# Patient Record
Sex: MALE | Race: WHITE | NOT HISPANIC OR LATINO | Employment: OTHER | URBAN - METROPOLITAN AREA
[De-identification: names, ages, dates, MRNs, and addresses within clinical notes are randomized per-mention and may not be internally consistent; named-entity substitution may affect disease eponyms.]

---

## 2017-01-03 DIAGNOSIS — R18.8 CIRRHOSIS OF LIVER WITH ASCITES, UNSPECIFIED HEPATIC CIRRHOSIS TYPE: ICD-10-CM

## 2017-01-03 DIAGNOSIS — C22.0 HCC (HEPATOCELLULAR CARCINOMA): Primary | ICD-10-CM

## 2017-01-03 DIAGNOSIS — Z76.82 LIVER TRANSPLANT CANDIDATE: ICD-10-CM

## 2017-01-03 DIAGNOSIS — K74.60 CIRRHOSIS OF LIVER WITH ASCITES, UNSPECIFIED HEPATIC CIRRHOSIS TYPE: ICD-10-CM

## 2017-01-03 PROBLEM — B18.2 HEP C W/O COMA, CHRONIC: Status: ACTIVE | Noted: 2017-01-03

## 2017-01-05 ENCOUNTER — TELEPHONE (OUTPATIENT)
Dept: TRANSPLANT | Facility: CLINIC | Age: 57
End: 2017-01-05

## 2017-01-05 NOTE — TELEPHONE ENCOUNTER
CAN faxed completed referral to [facility] for check in on 1/17/17 and check out on 1/19/17.  Sunil at  not available this evening to confirm availability.   CAN spoke with pts wife to have her call tomorrow to check for availability since referral has been faxed already.    CAN remains available for all transplant resources, education and psychosocial support.

## 2017-01-05 NOTE — TELEPHONE ENCOUNTER
----- Message from Sarah Perry sent at 1/5/2017  3:20 PM CST -----  Patients coming for procedures on 1/18 and 1/19.  He's requesting Argyle Milnor 1/17-1/19    [phone] (H)  [phone] (M)

## 2017-01-12 DIAGNOSIS — C22.0 HCC (HEPATOCELLULAR CARCINOMA): Primary | ICD-10-CM

## 2017-01-16 ENCOUNTER — TELEPHONE (OUTPATIENT)
Dept: TRANSPLANT | Facility: CLINIC | Age: 57
End: 2017-01-16

## 2017-01-16 NOTE — TELEPHONE ENCOUNTER
TACE scheduled 1/18/17 @ 1130.  Patient and wife notified.  Medication and allergy cards reviewed.  Pre-procedure instructions reviewed, including: fasting after midinight 1/17/17.  Understanding and agreement expressed.

## 2017-01-16 NOTE — TELEPHONE ENCOUNTER
"States patient had a little nausea this morning but is feeling better now. States patient thinks his symptoms are related to the antibiotic and thinks a stronger "stomach pill."     Advised I will review with MD and notify of recommendations.  Understanding expressed.      ----- Message from Mariama Hernandez sent at 1/16/2017  1:23 PM CST -----  Contact: Barbar/wife  Wants to speak with Coord pt is having a hard time tolerating some meds and wanted to talk with her about this, pt thinks it is the antibiotic, please call her @ # [phone].    "

## 2017-01-17 DIAGNOSIS — C22.0 HCC (HEPATOCELLULAR CARCINOMA): Primary | ICD-10-CM

## 2017-01-18 ENCOUNTER — HOSPITAL ENCOUNTER (INPATIENT)
Facility: HOSPITAL | Age: 57
LOS: 9 days | Discharge: LEFT BEFORE DISCHARGE | DRG: 640 | End: 2017-01-27
Attending: INTERNAL MEDICINE | Admitting: RADIOLOGY
Payer: MEDICARE

## 2017-01-18 ENCOUNTER — RESEARCH ENCOUNTER (OUTPATIENT)
Dept: TRANSPLANT | Facility: CLINIC | Age: 57
End: 2017-01-18

## 2017-01-18 ENCOUNTER — ANESTHESIA EVENT (OUTPATIENT)
Dept: ENDOSCOPY | Facility: HOSPITAL | Age: 57
End: 2017-01-18
Payer: MEDICARE

## 2017-01-18 DIAGNOSIS — K70.31 ALCOHOLIC CIRRHOSIS OF LIVER WITH ASCITES: ICD-10-CM

## 2017-01-18 DIAGNOSIS — K74.60 CIRRHOSIS OF LIVER WITH ASCITES: ICD-10-CM

## 2017-01-18 DIAGNOSIS — E88.09 HYPOALBUMINEMIA DUE TO PROTEIN-CALORIE MALNUTRITION: ICD-10-CM

## 2017-01-18 DIAGNOSIS — D69.6 THROMBOCYTOPENIA: ICD-10-CM

## 2017-01-18 DIAGNOSIS — K74.60 DECOMPENSATED HEPATIC CIRRHOSIS: ICD-10-CM

## 2017-01-18 DIAGNOSIS — R18.8 CIRRHOSIS OF LIVER WITH ASCITES, UNSPECIFIED HEPATIC CIRRHOSIS TYPE: Primary | ICD-10-CM

## 2017-01-18 DIAGNOSIS — C22.0 HCC (HEPATOCELLULAR CARCINOMA): ICD-10-CM

## 2017-01-18 DIAGNOSIS — E46 HYPOALBUMINEMIA DUE TO PROTEIN-CALORIE MALNUTRITION: ICD-10-CM

## 2017-01-18 DIAGNOSIS — J84.9 ILD (INTERSTITIAL LUNG DISEASE): ICD-10-CM

## 2017-01-18 DIAGNOSIS — Z01.811 PRE-OPERATIVE RESPIRATORY EXAMINATION: ICD-10-CM

## 2017-01-18 DIAGNOSIS — D63.8 ANEMIA OF CHRONIC DISEASE: ICD-10-CM

## 2017-01-18 DIAGNOSIS — B18.2 HEP C W/O COMA, CHRONIC: ICD-10-CM

## 2017-01-18 DIAGNOSIS — I95.9 ARTERIAL HYPOTENSION: ICD-10-CM

## 2017-01-18 DIAGNOSIS — K74.60 CIRRHOSIS OF LIVER WITH ASCITES, UNSPECIFIED HEPATIC CIRRHOSIS TYPE: Primary | ICD-10-CM

## 2017-01-18 DIAGNOSIS — E87.5 HYPERKALEMIA: ICD-10-CM

## 2017-01-18 DIAGNOSIS — I95.9 HYPOTENSION, UNSPECIFIED HYPOTENSION TYPE: ICD-10-CM

## 2017-01-18 DIAGNOSIS — K72.90 DECOMPENSATED HEPATIC CIRRHOSIS: ICD-10-CM

## 2017-01-18 DIAGNOSIS — E87.1 HYPONATREMIA: ICD-10-CM

## 2017-01-18 DIAGNOSIS — K72.91 HEPATIC COMA/ENCEPHALOPATHY: ICD-10-CM

## 2017-01-18 DIAGNOSIS — I34.9 NONRHEUMATIC MITRAL VALVE DISORDER: ICD-10-CM

## 2017-01-18 DIAGNOSIS — R18.8 CIRRHOSIS OF LIVER WITH ASCITES: ICD-10-CM

## 2017-01-18 PROCEDURE — 80048 BASIC METABOLIC PNL TOTAL CA: CPT

## 2017-01-18 PROCEDURE — 11000001 HC ACUTE MED/SURG PRIVATE ROOM

## 2017-01-18 PROCEDURE — 25000003 PHARM REV CODE 250: Performed by: HOSPITALIST

## 2017-01-18 PROCEDURE — 99223 1ST HOSP IP/OBS HIGH 75: CPT | Mod: ,,, | Performed by: HOSPITALIST

## 2017-01-18 PROCEDURE — 36415 COLL VENOUS BLD VENIPUNCTURE: CPT

## 2017-01-18 RX ORDER — LIDOCAINE HYDROCHLORIDE 10 MG/ML
1 INJECTION, SOLUTION EPIDURAL; INFILTRATION; INTRACAUDAL; PERINEURAL ONCE
Status: DISCONTINUED | OUTPATIENT
Start: 2017-01-18 | End: 2017-01-28 | Stop reason: HOSPADM

## 2017-01-18 RX ORDER — FUROSEMIDE 40 MG/1
40 TABLET ORAL DAILY
Status: DISCONTINUED | OUTPATIENT
Start: 2017-01-18 | End: 2017-01-18

## 2017-01-18 RX ORDER — LACTULOSE 10 G/15ML
30 SOLUTION ORAL 3 TIMES DAILY
Status: DISCONTINUED | OUTPATIENT
Start: 2017-01-18 | End: 2017-01-18

## 2017-01-18 RX ORDER — LACTULOSE 10 G/15ML
30 SOLUTION ORAL EVERY 6 HOURS
Status: DISCONTINUED | OUTPATIENT
Start: 2017-01-18 | End: 2017-01-20

## 2017-01-18 RX ORDER — ACETAMINOPHEN 325 MG/1
650 TABLET ORAL EVERY 4 HOURS PRN
Status: DISCONTINUED | OUTPATIENT
Start: 2017-01-18 | End: 2017-01-28 | Stop reason: HOSPADM

## 2017-01-18 RX ORDER — SODIUM CHLORIDE 9 MG/ML
INJECTION, SOLUTION INTRAVENOUS CONTINUOUS
Status: DISCONTINUED | OUTPATIENT
Start: 2017-01-18 | End: 2017-01-18

## 2017-01-18 RX ORDER — DIPHENHYDRAMINE HYDROCHLORIDE 50 MG/ML
50 INJECTION INTRAMUSCULAR; INTRAVENOUS ONCE
Status: DISCONTINUED | OUTPATIENT
Start: 2017-01-18 | End: 2017-01-28 | Stop reason: HOSPADM

## 2017-01-18 RX ORDER — SPIRONOLACTONE 50 MG/1
50 TABLET, FILM COATED ORAL DAILY
Status: DISCONTINUED | OUTPATIENT
Start: 2017-01-18 | End: 2017-01-18

## 2017-01-18 RX ORDER — GLUCAGON 1 MG
1 KIT INJECTION
Status: DISCONTINUED | OUTPATIENT
Start: 2017-01-18 | End: 2017-01-28 | Stop reason: HOSPADM

## 2017-01-18 RX ORDER — ONDANSETRON 8 MG/1
8 TABLET, ORALLY DISINTEGRATING ORAL EVERY 8 HOURS PRN
Status: DISCONTINUED | OUTPATIENT
Start: 2017-01-18 | End: 2017-01-28 | Stop reason: HOSPADM

## 2017-01-18 RX ORDER — DIAZEPAM 5 MG/1
10 TABLET ORAL 2 TIMES DAILY
Status: DISCONTINUED | OUTPATIENT
Start: 2017-01-18 | End: 2017-01-18

## 2017-01-18 RX ORDER — HYDROMORPHONE HYDROCHLORIDE 1 MG/ML
1 INJECTION, SOLUTION INTRAMUSCULAR; INTRAVENOUS; SUBCUTANEOUS EVERY 6 HOURS PRN
Status: DISCONTINUED | OUTPATIENT
Start: 2017-01-18 | End: 2017-01-24

## 2017-01-18 RX ORDER — SODIUM CHLORIDE 9 MG/ML
INJECTION, SOLUTION INTRAVENOUS CONTINUOUS
Status: DISCONTINUED | OUTPATIENT
Start: 2017-01-18 | End: 2017-01-19

## 2017-01-18 RX ORDER — OXYCODONE HYDROCHLORIDE 5 MG/1
30 TABLET ORAL EVERY 8 HOURS
Status: DISCONTINUED | OUTPATIENT
Start: 2017-01-18 | End: 2017-01-25

## 2017-01-18 RX ORDER — IBUPROFEN 200 MG
24 TABLET ORAL
Status: DISCONTINUED | OUTPATIENT
Start: 2017-01-18 | End: 2017-01-28 | Stop reason: HOSPADM

## 2017-01-18 RX ORDER — MORPHINE SULFATE 30 MG/1
30 TABLET, FILM COATED, EXTENDED RELEASE ORAL EVERY 12 HOURS
Status: DISCONTINUED | OUTPATIENT
Start: 2017-01-18 | End: 2017-01-25

## 2017-01-18 RX ORDER — PANTOPRAZOLE SODIUM 40 MG/1
40 TABLET, DELAYED RELEASE ORAL DAILY
Status: DISCONTINUED | OUTPATIENT
Start: 2017-01-18 | End: 2017-01-28 | Stop reason: HOSPADM

## 2017-01-18 RX ORDER — IBUPROFEN 200 MG
16 TABLET ORAL
Status: DISCONTINUED | OUTPATIENT
Start: 2017-01-18 | End: 2017-01-28 | Stop reason: HOSPADM

## 2017-01-18 RX ADMIN — ONDANSETRON 8 MG: 8 TABLET, ORALLY DISINTEGRATING ORAL at 07:01

## 2017-01-18 RX ADMIN — SODIUM CHLORIDE: 0.9 INJECTION, SOLUTION INTRAVENOUS at 06:01

## 2017-01-18 RX ADMIN — OXYCODONE HYDROCHLORIDE 30 MG: 5 TABLET ORAL at 06:01

## 2017-01-18 RX ADMIN — MORPHINE SULFATE 30 MG: 30 TABLET, EXTENDED RELEASE ORAL at 09:01

## 2017-01-18 RX ADMIN — FUROSEMIDE 40 MG: 40 TABLET ORAL at 06:01

## 2017-01-18 RX ADMIN — PANTOPRAZOLE SODIUM 40 MG: 40 TABLET, DELAYED RELEASE ORAL at 06:01

## 2017-01-18 RX ADMIN — LACTULOSE 30 G: 10 SOLUTION ORAL at 06:01

## 2017-01-18 NOTE — NURSING TRANSFER
Nursing Transfer Note      1/18/2017     Transfer To: [address]    Transfer via stretcher    Transported by PCT    Notified: Olga COBIAN    Patient reassessed at: 1/18/17 1440    Arrival to floor: bed in lowest position

## 2017-01-18 NOTE — PROGRESS NOTES
18 Jan 2017      IRB# [id number]  An observational study to evaluate patients receiving transarterial chemoembolization (TACE) therapy to define the link between tumor-elicited peripheral cell populations, and the risk of hepatocellular carcinoma (HCC) recurrence, before OLT.  : Александр Aguilar MD   Initials: RCALEB  Subject#: [id number]  The informed consent form was read as the patient followed along and discussed extensively, and ample time was provided for questions and answers. Pt denied participating in ant other research studies. Consent was signed at 11:00 am
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
## 2017-01-18 NOTE — PROGRESS NOTES
Pt resting in bed.  Wife at bedside.  Mami PO fluids, jello and crackers.  No distress noted.  Awaiting bed assignment.

## 2017-01-18 NOTE — NURSING
Pt admitted to floor, stable, VSS, no complaints at this time noted or stated, I.S at bedside, SCDs intact, will hand over to nurse. Olga Marks RN

## 2017-01-19 ENCOUNTER — TELEPHONE (OUTPATIENT)
Dept: TRANSPLANT | Facility: CLINIC | Age: 57
End: 2017-01-19

## 2017-01-19 ENCOUNTER — ANESTHESIA (OUTPATIENT)
Dept: ENDOSCOPY | Facility: HOSPITAL | Age: 57
End: 2017-01-19
Payer: MEDICARE

## 2017-01-19 LAB
ALBUMIN SERPL BCP-MCNC: 2.3 G/DL
ALP SERPL-CCNC: 158 U/L
ALT SERPL W/O P-5'-P-CCNC: 32 U/L
AMMONIA PLAS-SCNC: 87 UMOL/L
ANION GAP SERPL CALC-SCNC: 4 MMOL/L
ANION GAP SERPL CALC-SCNC: 5 MMOL/L
ANION GAP SERPL CALC-SCNC: 6 MMOL/L
APPEARANCE FLD: NORMAL
AST SERPL-CCNC: 75 U/L
BASOPHILS # BLD AUTO: 0.03 K/UL
BASOPHILS NFR BLD: 0.7 %
BILIRUB SERPL-MCNC: 2 MG/DL
BODY FLD TYPE: NORMAL
BUN SERPL-MCNC: 11 MG/DL
BUN SERPL-MCNC: 9 MG/DL
BUN SERPL-MCNC: 9 MG/DL
CALCIUM SERPL-MCNC: 7.6 MG/DL
CALCIUM SERPL-MCNC: 7.7 MG/DL
CALCIUM SERPL-MCNC: 7.9 MG/DL
CHLORIDE SERPL-SCNC: 97 MMOL/L
CHLORIDE SERPL-SCNC: 98 MMOL/L
CHLORIDE SERPL-SCNC: 99 MMOL/L
CO2 SERPL-SCNC: 19 MMOL/L
CO2 SERPL-SCNC: 21 MMOL/L
CO2 SERPL-SCNC: 21 MMOL/L
COLOR FLD: YELLOW
CREAT SERPL-MCNC: 0.9 MG/DL
DIFFERENTIAL METHOD: ABNORMAL
EOSINOPHIL # BLD AUTO: 0.1 K/UL
EOSINOPHIL NFR BLD: 3.1 %
ERYTHROCYTE [DISTWIDTH] IN BLOOD BY AUTOMATED COUNT: 14.8 %
EST. GFR  (AFRICAN AMERICAN): >60 ML/MIN/1.73 M^2
EST. GFR  (NON AFRICAN AMERICAN): >60 ML/MIN/1.73 M^2
GLUCOSE SERPL-MCNC: 100 MG/DL
GLUCOSE SERPL-MCNC: 111 MG/DL
GLUCOSE SERPL-MCNC: 96 MG/DL
HCT VFR BLD AUTO: 29.3 %
HGB BLD-MCNC: 10.5 G/DL
LYMPHOCYTES # BLD AUTO: 1.2 K/UL
LYMPHOCYTES NFR BLD: 28 %
LYMPHOCYTES NFR FLD MANUAL: 54 %
MAGNESIUM SERPL-MCNC: 1.4 MG/DL
MCH RBC QN AUTO: 34.7 PG
MCHC RBC AUTO-ENTMCNC: 35.8 %
MCV RBC AUTO: 97 FL
MESOTHL CELL NFR FLD MANUAL: 2 %
MONOCYTES # BLD AUTO: 0.4 K/UL
MONOCYTES NFR BLD: 9.4 %
MONOS+MACROS NFR FLD MANUAL: 31 %
NEUTROPHILS # BLD AUTO: 2.4 K/UL
NEUTROPHILS NFR BLD: 58.6 %
NEUTROPHILS NFR FLD MANUAL: 13 %
PHOSPHATE SERPL-MCNC: 3.1 MG/DL
PLATELET # BLD AUTO: 74 K/UL
PMV BLD AUTO: 9.8 FL
POTASSIUM SERPL-SCNC: 4.3 MMOL/L
POTASSIUM SERPL-SCNC: 4.5 MMOL/L
POTASSIUM SERPL-SCNC: 4.6 MMOL/L
PREALB SERPL-MCNC: 4 MG/DL
PROT SERPL-MCNC: 5.4 G/DL
RBC # BLD AUTO: 3.03 M/UL
SODIUM SERPL-SCNC: 122 MMOL/L
SODIUM SERPL-SCNC: 124 MMOL/L
SODIUM SERPL-SCNC: 124 MMOL/L
WBC # BLD AUTO: 4.14 K/UL
WBC # FLD: 0 /CU MM

## 2017-01-19 PROCEDURE — 97110 THERAPEUTIC EXERCISES: CPT

## 2017-01-19 PROCEDURE — 36415 COLL VENOUS BLD VENIPUNCTURE: CPT

## 2017-01-19 PROCEDURE — 97165 OT EVAL LOW COMPLEX 30 MIN: CPT

## 2017-01-19 PROCEDURE — 63600175 PHARM REV CODE 636 W HCPCS: Performed by: HOSPITALIST

## 2017-01-19 PROCEDURE — 97535 SELF CARE MNGMENT TRAINING: CPT

## 2017-01-19 PROCEDURE — 25000003 PHARM REV CODE 250: Performed by: HOSPITALIST

## 2017-01-19 PROCEDURE — P9047 ALBUMIN (HUMAN), 25%, 50ML: HCPCS | Performed by: HOSPITALIST

## 2017-01-19 PROCEDURE — 80048 BASIC METABOLIC PNL TOTAL CA: CPT

## 2017-01-19 PROCEDURE — 0W9G3ZZ DRAINAGE OF PERITONEAL CAVITY, PERCUTANEOUS APPROACH: ICD-10-PCS | Performed by: FAMILY MEDICINE

## 2017-01-19 PROCEDURE — 99233 SBSQ HOSP IP/OBS HIGH 50: CPT | Mod: GC,,, | Performed by: INTERNAL MEDICINE

## 2017-01-19 PROCEDURE — 84100 ASSAY OF PHOSPHORUS: CPT

## 2017-01-19 PROCEDURE — 99233 SBSQ HOSP IP/OBS HIGH 50: CPT | Mod: ,,, | Performed by: HOSPITALIST

## 2017-01-19 PROCEDURE — 80053 COMPREHEN METABOLIC PANEL: CPT

## 2017-01-19 PROCEDURE — 11000001 HC ACUTE MED/SURG PRIVATE ROOM

## 2017-01-19 PROCEDURE — 89051 BODY FLUID CELL COUNT: CPT

## 2017-01-19 PROCEDURE — 85025 COMPLETE CBC W/AUTO DIFF WBC: CPT

## 2017-01-19 PROCEDURE — 84134 ASSAY OF PREALBUMIN: CPT

## 2017-01-19 PROCEDURE — 83735 ASSAY OF MAGNESIUM: CPT

## 2017-01-19 PROCEDURE — 82140 ASSAY OF AMMONIA: CPT

## 2017-01-19 PROCEDURE — 90792 PSYCH DIAG EVAL W/MED SRVCS: CPT | Mod: ,,, | Performed by: PSYCHIATRY & NEUROLOGY

## 2017-01-19 RX ORDER — ALBUMIN HUMAN 250 G/1000ML
25 SOLUTION INTRAVENOUS ONCE
Status: COMPLETED | OUTPATIENT
Start: 2017-01-19 | End: 2017-01-19

## 2017-01-19 RX ADMIN — MORPHINE SULFATE 30 MG: 30 TABLET, EXTENDED RELEASE ORAL at 08:01

## 2017-01-19 RX ADMIN — MAGNESIUM SULFATE HEPTAHYDRATE 3 G: 500 INJECTION, SOLUTION INTRAMUSCULAR; INTRAVENOUS at 09:01

## 2017-01-19 RX ADMIN — ALBUMIN (HUMAN) 25 G: 25 SOLUTION INTRAVENOUS at 02:01

## 2017-01-19 RX ADMIN — LACTULOSE 30 G: 10 SOLUTION ORAL at 08:01

## 2017-01-19 RX ADMIN — OXYCODONE HYDROCHLORIDE 30 MG: 5 TABLET ORAL at 05:01

## 2017-01-19 RX ADMIN — PANTOPRAZOLE SODIUM 40 MG: 40 TABLET, DELAYED RELEASE ORAL at 09:01

## 2017-01-19 RX ADMIN — RIFAXIMIN 550 MG: 550 TABLET ORAL at 09:01

## 2017-01-19 RX ADMIN — OXYCODONE HYDROCHLORIDE 30 MG: 5 TABLET ORAL at 01:01

## 2017-01-19 RX ADMIN — RIFAXIMIN 550 MG: 550 TABLET ORAL at 08:01

## 2017-01-19 RX ADMIN — HYDROMORPHONE HYDROCHLORIDE 1 MG: 1 INJECTION, SOLUTION INTRAMUSCULAR; INTRAVENOUS; SUBCUTANEOUS at 04:01

## 2017-01-19 RX ADMIN — LACTULOSE 30 G: 10 SOLUTION ORAL at 05:01

## 2017-01-19 RX ADMIN — LACTULOSE 30 G: 10 SOLUTION ORAL at 01:01

## 2017-01-19 RX ADMIN — RIFAXIMIN 550 MG: 550 TABLET ORAL at 01:01

## 2017-01-19 RX ADMIN — OXYCODONE HYDROCHLORIDE 30 MG: 5 TABLET ORAL at 10:01

## 2017-01-19 RX ADMIN — MORPHINE SULFATE 30 MG: 30 TABLET, EXTENDED RELEASE ORAL at 09:01

## 2017-01-19 RX ADMIN — LACTULOSE 30 G: 10 SOLUTION ORAL at 12:01
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
## 2017-01-19 NOTE — PROCEDURES
Radiology Post-Procedure Note    Pre Op Diagnosis: Ascites  Post Op Diagnosis: Same    Procedure: Ultrasound Guided Paracentesis    Procedure performed by: Danielle HODGES, Mariama     Written Informed Consent Obtained: Yes  Specimen Removed: YES cloudy yellow  Estimated Blood Loss: Minimal    Findings:   Successful paracentesis.  Albumin administered PRN per protocol.    Patient tolerated procedure well.    Mariama Santos, APRN, FNP  Interventional Radiology  [phone] [employer]

## 2017-01-19 NOTE — TELEPHONE ENCOUNTER
Appropriate liver transplant team members notified of patient's admit via e-mail    Tommy Hopper [id number]  Alcoholic Cirrhosis, HCV/HCC  In evaluation (pending psych, egd/colon)    Patient was scheduled for TACE on 1/18 and Psych 1/19.  He was admitted yesterday w/hyponatremia to bed 523 B, Dr Castro.    TACE was not performed
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
## 2017-01-19 NOTE — PROGRESS NOTES
Paracentesis completed, pt tolerated well. No apparent distress noted. 3.4 Liters removed from left abdomen, mepore applied CDI. Labs collected and sent. Albumin 100 ml given. Pt to be transferred to [facility] via stretcher, accompanied by transport team. Report called to Eunice.

## 2017-01-19 NOTE — PT/OT/SLP PROGRESS
Physical Therapy      Tommy Hopper  MRN: [id number]    PT orders received and acknowledged.  Patient not seen today secondary to patient unavailable. Initial eval attempted 2x.  He was off the floor for tests in the morning and the afternoon.  . Will follow-up tomorrow as able.    Berta Gallegos, PT   1/19/2017
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
## 2017-01-19 NOTE — PLAN OF CARE
Problem: Patient Care Overview  Goal: Plan of Care Review  Outcome: Ongoing (interventions implemented as appropriate)  AAOx4, VSS, No falls noted, fall precautions remain. Skin intact, Pain assessed, no pain noted. Call light within reach, bed wheels locked, bed in lowest position, side rails ^x2, safety maintained. NADN, Will continue monitor.

## 2017-01-19 NOTE — PLAN OF CARE
Problem: Occupational Therapy Goal  Goal: Occupational Therapy Goal  No goals set this date 2* high (I) demonstrated during eval as well as pt report no need for therapy when POC discussed. Pt does not require further OT at this time due to performing functional tasks at baseline.  Outcome: Outcome(s) achieved Date Met:  01/19/17  OT eval completed and pt DC'd from acute OT per request and presentation of near baseline performance with daily tasks.   DESTINEE Goode  1/19/2017

## 2017-01-19 NOTE — PROGRESS NOTES
Pt arrived to IR room 125 for paracentesis, no acute distress noted. Orders and labs reviewed on chart. Awaiting consent.

## 2017-01-20 ENCOUNTER — DOCUMENTATION ONLY (OUTPATIENT)
Dept: PHARMACY | Facility: HOSPITAL | Age: 57
End: 2017-01-20

## 2017-01-20 LAB
ALBUMIN SERPL BCP-MCNC: 2.4 G/DL
ALP SERPL-CCNC: 155 U/L
ALT SERPL W/O P-5'-P-CCNC: 30 U/L
AMMONIA PLAS-SCNC: 49 UMOL/L
ANION GAP SERPL CALC-SCNC: 8 MMOL/L
AST SERPL-CCNC: 78 U/L
BASOPHILS # BLD AUTO: 0.02 K/UL
BASOPHILS NFR BLD: 0.3 %
BILIRUB SERPL-MCNC: 1.9 MG/DL
BUN SERPL-MCNC: 9 MG/DL
CALCIUM SERPL-MCNC: 7.6 MG/DL
CHLORIDE SERPL-SCNC: 99 MMOL/L
CO2 SERPL-SCNC: 18 MMOL/L
CREAT SERPL-MCNC: 0.9 MG/DL
DIFFERENTIAL METHOD: ABNORMAL
EOSINOPHIL # BLD AUTO: 0.1 K/UL
EOSINOPHIL NFR BLD: 0.8 %
ERYTHROCYTE [DISTWIDTH] IN BLOOD BY AUTOMATED COUNT: 15 %
EST. GFR  (AFRICAN AMERICAN): >60 ML/MIN/1.73 M^2
EST. GFR  (NON AFRICAN AMERICAN): >60 ML/MIN/1.73 M^2
GLUCOSE SERPL-MCNC: 133 MG/DL
HCT VFR BLD AUTO: 28.6 %
HGB BLD-MCNC: 10.4 G/DL
LYMPHOCYTES # BLD AUTO: 0.9 K/UL
LYMPHOCYTES NFR BLD: 14 %
MAGNESIUM SERPL-MCNC: 1.7 MG/DL
MCH RBC QN AUTO: 34.6 PG
MCHC RBC AUTO-ENTMCNC: 36.4 %
MCV RBC AUTO: 95 FL
MONOCYTES # BLD AUTO: 0.6 K/UL
MONOCYTES NFR BLD: 9 %
NEUTROPHILS # BLD AUTO: 4.8 K/UL
NEUTROPHILS NFR BLD: 75.6 %
PHOSPHATE SERPL-MCNC: 3.4 MG/DL
PLATELET # BLD AUTO: 69 K/UL
PMV BLD AUTO: 9.8 FL
POTASSIUM SERPL-SCNC: 4.5 MMOL/L
PROT SERPL-MCNC: 5.4 G/DL
RBC # BLD AUTO: 3.01 M/UL
SODIUM SERPL-SCNC: 125 MMOL/L
WBC # BLD AUTO: 6.35 K/UL

## 2017-01-20 PROCEDURE — 99233 SBSQ HOSP IP/OBS HIGH 50: CPT | Mod: ,,, | Performed by: HOSPITALIST

## 2017-01-20 PROCEDURE — 83735 ASSAY OF MAGNESIUM: CPT

## 2017-01-20 PROCEDURE — 82140 ASSAY OF AMMONIA: CPT

## 2017-01-20 PROCEDURE — 63600175 PHARM REV CODE 636 W HCPCS: Performed by: HOSPITALIST

## 2017-01-20 PROCEDURE — 99232 SBSQ HOSP IP/OBS MODERATE 35: CPT | Mod: GC,,, | Performed by: INTERNAL MEDICINE

## 2017-01-20 PROCEDURE — 11000001 HC ACUTE MED/SURG PRIVATE ROOM

## 2017-01-20 PROCEDURE — 97161 PT EVAL LOW COMPLEX 20 MIN: CPT

## 2017-01-20 PROCEDURE — 80053 COMPREHEN METABOLIC PANEL: CPT

## 2017-01-20 PROCEDURE — 25000003 PHARM REV CODE 250: Performed by: HOSPITALIST

## 2017-01-20 PROCEDURE — 84100 ASSAY OF PHOSPHORUS: CPT

## 2017-01-20 PROCEDURE — 85025 COMPLETE CBC W/AUTO DIFF WBC: CPT

## 2017-01-20 PROCEDURE — 36415 COLL VENOUS BLD VENIPUNCTURE: CPT

## 2017-01-20 RX ORDER — LACTULOSE 10 G/15ML
30 SOLUTION ORAL DAILY
Status: DISCONTINUED | OUTPATIENT
Start: 2017-01-21 | End: 2017-01-23

## 2017-01-20 RX ADMIN — HYPROMELLOSE 2910 1 DROP: 5 SOLUTION OPHTHALMIC at 01:01

## 2017-01-20 RX ADMIN — HYPROMELLOSE 2910 1 DROP: 5 SOLUTION OPHTHALMIC at 09:01

## 2017-01-20 RX ADMIN — MORPHINE SULFATE 30 MG: 30 TABLET, EXTENDED RELEASE ORAL at 09:01

## 2017-01-20 RX ADMIN — OXYCODONE HYDROCHLORIDE 30 MG: 5 TABLET ORAL at 01:01

## 2017-01-20 RX ADMIN — HYDROMORPHONE HYDROCHLORIDE 1 MG: 1 INJECTION, SOLUTION INTRAMUSCULAR; INTRAVENOUS; SUBCUTANEOUS at 03:01

## 2017-01-20 RX ADMIN — MORPHINE SULFATE 30 MG: 30 TABLET, EXTENDED RELEASE ORAL at 07:01

## 2017-01-20 RX ADMIN — OXYCODONE HYDROCHLORIDE 30 MG: 5 TABLET ORAL at 09:01

## 2017-01-20 RX ADMIN — RIFAXIMIN 550 MG: 550 TABLET ORAL at 07:01

## 2017-01-20 RX ADMIN — HYDROMORPHONE HYDROCHLORIDE 1 MG: 1 INJECTION, SOLUTION INTRAMUSCULAR; INTRAVENOUS; SUBCUTANEOUS at 08:01

## 2017-01-20 RX ADMIN — PANTOPRAZOLE SODIUM 40 MG: 40 TABLET, DELAYED RELEASE ORAL at 07:01

## 2017-01-20 RX ADMIN — HYPROMELLOSE 2910 1 DROP: 5 SOLUTION OPHTHALMIC at 10:01

## 2017-01-20 RX ADMIN — LACTULOSE 30 G: 10 SOLUTION ORAL at 06:01

## 2017-01-20 RX ADMIN — RIFAXIMIN 550 MG: 550 TABLET ORAL at 09:01

## 2017-01-20 RX ADMIN — OXYCODONE HYDROCHLORIDE 30 MG: 5 TABLET ORAL at 06:01

## 2017-01-20 RX ADMIN — HYDROMORPHONE HYDROCHLORIDE 1 MG: 1 INJECTION, SOLUTION INTRAMUSCULAR; INTRAVENOUS; SUBCUTANEOUS at 02:01
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
## 2017-01-20 NOTE — NURSING
IV access inadvertently pulled by pt.    Vanc due; x3 attempts made by staff, including charge RN.  Pt asking not to be stuck again.  Per notes, pt to be switched to PO atb tomorrow and d/c w/ hospice.  Resident on call made aware of same, will review get back to this RN.

## 2017-01-20 NOTE — PLAN OF CARE
Problem: Patient Care Overview  Goal: Plan of Care Review  Outcome: Ongoing (interventions implemented as appropriate)  Plan of care reviewed and updated. Pt AA+O. Pt's pain is managed with the medication ordered at this time. Pt's VS are as charted.  No falls this shift. Pt is oriented to room and call system. Will continue to [facility].

## 2017-01-20 NOTE — PROGRESS NOTES
Progress Note  Hospital Medicine    Admit Date: 1/18/2017    SUBJECTIVE:     Follow-up For: Hyponatremia      HPI/Interval history (See H&P for complete P,F,SHx) : patient still has not had a BM over night; AAO times 3; went for paracentesis today with 3.4 L removal negative for SBP  - appreciate addiction psych recs;   - will need lactulose enema if he has not had a BM by tomorrow;   - patient has been approved for inpatient liver transplant evaluation    Review of Systems:  Constitutional- Negative for Fever, positive for weakness  Neuro- Negative for Confusion, Negative for hallucinations  CV- Negative for Chest Pain, Negative for palpitations  Resp- Negative for Cough, Negative for SOB  GI- Negative for nausea, nomiting, Negative for diarrhea  Extrem- Negative for Pain, Negative for Swelling    OBJECTIVE:       Intake/Output Summary (Last 24 hours) at 01/19/17 2216  Last data filed at 01/19/17 1457   Gross per 24 hour   Intake                0 ml   Output             3400 ml   Net            -3400 ml       Vital Signs Range (Last 24H):  Temp:  [97.9 °F (36.6 °C)-98.8 °F (37.1 °C)]   Pulse:  [74-90]   Resp:  [18-20]   BP: ()/(55-64)   SpO2:  [94 %-97 %]     Physical Exam:  General: Well developed, well nourished male in NAD  HEENT: Conjunctiva clear; Oropharynx clear  Neck: No JVD noted, Supple  CV: Normal S1, S2 with no murmurs,gallops,rubs  Resp: Lungs CTA Bilaterally, Unlabored  Abdomen: NTND, BS normoactive x4 quads, soft  Extrem: No cyanosis, clubbing, edema.  Skin: No rashes, lesions, ulcers  Neuro: motor strength in tact, CN 2-12 intact  PSYCH: Oriented x3    Medications:  Medication list was reviewed and changes noted under Assessment/Plan.    Laboratory:  Recent Labs      01/18/17   1022  01/19/17   0419   WBC  6.37  4.14   RBC  3.31*  3.03*   HGB  11.3*  10.5*   HCT  31.5*  29.3*   PLT  91*  74*   MCV  95  97   MCH  34.1*  34.7*   MCHC  35.9  35.8       Recent Labs      01/18/17   1857  01/19/17    0419   GLU  100  96   NA  122*  124*   K  4.6  4.5   CL  97  98   CO2  19*  21*   BUN  11  9   CREATININE  0.9  0.9   MG   --   1.4*   PHOS   --   3.1       Diagnostic Results:    Labs reviewed     ASSESSMENT/PLAN:     Active Hospital Problems    Hepatic coma/encephalopathy      Anemia of chronic disease      Hypoalbuminemia due to protein-calorie malnutrition      Decompensated hepatic cirrhosis      Alcoholic cirrhosis of liver with ascites      Hyperkalemia      Hep C w/o coma, chronic      HCC (hepatocellular carcinoma)      Cirrhosis of liver with ascites      *Hyponatremia      Thrombocytopenia          Plan:      # Hyponatremia  - patient seems to be extremely dehydrated; will hold lasix  -  gentle hydration with NS at 100 cc/hr; sodium improved to 124, continue hydration for now, checking another BMP tonight; if begins to drop, will need to begin fluid restriction     # Decompensated alcohol and Hep C cirrhosis with ascites  # Hepatic Encephalopathy   # Hepatocellular Carcinoma  MELD-Na score: 11 at 1/19/2017  4:19 AM  MELD score: 11 at 1/19/2017  4:19 AM  Calculated from:  Serum Creatinine: 0.9 mg/dL (Rounded to 1) at 1/19/2017  4:19 AM  Serum Sodium: 124 mmol/L (Rounded to 125) at 1/19/2017  4:19 AM  Total Bilirubin: 2.0 mg/dL at 1/19/2017  4:19 AM  INR(ratio): 1.2 at 1/18/2017 10:22 AM  Age: 56 years       - hepatology consult, under going outpatient transplant evaluation, has been approved for inpatient  - actulose to 30 g PO Q6, goal to have 3 to 4 BMs daily; add rifaximin  - s/p paracentesis 1/19/17 with 3.4 L fluid removal negative for SBP  - addiction psych consulted and believe he is at moderate risk     # Hyperkalemia  - likely from aldactone; stoppped; resolved     # Anemia of chronic disease  # Thrombocytopenia  - monitor     # Chronic Pain syndrome  - as per patient, he is on morphine 30 mg PO BID and oxycodone 30mg PO TID; states he was on much more before and has been tapered down to this; has  multiple back and hip surgeries and HCC causing this pain     # Hypoalbuminemia due to moderate protein vero malnutrition  - PAB 4; boost    # Hypomagnesemia - replace     DVT prophylaxis- early ambulation     Discharge Disposition- possibly saturday    Time spent in care of patient (Greater than 1/2 spent in coordinating and counseling patient care):  30 minutes

## 2017-01-20 NOTE — PROGRESS NOTES
"Hepatology  Consult note      SUBJECTIVE:     Reason for Consult: ESLD    History of Present Illness:  Patient is a 56 y.o. male w/ a history of ETOH+HCV+HCC cirrhosis who was admitted to [facility] after he was found to be hyponatremic and confused when he presented to IR for scheduled TACE.    The patient is currently in outpatient liver transplant evaluation.  He has undergone TACE previously on 10/3/16 for a 3.3cm lesion.  A second lesion identified on CT on 11/4/16 was scheduled for TACE yesterday.      Today the patient reports a productive cough of 3 months' duration.  No fevers, chills, rigors.  Mental status back to baseline.      Interval Hx: No acute events overnight.  Patient has had "multiple" bowel movements.       OBJECTIVE:     Vital Signs (Most Recent)  Temp: 99.9 °F (37.7 °C) (01/20/17 1137)  Pulse: 100 (01/20/17 1137)  Resp: 18 (01/20/17 1137)  BP: (!) 105/56 (01/20/17 1137)  SpO2: 95 % (01/20/17 1137)    Physical Exam:  General appearance: chronically ill appearing, no acute distress  Eyes: anicteric sclerae  Lungs: bronchial breath sounds, productive cough  Heart: regular rate and rhythm, S1, S2 clearly audible,  Abdomen: soft, non-tender, non-distended; no rebound tenderness, rigidity, or guarding  Pulses: 2+ and symmetric  Skin: Skin color, texture, turgor normal.   Neurologic: No focal deficits noted      Laboratory    CBC:     Recent Labs  Lab 01/18/17  1022 01/19/17  0419 01/20/17  0540   WBC 6.37 4.14 6.35   RBC 3.31* 3.03* 3.01*   HGB 11.3* 10.5* 10.4*   HCT 31.5* 29.3* 28.6*   PLT 91* 74* 69*   MCV 95 97 95   MCH 34.1* 34.7* 34.6*   MCHC 35.9 35.8 36.4*     BMP:     Recent Labs  Lab 01/19/17  0419 01/19/17  2240 01/20/17  0540   GLU 96 111* 133*   * 124* 125*   K 4.5 4.3 4.5   CL 98 99 99   CO2 21* 21* 18*   BUN 9 9 9   CREATININE 0.9 0.9 0.9   CALCIUM 7.7* 7.6* 7.6*   MG 1.4*  --  1.7     Coagulation:     Recent Labs  Lab 01/18/17  1022   INR 1.2           ASSESSMENT/PLAN:     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

## 2017-01-20 NOTE — NURSING
PRE-TRANSPLANT NOTE:    This patient's medication therapy was evaluated as part of his pre-transplant evaluation.    The following pharmacologic concerns were noted: none    I am available for consultation and can be contacted, as needed by the other members of the Liver Transplant team.

## 2017-01-20 NOTE — PLAN OF CARE
Problem: Physical Therapy Goal  Goal: Physical Therapy Goal  Goals to be met by: 2017     Patient will increase functional independence with mobility by performin. Sit to stand transfer with Modified [place]  2. Bed to chair transfer with Modified [place] using Rolling Walker as needed  3. Gait training with Rollator walker to determine safety x 150 feet with SBA for mobility and safety  Outcome: Ongoing (interventions implemented as appropriate)  Initial eval completed.  Results, POC and goals discussed with patient.

## 2017-01-20 NOTE — NURSING
Pt refusing lactulose, educated on importance of complying with medication, pt states he had 6-7 BIG BMs  today, 2 on current shift, no others noted throughout day.  WCTM.

## 2017-01-20 NOTE — PLAN OF CARE
Future Appointments  Date Time Provider Department Center   2/24/2017 9:00 AM PULMONARY FUNCTION [facility]   2/24/2017 9:15 AM PULMONARY FUNCTION [facility]   2/24/2017 9:30 AM PULMONARY FUNCTION [facility]   2/24/2017 9:45 AM PULMONARY FUNCTION [facility]   2/24/2017 10:00 AM PULMONARY FUNCTION [facility]   2/24/2017 11:30 AM NOM CT1 64- LIMIT 450 LBS [facility]AN [facility]         [facility] [place] [address]  [address]  [place] [address]  Phone: [phone] Fax: [phone]      Payor: [employer] MANAGED MEDICARE / Plan: [employer] MEDICARE HMO / Product Type: Capitation /        01/20/17 1120   Discharge Assessment   Assessment Type Discharge Planning Assessment   Confirmed/corrected address and phone number on facesheet? Yes   Assessment information obtained from? Caregiver;Patient   Expected Length of Stay (days) 3   Prior to hospitilization cognitive status: Unable to Assess   Prior to hospitalization functional status: Assistive Equipment   Current cognitive status: Not Oriented to Time;Not Oriented to Place   Current Functional Status: Assistive Equipment   Arrived From home or self-care   Lives With spouse   Able to Return to Prior Arrangements unable to determine at this time (comments)   Is patient able to care for self after discharge? Unable to determine at this time (comments)   Who are your caregiver(s) and their phone number(s)? (Evelia Hopper  spouse [phone])   Patient currently receives home health services? No   Patient currently receives any other outside agency services? No   Equipment Currently Used at Home cane, straight  (rollator)   Does the patient have transportation to healthcare appointments? Yes   Transportation Available family or friend will provide   On Dialysis? No   Discharge Plan A Home Health   Discharge Plan B Home with family   Patient/Family In Agreement With Plan yes

## 2017-01-21 PROBLEM — I95.9 ARTERIAL HYPOTENSION: Status: ACTIVE | Noted: 2017-01-21

## 2017-01-21 LAB
ALBUMIN SERPL BCP-MCNC: 2.2 G/DL
ALP SERPL-CCNC: 133 U/L
ALT SERPL W/O P-5'-P-CCNC: 28 U/L
ANION GAP SERPL CALC-SCNC: 6 MMOL/L
AST SERPL-CCNC: 67 U/L
BASOPHILS # BLD AUTO: 0.04 K/UL
BASOPHILS NFR BLD: 0.6 %
BILIRUB SERPL-MCNC: 2 MG/DL
BUN SERPL-MCNC: 12 MG/DL
CALCIUM SERPL-MCNC: 7.5 MG/DL
CHLORIDE SERPL-SCNC: 98 MMOL/L
CO2 SERPL-SCNC: 17 MMOL/L
CREAT SERPL-MCNC: 0.9 MG/DL
DIFFERENTIAL METHOD: ABNORMAL
EOSINOPHIL # BLD AUTO: 0.3 K/UL
EOSINOPHIL NFR BLD: 4.4 %
ERYTHROCYTE [DISTWIDTH] IN BLOOD BY AUTOMATED COUNT: 14.7 %
EST. GFR  (AFRICAN AMERICAN): >60 ML/MIN/1.73 M^2
EST. GFR  (NON AFRICAN AMERICAN): >60 ML/MIN/1.73 M^2
GLUCOSE SERPL-MCNC: 105 MG/DL
HCT VFR BLD AUTO: 26.4 %
HGB BLD-MCNC: 9.6 G/DL
LYMPHOCYTES # BLD AUTO: 1.7 K/UL
LYMPHOCYTES NFR BLD: 24.6 %
MAGNESIUM SERPL-MCNC: 1.5 MG/DL
MCH RBC QN AUTO: 34.4 PG
MCHC RBC AUTO-ENTMCNC: 36.4 %
MCV RBC AUTO: 95 FL
MONOCYTES # BLD AUTO: 0.8 K/UL
MONOCYTES NFR BLD: 11.3 %
NEUTROPHILS # BLD AUTO: 4 K/UL
NEUTROPHILS NFR BLD: 58.8 %
PHOSPHATE SERPL-MCNC: 2.6 MG/DL
PLATELET # BLD AUTO: 64 K/UL
PMV BLD AUTO: 9.8 FL
POTASSIUM SERPL-SCNC: 4.6 MMOL/L
PROT SERPL-MCNC: 5 G/DL
RBC # BLD AUTO: 2.79 M/UL
SODIUM SERPL-SCNC: 121 MMOL/L
WBC # BLD AUTO: 6.84 K/UL

## 2017-01-21 PROCEDURE — 99233 SBSQ HOSP IP/OBS HIGH 50: CPT | Mod: ,,, | Performed by: HOSPITALIST

## 2017-01-21 PROCEDURE — 84100 ASSAY OF PHOSPHORUS: CPT

## 2017-01-21 PROCEDURE — 83735 ASSAY OF MAGNESIUM: CPT

## 2017-01-21 PROCEDURE — 11000001 HC ACUTE MED/SURG PRIVATE ROOM

## 2017-01-21 PROCEDURE — 63600175 PHARM REV CODE 636 W HCPCS: Performed by: HOSPITALIST

## 2017-01-21 PROCEDURE — 85025 COMPLETE CBC W/AUTO DIFF WBC: CPT

## 2017-01-21 PROCEDURE — 36415 COLL VENOUS BLD VENIPUNCTURE: CPT

## 2017-01-21 PROCEDURE — 99232 SBSQ HOSP IP/OBS MODERATE 35: CPT | Mod: GC,,, | Performed by: INTERNAL MEDICINE

## 2017-01-21 PROCEDURE — 80053 COMPREHEN METABOLIC PANEL: CPT

## 2017-01-21 PROCEDURE — 25000003 PHARM REV CODE 250: Performed by: HOSPITALIST

## 2017-01-21 RX ORDER — MIDODRINE HYDROCHLORIDE 2.5 MG/1
10 TABLET ORAL 3 TIMES DAILY
Status: DISCONTINUED | OUTPATIENT
Start: 2017-01-21 | End: 2017-01-22

## 2017-01-21 RX ADMIN — OXYCODONE HYDROCHLORIDE 30 MG: 5 TABLET ORAL at 08:01

## 2017-01-21 RX ADMIN — RIFAXIMIN 550 MG: 550 TABLET ORAL at 08:01

## 2017-01-21 RX ADMIN — PANTOPRAZOLE SODIUM 40 MG: 40 TABLET, DELAYED RELEASE ORAL at 09:01

## 2017-01-21 RX ADMIN — MORPHINE SULFATE 30 MG: 30 TABLET, EXTENDED RELEASE ORAL at 10:01

## 2017-01-21 RX ADMIN — HYPROMELLOSE 2910 1 DROP: 5 SOLUTION OPHTHALMIC at 10:01

## 2017-01-21 RX ADMIN — LACTULOSE 30 G: 10 SOLUTION ORAL at 10:01

## 2017-01-21 RX ADMIN — RIFAXIMIN 550 MG: 550 TABLET ORAL at 09:01

## 2017-01-21 RX ADMIN — MIDODRINE HYDROCHLORIDE 10 MG: 2.5 TABLET ORAL at 08:01

## 2017-01-21 RX ADMIN — MORPHINE SULFATE 30 MG: 30 TABLET, EXTENDED RELEASE ORAL at 08:01

## 2017-01-21 RX ADMIN — MAGNESIUM SULFATE HEPTAHYDRATE 3 G: 500 INJECTION, SOLUTION INTRAMUSCULAR; INTRAVENOUS at 06:01

## 2017-01-21 RX ADMIN — HYPROMELLOSE 2910 1 DROP: 5 SOLUTION OPHTHALMIC at 05:01

## 2017-01-21 RX ADMIN — OXYCODONE HYDROCHLORIDE 30 MG: 5 TABLET ORAL at 10:01

## 2017-01-21 RX ADMIN — MIDODRINE HYDROCHLORIDE 10 MG: 2.5 TABLET ORAL at 01:01

## 2017-01-21 RX ADMIN — OXYCODONE HYDROCHLORIDE 30 MG: 5 TABLET ORAL at 01:01

## 2017-01-21 RX ADMIN — HYPROMELLOSE 2910 1 DROP: 5 SOLUTION OPHTHALMIC at 01:01

## 2017-01-21 RX ADMIN — HYDROMORPHONE HYDROCHLORIDE 1 MG: 1 INJECTION, SOLUTION INTRAMUSCULAR; INTRAVENOUS; SUBCUTANEOUS at 10:01

## 2017-01-21 RX ADMIN — MIDODRINE HYDROCHLORIDE 10 MG: 2.5 TABLET ORAL at 10:01

## 2017-01-21 NOTE — PLAN OF CARE
Problem: Patient Care Overview  Goal: Plan of Care Review  Outcome: Ongoing (interventions implemented as appropriate)   Pt AAOx3. No falls noted, fall precautions in place. No skin breakdown noted. Pt encouraged to eat and drink. VSS. Will continue to monitor.

## 2017-01-21 NOTE — PROGRESS NOTES
Spoke with Dr. Atkins regarding pt BP of 90/52. Pt is alert and oriented. No concerns noted, no new orders noted. Will continue to monitor.

## 2017-01-21 NOTE — PROGRESS NOTES
Progress Note  Hospital Medicine    Admit Date: 1/18/2017    SUBJECTIVE:     Follow-up For: Hyponatremia      HPI/Interval history (See H&P for complete P,F,SHx) : patient with multiple BMs over night, states atleast 7; wife at the bedside;     Review of Systems:  Constitutional- Negative for Fever, positive for weakness  Neuro- Negative for Confusion, Negative for hallucinations  CV- Negative for Chest Pain, Negative for palpitations  Resp- Negative for Cough, Negative for SOB  GI- Negative for nausea, nomiting, Negative for diarrhea  Extrem- Negative for Pain, Negative for Swelling    OBJECTIVE:       Intake/Output Summary (Last 24 hours) at 01/21/17 1614  Last data filed at 01/21/17 1252   Gross per 24 hour   Intake              318 ml   Output                0 ml   Net              318 ml       Vital Signs Range (Last 24H):  Temp:  [97.5 °F (36.4 °C)-98.7 °F (37.1 °C)]   Pulse:  [73-86]   Resp:  [16-18]   BP: ()/(44-58)   SpO2:  [94 %-96 %]     Physical Exam:  General: Well developed, well nourished male in NAD  HEENT: Conjunctiva clear; Oropharynx clear  Neck: No JVD noted, Supple  CV: Normal S1, S2 with no murmurs,gallops,rubs  Resp: Lungs CTA Bilaterally, Unlabored  Abdomen: NTND, BS normoactive x4 quads, soft  Extrem: No cyanosis, clubbing, edema.  Skin: No rashes, lesions, ulcers  Neuro: motor strength in tact, CN 2-12 intact  PSYCH: Oriented x3    Medications:  Medication list was reviewed and changes noted under Assessment/Plan.    Laboratory:  Recent Labs      01/20/17   0540  01/21/17   0434   WBC  6.35  6.84   RBC  3.01*  2.79*   HGB  10.4*  9.6*   HCT  28.6*  26.4*   PLT  69*  64*   MCV  95  95   MCH  34.6*  34.4*   MCHC  36.4*  36.4*       Recent Labs      01/20/17   0540  01/21/17   0434   GLU  133*  105   NA  125*  121*   K  4.5  4.6   CL  99  98   CO2  18*  17*   BUN  9  12   CREATININE  0.9  0.9   MG  1.7  1.5*   PHOS  3.4  2.6*       Diagnostic Results:    Labs reviewed      ASSESSMENT/PLAN:     Active Hospital Problems    Hepatic coma/encephalopathy      Anemia of chronic disease      Hypoalbuminemia due to protein-calorie malnutrition      Decompensated hepatic cirrhosis      Alcoholic cirrhosis of liver with ascites      Hyperkalemia      Hep C w/o coma, chronic      HCC (hepatocellular carcinoma)      Cirrhosis of liver with ascites      *Hyponatremia      Thrombocytopenia          Plan:      # Hyponatremia  - patient seems to be extremely dehydrated; will hold lasix  -  gentle hydration with NS at 100 cc/hr; sodium improved to 125, holding hydration for now     # Decompensated alcohol and Hep C cirrhosis with ascites  # Hepatic Encephalopathy   # Hepatocellular Carcinoma  MELD-Na score: 11 at 1/20/2017  5:40 AM  MELD score: 11 at 1/20/2017  5:40 AM  Calculated from:  Serum Creatinine: 0.9 mg/dL (Rounded to 1) at 1/20/2017  5:40 AM  Serum Sodium: 125 mmol/L at 1/20/2017  5:40 AM  Total Bilirubin: 1.9 mg/dL at 1/20/2017  5:40 AM  INR(ratio): 1.2 at 1/18/2017 10:22 AM  Age: 56 years       - hepatology consult, under going outpatient transplant evaluation, has been approved for inpatient  - actulose to 30 g PO Q6, goal to have 3 to 4 BMs daily; add rifaximin  - s/p paracentesis 1/19/17 with 3.4 L fluid removal negative for SBP  - addiction psych consulted and believe he is at moderate risk     # Hyperkalemia  - likely from aldactone; stoppped; resolved     # Anemia of chronic disease  # Thrombocytopenia  - monitor     # Chronic Pain syndrome  - as per patient, he is on morphine 30 mg PO BID and oxycodone 30mg PO TID; states he was on much more before and has been tapered down to this; has multiple back and hip surgeries and HCC causing this pain     # Hypoalbuminemia due to moderate protein vero malnutrition  - PAB 4; boost    # Hypomagnesemia - replace     DVT prophylaxis- early ambulation     Discharge Disposition- possibly saturday    Time spent in care of patient (Greater than  1/2 spent in coordinating and counseling patient care):  30 minutes

## 2017-01-21 NOTE — PROGRESS NOTES
Progress Note  Hospital Medicine    Admit Date: 1/18/2017    SUBJECTIVE:     Follow-up For: Hyponatremia      HPI/Interval history (See H&P for complete P,F,SHx) : patient doing well today, complaining of pain, however is having low BPs and unable to get pain meds  - will start patient on midodrine 10 mg TID; wife at the bedside  - Na is 121 today, will start fluid restriction today    Review of Systems:  Constitutional- Negative for Fever, positive for weakness  Neuro- Negative for Confusion, Negative for hallucinations  CV- Negative for Chest Pain, Negative for palpitations  Resp- Negative for Cough, Negative for SOB  GI- Negative for nausea, nomiting, Negative for diarrhea  Extrem- Negative for Pain, Negative for Swelling    OBJECTIVE:       Intake/Output Summary (Last 24 hours) at 01/21/17 1615  Last data filed at 01/21/17 1252   Gross per 24 hour   Intake              318 ml   Output                0 ml   Net              318 ml       Vital Signs Range (Last 24H):  Temp:  [97.5 °F (36.4 °C)-98.7 °F (37.1 °C)]   Pulse:  [73-86]   Resp:  [16-18]   BP: ()/(44-58)   SpO2:  [94 %-96 %]     Physical Exam:  General: Well developed, well nourished male in NAD  HEENT: Conjunctiva clear; Oropharynx clear  Neck: No JVD noted, Supple  CV: Normal S1, S2 with no murmurs,gallops,rubs  Resp: Lungs CTA Bilaterally, Unlabored  Abdomen: NTND, BS normoactive x4 quads, soft  Extrem: No cyanosis, clubbing, edema.  Skin: No rashes, lesions, ulcers  Neuro: motor strength in tact, CN 2-12 intact  PSYCH: Oriented x3    Medications:  Medication list was reviewed and changes noted under Assessment/Plan.    Laboratory:  Recent Labs      01/20/17   0540  01/21/17   0434   WBC  6.35  6.84   RBC  3.01*  2.79*   HGB  10.4*  9.6*   HCT  28.6*  26.4*   PLT  69*  64*   MCV  95  95   MCH  34.6*  34.4*   MCHC  36.4*  36.4*       Recent Labs      01/20/17   0540  01/21/17   0434   GLU  133*  105   NA  125*  121*   K  4.5  4.6   CL  99  98    CO2  18*  17*   BUN  9  12   CREATININE  0.9  0.9   MG  1.7  1.5*   PHOS  3.4  2.6*       Diagnostic Results:    Labs reviewed     ASSESSMENT/PLAN:     Active Hospital Problems    Hepatic coma/encephalopathy      Anemia of chronic disease      Hypoalbuminemia due to protein-calorie malnutrition      Decompensated hepatic cirrhosis      Alcoholic cirrhosis of liver with ascites      Hyperkalemia      Hep C w/o coma, chronic      HCC (hepatocellular carcinoma)      Cirrhosis of liver with ascites      *Hyponatremia      Thrombocytopenia          Plan:      # Hyponatremia  - sodium dropped from 125 to 121, will start fluid restriction of 1L today;      # Decompensated alcohol and Hep C cirrhosis with ascites  # Hepatic Encephalopathy   # Hepatocellular Carcinoma  MELD-Na score: 11 at 1/20/2017  5:40 AM  MELD score: 11 at 1/20/2017  5:40 AM  Calculated from:  Serum Creatinine: 0.9 mg/dL (Rounded to 1) at 1/20/2017  5:40 AM  Serum Sodium: 125 mmol/L at 1/20/2017  5:40 AM  Total Bilirubin: 1.9 mg/dL at 1/20/2017  5:40 AM  INR(ratio): 1.2 at 1/18/2017 10:22 AM  Age: 56 years       - hepatology consult, under going outpatient transplant evaluation, has been approved for inpatient  - actulose to 30 g PO Q6, goal to have 3 to 4 BMs daily; add rifaximin  - s/p paracentesis 1/19/17 with 3.4 L fluid removal negative for SBP  - addiction psych consulted and believe he is at moderate risk    # Arterial Hypotension  - likely from liver disease, will start midodrine 10 mg PO TID     # Hyperkalemia  - likely from aldactone; stoppped; resolved     # Anemia of chronic disease  # Thrombocytopenia  - monitor     # Chronic Pain syndrome  - as per patient, he is on morphine 30 mg PO BID and oxycodone 30mg PO TID; states he was on much more before and has been tapered down to this; has multiple back and hip surgeries and HCC causing this pain     # Hypoalbuminemia due to moderate protein vero malnutrition  - PAB 4; boost    #  Hypomagnesemia - replace     DVT prophylaxis- early ambulation     Discharge Disposition- possibly monday    Time spent in care of patient (Greater than 1/2 spent in coordinating and counseling patient care):  30 minutes

## 2017-01-21 NOTE — PROGRESS NOTES
"Hepatology  Consult note      SUBJECTIVE:     Reason for Consult: ESLD    History of Present Illness:  Patient is a 56 y.o. male w/ a history of ETOH+HCV+HCC cirrhosis who [place] [facility] [place] he was found to be hyponatremic and confused when he presented to IR for scheduled TACE.    The patient is currently in outpatient liver transplant evaluation.  He has undergone TACE previously on 10/3/16 for a 3.3cm lesion.  A second lesion identified on CT on 11/4/16 was scheduled for TACE yesterday.      Today the patient reports a productive cough of 3 months' duration.  No fevers, chills, rigors.  Mental status back to baseline.      Interval Hx: Na+ dropped to 121 overnight.  Patient states he wants to go home "but can't because they won't give me a liver."      OBJECTIVE:     Vital Signs (Most Recent)  Temp: 97.8 °F (36.6 °C) (01/21/17 0759)  Pulse: 81 (01/21/17 0759)  Resp: 18 (01/21/17 0759)  BP: (!) 104/44 (01/21/17 0759)  SpO2: 96 % (01/21/17 0759)    Physical Exam:  General appearance: chronically ill appearing, no acute distress  Eyes: anicteric sclerae  Lungs: bronchial breath sounds, productive cough  Heart: regular rate and rhythm, S1, S2 clearly audible,  Abdomen: soft, non-tender, non-distended; no rebound tenderness, rigidity, or guarding  Pulses: 2+ and symmetric  Skin: Skin color, texture, turgor normal.   Neurologic: No focal deficits noted      Laboratory    CBC:     Recent Labs  Lab 01/19/17  0419 01/20/17  0540 01/21/17  0434   WBC 4.14 6.35 6.84   RBC 3.03* 3.01* 2.79*   HGB 10.5* 10.4* 9.6*   HCT 29.3* 28.6* 26.4*   PLT 74* 69* 64*   MCV 97 95 95   MCH 34.7* 34.6* 34.4*   MCHC 35.8 36.4* 36.4*     BMP:     Recent Labs  Lab 01/19/17  2240 01/20/17  0540 01/21/17  0434   * 133* 105   * 125* 121*   K 4.3 4.5 4.6   CL 99 99 98   CO2 21* 18* 17*   BUN 9 9 12   CREATININE 0.9 0.9 0.9   CALCIUM 7.6* 7.6* 7.5*   MG  --  1.7 1.5*     Coagulation:     Recent Labs  Lab 01/18/17  1022   INR 1.2 "           ASSESSMENT/PLAN:     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

## 2017-01-22 LAB
ALBUMIN SERPL BCP-MCNC: 2.1 G/DL
ALP SERPL-CCNC: 126 U/L
ALT SERPL W/O P-5'-P-CCNC: 31 U/L
ANION GAP SERPL CALC-SCNC: 5 MMOL/L
AST SERPL-CCNC: 78 U/L
BASOPHILS # BLD AUTO: 0.04 K/UL
BASOPHILS NFR BLD: 0.6 %
BILIRUB SERPL-MCNC: 1.3 MG/DL
BUN SERPL-MCNC: 11 MG/DL
CALCIUM SERPL-MCNC: 7.4 MG/DL
CHLORIDE SERPL-SCNC: 99 MMOL/L
CO2 SERPL-SCNC: 18 MMOL/L
CREAT SERPL-MCNC: 0.8 MG/DL
DIFFERENTIAL METHOD: ABNORMAL
EOSINOPHIL # BLD AUTO: 0.3 K/UL
EOSINOPHIL NFR BLD: 4.9 %
ERYTHROCYTE [DISTWIDTH] IN BLOOD BY AUTOMATED COUNT: 14.8 %
EST. GFR  (AFRICAN AMERICAN): >60 ML/MIN/1.73 M^2
EST. GFR  (NON AFRICAN AMERICAN): >60 ML/MIN/1.73 M^2
GLUCOSE SERPL-MCNC: 110 MG/DL
HCT VFR BLD AUTO: 26.4 %
HGB BLD-MCNC: 9.7 G/DL
LYMPHOCYTES # BLD AUTO: 1.4 K/UL
LYMPHOCYTES NFR BLD: 21 %
MAGNESIUM SERPL-MCNC: 2 MG/DL
MCH RBC QN AUTO: 34.5 PG
MCHC RBC AUTO-ENTMCNC: 36.7 %
MCV RBC AUTO: 94 FL
MONOCYTES # BLD AUTO: 0.7 K/UL
MONOCYTES NFR BLD: 10.6 %
NEUTROPHILS # BLD AUTO: 4.1 K/UL
NEUTROPHILS NFR BLD: 62.6 %
PHOSPHATE SERPL-MCNC: 2.8 MG/DL
PLATELET # BLD AUTO: 69 K/UL
PMV BLD AUTO: 9.9 FL
POTASSIUM SERPL-SCNC: 4.3 MMOL/L
PROT SERPL-MCNC: 4.8 G/DL
RBC # BLD AUTO: 2.81 M/UL
SODIUM SERPL-SCNC: 122 MMOL/L
WBC # BLD AUTO: 6.52 K/UL

## 2017-01-22 PROCEDURE — 36415 COLL VENOUS BLD VENIPUNCTURE: CPT

## 2017-01-22 PROCEDURE — 25000003 PHARM REV CODE 250: Performed by: HOSPITALIST

## 2017-01-22 PROCEDURE — 11000001 HC ACUTE MED/SURG PRIVATE ROOM

## 2017-01-22 PROCEDURE — 99232 SBSQ HOSP IP/OBS MODERATE 35: CPT | Mod: GC,,, | Performed by: INTERNAL MEDICINE

## 2017-01-22 PROCEDURE — 83735 ASSAY OF MAGNESIUM: CPT

## 2017-01-22 PROCEDURE — 84100 ASSAY OF PHOSPHORUS: CPT

## 2017-01-22 PROCEDURE — 99233 SBSQ HOSP IP/OBS HIGH 50: CPT | Mod: ,,, | Performed by: HOSPITALIST

## 2017-01-22 PROCEDURE — 80053 COMPREHEN METABOLIC PANEL: CPT

## 2017-01-22 PROCEDURE — 85025 COMPLETE CBC W/AUTO DIFF WBC: CPT

## 2017-01-22 RX ORDER — MIDODRINE HYDROCHLORIDE 5 MG/1
15 TABLET ORAL 3 TIMES DAILY
Status: DISCONTINUED | OUTPATIENT
Start: 2017-01-22 | End: 2017-01-28 | Stop reason: HOSPADM

## 2017-01-22 RX ADMIN — PANTOPRAZOLE SODIUM 40 MG: 40 TABLET, DELAYED RELEASE ORAL at 10:01

## 2017-01-22 RX ADMIN — MORPHINE SULFATE 30 MG: 30 TABLET, EXTENDED RELEASE ORAL at 10:01

## 2017-01-22 RX ADMIN — LACTULOSE 30 G: 10 SOLUTION ORAL at 10:01

## 2017-01-22 RX ADMIN — MIDODRINE HYDROCHLORIDE 10 MG: 2.5 TABLET ORAL at 06:01

## 2017-01-22 RX ADMIN — MIDODRINE HYDROCHLORIDE 15 MG: 5 TABLET ORAL at 09:01

## 2017-01-22 RX ADMIN — RIFAXIMIN 550 MG: 550 TABLET ORAL at 09:01

## 2017-01-22 RX ADMIN — RIFAXIMIN 550 MG: 550 TABLET ORAL at 10:01

## 2017-01-22 RX ADMIN — MIDODRINE HYDROCHLORIDE 10 MG: 2.5 TABLET ORAL at 01:01

## 2017-01-22 RX ADMIN — OXYCODONE HYDROCHLORIDE 30 MG: 5 TABLET ORAL at 09:01

## 2017-01-22 RX ADMIN — HYPROMELLOSE 2910 1 DROP: 5 SOLUTION OPHTHALMIC at 06:01

## 2017-01-22 RX ADMIN — OXYCODONE HYDROCHLORIDE 30 MG: 5 TABLET ORAL at 01:01

## 2017-01-22 RX ADMIN — OXYCODONE HYDROCHLORIDE 30 MG: 5 TABLET ORAL at 06:01

## 2017-01-22 RX ADMIN — HYPROMELLOSE 2910 1 DROP: 5 SOLUTION OPHTHALMIC at 01:01

## 2017-01-22 RX ADMIN — MORPHINE SULFATE 30 MG: 30 TABLET, EXTENDED RELEASE ORAL at 09:01

## 2017-01-22 RX ADMIN — HYPROMELLOSE 2910 1 DROP: 5 SOLUTION OPHTHALMIC at 09:01

## 2017-01-22 NOTE — PROGRESS NOTES
Progress Note  Hospital Medicine    Admit Date: 1/18/2017    SUBJECTIVE:     Follow-up For: Hyponatremia      HPI/Interval history (See H&P for complete P,F,SHx) : patients BP has improved on midodrine, will increase to 15 mg TID  - sodium slightly improving, will check cortisol in the AM;     Review of Systems:  Constitutional- Negative for Fever, positive for weakness  Neuro- Negative for Confusion, Negative for hallucinations  CV- Negative for Chest Pain, Negative for palpitations  Resp- Negative for Cough, Negative for SOB  GI- Negative for nausea, nomiting, Negative for diarrhea  Extrem- Negative for Pain, Negative for Swelling    OBJECTIVE:       Intake/Output Summary (Last 24 hours) at 01/22/17 1741  Last data filed at 01/22/17 1600   Gross per 24 hour   Intake             1308 ml   Output             1100 ml   Net              208 ml       Vital Signs Range (Last 24H):  Temp:  [97 °F (36.1 °C)-98.5 °F (36.9 °C)]   Pulse:  [68-84]   Resp:  [18-20]   BP: ()/(50-64)   SpO2:  [95 %-99 %]     Physical Exam:  General: Well developed, well nourished male in NAD  HEENT: Conjunctiva clear; Oropharynx clear  Neck: No JVD noted, Supple  CV: Normal S1, S2 with no murmurs,gallops,rubs  Resp: Lungs CTA Bilaterally, Unlabored  Abdomen: NTND, BS normoactive x4 quads, soft  Extrem: No cyanosis, clubbing, edema.  Skin: No rashes, lesions, ulcers  Neuro: motor strength in tact, CN 2-12 intact  PSYCH: Oriented x3    Medications:  Medication list was reviewed and changes noted under Assessment/Plan.    Laboratory:  Recent Labs      01/21/17   0434  01/22/17 0447   WBC  6.84  6.52   RBC  2.79*  2.81*   HGB  9.6*  9.7*   HCT  26.4*  26.4*   PLT  64*  69*   MCV  95  94   MCH  34.4*  34.5*   MCHC  36.4*  36.7*       Recent Labs      01/21/17   0434  01/22/17   0447   GLU  105  110   NA  121*  122*   K  4.6  4.3   CL  98  99   CO2  17*  18*   BUN  12  11   CREATININE  0.9  0.8   MG  1.5*  2.0   PHOS  2.6*  2.8        Diagnostic Results:    Labs reviewed     ASSESSMENT/PLAN:     Active Hospital Problems    Arterial hypotension      Hepatic coma/encephalopathy      Anemia of chronic disease      Hypoalbuminemia due to protein-calorie malnutrition      Decompensated hepatic cirrhosis      Alcoholic cirrhosis of liver with ascites      Hyperkalemia      Hep C w/o coma, chronic      HCC (hepatocellular carcinoma)      Cirrhosis of liver with ascites      *Hyponatremia      Thrombocytopenia          Plan:      # Hyponatremia  - sodium improved from 121 to 122, continue 1 L fluid restriction, will check a cortisol at 8 am      # Decompensated alcohol and Hep C cirrhosis with ascites  # Hepatic Encephalopathy   # Hepatocellular Carcinoma  MELD-Na score: 11 at 1/20/2017  5:40 AM  MELD score: 11 at 1/20/2017  5:40 AM  Calculated from:  Serum Creatinine: 0.9 mg/dL (Rounded to 1) at 1/20/2017  5:40 AM  Serum Sodium: 125 mmol/L at 1/20/2017  5:40 AM  Total Bilirubin: 1.9 mg/dL at 1/20/2017  5:40 AM  INR(ratio): 1.2 at 1/18/2017 10:22 AM  Age: 56 years       - hepatology consult, under going outpatient transplant evaluation, has been approved for inpatient  - actulose to 30 g PO Q6, goal to have 3 to 4 BMs daily; add rifaximin  - s/p paracentesis 1/19/17 with 3.4 L fluid removal negative for SBP  - addiction psych consulted and believe he is at moderate risk    # Arterial Hypotension  - likely from liver disease, increase midodrine 15 mg PO TID     # Hyperkalemia  - likely from aldactone; stoppped; resolved     # Anemia of chronic disease  # Thrombocytopenia  - monitor     # Chronic Pain syndrome  - as per patient, he is on morphine 30 mg PO BID and oxycodone 30mg PO TID; states he was on much more before and has been tapered down to this; has multiple back and hip surgeries and HCC causing this pain     # Hypoalbuminemia due to moderate protein vero malnutrition  - PAB 4; boost    # Hypomagnesemia - replace     DVT prophylaxis- early  ambulation     Discharge Disposition- possibly monday    Time spent in care of patient (Greater than 1/2 spent in coordinating and counseling patient care):  30 minutes

## 2017-01-22 NOTE — PLAN OF CARE
Problem: Patient Care Overview  Goal: Plan of Care Review  Outcome: Ongoing (interventions implemented as appropriate)  No falls or injuries this shift. Pain meds given as ordered.

## 2017-01-22 NOTE — PROGRESS NOTES
Hepatology  Consult note      SUBJECTIVE:     Reason for Consult: ESLD    History of Present Illness:  Patient is a 56 y.o. male w/ a history of ETOH+HCV+HCC cirrhosis [place] [facility] [place] was found to be hyponatremic and confused when he presented to IR for scheduled TACE.    The patient is currently in outpatient liver transplant evaluation.  He has undergone TACE previously on 10/3/16 for a 3.3cm lesion.  A second lesion identified on CT on 11/4/16 was scheduled for TACE yesterday.      Today the patient reports a productive cough of 3 months' duration.  No fevers, chills, rigors.  Mental status back to baseline.      Interval Hx: No acute events overnight.       OBJECTIVE:     Vital Signs (Most Recent)  Temp: 98.5 °F (36.9 °C) (01/22/17 0720)  Pulse: 68 (01/22/17 0720)  Resp: 18 (01/22/17 0720)  BP: (!) 99/56 (01/22/17 0720)  SpO2: 95 % (01/22/17 0720)    Physical Exam:  General appearance: chronically ill appearing, no acute distress  Eyes: anicteric sclerae  Lungs: bronchial breath sounds, productive cough  Heart: regular rate and rhythm, S1, S2 clearly audible,  Abdomen: soft, non-tender, non-distended; no rebound tenderness, rigidity, or guarding  Pulses: 2+ and symmetric  Skin: Skin color, texture, turgor normal.   Neurologic: No focal deficits noted      Laboratory    CBC:     Recent Labs  Lab 01/20/17  0540 01/21/17 0434 01/22/17 0447   WBC 6.35 6.84 6.52   RBC 3.01* 2.79* 2.81*   HGB 10.4* 9.6* 9.7*   HCT 28.6* 26.4* 26.4*   PLT 69* 64* 69*   MCV 95 95 94   MCH 34.6* 34.4* 34.5*   MCHC 36.4* 36.4* 36.7*     BMP:     Recent Labs  Lab 01/20/17  0540 01/21/17 0434 01/22/17 0447   * 105 110   * 121* 122*   K 4.5 4.6 4.3   CL 99 98 99   CO2 18* 17* 18*   BUN 9 12 11   CREATININE 0.9 0.9 0.8   CALCIUM 7.6* 7.5* 7.4*   MG 1.7 1.5* 2.0     Coagulation:     Recent Labs  Lab 01/18/17  1022   INR 1.2           ASSESSMENT/PLAN:     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

## 2017-01-23 LAB
AFP SERPL-MCNC: 2.5 NG/ML
ALBUMIN SERPL BCP-MCNC: 2.2 G/DL
ALP SERPL-CCNC: 132 U/L
ALT SERPL W/O P-5'-P-CCNC: 37 U/L
ANION GAP SERPL CALC-SCNC: 3 MMOL/L
AST SERPL-CCNC: 89 U/L
BASOPHILS # BLD AUTO: 0.09 K/UL
BASOPHILS NFR BLD: 1.6 %
BILIRUB SERPL-MCNC: 1.7 MG/DL
BUN SERPL-MCNC: 14 MG/DL
CALCIUM SERPL-MCNC: 7.6 MG/DL
CHLORIDE SERPL-SCNC: 100 MMOL/L
CO2 SERPL-SCNC: 20 MMOL/L
CORTIS SERPL-MCNC: 5.1 UG/DL
CREAT SERPL-MCNC: 0.9 MG/DL
DIFFERENTIAL METHOD: ABNORMAL
EOSINOPHIL # BLD AUTO: 0.3 K/UL
EOSINOPHIL NFR BLD: 4.7 %
ERYTHROCYTE [DISTWIDTH] IN BLOOD BY AUTOMATED COUNT: 15 %
EST. GFR  (AFRICAN AMERICAN): >60 ML/MIN/1.73 M^2
EST. GFR  (NON AFRICAN AMERICAN): >60 ML/MIN/1.73 M^2
GLUCOSE SERPL-MCNC: 106 MG/DL
HCT VFR BLD AUTO: 28.1 %
HGB BLD-MCNC: 10.1 G/DL
LYMPHOCYTES # BLD AUTO: 1.4 K/UL
LYMPHOCYTES NFR BLD: 24 %
MAGNESIUM SERPL-MCNC: 2 MG/DL
MCH RBC QN AUTO: 34.5 PG
MCHC RBC AUTO-ENTMCNC: 35.9 %
MCV RBC AUTO: 96 FL
MONOCYTES # BLD AUTO: 0.6 K/UL
MONOCYTES NFR BLD: 9.7 %
NEUTROPHILS # BLD AUTO: 3.4 K/UL
NEUTROPHILS NFR BLD: 59.7 %
PHOSPHATE SERPL-MCNC: 3.8 MG/DL
PLATELET # BLD AUTO: 89 K/UL
PMV BLD AUTO: 9.9 FL
POTASSIUM SERPL-SCNC: 4.6 MMOL/L
PROT SERPL-MCNC: 4.9 G/DL
RBC # BLD AUTO: 2.93 M/UL
SODIUM SERPL-SCNC: 123 MMOL/L
WBC # BLD AUTO: 5.75 K/UL

## 2017-01-23 PROCEDURE — 36415 COLL VENOUS BLD VENIPUNCTURE: CPT

## 2017-01-23 PROCEDURE — 11000001 HC ACUTE MED/SURG PRIVATE ROOM

## 2017-01-23 PROCEDURE — 84100 ASSAY OF PHOSPHORUS: CPT

## 2017-01-23 PROCEDURE — 85025 COMPLETE CBC W/AUTO DIFF WBC: CPT

## 2017-01-23 PROCEDURE — 25500020 PHARM REV CODE 255: Performed by: STUDENT IN AN ORGANIZED HEALTH CARE EDUCATION/TRAINING PROGRAM

## 2017-01-23 PROCEDURE — 63600175 PHARM REV CODE 636 W HCPCS: Performed by: NURSE PRACTITIONER

## 2017-01-23 PROCEDURE — 82105 ALPHA-FETOPROTEIN SERUM: CPT

## 2017-01-23 PROCEDURE — P9047 ALBUMIN (HUMAN), 25%, 50ML: HCPCS | Performed by: NURSE PRACTITIONER

## 2017-01-23 PROCEDURE — 82533 TOTAL CORTISOL: CPT

## 2017-01-23 PROCEDURE — 83735 ASSAY OF MAGNESIUM: CPT

## 2017-01-23 PROCEDURE — 25000003 PHARM REV CODE 250: Performed by: HOSPITALIST

## 2017-01-23 PROCEDURE — 80053 COMPREHEN METABOLIC PANEL: CPT

## 2017-01-23 PROCEDURE — 99233 SBSQ HOSP IP/OBS HIGH 50: CPT | Mod: ,,, | Performed by: HOSPITALIST

## 2017-01-23 PROCEDURE — 99223 1ST HOSP IP/OBS HIGH 75: CPT | Mod: GC,,, | Performed by: INTERNAL MEDICINE

## 2017-01-23 RX ORDER — LACTULOSE 10 G/15ML
30 SOLUTION ORAL 3 TIMES DAILY
Status: DISCONTINUED | OUTPATIENT
Start: 2017-01-23 | End: 2017-01-28 | Stop reason: HOSPADM

## 2017-01-23 RX ORDER — ALBUMIN HUMAN 250 G/1000ML
25 SOLUTION INTRAVENOUS ONCE
Status: COMPLETED | OUTPATIENT
Start: 2017-01-23 | End: 2017-01-23

## 2017-01-23 RX ORDER — LIDOCAINE 50 MG/G
1 PATCH TOPICAL
Status: DISCONTINUED | OUTPATIENT
Start: 2017-01-23 | End: 2017-01-28 | Stop reason: HOSPADM

## 2017-01-23 RX ADMIN — IOHEXOL 15 ML: 350 INJECTION, SOLUTION INTRAVENOUS at 03:01

## 2017-01-23 RX ADMIN — MORPHINE SULFATE 30 MG: 30 TABLET, EXTENDED RELEASE ORAL at 09:01

## 2017-01-23 RX ADMIN — OXYCODONE HYDROCHLORIDE 30 MG: 5 TABLET ORAL at 04:01

## 2017-01-23 RX ADMIN — ONDANSETRON 8 MG: 8 TABLET, ORALLY DISINTEGRATING ORAL at 04:01

## 2017-01-23 RX ADMIN — OXYCODONE HYDROCHLORIDE 30 MG: 5 TABLET ORAL at 01:01

## 2017-01-23 RX ADMIN — LACTULOSE 30 G: 10 SOLUTION ORAL at 09:01

## 2017-01-23 RX ADMIN — OXYCODONE HYDROCHLORIDE 30 MG: 5 TABLET ORAL at 09:01

## 2017-01-23 RX ADMIN — HYPROMELLOSE 2910 1 DROP: 5 SOLUTION OPHTHALMIC at 01:01

## 2017-01-23 RX ADMIN — RIFAXIMIN 550 MG: 550 TABLET ORAL at 09:01

## 2017-01-23 RX ADMIN — HYPROMELLOSE 2910 1 DROP: 5 SOLUTION OPHTHALMIC at 04:01

## 2017-01-23 RX ADMIN — MIDODRINE HYDROCHLORIDE 15 MG: 5 TABLET ORAL at 09:01

## 2017-01-23 RX ADMIN — ALBUMIN (HUMAN) 25 G: 12.5 SOLUTION INTRAVENOUS at 09:01

## 2017-01-23 RX ADMIN — MIDODRINE HYDROCHLORIDE 15 MG: 5 TABLET ORAL at 04:01

## 2017-01-23 RX ADMIN — PANTOPRAZOLE SODIUM 40 MG: 40 TABLET, DELAYED RELEASE ORAL at 09:01

## 2017-01-23 RX ADMIN — LIDOCAINE 1 PATCH: 50 PATCH TOPICAL at 02:01

## 2017-01-23 RX ADMIN — HYPROMELLOSE 2910 1 DROP: 5 SOLUTION OPHTHALMIC at 09:01

## 2017-01-23 RX ADMIN — LACTULOSE 30 G: 10 SOLUTION ORAL at 01:01

## 2017-01-23 NOTE — PLAN OF CARE
Problem: Patient Care Overview  Goal: Plan of Care Review  Outcome: Ongoing (interventions implemented as appropriate)  Patient very somnolent, but otherwise AAOx4. Encouraged to avoid Dilaudid with low systolic b/p. Scheduled pain meds given as ordered with good results. No falls or injuries this shift. Will continue to monitor.

## 2017-01-23 NOTE — PROGRESS NOTES
Hepatology  Consult note      SUBJECTIVE:     Reason for Consult: ESLD    History of Present Illness:  Patient is a 56 y.o. male w/ a history of ETOH+HCV+HCC cirrhosis who was [place] [facility] [place] he was found to be hyponatremic and confused when he presented to IR for scheduled TACE.    The patient is currently in outpatient liver transplant evaluation.  He has undergone TACE previously on 10/3/16 for a 3.3cm lesion.  A second lesion identified on CT on 11/4/16 was scheduled for TACE yesterday.      Today the patient reports a productive cough of 3 months' duration.  No fevers, chills, rigors.  Mental status back to baseline.      Interval Hx: No acute events overnight.       OBJECTIVE:     Vital Signs (Most Recent)  Temp: 98.2 °F (36.8 °C) (01/23/17 0733)  Pulse: 78 (01/23/17 0733)  Resp: 18 (01/23/17 0733)  BP: (!) 93/51 (01/23/17 0733)  SpO2: 95 % (01/23/17 0733)    Physical Exam:  General appearance: chronically ill appearing, no acute distress  Eyes: anicteric sclerae  Lungs: bronchial breath sounds, productive cough  Heart: regular rate and rhythm, S1, S2 clearly audible,  Abdomen: soft, non-tender, non-distended; no rebound tenderness, rigidity, or guarding  Pulses: 2+ and symmetric  Skin: Skin color, texture, turgor normal.   Neurologic: No focal deficits noted      Laboratory    CBC:     Recent Labs  Lab 01/21/17 0434 01/22/17 0447 01/23/17  0523   WBC 6.84 6.52 5.75   RBC 2.79* 2.81* 2.93*   HGB 9.6* 9.7* 10.1*   HCT 26.4* 26.4* 28.1*   PLT 64* 69* 89*   MCV 95 94 96   MCH 34.4* 34.5* 34.5*   MCHC 36.4* 36.7* 35.9     BMP:     Recent Labs  Lab 01/21/17 0434 01/22/17 0447 01/23/17  0523    110 106   * 122* 123*   K 4.6 4.3 4.6   CL 98 99 100   CO2 17* 18* 20*   BUN 12 11 14   CREATININE 0.9 0.8 0.9   CALCIUM 7.5* 7.4* 7.6*   MG 1.5* 2.0 2.0     Coagulation:     Recent Labs  Lab 01/18/17  1022   INR 1.2       MELD-Na score: 11 at 1/20/2017  5:40 AM  MELD score: 11 at 1/20/2017  5:40  AM  Calculated from:  Serum Creatinine: 0.9 mg/dL (Rounded to 1) at 1/20/2017  5:40 AM  Serum Sodium: 125 mmol/L at 1/20/2017  5:40 AM  Total Bilirubin: 1.9 mg/dL at 1/20/2017  5:40 AM  INR(ratio): 1.2 at 1/18/2017 10:22 AM  Age: 56 years    ASSESSMENT/PLAN:     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

## 2017-01-23 NOTE — PLAN OF CARE
Problem: Patient Care Overview  Goal: Plan of Care Review  Outcome: Ongoing (interventions implemented as appropriate)  Pt verbalized understanding plan of care. Frequent assessments done and fall precautions maintained. Pain and discomfort controlled. Will continue to monitor.

## 2017-01-24 PROBLEM — J84.9 ILD (INTERSTITIAL LUNG DISEASE): Status: ACTIVE | Noted: 2017-01-24

## 2017-01-24 PROBLEM — Z01.811 PRE-OPERATIVE RESPIRATORY EXAMINATION: Status: ACTIVE | Noted: 2017-01-24

## 2017-01-24 LAB
ALBUMIN SERPL BCP-MCNC: 2.8 G/DL
ALP SERPL-CCNC: 143 U/L
ALT SERPL W/O P-5'-P-CCNC: 40 U/L
AMMONIA PLAS-SCNC: 55 UMOL/L
ANION GAP SERPL CALC-SCNC: 6 MMOL/L
AST SERPL-CCNC: 88 U/L
BASOPHILS # BLD AUTO: 0.03 K/UL
BASOPHILS NFR BLD: 0.6 %
BILIRUB SERPL-MCNC: 1.9 MG/DL
BUN SERPL-MCNC: 13 MG/DL
CALCIUM SERPL-MCNC: 8.2 MG/DL
CHLORIDE SERPL-SCNC: 98 MMOL/L
CO2 SERPL-SCNC: 20 MMOL/L
CREAT SERPL-MCNC: 0.8 MG/DL
DIASTOLIC DYSFUNCTION: NO
DIFFERENTIAL METHOD: ABNORMAL
EOSINOPHIL # BLD AUTO: 0.2 K/UL
EOSINOPHIL NFR BLD: 4.7 %
ERYTHROCYTE [DISTWIDTH] IN BLOOD BY AUTOMATED COUNT: 15.2 %
EST. GFR  (AFRICAN AMERICAN): >60 ML/MIN/1.73 M^2
EST. GFR  (NON AFRICAN AMERICAN): >60 ML/MIN/1.73 M^2
ESTIMATED PA SYSTOLIC PRESSURE: 16
GLUCOSE SERPL-MCNC: 93 MG/DL
HCT VFR BLD AUTO: 28.7 %
HGB BLD-MCNC: 10.2 G/DL
INR PPP: 1.3
LACTATE SERPL-SCNC: 0.9 MMOL/L
LYMPHOCYTES # BLD AUTO: 1.5 K/UL
LYMPHOCYTES NFR BLD: 30.3 %
MAGNESIUM SERPL-MCNC: 1.7 MG/DL
MCH RBC QN AUTO: 34.7 PG
MCHC RBC AUTO-ENTMCNC: 35.5 %
MCV RBC AUTO: 98 FL
MITRAL VALVE MOBILITY: NORMAL
MITRAL VALVE REGURGITATION: NORMAL
MONOCYTES # BLD AUTO: 0.6 K/UL
MONOCYTES NFR BLD: 12.7 %
NEUTROPHILS # BLD AUTO: 2.5 K/UL
NEUTROPHILS NFR BLD: 51.7 %
PHOSPHATE SERPL-MCNC: 3.2 MG/DL
PLATELET # BLD AUTO: 87 K/UL
PMV BLD AUTO: 9.5 FL
POTASSIUM SERPL-SCNC: 4.2 MMOL/L
PRE FEV1 FVC: 72
PRE FEV1: 2.16
PRE FVC: 3.02
PREDICTED FEV1 FVC: 80
PREDICTED FEV1: 3.49
PREDICTED FVC: 4.3
PROT SERPL-MCNC: 5.6 G/DL
PROTHROMBIN TIME: 13.7 SEC
RBC # BLD AUTO: 2.94 M/UL
RETIRED EF AND QEF - SEE NOTES: 60 (ref 55–65)
SODIUM SERPL-SCNC: 124 MMOL/L
TRICUSPID VALVE REGURGITATION: NORMAL
WBC # BLD AUTO: 4.88 K/UL

## 2017-01-24 PROCEDURE — 99223 1ST HOSP IP/OBS HIGH 75: CPT | Mod: GC,,, | Performed by: INTERNAL MEDICINE

## 2017-01-24 PROCEDURE — 94727 GAS DIL/WSHOT DETER LNG VOL: CPT | Performed by: INTERNAL MEDICINE

## 2017-01-24 PROCEDURE — 99291 CRITICAL CARE FIRST HOUR: CPT | Mod: ,,, | Performed by: PHYSICIAN ASSISTANT

## 2017-01-24 PROCEDURE — 82803 BLOOD GASES ANY COMBINATION: CPT | Performed by: INTERNAL MEDICINE

## 2017-01-24 PROCEDURE — 87040 BLOOD CULTURE FOR BACTERIA: CPT

## 2017-01-24 PROCEDURE — 36600 WITHDRAWAL OF ARTERIAL BLOOD: CPT | Performed by: INTERNAL MEDICINE

## 2017-01-24 PROCEDURE — 94729 DIFFUSING CAPACITY: CPT | Performed by: INTERNAL MEDICINE

## 2017-01-24 PROCEDURE — P9047 ALBUMIN (HUMAN), 25%, 50ML: HCPCS | Performed by: HOSPITALIST

## 2017-01-24 PROCEDURE — 85025 COMPLETE CBC W/AUTO DIFF WBC: CPT

## 2017-01-24 PROCEDURE — 25000003 PHARM REV CODE 250: Performed by: PHYSICIAN ASSISTANT

## 2017-01-24 PROCEDURE — 93005 ELECTROCARDIOGRAM TRACING: CPT

## 2017-01-24 PROCEDURE — 85610 PROTHROMBIN TIME: CPT

## 2017-01-24 PROCEDURE — 63600175 PHARM REV CODE 636 W HCPCS: Performed by: HOSPITALIST

## 2017-01-24 PROCEDURE — 83605 ASSAY OF LACTIC ACID: CPT

## 2017-01-24 PROCEDURE — 25000003 PHARM REV CODE 250: Performed by: HOSPITALIST

## 2017-01-24 PROCEDURE — 99233 SBSQ HOSP IP/OBS HIGH 50: CPT | Mod: ,,, | Performed by: HOSPITALIST

## 2017-01-24 PROCEDURE — 25500020 PHARM REV CODE 255: Performed by: STUDENT IN AN ORGANIZED HEALTH CARE EDUCATION/TRAINING PROGRAM

## 2017-01-24 PROCEDURE — 80053 COMPREHEN METABOLIC PANEL: CPT

## 2017-01-24 PROCEDURE — 93306 TTE W/DOPPLER COMPLETE: CPT | Mod: 26,,, | Performed by: INTERNAL MEDICINE

## 2017-01-24 PROCEDURE — 84100 ASSAY OF PHOSPHORUS: CPT

## 2017-01-24 PROCEDURE — 83735 ASSAY OF MAGNESIUM: CPT

## 2017-01-24 PROCEDURE — 11000001 HC ACUTE MED/SURG PRIVATE ROOM

## 2017-01-24 PROCEDURE — 93306 TTE W/DOPPLER COMPLETE: CPT

## 2017-01-24 PROCEDURE — 94010 BREATHING CAPACITY TEST: CPT | Performed by: INTERNAL MEDICINE

## 2017-01-24 PROCEDURE — 82140 ASSAY OF AMMONIA: CPT

## 2017-01-24 RX ORDER — ALBUMIN HUMAN 250 G/1000ML
25 SOLUTION INTRAVENOUS EVERY 6 HOURS
Status: DISCONTINUED | OUTPATIENT
Start: 2017-01-24 | End: 2017-01-28 | Stop reason: HOSPADM

## 2017-01-24 RX ORDER — OXYCODONE HYDROCHLORIDE 5 MG/1
5 TABLET ORAL ONCE AS NEEDED
Status: COMPLETED | OUTPATIENT
Start: 2017-01-24 | End: 2017-01-24

## 2017-01-24 RX ORDER — MIDODRINE HYDROCHLORIDE 2.5 MG/1
2.5 TABLET ORAL ONCE
Status: COMPLETED | OUTPATIENT
Start: 2017-01-24 | End: 2017-01-24

## 2017-01-24 RX ADMIN — MIDODRINE HYDROCHLORIDE 2.5 MG: 2.5 TABLET ORAL at 02:01

## 2017-01-24 RX ADMIN — HYPROMELLOSE 2910 1 DROP: 5 SOLUTION OPHTHALMIC at 10:01

## 2017-01-24 RX ADMIN — LACTULOSE 30 G: 10 SOLUTION ORAL at 06:01

## 2017-01-24 RX ADMIN — MIDODRINE HYDROCHLORIDE 15 MG: 5 TABLET ORAL at 06:01

## 2017-01-24 RX ADMIN — HYPROMELLOSE 2910 1 DROP: 5 SOLUTION OPHTHALMIC at 06:01

## 2017-01-24 RX ADMIN — MIDODRINE HYDROCHLORIDE 15 MG: 5 TABLET ORAL at 10:01

## 2017-01-24 RX ADMIN — OXYCODONE HYDROCHLORIDE 5 MG: 5 TABLET ORAL at 02:01

## 2017-01-24 RX ADMIN — ALBUMIN (HUMAN) 25 G: 12.5 SOLUTION INTRAVENOUS at 07:01

## 2017-01-24 RX ADMIN — PANTOPRAZOLE SODIUM 40 MG: 40 TABLET, DELAYED RELEASE ORAL at 09:01

## 2017-01-24 RX ADMIN — LIDOCAINE 1 PATCH: 50 PATCH TOPICAL at 03:01

## 2017-01-24 RX ADMIN — MORPHINE SULFATE 30 MG: 30 TABLET, EXTENDED RELEASE ORAL at 11:01

## 2017-01-24 RX ADMIN — IOHEXOL 15 ML: 350 INJECTION, SOLUTION INTRAVENOUS at 05:01

## 2017-01-24 RX ADMIN — MIDODRINE HYDROCHLORIDE 15 MG: 5 TABLET ORAL at 01:01

## 2017-01-24 RX ADMIN — OXYCODONE HYDROCHLORIDE 30 MG: 5 TABLET ORAL at 01:01

## 2017-01-24 RX ADMIN — LACTULOSE 30 G: 10 SOLUTION ORAL at 02:01

## 2017-01-24 RX ADMIN — HYPROMELLOSE 2910 1 DROP: 5 SOLUTION OPHTHALMIC at 02:01

## 2017-01-24 RX ADMIN — OXYCODONE HYDROCHLORIDE 30 MG: 5 TABLET ORAL at 10:01

## 2017-01-24 RX ADMIN — RIFAXIMIN 550 MG: 550 TABLET ORAL at 09:01

## 2017-01-24 RX ADMIN — MORPHINE SULFATE 30 MG: 30 TABLET, EXTENDED RELEASE ORAL at 09:01

## 2017-01-24 NOTE — PROGRESS NOTES
During 4am frequent checks patient's automatic BP reading was 68/40, confirmed reading with manual at 60/38. Charge nurse notified and at bedside immediately. NP - Bushra Atkins notified and at bedside within minutes. Rapid called and at pt's bedside within minutes. Team stabilized pt and was able to keep him on floor. Reassessed pt's VS at 5:05am BP returned to pt's baseline at 91/60. NP - Milly said to notify IM-A team if pt's blood pressure systolic drops blow 85 to give pt a bolus of 250. Will continue to monitor

## 2017-01-24 NOTE — PT/OT/SLP PROGRESS
"Physical Therapy      Tommy Hopper  MRN: [id number]    Patient not seen today secondary to pt attempted x 2 attempts. Pt on first attempt pt with increased c/o of pain reporting "If I get something for pain, I will walk" nsg notified, upon returning pt out of room for testing  . Will follow-up next scheduled treatment per PT POC.    Quirino Singh, PTA   1/24/2017      "
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
## 2017-01-24 NOTE — PROGRESS NOTES
Progress Note  Hospital Medicine    Admit Date: 1/18/2017    SUBJECTIVE:     Follow-up For: Hyponatremia      HPI/Interval history (See H&P for complete P,F,SHx) : patient had a core call this morning due to hypotension, resolved after Albumin administration.     Review of Systems:  Constitutional- Negative for Fever, positive for weakness  Neuro- Negative for Confusion, Negative for hallucinations  CV- Negative for Chest Pain, Negative for palpitations  Resp- Negative for Cough, Negative for SOB  GI- Negative for nausea, nomiting, Negative for diarrhea  Extrem- Negative for Pain, Negative for Swelling    OBJECTIVE:       Intake/Output Summary (Last 24 hours) at 01/24/17 1736  Last data filed at 01/24/17 0900   Gross per 24 hour   Intake              250 ml   Output              200 ml   Net               50 ml       Vital Signs Range (Last 24H):  Temp:  [97.4 °F (36.3 °C)-98.5 °F (36.9 °C)]   Pulse:  [64-75]   Resp:  [16-18]   BP: ()/(34-66)   SpO2:  [91 %-96 %]     Physical Exam:  General: Well developed, well nourished male in NAD  HEENT: Conjunctiva clear; Oropharynx clear  Neck: No JVD noted, Supple  CV: Normal S1, S2 with no murmurs,gallops,rubs  Resp: Lungs CTA Bilaterally, Unlabored  Abdomen: NTND, BS normoactive x4 quads, soft  Extrem: No cyanosis, clubbing, edema.  Skin: No rashes, lesions, ulcers  Neuro: motor strength in tact, CN 2-12 intact  PSYCH: Oriented x3    Medications:  Medication list was reviewed and changes noted under Assessment/Plan.    Laboratory:  Recent Labs      01/23/17 0523 01/24/17 0522   WBC  5.75  4.88   RBC  2.93*  2.94*   HGB  10.1*  10.2*   HCT  28.1*  28.7*   PLT  89*  87*   MCV  96  98   MCH  34.5*  34.7*   MCHC  35.9  35.5       Recent Labs      01/23/17 0523 01/24/17 0522   GLU  106  93   NA  123*  124*   K  4.6  4.2   CL  100  98   CO2  20*  20*   BUN  14  13   CREATININE  0.9  0.8   MG  2.0  1.7   PHOS  3.8  3.2       Diagnostic Results:    Labs reviewed      ASSESSMENT/PLAN:     Active Hospital Problems    Arterial hypotension      Hepatic coma/encephalopathy      Anemia of chronic disease      Hypoalbuminemia due to protein-calorie malnutrition      Decompensated hepatic cirrhosis      Alcoholic cirrhosis of liver with ascites      Hyperkalemia      Hep C w/o coma, chronic      HCC (hepatocellular carcinoma)      Cirrhosis of liver with ascites      *Hyponatremia      Thrombocytopenia          Plan:      # Hyponatremia  - sodium improved from 121 to 122, continue 1 L fluid restriction, will check a cortisol at 8 am      # Decompensated alcohol and Hep C cirrhosis with ascites  # Hepatic Encephalopathy   # Hepatocellular Carcinoma  MELD-Na score: 23 at 1/24/2017  5:22 AM  MELD score: 12 at 1/24/2017  5:22 AM  Calculated from:  Serum Creatinine: 0.8 mg/dL (Rounded to 1) at 1/24/2017  5:22 AM  Serum Sodium: 124 mmol/L (Rounded to 125) at 1/24/2017  5:22 AM  Total Bilirubin: 1.9 mg/dL at 1/24/2017  5:22 AM  INR(ratio): 1.3 at 1/24/2017  5:22 AM  Age: 56 years  - hepatology consult, under going outpatient transplant evaluation, has been approved for inpatient  - actulose to 30 g PO Q6, goal to have 3 to 4 BMs daily; add rifaximin  - s/p paracentesis 1/19/17 with 3.4 L fluid removal negative for SBP  - addiction psych consulted and believe he is at moderate risk      # Emphysema   - 45 pack year hx  - Chest CT w/ atypical pattern of emphysema and worsening GGO within the right and left lower lobe, which may represent focal worsening of interstitial lung disease and/or pulmonary edema. This may be 2/2 worsening ascites causing some compressive atelectasis at the lung bases.   - PFTs in June w/o obstruction or restriction but DLCO decreased. PFTs repeated this am.   - Very likely that respiratory studies will demonstrate worsening respiratory status, especially with significant distention and ascites 2/2 liver disease.    - Repeat Paracentesis tomorrow     # Arterial  Hypotension  - likely from liver disease,  midodrine 15 mg PO TID, will need to continue of discharge  - Albumin IVPB q 6hrs     # Hyperkalemia  - likely from aldactone; stoppped; resolved     # Anemia of chronic disease  # Thrombocytopenia  - monitor     # Chronic Pain syndrome  - as per patient, he is on morphine 30 mg PO BID and oxycodone 30mg PO TID; states he was on much more before and has been tapered down to this; has multiple back and hip surgeries and HCC causing this pain     # Hypoalbuminemia due to moderate protein vero malnutrition  - PAB 4; boost    # Hypomagnesemia - replaced     DVT prophylaxis- early ambulation     Discharge Disposition- possibly wednesday    Time spent in care of patient (Greater than 1/2 spent in coordinating and counseling patient care):  30 minutes

## 2017-01-24 NOTE — PROGRESS NOTES
Progress Note  Hospital Medicine    Admit Date: 1/18/2017    SUBJECTIVE:     Follow-up For: Hyponatremia      HPI/Interval history (See H&P for complete P,F,SHx) : patient still complaining of pain, however patient looks weak too lethargic to get an extra doses of pain medications;   - patients CT of chest shows emphysema and some thoracic fractures, which are known, will place lidoderm patch and will get pulmonology evaluation and 2d echo     Review of Systems:  Constitutional- Negative for Fever, positive for weakness  Neuro- Negative for Confusion, Negative for hallucinations  CV- Negative for Chest Pain, Negative for palpitations  Resp- Negative for Cough, Negative for SOB  GI- Negative for nausea, nomiting, Negative for diarrhea  Extrem- Negative for Pain, Negative for Swelling    OBJECTIVE:       Intake/Output Summary (Last 24 hours) at 01/23/17 2217  Last data filed at 01/23/17 1100   Gross per 24 hour   Intake              440 ml   Output              200 ml   Net              240 ml       Vital Signs Range (Last 24H):  Temp:  [97 °F (36.1 °C)-98.2 °F (36.8 °C)]   Pulse:  [71-78]   Resp:  [16-20]   BP: (93-98)/(51-68)   SpO2:  [95 %-100 %]     Physical Exam:  General: Well developed, well nourished male in NAD  HEENT: Conjunctiva clear; Oropharynx clear  Neck: No JVD noted, Supple  CV: Normal S1, S2 with no murmurs,gallops,rubs  Resp: Lungs CTA Bilaterally, Unlabored  Abdomen: NTND, BS normoactive x4 quads, soft  Extrem: No cyanosis, clubbing, edema.  Skin: No rashes, lesions, ulcers  Neuro: motor strength in tact, CN 2-12 intact  PSYCH: Oriented x3    Medications:  Medication list was reviewed and changes noted under Assessment/Plan.    Laboratory:  Recent Labs      01/22/17 0447 01/23/17   0523   WBC  6.52  5.75   RBC  2.81*  2.93*   HGB  9.7*  10.1*   HCT  26.4*  28.1*   PLT  69*  89*   MCV  94  96   MCH  34.5*  34.5*   MCHC  36.7*  35.9       Recent Labs      01/22/17 0447 01/23/17   0523   GLU   110  106   NA  122*  123*   K  4.3  4.6   CL  99  100   CO2  18*  20*   BUN  11  14   CREATININE  0.8  0.9   MG  2.0  2.0   PHOS  2.8  3.8       Diagnostic Results:    Labs reviewed     ASSESSMENT/PLAN:     Active Hospital Problems    Arterial hypotension      Hepatic coma/encephalopathy      Anemia of chronic disease      Hypoalbuminemia due to protein-calorie malnutrition      Decompensated hepatic cirrhosis      Alcoholic cirrhosis of liver with ascites      Hyperkalemia      Hep C w/o coma, chronic      HCC (hepatocellular carcinoma)      Cirrhosis of liver with ascites      *Hyponatremia      Thrombocytopenia          Plan:      # Hyponatremia  - sodium improved from 121 to 122, continue 1 L fluid restriction, will check a cortisol at 8 am      # Decompensated alcohol and Hep C cirrhosis with ascites  # Hepatic Encephalopathy   # Hepatocellular Carcinoma  MELD-Na score: 11 at 1/20/2017  5:40 AM  MELD score: 11 at 1/20/2017  5:40 AM  Calculated from:  Serum Creatinine: 0.9 mg/dL (Rounded to 1) at 1/20/2017  5:40 AM  Serum Sodium: 125 mmol/L at 1/20/2017  5:40 AM  Total Bilirubin: 1.9 mg/dL at 1/20/2017  5:40 AM  INR(ratio): 1.2 at 1/18/2017 10:22 AM  Age: 56 years       - hepatology consult, under going outpatient transplant evaluation, has been approved for inpatient  - actulose to 30 g PO Q6, goal to have 3 to 4 BMs daily; add rifaximin  - s/p paracentesis 1/19/17 with 3.4 L fluid removal negative for SBP  - addiction psych consulted and believe he is at moderate risk      # Emphysema   - PFTs pending, will get pulmonology consultation for clearance for liver transplant as well as a 2d echo with evaluate pulmonary pressures;     # Arterial Hypotension  - likely from liver disease,  midodrine 15 mg PO TID, will need to continue of discharge     # Hyperkalemia  - likely from aldactone; stoppped; resolved     # Anemia of chronic disease  # Thrombocytopenia  - monitor     # Chronic Pain syndrome  - as per patient,  he is on morphine 30 mg PO BID and oxycodone 30mg PO TID; states he was on much more before and has been tapered down to this; has multiple back and hip surgeries and HCC causing this pain     # Hypoalbuminemia due to moderate protein vero malnutrition  - PAB 4; boost    # Hypomagnesemia - replace     DVT prophylaxis- early ambulation     Discharge Disposition- possibly wednesday    Time spent in care of patient (Greater than 1/2 spent in coordinating and counseling patient care):  30 minutes

## 2017-01-24 NOTE — PROGRESS NOTES
Patient transferred from  Unit for CT-Scan, CT-Tech attempted to flush patient IV line before connecting to contrast line, line not flushing, blood stain saline fluid coming out from IV dressing when flushed, reassessed, clean area, attempted to flush line with male adaptor connected, no success, patient's primary Nurse Shanda notified, aware patient is returning back to unit for IV access to return back to  CT once ready.

## 2017-01-24 NOTE — SIGNIFICANT EVENT
Significant Event  Hospital Medicine    CC:  Hyponatremia  Date:  01/24/2017  Admit Date:  1/18/2017  Hospital Length of Stay:  6    Code Status:  Full Code     SUBJECTIVE:     Significant Events:  Called urgently to the bedside by nurse to evaluate patient for SBP in the 60s.  Patient seen and examined.  Albumin started.  SBP improved to high 80s.  VSS otherwise.  NAD.  Patient is alert and oriented.  States he had two BMs today.  ECG showed NSR.  CCM at bedside recommends septic work-up.  Ammonia, lactic acid, and blood cultures ordered.  BP cuff noted to be big on patient.  This was switched to a more appropriate size with SBP improved to 89.  RR 16 and O2 sats 99% on RA.  GCS 15.    Of note, patient had been complaining of pain earlier in the evening.  Patient did not have IV access, and had not been able to receive his albumin.  He also missed a dose of midodrine 15mg this afternoon.  His morphine 30mg AR had been held due to low BP.  As patient was in pain and as his previous BP had been maintained in the 90s on BID morphine 30mg and oxycodone 30mg TID, patient was given oxycodone 5mg and Midodrine 2.5mg.  This was given at 2:27am.    OBJECTIVE:     Physical Exam:  Last Vitals:   Vitals:    01/24/17 0000   BP: (!) 98/57   Pulse: 65   Resp: 16   Temp: 97.4 °F (36.3 °C)     GA:  Alert  HEENT:  PERRL.  No scleral icterus or JVD.   Pulmonary:  Clear to auscultation A/P/L.  No wheezing, crackles, or rhonchi.  Cardiac:  RRR, S1 & S2 w/o rubs/murmurs/gallops.   Abdominal:  Bowel sounds present x 4. No appreciable hepatosplenomegaly.  Neuro:  --GCS:  E4 V5 M6  --Mental Status:  Alert  --CN II-XII grossly intact.  Corneal reflex, gag, cough intact.  --Extremity strength and sensation intact x 4.     ASSESSMENT AND PLAN/INTERVENTIONS:     1) Hypotension  Plan/Interventions:  - Patient returned to SBP of 89 after receiving albumin.  Baseline is low 90s.  - Will hold IV bolus for now due to fluid restriction for  hyponatremia.  Should SBP drop again, will give 250cc bolus.  - Septic work-up recommended by [facility].  Blood cultures and lactic acid ordered.  - Suspect secondary to pain medication and missed albumin and midodrine treatments.  GCS 15, RR 16, SpO2 99% on RA.  No indication for Narcan at this time.  Caution with opioids.    Uninterrupted Critical Care/Counseling Time (not including procedures):  30 minutes    ADELAIDE Alejandra, PA-C  Hospital Medicine Department  Staff:  Dr. Aggarwal

## 2017-01-24 NOTE — SIGNIFICANT EVENT
Rapid response called for systolic BP in the 60s. Pt was evaluated and albumin was started with subsequent improvement in BP to high 80s. Other vitals signs remained stable. Pt was alert and oriented and at his baseline mental status per nursing. Septic workup was started wit lactic acid, blood cultures, and ammonia. EKG at beside showed NSR. Pt remained stable following albumin administration.     Javi Arias MD  [facility] PGY1  [phone]

## 2017-01-24 NOTE — PROGRESS NOTES
Hepatology  Consult note      SUBJECTIVE:     Reason for Consult: ESLD    History of Present Illness:  Patient is a 56 y.o. male w/ a history of ETOH+HCV+HCC cirrhosis who was admitted to [facility] after he was found to be hyponatremic and confused when he presented to IR for scheduled TACE.    The patient is currently in outpatient liver transplant evaluation.  He has undergone TACE previously on 10/3/16 for a 3.3cm lesion.  A second lesion identified on CT on 11/4/16 was scheduled for TACE yesterday.      Today the patient reports a productive cough of 3 months' duration.  No fevers, chills, rigors.  Mental status back to baseline.      Interval Hx: No acute events overnight.       OBJECTIVE:     Vital Signs (Most Recent)  Temp: 98.2 °F (36.8 °C) (01/24/17 0714)  Pulse: 70 (01/24/17 0714)  Resp: 16 (01/24/17 0714)  BP: (!) 90/53 (01/24/17 0714)  SpO2: 95 % (01/24/17 0714)    Physical Exam:  General appearance: chronically ill appearing, no acute distress  Eyes: anicteric sclerae  Lungs: bronchial breath sounds, productive cough  Heart: regular rate and rhythm, S1, S2 clearly audible,  Abdomen: soft, non-tender, non-distended; no rebound tenderness, rigidity, or guarding  Pulses: 2+ and symmetric  Skin: Skin color, texture, turgor normal.   Neurologic: No focal deficits noted      Laboratory    CBC:     Recent Labs  Lab 01/22/17 0447 01/23/17 0523 01/24/17  0522   WBC 6.52 5.75 4.88   RBC 2.81* 2.93* 2.94*   HGB 9.7* 10.1* 10.2*   HCT 26.4* 28.1* 28.7*   PLT 69* 89* 87*   MCV 94 96 98   MCH 34.5* 34.5* 34.7*   MCHC 36.7* 35.9 35.5     BMP:     Recent Labs  Lab 01/22/17 0447 01/23/17  0523 01/24/17  0522    106 93   * 123* 124*   K 4.3 4.6 4.2   CL 99 100 98   CO2 18* 20* 20*   BUN 11 14 13   CREATININE 0.8 0.9 0.8   CALCIUM 7.4* 7.6* 8.2*   MG 2.0 2.0 1.7     Coagulation:     Recent Labs  Lab 01/24/17  0522   INR 1.3*       MELD-Na score: 23 at 1/24/2017  5:22 AM  MELD score: 12 at 1/24/2017  5:22  AM  Calculated from:  Serum Creatinine: 0.8 mg/dL (Rounded to 1) at 1/24/2017  5:22 AM  Serum Sodium: 124 mmol/L (Rounded to 125) at 1/24/2017  5:22 AM  Total Bilirubin: 1.9 mg/dL at 1/24/2017  5:22 AM  INR(ratio): 1.3 at 1/24/2017  5:22 AM  Age: 56 years    ASSESSMENT/PLAN:     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  HCC s/p TACE  MELD 11    Admitted with Hyponatremia/Confusion  - likely 2/2 not enough lactulose (no bowel movement in >24hr) and dehydration    Para 1/19: No SBP    Addiction Psych: Moderate Risk    CT Chest (1/20): Emphysema/UIP    Am cortisol 5.1     Recommendations:     Continue fluid restriction (1L)    Liberalize diet to regular    Obtain daily CMP and INR while inpatient     Repeat CT abdomen and pelvis, AFP level     Repeat 2d ECHO    Please order Pulmonary function tests     Bro Ruiz   Gastroenterology Fellow PGY V

## 2017-01-24 NOTE — PLAN OF CARE
Problem: Patient Care Overview  Goal: Plan of Care Review  Outcome: Ongoing (interventions implemented as appropriate)  AAOX3 VSS. No falls noted, fall precautions remain. Skin intact, patient encouraged to eat and limited to 1000ml fluids daily. Pain assessed, complains of pain with mild relief from medications.. Call light within reach. Will continue to monitor.

## 2017-01-24 NOTE — PLAN OF CARE
01/24/17 1231   Right Care Assessment   Can the patient answer the patient profile reliably? Yes, cognitively intact   How often would a person be available to care for the patient? Whenever needed   Describe the patient's ability to walk at the present time. Walks with the help of equipment   How does the patient rate their overall health at the present time? Fair   Number of comorbid conditions (as recorded on the chart) Liver disease   During the past month, has the patient often been bothered by feeling down, depressed or hopeless? Yes   Have you felt down, depressed, or hopeless? 1   During the past month, has the patient often been bothered by little interest or pleasure in doing things? Yes   Have you felt little interest or pleasure in doing things? 1     Patient will need home health services at discharge.

## 2017-01-25 LAB
ALBUMIN SERPL BCP-MCNC: 2.9 G/DL
ALP SERPL-CCNC: 118 U/L
ALT SERPL W/O P-5'-P-CCNC: 30 U/L
ANION GAP SERPL CALC-SCNC: 7 MMOL/L
AST SERPL-CCNC: 66 U/L
BASOPHILS # BLD AUTO: 0.03 K/UL
BASOPHILS NFR BLD: 0.7 %
BILIRUB SERPL-MCNC: 2.6 MG/DL
BUN SERPL-MCNC: 11 MG/DL
CALCIUM SERPL-MCNC: 8.1 MG/DL
CHLORIDE SERPL-SCNC: 101 MMOL/L
CO2 SERPL-SCNC: 17 MMOL/L
CREAT SERPL-MCNC: 0.8 MG/DL
DIFFERENTIAL METHOD: ABNORMAL
EOSINOPHIL # BLD AUTO: 0.2 K/UL
EOSINOPHIL NFR BLD: 3.5 %
ERYTHROCYTE [DISTWIDTH] IN BLOOD BY AUTOMATED COUNT: 15.4 %
EST. GFR  (AFRICAN AMERICAN): >60 ML/MIN/1.73 M^2
EST. GFR  (NON AFRICAN AMERICAN): >60 ML/MIN/1.73 M^2
GLUCOSE SERPL-MCNC: 92 MG/DL
HCT VFR BLD AUTO: 28 %
HGB BLD-MCNC: 10 G/DL
INR PPP: 1.4
LYMPHOCYTES # BLD AUTO: 1.6 K/UL
LYMPHOCYTES NFR BLD: 35.9 %
MAGNESIUM SERPL-MCNC: 1.5 MG/DL
MCH RBC QN AUTO: 34.6 PG
MCHC RBC AUTO-ENTMCNC: 35.7 %
MCV RBC AUTO: 97 FL
MONOCYTES # BLD AUTO: 0.4 K/UL
MONOCYTES NFR BLD: 9.3 %
NEUTROPHILS # BLD AUTO: 2.3 K/UL
NEUTROPHILS NFR BLD: 50.4 %
PHOSPHATE SERPL-MCNC: 2.8 MG/DL
PLATELET # BLD AUTO: 79 K/UL
PMV BLD AUTO: 9.6 FL
POTASSIUM SERPL-SCNC: 4.4 MMOL/L
PROT SERPL-MCNC: 5.4 G/DL
PROTHROMBIN TIME: 14.4 SEC
RBC # BLD AUTO: 2.89 M/UL
SODIUM SERPL-SCNC: 125 MMOL/L
WBC # BLD AUTO: 4.54 K/UL

## 2017-01-25 PROCEDURE — 80053 COMPREHEN METABOLIC PANEL: CPT

## 2017-01-25 PROCEDURE — P9047 ALBUMIN (HUMAN), 25%, 50ML: HCPCS | Performed by: HOSPITALIST

## 2017-01-25 PROCEDURE — 97116 GAIT TRAINING THERAPY: CPT

## 2017-01-25 PROCEDURE — 36415 COLL VENOUS BLD VENIPUNCTURE: CPT

## 2017-01-25 PROCEDURE — 97110 THERAPEUTIC EXERCISES: CPT

## 2017-01-25 PROCEDURE — 83735 ASSAY OF MAGNESIUM: CPT

## 2017-01-25 PROCEDURE — 99233 SBSQ HOSP IP/OBS HIGH 50: CPT | Mod: GC,,, | Performed by: INTERNAL MEDICINE

## 2017-01-25 PROCEDURE — 11000001 HC ACUTE MED/SURG PRIVATE ROOM

## 2017-01-25 PROCEDURE — 99233 SBSQ HOSP IP/OBS HIGH 50: CPT | Mod: ,,, | Performed by: HOSPITALIST

## 2017-01-25 PROCEDURE — 85025 COMPLETE CBC W/AUTO DIFF WBC: CPT

## 2017-01-25 PROCEDURE — 97530 THERAPEUTIC ACTIVITIES: CPT

## 2017-01-25 PROCEDURE — 25000003 PHARM REV CODE 250: Performed by: HOSPITALIST

## 2017-01-25 PROCEDURE — 63600175 PHARM REV CODE 636 W HCPCS: Performed by: HOSPITALIST

## 2017-01-25 PROCEDURE — 85610 PROTHROMBIN TIME: CPT

## 2017-01-25 PROCEDURE — 84100 ASSAY OF PHOSPHORUS: CPT

## 2017-01-25 RX ORDER — OXYCODONE HYDROCHLORIDE 5 MG/1
15 TABLET ORAL EVERY 8 HOURS PRN
Status: DISCONTINUED | OUTPATIENT
Start: 2017-01-25 | End: 2017-01-28 | Stop reason: HOSPADM

## 2017-01-25 RX ORDER — MAGNESIUM SULFATE HEPTAHYDRATE 40 MG/ML
2 INJECTION, SOLUTION INTRAVENOUS ONCE
Status: COMPLETED | OUTPATIENT
Start: 2017-01-25 | End: 2017-01-25

## 2017-01-25 RX ORDER — MORPHINE SULFATE 30 MG/1
30 TABLET, FILM COATED, EXTENDED RELEASE ORAL EVERY 12 HOURS PRN
Status: DISCONTINUED | OUTPATIENT
Start: 2017-01-25 | End: 2017-01-28 | Stop reason: HOSPADM

## 2017-01-25 RX ADMIN — OXYCODONE HYDROCHLORIDE 15 MG: 5 TABLET ORAL at 05:01

## 2017-01-25 RX ADMIN — LACTULOSE 30 G: 10 SOLUTION ORAL at 02:01

## 2017-01-25 RX ADMIN — ALBUMIN (HUMAN) 25 G: 12.5 SOLUTION INTRAVENOUS at 05:01

## 2017-01-25 RX ADMIN — HYPROMELLOSE 2910 1 DROP: 5 SOLUTION OPHTHALMIC at 09:01

## 2017-01-25 RX ADMIN — LACTULOSE 30 G: 10 SOLUTION ORAL at 06:01

## 2017-01-25 RX ADMIN — MIDODRINE HYDROCHLORIDE 15 MG: 5 TABLET ORAL at 09:01

## 2017-01-25 RX ADMIN — HYPROMELLOSE 2910 1 DROP: 5 SOLUTION OPHTHALMIC at 02:01

## 2017-01-25 RX ADMIN — MIDODRINE HYDROCHLORIDE 15 MG: 5 TABLET ORAL at 06:01

## 2017-01-25 RX ADMIN — HYPROMELLOSE 2910 1 DROP: 5 SOLUTION OPHTHALMIC at 06:01

## 2017-01-25 RX ADMIN — ALBUMIN (HUMAN) 25 G: 12.5 SOLUTION INTRAVENOUS at 01:01

## 2017-01-25 RX ADMIN — RIFAXIMIN 550 MG: 550 TABLET ORAL at 09:01

## 2017-01-25 RX ADMIN — MORPHINE SULFATE 30 MG: 30 TABLET, EXTENDED RELEASE ORAL at 08:01

## 2017-01-25 RX ADMIN — PANTOPRAZOLE SODIUM 40 MG: 40 TABLET, DELAYED RELEASE ORAL at 08:01

## 2017-01-25 RX ADMIN — MORPHINE SULFATE 30 MG: 30 TABLET, EXTENDED RELEASE ORAL at 09:01

## 2017-01-25 RX ADMIN — OXYCODONE HYDROCHLORIDE 30 MG: 5 TABLET ORAL at 06:01

## 2017-01-25 RX ADMIN — RIFAXIMIN 550 MG: 550 TABLET ORAL at 08:01

## 2017-01-25 RX ADMIN — MIDODRINE HYDROCHLORIDE 15 MG: 5 TABLET ORAL at 02:01

## 2017-01-25 RX ADMIN — MAGNESIUM SULFATE IN WATER 2 G: 40 INJECTION, SOLUTION INTRAVENOUS at 06:01

## 2017-01-25 NOTE — NURSING
Pt transported to CT scan, CT tech notified me that patient does not have a number 18 or 20 gauge iv in, which is needed for the test.  Pt is very hard stick and we were unable to obtain the iv.  Dr. Carty notified, no new orders given, states will pass on to primary service, will continue to monitor.

## 2017-01-25 NOTE — PROGRESS NOTES
Hepatology  Consult note      SUBJECTIVE:     Reason for Consult: ESLD    History of Present Illness:  Patient is a 56 y.o. male w/ a history of ETOH+HCV+HCC cirrhosis who was [place] [facility] [place] after he was found to be hyponatremic and confused when he presented to IR for scheduled TACE.    The patient is currently in outpatient liver transplant evaluation.  He has undergone TACE previously on 10/3/16 for a 3.3cm lesion.  A second lesion identified on CT on 11/4/16 was scheduled for TACE yesterday.      Today the patient reports a productive cough of 3 months' duration.  No fevers, chills, rigors.  Mental status back to baseline.      Interval Hx:  Patient intermittently somnolent      OBJECTIVE:     Vital Signs (Most Recent)  Temp: 98.8 °F (37.1 °C) (01/25/17 0720)  Pulse: 68 (01/25/17 0720)  Resp: 18 (01/25/17 0720)  BP: (!) 98/54 (01/25/17 0720)  SpO2: 95 % (01/25/17 0720)    Physical Exam:  General appearance: chronically ill appearing, no acute distress  Eyes: anicteric sclerae  Lungs: bronchial breath sounds, productive cough  Heart: regular rate and rhythm, S1, S2 clearly audible,  Abdomen: soft, non-tender, non-distended; no rebound tenderness, rigidity, or guarding  Pulses: 2+ and symmetric  Skin: Skin color, texture, turgor normal.   Neurologic: No focal deficits noted      Laboratory    CBC:     Recent Labs  Lab 01/23/17  0523 01/24/17  0522 01/25/17  0633   WBC 5.75 4.88 4.54   RBC 2.93* 2.94* 2.89*   HGB 10.1* 10.2* 10.0*   HCT 28.1* 28.7* 28.0*   PLT 89* 87* 79*   MCV 96 98 97   MCH 34.5* 34.7* 34.6*   MCHC 35.9 35.5 35.7     BMP:     Recent Labs  Lab 01/23/17  0523 01/24/17  0522 01/25/17  0633    93 92   * 124* 125*   K 4.6 4.2 4.4    98 101   CO2 20* 20* 17*   BUN 14 13 11   CREATININE 0.9 0.8 0.8   CALCIUM 7.6* 8.2* 8.1*   MG 2.0 1.7 1.5*     Coagulation:     Recent Labs  Lab 01/25/17  0633   INR 1.4*       MELD-Na score: 24 at 1/25/2017  6:33 AM  MELD score: 14 at 1/25/2017  6:33  AM  Calculated from:  Serum Creatinine: 0.8 mg/dL (Rounded to 1) at 1/25/2017  6:33 AM  Serum Sodium: 125 mmol/L at 1/25/2017  6:33 AM  Total Bilirubin: 2.6 mg/dL at 1/25/2017  6:33 AM  INR(ratio): 1.4 at 1/25/2017  6:33 AM  Age: 56 years    ASSESSMENT/PLAN:     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

## 2017-01-25 NOTE — PLAN OF CARE
Problem: Patient Care Overview  Goal: Plan of Care Review  Outcome: Ongoing (interventions implemented as appropriate)  Patient resting in bed comfortably. No IV access. Charge nurse and nurse manager attempted without success. PICC consult placed earlier today. ICU charge to attempt IV. Fall precautions maintained with no falls noted. Call light in reach bed locked and in lowest position. Non-skid socks on while out of bed. Patient instructed to call for assistance. Skin integrity maintained as patient is assisted with frequent positioning. C/o pain managed with prn meds. No other complaints or concerns. Progressing towards goals. Will continue to monitor and follow plan of care.

## 2017-01-25 NOTE — PT/OT/SLP PROGRESS
Physical Therapy  Treatment    Tommy Hopper   MRN: [id number]   Admitting Diagnosis: Hyponatremia    PT Received On: 17  PT Start Time: 907     PT Stop Time: 932    PT Total Time (min): 25 min       Billable Minutes:  Gait Training9, Therapeutic Activity 8 and Therapeutic Exercise 8    Treatment Type: Treatment  PT/PTA: PTA     PTA Visit Number: 1       General Precautions: Standard, fall  Orthopedic Precautions:     Braces:           Subjective:  Communicated with RN prior to session.  I want a walker with the brakes to go home with    Pain Ratin/10  Location - Side: Bilateral  Location - Orientation: generalized  Location: abdomen  Pain Addressed: Reposition, Distraction, Cessation of Activity  Pain Rating Post-Intervention: 6/10    Objective:   Patient found with: peripheral IV    Functional Mobility:  Bed Mobility:   Rolling/Turning Right: Independent  Supine to Sit: Modified Independent  Sit to Supine: Modified Independent    Transfers:  Sit <> Stand Assistance: Supervision  Sit <> Stand Assistive Device: No Assistive Device    Gait:   Gait Distance: 185 ft with vcs for posture,safety,narrow HUBERT and placement of AD. with no LOB  Assistance 1: Stand by Assistance  Gait Assistive Device: Rollator  Gait Pattern: swing-through gait  Gait Deviation(s): decreased tomi, decreased step length    Therapeutic Activities and Exercises:   Discussed pt progress and d/c  Pt performed sit <> stand from rollator locking brakes and sitting safely   Patient performed therex X 15 reps seated B LE AROM AP, LAQ, Hip Flexion, Hip Abd/Add   White board updated    AM-PAC 6 CLICK MOBILITY  How much help from another person does this patient currently need?   1 = Unable, Total/Dependent Assistance  2 = A lot, Maximum/Moderate Assistance  3 = A little, Minimum/Contact Guard/Supervision  4 = None, Modified [place]/Independent    Turning over in bed (including adjusting bedclothes, sheets and blankets)?: 4  Sitting down  on and standing up from a chair with arms (e.g., wheelchair, bedside commode, etc.): 3  Moving from lying on back to sitting on the side of the bed?: 4  Moving to and from a bed to a chair (including a wheelchair)?: 3  Need to walk in hospital room?: 3  Climbing 3-5 steps with a railing?: 3  Total Score: 20    AM-PAC Raw Score CMS G-Code Modifier Level of Impairment Assistance   6 % Total / Unable   7 - 9 CM 80 - 100% Maximal Assist   10 - 14 CL 60 - 80% Moderate Assist   15 - 19 CK 40 - 60% Moderate Assist   20 - 22 CJ 20 - 40% Minimal Assist   23 CI 1-20% SBA / CGA   24 CH 0% Independent/ Mod I     Patient left supine with all lines intact, call button in reach and nsg notified.    Assessment:  Tommy Hopper is a 56 y.o. male with a medical diagnosis of Hyponatremia and presents with continued deficits as listed below. Pt continues to remain motivated and cooperative with treatment session. Pt Progressing with PT Intervention. Pt required min vcs for safety with  gait and mobility. Pt would continue to benefit from skilled PT to address overall functional mobility and goals. Goals remain appropriate.    Rehab identified problem list/impairments: Rehab identified problem list/impairments: weakness, impaired functional mobilty, gait instability, pain    Rehab potential is good.    Activity tolerance: Good    Discharge recommendations: Discharge Facility/Level Of Care Needs: home with home health     Barriers to discharge: Barriers to Discharge: None    Equipment recommendations: Equipment Needed After Discharge: rollator     GOALS:   Physical Therapy Goals        Problem: Physical Therapy Goal    Goal Priority Disciplines Outcome Goal Variances Interventions   Physical Therapy Goal     PT/OT, PT Ongoing (interventions implemented as appropriate)     Description:  Goals to be met by: 2017     Patient will increase functional independence with mobility by performin. Sit to stand transfer with  Modified [place]-not met  2. Bed to chair transfer with Modified [place] using Rolling Walker as needed-not met  3. Gait training with Rollator walker to determine safety x 150 feet with SBA for mobility and safety- met                 PLAN:    Patient to be seen 4 x/week  to address the above listed problems via gait training, therapeutic activities, therapeutic exercises, neuromuscular re-education  Plan of Care expires: 02/20/17  Plan of Care reviewed with: patient         Quirino Singh, PTA  01/25/2017

## 2017-01-25 NOTE — PLAN OF CARE
Problem: Physical Therapy Goal  Goal: Physical Therapy Goal  Goals to be met by: 2017     Patient will increase functional independence with mobility by performin. Sit to stand transfer with Modified [place]-not met  2. Bed to chair transfer with Modified [place] using Rolling Walker as needed-not met  3. Gait training with Rollator walker to determine safety x 150 feet with SBA for mobility and safety- met   Pt progressing towards goals. continue with PT POC.Goals remain appropriate.     Quirino Singh PTA  2017

## 2017-01-26 LAB
ALBUMIN FLD-MCNC: 1.1 G/DL
ALBUMIN SERPL BCP-MCNC: 3.1 G/DL
ALP SERPL-CCNC: 99 U/L
ALT SERPL W/O P-5'-P-CCNC: 26 U/L
ANION GAP SERPL CALC-SCNC: 6 MMOL/L
APPEARANCE FLD: NORMAL
AST SERPL-CCNC: 59 U/L
BASOPHILS # BLD AUTO: 0.04 K/UL
BASOPHILS NFR BLD: 0.8 %
BILIRUB SERPL-MCNC: 2 MG/DL
BODY FLD TYPE: NORMAL
BODY FLUID SOURCE, LDH: NORMAL
BUN SERPL-MCNC: 10 MG/DL
CALCIUM SERPL-MCNC: 7.9 MG/DL
CHLORIDE SERPL-SCNC: 101 MMOL/L
CO2 SERPL-SCNC: 19 MMOL/L
COLOR FLD: YELLOW
CREAT SERPL-MCNC: 0.8 MG/DL
DIFFERENTIAL METHOD: ABNORMAL
EOSINOPHIL # BLD AUTO: 0.2 K/UL
EOSINOPHIL NFR BLD: 3 %
ERYTHROCYTE [DISTWIDTH] IN BLOOD BY AUTOMATED COUNT: 15.6 %
EST. GFR  (AFRICAN AMERICAN): >60 ML/MIN/1.73 M^2
EST. GFR  (NON AFRICAN AMERICAN): >60 ML/MIN/1.73 M^2
GLUCOSE SERPL-MCNC: 101 MG/DL
HCT VFR BLD AUTO: 26.2 %
HGB BLD-MCNC: 9.4 G/DL
INR PPP: 1.4
LDH FLD L TO P-CCNC: 47 U/L
LYMPHOCYTES # BLD AUTO: 1.5 K/UL
LYMPHOCYTES NFR BLD: 30.3 %
LYMPHOCYTES NFR FLD MANUAL: 61 %
MAGNESIUM SERPL-MCNC: 1.8 MG/DL
MCH RBC QN AUTO: 34.7 PG
MCHC RBC AUTO-ENTMCNC: 35.9 %
MCV RBC AUTO: 97 FL
MESOTHL CELL NFR FLD MANUAL: 2 %
MONOCYTES # BLD AUTO: 0.5 K/UL
MONOCYTES NFR BLD: 10.3 %
MONOS+MACROS NFR FLD MANUAL: 19 %
NEUTROPHILS # BLD AUTO: 2.8 K/UL
NEUTROPHILS NFR BLD: 55.4 %
NEUTROPHILS NFR FLD MANUAL: 18 %
PHOSPHATE SERPL-MCNC: 3.3 MG/DL
PLATELET # BLD AUTO: 85 K/UL
PMV BLD AUTO: 9.5 FL
POTASSIUM SERPL-SCNC: 4.4 MMOL/L
PROT FLD-MCNC: 1.6 G/DL
PROT SERPL-MCNC: 5.2 G/DL
PROTHROMBIN TIME: 14.7 SEC
RBC # BLD AUTO: 2.71 M/UL
SODIUM SERPL-SCNC: 126 MMOL/L
SPECIMEN SOURCE: NORMAL
SPECIMEN SOURCE: NORMAL
WBC # BLD AUTO: 5.05 K/UL
WBC # FLD: 120 /CU MM

## 2017-01-26 PROCEDURE — 0W9G3ZZ DRAINAGE OF PERITONEAL CAVITY, PERCUTANEOUS APPROACH: ICD-10-PCS | Performed by: NURSE PRACTITIONER

## 2017-01-26 PROCEDURE — 11000001 HC ACUTE MED/SURG PRIVATE ROOM

## 2017-01-26 PROCEDURE — 87070 CULTURE OTHR SPECIMN AEROBIC: CPT

## 2017-01-26 PROCEDURE — A9585 GADOBUTROL INJECTION: HCPCS | Performed by: HOSPITALIST

## 2017-01-26 PROCEDURE — 84157 ASSAY OF PROTEIN OTHER: CPT

## 2017-01-26 PROCEDURE — 97110 THERAPEUTIC EXERCISES: CPT

## 2017-01-26 PROCEDURE — 83615 LACTATE (LD) (LDH) ENZYME: CPT

## 2017-01-26 PROCEDURE — P9047 ALBUMIN (HUMAN), 25%, 50ML: HCPCS | Performed by: HOSPITALIST

## 2017-01-26 PROCEDURE — 87205 SMEAR GRAM STAIN: CPT

## 2017-01-26 PROCEDURE — 84100 ASSAY OF PHOSPHORUS: CPT

## 2017-01-26 PROCEDURE — 99233 SBSQ HOSP IP/OBS HIGH 50: CPT | Mod: ,,, | Performed by: HOSPITALIST

## 2017-01-26 PROCEDURE — 99233 SBSQ HOSP IP/OBS HIGH 50: CPT | Mod: GC,,, | Performed by: INTERNAL MEDICINE

## 2017-01-26 PROCEDURE — 89051 BODY FLUID CELL COUNT: CPT

## 2017-01-26 PROCEDURE — 97116 GAIT TRAINING THERAPY: CPT

## 2017-01-26 PROCEDURE — 80053 COMPREHEN METABOLIC PANEL: CPT

## 2017-01-26 PROCEDURE — 25500020 PHARM REV CODE 255: Performed by: HOSPITALIST

## 2017-01-26 PROCEDURE — 82042 OTHER SOURCE ALBUMIN QUAN EA: CPT

## 2017-01-26 PROCEDURE — 36415 COLL VENOUS BLD VENIPUNCTURE: CPT

## 2017-01-26 PROCEDURE — 85025 COMPLETE CBC W/AUTO DIFF WBC: CPT

## 2017-01-26 PROCEDURE — 87075 CULTR BACTERIA EXCEPT BLOOD: CPT

## 2017-01-26 PROCEDURE — 25000003 PHARM REV CODE 250: Performed by: HOSPITALIST

## 2017-01-26 PROCEDURE — 85610 PROTHROMBIN TIME: CPT

## 2017-01-26 PROCEDURE — 83735 ASSAY OF MAGNESIUM: CPT

## 2017-01-26 PROCEDURE — 63600175 PHARM REV CODE 636 W HCPCS: Performed by: HOSPITALIST

## 2017-01-26 RX ORDER — ALBUMIN HUMAN 250 G/1000ML
25 SOLUTION INTRAVENOUS ONCE
Status: COMPLETED | OUTPATIENT
Start: 2017-01-26 | End: 2017-01-26

## 2017-01-26 RX ORDER — GADOBUTROL 604.72 MG/ML
8 INJECTION INTRAVENOUS
Status: COMPLETED | OUTPATIENT
Start: 2017-01-26 | End: 2017-01-26

## 2017-01-26 RX ADMIN — OXYCODONE HYDROCHLORIDE 15 MG: 5 TABLET ORAL at 03:01

## 2017-01-26 RX ADMIN — OXYCODONE HYDROCHLORIDE 15 MG: 5 TABLET ORAL at 11:01

## 2017-01-26 RX ADMIN — ALBUMIN (HUMAN) 25 G: 25 SOLUTION INTRAVENOUS at 03:01

## 2017-01-26 RX ADMIN — ALBUMIN (HUMAN) 25 G: 12.5 SOLUTION INTRAVENOUS at 05:01

## 2017-01-26 RX ADMIN — ALBUMIN (HUMAN) 25 G: 12.5 SOLUTION INTRAVENOUS at 01:01

## 2017-01-26 RX ADMIN — MIDODRINE HYDROCHLORIDE 15 MG: 5 TABLET ORAL at 06:01

## 2017-01-26 RX ADMIN — MIDODRINE HYDROCHLORIDE 15 MG: 5 TABLET ORAL at 09:01

## 2017-01-26 RX ADMIN — RIFAXIMIN 550 MG: 550 TABLET ORAL at 08:01

## 2017-01-26 RX ADMIN — OXYCODONE HYDROCHLORIDE 15 MG: 5 TABLET ORAL at 01:01

## 2017-01-26 RX ADMIN — ALBUMIN (HUMAN) 25 G: 12.5 SOLUTION INTRAVENOUS at 06:01

## 2017-01-26 RX ADMIN — MORPHINE SULFATE 30 MG: 30 TABLET, EXTENDED RELEASE ORAL at 10:01

## 2017-01-26 RX ADMIN — HYPROMELLOSE 2910 1 DROP: 5 SOLUTION OPHTHALMIC at 01:01

## 2017-01-26 RX ADMIN — MIDODRINE HYDROCHLORIDE 15 MG: 5 TABLET ORAL at 01:01

## 2017-01-26 RX ADMIN — ALBUMIN (HUMAN) 25 G: 12.5 SOLUTION INTRAVENOUS at 11:01

## 2017-01-26 RX ADMIN — PANTOPRAZOLE SODIUM 40 MG: 40 TABLET, DELAYED RELEASE ORAL at 08:01

## 2017-01-26 RX ADMIN — HYPROMELLOSE 2910 1 DROP: 5 SOLUTION OPHTHALMIC at 06:01

## 2017-01-26 RX ADMIN — GADOBUTROL 8 ML: 604.72 INJECTION INTRAVENOUS at 07:01

## 2017-01-26 RX ADMIN — LACTULOSE 30 G: 10 SOLUTION ORAL at 09:01

## 2017-01-26 RX ADMIN — HYPROMELLOSE 2910 1 DROP: 5 SOLUTION OPHTHALMIC at 11:01

## 2017-01-26 NOTE — PROGRESS NOTES
Paracentesis complete. Pt tolerated well. Total 4,900 ml peritoneal fluid removed. 100 ml Albumin 25% IV administered.

## 2017-01-26 NOTE — NURSING
"Patient back to floor. Declined vital signs at this time due to wanting to "Eat and gain energy back we took." Will continue to monitor.   "

## 2017-01-26 NOTE — NURSING
Received call from CT that pt IV infiltrated. Patient arrived back to floor. MESSI MARTINEZ MD paged. Will continue to monitor.

## 2017-01-26 NOTE — PROCEDURES
Radiology Post-Procedure Note    Pre Op Diagnosis: Ascites  Post Op Diagnosis: Same    Procedure: Ultrasound Guided Paracentesis    Procedure performed by: Jose Jones NP/Bhupinder Dinh MD    Written Informed Consent Obtained: Yes  Specimen Removed: YES clear yellow fluid   Estimated Blood Loss: Minimal    Findings:   Successful paracentesis.  Albumin administered PRN per protocol.    Patient tolerated procedure well.    CHERRIE Diaz, FNP  Interventional Radiology  [phone] [employer]

## 2017-01-26 NOTE — PLAN OF CARE
Problem: Physical Therapy Goal  Goal: Physical Therapy Goal  Goals to be met by: 2017     Patient will increase functional independence with mobility by performin. Sit to stand transfer with Modified [place]-not met  2. Bed to chair transfer with Modified [place] using Rolling Walker as needed-not met  3. Gait training with Rollator walker to determine safety x 150 feet with SBA for mobility and safety- met   Pt progressing towards goals. continue with PT POC.Goals remain appropriate.    Quirino Singh PTA  2017

## 2017-01-26 NOTE — PROGRESS NOTES
Progress Note  Hospital Medicine    Admit Date: 1/18/2017    SUBJECTIVE:     Follow-up For: Hyponatremia      HPI/Interval history (See H&P for complete P,F,SHx) : sodium improving to 125, mental status back to baseline.     Review of Systems:  Constitutional- Negative for Fever, positive for weakness  Neuro- Negative for Confusion, Negative for hallucinations  CV- Negative for Chest Pain, Negative for palpitations  Resp- Negative for Cough, Negative for SOB  GI- Negative for nausea, nomiting, Negative for diarrhea  Extrem- Negative for Pain, Negative for Swelling    OBJECTIVE:       Intake/Output Summary (Last 24 hours) at 01/25/17 2006  Last data filed at 01/25/17 1800   Gross per 24 hour   Intake              580 ml   Output             1300 ml   Net             -720 ml       Vital Signs Range (Last 24H):  Temp:  [98.1 °F (36.7 °C)-99.4 °F (37.4 °C)]   Pulse:  [68-73]   Resp:  [16-18]   BP: ()/(54-62)   SpO2:  [95 %-97 %]     Physical Exam:  General: Well developed, well nourished male in NAD  HEENT: Conjunctiva clear; Oropharynx clear  Neck: No JVD noted, Supple  CV: Normal S1, S2 with no murmurs,gallops,rubs  Resp: Lungs CTA Bilaterally, Unlabored  Abdomen: NTND, BS normoactive x4 quads, soft  Extrem: No cyanosis, clubbing, edema.  Skin: No rashes, lesions, ulcers  Neuro: motor strength in tact, CN 2-12 intact  PSYCH: Oriented x3    Medications:  Medication list was reviewed and changes noted under Assessment/Plan.    Laboratory:  Recent Labs      01/24/17 0522 01/25/17   0633   WBC  4.88  4.54   RBC  2.94*  2.89*   HGB  10.2*  10.0*   HCT  28.7*  28.0*   PLT  87*  79*   MCV  98  97   MCH  34.7*  34.6*   MCHC  35.5  35.7       Recent Labs      01/24/17   0522  01/25/17   0633   GLU  93  92   NA  124*  125*   K  4.2  4.4   CL  98  101   CO2  20*  17*   BUN  13  11   CREATININE  0.8  0.8   MG  1.7  1.5*   PHOS  3.2  2.8       Diagnostic Results:    Labs reviewed     ASSESSMENT/PLAN:     Active Hospital  Problems    ILD (interstitial lung disease)      Pre-operative respiratory examination      Arterial hypotension      Hepatic coma/encephalopathy      Anemia of chronic disease      Hypoalbuminemia due to protein-calorie malnutrition      Decompensated hepatic cirrhosis      Alcoholic cirrhosis of liver with ascites      Hyperkalemia      Hep C w/o coma, chronic      HCC (hepatocellular carcinoma)      Cirrhosis of liver with ascites      *Hyponatremia      Thrombocytopenia          Plan:      # Hyponatremia  - sodium improved from 121 to 125, continue 1 L fluid restriction, normal cortisol level.     # Decompensated alcohol and Hep C cirrhosis with ascites  # Hepatic Encephalopathy   # Hepatocellular Carcinoma  MELD-Na score: 24 at 1/25/2017  6:33 AM  MELD score: 14 at 1/25/2017  6:33 AM  Calculated from:  Serum Creatinine: 0.8 mg/dL (Rounded to 1) at 1/25/2017  6:33 AM  Serum Sodium: 125 mmol/L at 1/25/2017  6:33 AM  Total Bilirubin: 2.6 mg/dL at 1/25/2017  6:33 AM  INR(ratio): 1.4 at 1/25/2017  6:33 AM  Age: 56 years  - hepatology consult, under going outpatient transplant evaluation, has been approved for inpatient  - actulose to 30 g PO Q6, goal to have 3 to 4 BMs daily; add rifaximin  - s/p paracentesis 1/19/17 with 3.4 L fluid removal negative for SBP  - addiction psych consulted and believe he is at moderate risk      # Emphysema   - 45 pack year hx  - Chest CT w/ atypical pattern of emphysema and worsening GGO within the right and left lower lobe, which may represent focal worsening of interstitial lung disease and/or pulmonary edema. This may be 2/2 worsening ascites causing some compressive atelectasis at the lung bases.   - PFTs in June w/o obstruction or restriction but DLCO decreased. PFTs repeated this am.   - Very likely that respiratory studies will demonstrate worsening respiratory status, especially with significant distention and ascites 2/2 liver disease.      # Arterial Hypotension  - likely  from liver disease,  midodrine 15 mg PO TID, will need to continue of discharge  - Albumin IVPB q 6hrs     # Hyperkalemia  - likely from aldactone; stoppped; resolved     # Anemia of chronic disease  # Thrombocytopenia  - monitor     # Chronic Pain syndrome  - as per patient, he is on morphine 30 mg PO BID and oxycodone 30mg PO TID;will try to wean off to avoid somnl     # Hypoalbuminemia due to moderate protein vero malnutrition  - PAB 4; boost    # Hypomagnesemia - replaced     DVT prophylaxis- early ambulation     Discharge Disposition- possibly wednesday    Time spent in care of patient (Greater than 1/2 spent in coordinating and counseling patient care):  30 minutes

## 2017-01-26 NOTE — PLAN OF CARE
Problem: Patient Care Overview  Goal: Plan of Care Review  Outcome: Ongoing (interventions implemented as appropriate)  Patient resting in bed comfortably. IV intact with no signs of irritation. Fall precautions maintained with no falls noted. Call light in reach bed locked and in lowest position. Non-skid socks on while out of bed. patient instructed to call for assistance. Skin integrity maintained as patient is assisted with frequent positioning. C/o pain managed with prn meds. No other complaints or concerns. Progressing towards goals. Will continue to monitor and follow plan of care.

## 2017-01-26 NOTE — PROGRESS NOTES
Pt to rm 124. T Robert NP to bedside. Yueh Catheter placed to RLQ with use of ultrasound guidance. No complaints or signs of distress. Will continue to monitor.

## 2017-01-26 NOTE — PROGRESS NOTES
Hepatology  Consult note      SUBJECTIVE:     Reason for Consult: ESLD    History of Present Illness:  Patient is a 56 y.o. male w/ a history of ETOH+HCV+HCC cirrhosis who was admitted to [facility] [place] after he was found to be hyponatremic and confused when he presented to IR for scheduled TACE.    The patient is currently in outpatient liver transplant evaluation.  He has undergone TACE previously on 10/3/16 for a 3.3cm lesion.  A second lesion identified on CT on 11/4/16 was scheduled for TACE yesterday.      Today the patient reports a productive cough of 3 months' duration.  No fevers, chills, rigors.  Mental status back to baseline.      Interval Hx:  Patient intermittently somnolent    OBJECTIVE:     Vital Signs (Most Recent)  Temp: 97.1 °F (36.2 °C) (01/26/17 0730)  Pulse: 66 (01/26/17 0730)  Resp: 18 (01/26/17 0730)  BP: (!) 101/58 (01/26/17 0730)  SpO2: 98 % (01/26/17 0730)    Physical Exam:  General appearance: chronically ill appearing, no acute distress  Eyes: anicteric sclerae  Lungs: bronchial breath sounds, productive cough  Heart: regular rate and rhythm, S1, S2 clearly audible,  Abdomen: soft, non-tender, non-distended; no rebound tenderness, rigidity, or guarding  Pulses: 2+ and symmetric  Skin: Skin color, texture, turgor normal.   Neurologic: No focal deficits noted      Laboratory    CBC:     Recent Labs  Lab 01/24/17 0522 01/25/17 0633 01/26/17 0519   WBC 4.88 4.54 5.05   RBC 2.94* 2.89* 2.71*   HGB 10.2* 10.0* 9.4*   HCT 28.7* 28.0* 26.2*   PLT 87* 79* 85*   MCV 98 97 97   MCH 34.7* 34.6* 34.7*   MCHC 35.5 35.7 35.9     BMP:     Recent Labs  Lab 01/24/17 0522 01/25/17 0633 01/26/17  0519   GLU 93 92 101   * 125* 126*   K 4.2 4.4 4.4   CL 98 101 101   CO2 20* 17* 19*   BUN 13 11 10   CREATININE 0.8 0.8 0.8   CALCIUM 8.2* 8.1* 7.9*   MG 1.7 1.5* 1.8     Coagulation:     Recent Labs  Lab 01/26/17  0519   INR 1.4*     MELD-Na score: 23 at 1/26/2017  5:19 AM  MELD score: 13 at 1/26/2017  5:19  AM  Calculated from:  Serum Creatinine: 0.8 mg/dL (Rounded to 1) at 1/26/2017  5:19 AM  Serum Sodium: 126 mmol/L at 1/26/2017  5:19 AM  Total Bilirubin: 2.0 mg/dL at 1/26/2017  5:19 AM  INR(ratio): 1.4 at 1/26/2017  5:19 AM  Age: 56 years    ASSESSMENT/PLAN:     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

## 2017-01-27 VITALS
DIASTOLIC BLOOD PRESSURE: 60 MMHG | OXYGEN SATURATION: 95 % | HEART RATE: 65 BPM | WEIGHT: 166 LBS | RESPIRATION RATE: 18 BRPM | TEMPERATURE: 98 F | BODY MASS INDEX: 24.59 KG/M2 | SYSTOLIC BLOOD PRESSURE: 99 MMHG | HEIGHT: 69 IN

## 2017-01-27 LAB
ALBUMIN SERPL BCP-MCNC: 3.7 G/DL
ALP SERPL-CCNC: 81 U/L
ALT SERPL W/O P-5'-P-CCNC: 25 U/L
ANION GAP SERPL CALC-SCNC: 11 MMOL/L
AST SERPL-CCNC: 57 U/L
BASOPHILS # BLD AUTO: 0.03 K/UL
BASOPHILS NFR BLD: 0.6 %
BILIRUB SERPL-MCNC: 2.4 MG/DL
BUN SERPL-MCNC: 7 MG/DL
CALCIUM SERPL-MCNC: 8.2 MG/DL
CHLORIDE SERPL-SCNC: 106 MMOL/L
CO2 SERPL-SCNC: 12 MMOL/L
CREAT SERPL-MCNC: 0.9 MG/DL
DIFFERENTIAL METHOD: ABNORMAL
EOSINOPHIL # BLD AUTO: 0.1 K/UL
EOSINOPHIL NFR BLD: 2.1 %
ERYTHROCYTE [DISTWIDTH] IN BLOOD BY AUTOMATED COUNT: 16.1 %
EST. GFR  (AFRICAN AMERICAN): >60 ML/MIN/1.73 M^2
EST. GFR  (NON AFRICAN AMERICAN): >60 ML/MIN/1.73 M^2
GLUCOSE SERPL-MCNC: 106 MG/DL
GRAM STN SPEC: NORMAL
GRAM STN SPEC: NORMAL
HCT VFR BLD AUTO: 29 %
HGB BLD-MCNC: 10.2 G/DL
INR PPP: 1.6
LYMPHOCYTES # BLD AUTO: 1.1 K/UL
LYMPHOCYTES NFR BLD: 21.3 %
MAGNESIUM SERPL-MCNC: 1.9 MG/DL
MCH RBC QN AUTO: 34.9 PG
MCHC RBC AUTO-ENTMCNC: 35.2 %
MCV RBC AUTO: 99 FL
MONOCYTES # BLD AUTO: 0.7 K/UL
MONOCYTES NFR BLD: 12.5 %
NEUTROPHILS # BLD AUTO: 3.4 K/UL
NEUTROPHILS NFR BLD: 63.3 %
PHOSPHATE SERPL-MCNC: 3.5 MG/DL
PLATELET # BLD AUTO: 100 K/UL
PMV BLD AUTO: 9.9 FL
POTASSIUM SERPL-SCNC: 4.6 MMOL/L
PROT SERPL-MCNC: 5.6 G/DL
PROTHROMBIN TIME: 16.1 SEC
RBC # BLD AUTO: 2.92 M/UL
SODIUM SERPL-SCNC: 129 MMOL/L
WBC # BLD AUTO: 5.3 K/UL

## 2017-01-27 PROCEDURE — 25500020 PHARM REV CODE 255: Performed by: STUDENT IN AN ORGANIZED HEALTH CARE EDUCATION/TRAINING PROGRAM

## 2017-01-27 PROCEDURE — 99238 HOSP IP/OBS DSCHRG MGMT 30/<: CPT | Mod: ,,, | Performed by: HOSPITALIST

## 2017-01-27 PROCEDURE — 84100 ASSAY OF PHOSPHORUS: CPT

## 2017-01-27 PROCEDURE — 85610 PROTHROMBIN TIME: CPT

## 2017-01-27 PROCEDURE — 36415 COLL VENOUS BLD VENIPUNCTURE: CPT

## 2017-01-27 PROCEDURE — 94761 N-INVAS EAR/PLS OXIMETRY MLT: CPT

## 2017-01-27 PROCEDURE — 63600175 PHARM REV CODE 636 W HCPCS: Performed by: HOSPITALIST

## 2017-01-27 PROCEDURE — P9047 ALBUMIN (HUMAN), 25%, 50ML: HCPCS | Performed by: HOSPITALIST

## 2017-01-27 PROCEDURE — 80053 COMPREHEN METABOLIC PANEL: CPT

## 2017-01-27 PROCEDURE — 85025 COMPLETE CBC W/AUTO DIFF WBC: CPT

## 2017-01-27 PROCEDURE — 25000003 PHARM REV CODE 250: Performed by: HOSPITALIST

## 2017-01-27 PROCEDURE — 83735 ASSAY OF MAGNESIUM: CPT

## 2017-01-27 RX ADMIN — LACTULOSE 30 G: 10 SOLUTION ORAL at 05:01

## 2017-01-27 RX ADMIN — MIDODRINE HYDROCHLORIDE 15 MG: 5 TABLET ORAL at 05:01

## 2017-01-27 RX ADMIN — RIFAXIMIN 550 MG: 550 TABLET ORAL at 08:01

## 2017-01-27 RX ADMIN — MORPHINE SULFATE 30 MG: 30 TABLET, EXTENDED RELEASE ORAL at 03:01

## 2017-01-27 RX ADMIN — HYPROMELLOSE 2910 1 DROP: 5 SOLUTION OPHTHALMIC at 02:01

## 2017-01-27 RX ADMIN — ALBUMIN (HUMAN) 25 G: 12.5 SOLUTION INTRAVENOUS at 11:01

## 2017-01-27 RX ADMIN — ALBUMIN (HUMAN) 25 G: 12.5 SOLUTION INTRAVENOUS at 05:01

## 2017-01-27 RX ADMIN — HYPROMELLOSE 2910 1 DROP: 5 SOLUTION OPHTHALMIC at 05:01

## 2017-01-27 RX ADMIN — MIDODRINE HYDROCHLORIDE 15 MG: 5 TABLET ORAL at 02:01

## 2017-01-27 RX ADMIN — IOHEXOL 15 ML: 350 INJECTION, SOLUTION INTRAVENOUS at 02:01

## 2017-01-27 RX ADMIN — LIDOCAINE 1 PATCH: 50 PATCH TOPICAL at 04:01

## 2017-01-27 RX ADMIN — LACTULOSE 30 G: 10 SOLUTION ORAL at 02:01

## 2017-01-27 RX ADMIN — PANTOPRAZOLE SODIUM 40 MG: 40 TABLET, DELAYED RELEASE ORAL at 08:01

## 2017-01-27 NOTE — NURSING
MD Santo aware of BP = 85/55 HR = 63. No new orders at this time. Per MD, to call back if pt becomes symptomatic or if pressure goes lower than 85/55. Will monitor pt

## 2017-01-27 NOTE — PLAN OF CARE
SW met with pt and his wife at bedside to discuss possible discharge for today and recommendations of home health. Pt admitted to eating breakfast this morning and SW made both pt and his wife aware that, that may cause a delay in discharge. Pt and his wife eager to discharge today. SW made them aware of recommendation of home health at discharge and provided them with [employer] list, they selected [facility], SW will send referral via [facility].     Face sheet, H&P, MAR and last physician to [place] via [facility], pending HH orders

## 2017-01-27 NOTE — NURSING
Medicaine Team A paged three times ti inquire about oral contrast. Radiologist st to adm PO contrast per protocol.

## 2017-01-27 NOTE — PLAN OF CARE
Problem: Patient Care Overview  Goal: Plan of Care Review  Outcome: Ongoing (interventions implemented as appropriate)  Plan of care reviewed with patient with no questions or concerns at this time. Pain was assessed and managed throughout shift. Patient voids without difficulty. Patient turned every 2 hours throughout shift. IV intact, albumin administered as scheduled. SCD's in place. Fall precautions in place with bed alarm set and call light within reach. Family at bedside. Will continue to monitor.

## 2017-01-28 NOTE — NURSING
CT transport present w/ Pt and wife not oresent w/ wife leaving floor with  in w/c. Medicine team A paged to inform. Nursing staff walked to front of hospital and to cafeteria w/ pt or spouse not found.

## 2017-01-28 NOTE — PROGRESS NOTES
Progress Note  Hospital Medicine    Admit Date: 1/18/2017    SUBJECTIVE:     Follow-up For: Hyponatremia      HPI/Interval history (See H&P for complete P,F,SHx) : sodium improving to 129, repeat CT abdomen to investigate HCC progression.     Review of Systems:  Constitutional- Negative for Fever, positive for weakness  Neuro- Negative for Confusion, Negative for hallucinations  CV- Negative for Chest Pain, Negative for palpitations  Resp- Negative for Cough, Negative for SOB  GI- Negative for nausea, nomiting, Negative for diarrhea  Extrem- Negative for Pain, Negative for Swelling    OBJECTIVE:       Intake/Output Summary (Last 24 hours) at 01/27/17 8412  Last data filed at 01/27/17 0351   Gross per 24 hour   Intake              550 ml   Output                0 ml   Net              550 ml       Vital Signs Range (Last 24H):  Temp:  [96.7 °F (35.9 °C)-99 °F (37.2 °C)]   Pulse:  [63-76]   Resp:  [14-20]   BP: ()/(54-60)   SpO2:  [93 %-97 %]     Physical Exam:  General: Well developed, well nourished male in NAD  HEENT: Conjunctiva clear; Oropharynx clear  Neck: No JVD noted, Supple  CV: Normal S1, S2 with no murmurs,gallops,rubs  Resp: Lungs CTA Bilaterally, Unlabored  Abdomen: NTND, BS normoactive x4 quads, soft  Extrem: No cyanosis, clubbing, edema.  Skin: No rashes, lesions, ulcers  Neuro: motor strength in tact, CN 2-12 intact  PSYCH: Oriented x3    Medications:  Medication list was reviewed and changes noted under Assessment/Plan.    Laboratory:  Recent Labs      01/26/17 0519 01/27/17   0624   WBC  5.05  5.30   RBC  2.71*  2.92*   HGB  9.4*  10.2*   HCT  26.2*  29.0*   PLT  85*  100*   MCV  97  99*   MCH  34.7*  34.9*   MCHC  35.9  35.2       Recent Labs      01/26/17 0519 01/27/17   0624   GLU  101  106   NA  126*  129*   K  4.4  4.6   CL  101  106   CO2  19*  12*   BUN  10  7   CREATININE  0.8  0.9   MG  1.8  1.9   PHOS  3.3  3.5       Diagnostic Results:    Labs reviewed     ASSESSMENT/PLAN:      Active Hospital Problems    ILD (interstitial lung disease)      Pre-operative respiratory examination      Arterial hypotension      Hepatic coma/encephalopathy      Anemia of chronic disease      Hypoalbuminemia due to protein-calorie malnutrition      Decompensated hepatic cirrhosis      Alcoholic cirrhosis of liver with ascites      Hyperkalemia      Hep C w/o coma, chronic      HCC (hepatocellular carcinoma)      Cirrhosis of liver with ascites      *Hyponatremia      Thrombocytopenia          Plan:      # Hyponatremia  - sodium improved from 121 to 129, s/p paracentesis on 01/26/17, removed 5L     # Decompensated alcohol and Hep C cirrhosis with ascites  # Hepatic Encephalopathy   # Hepatocellular Carcinoma  MELD-Na score: 22 at 1/27/2017  6:24 AM  MELD score: 15 at 1/27/2017  6:24 AM  Calculated from:  Serum Creatinine: 0.9 mg/dL (Rounded to 1) at 1/27/2017  6:24 AM  Serum Sodium: 129 mmol/L at 1/27/2017  6:24 AM  Total Bilirubin: 2.4 mg/dL at 1/27/2017  6:24 AM  INR(ratio): 1.6 at 1/27/2017  6:24 AM  Age: 56 years  - hepatology consult, under going outpatient transplant evaluation, has been approved for inpatient  - Lactulose to 30 g PO Q6, goal to have 3 to 4 BMs daily; add rifaximin  - s/p paracentesis 1/19/17 with 3.4 L fluid removal negative for SBP, repeat paracentesis on 01/26/17 removed 4.9 L, also negative for SBP  - repeat CT Abdomen with contrast  - addiction psych consulted and believe he is at moderate risk    # Emphysema   - 45 pack year hx  - Chest CT w/ atypical pattern of emphysema and worsening GGO within the right and left lower lobe, which may represent focal worsening of interstitial lung disease and/or pulmonary edema. This may be 2/2 worsening ascites causing some compressive atelectasis at the lung bases.   - PFTs in June w/o obstruction or restriction but DLCO decreased. PFTs repeated  And revealed worsening pulmonary function  - Very likely that respiratory studies will  demonstrate worsening respiratory status, especially with significant distention and ascites 2/2 liver disease.      # Arterial Hypotension  - likely from liver disease, midodrine 15 mg PO TID, will need to continue of discharge  - Albumin IVPB q 6hrs     # Hyperkalemia  - likely from aldactone; stoppped; resolved     # Anemia of chronic disease  # Thrombocytopenia  - monitor     # Chronic Pain syndrome  - as per patient, he is on morphine 30 mg PO BID and oxycodone 30mg PO TID;will try to wean off to avoid somnolence   - MRI T/L to rule out fracture     # Hypoalbuminemia due to moderate protein vero malnutrition  - PAB 4; boost    # Hypomagnesemia - replaced     DVT prophylaxis- early ambulation     Discharge Disposition- possibly tomorrow    Time spent in care of patient (Greater than 1/2 spent in coordinating and counseling patient care):  30 minutes    Addendum: Patient was not in room during my daily rounds, spoke to nursing staff who called patient several times after noticing his personal items were out of his room. Patient did not received ordered CT of the abdomen with pelvis. Patients' contact numbers were called with no answer. Liver team notified of patient's Elopement,

## 2017-01-28 NOTE — NURSING
Pt overhead paged via hospital PA system. All patients personal items out    Room. Wife was seen wheeling patient off of Unit. Pt st'd throughtout day that he was going to leave, and was getting flustrated. Dr Crawford returned call and was informed of patients elopement. Patients contact numbers were called w/ no answer. Severo nurse and House Supervisor informed of elopement. Patients 1600 VS were stable w/ no pain st w/ last contact. PIV's were p[resent when last seen as documented in AM assessment.

## 2017-01-29 LAB — BACTERIA BLD CULT: NORMAL

## 2017-01-30 LAB — BACTERIA SPEC AEROBE CULT: NO GROWTH

## 2017-01-30 NOTE — PT/OT/SLP DISCHARGE
Physical Therapy Discharge Summary    Tommy Hopper  MRN: [id number]   Hyponatremia     Patient Discharged from acute Physical Therapy on 2017.  Please refer to prior PT noted date on 2017 for functional status.     Assessment:   Patient appropriate for care in another setting.  GOALS:   Physical Therapy Goals        Problem: Physical Therapy Goal    Goal Priority Disciplines Outcome Goal Variances Interventions   Physical Therapy Goal     PT/OT, PT Ongoing (interventions implemented as appropriate)     Description:  Goals to be met by: 2017     Patient will increase functional independence with mobility by performin. Sit to stand transfer with Modified Baker-not met  2. Bed to chair transfer with Modified Baker using Rolling Walker as needed-not met  3. Gait training with Rollator walker to determine safety x 150 feet with SBA for mobility and safety- met               Reasons for Discontinuation of Therapy Services  Pt elopement      Plan:  Patient Discharged to: Home with Home Health Service recommended.    Berta Gallegos, PT  2017

## 2017-02-03 LAB — BACTERIA SPEC ANAEROBE CULT: NORMAL

## 2017-02-10 ENCOUNTER — TELEPHONE (OUTPATIENT)
Dept: TRANSPLANT | Facility: CLINIC | Age: 57
End: 2017-02-10

## 2017-02-10 ENCOUNTER — TELEPHONE (OUTPATIENT)
Dept: TRANSPLANT | Facility: HOSPITAL | Age: 57
End: 2017-02-10

## 2017-02-10 NOTE — TELEPHONE ENCOUNTER
----- Message from Balwinder Jones sent at 2/10/2017 12:26 PM CST -----  Regarding: [facility]  Pt has appts on 2/22, 2/23 and 2/24. They where wondering if they where any openings at [facility] fir those days.

## 2017-02-10 NOTE — TELEPHONE ENCOUNTER
CAN contacted pt's wife Evelia ([phone]) to confirm that pt was looking to stay at [facility] from 2/22-2/24. Pt's wife reports that pt has appointments on those days at [facility]. CAN stated that there is no guarantee that [facility] will have availability but that [facility] will notify pt once they receive fax. CAN had attempted to contact [facility], but no one was picking up. CAN will try again later. CAN reviewed referral form with pt's wife. Pt's wife had no further questions at this time. SW remains available.    CAN faxed [facility] referral form to [phone]. SW remains available.     CAN was able to get a hold of Romario from [facility] who stated that he was unsure of availability for those dates at this time, and would be in touch with pt or wife to confirm. CAN remains available.

## 2017-02-10 NOTE — TELEPHONE ENCOUNTER
----- Message from Yessica Vargas sent at 2017 12:10 PM CST -----  Contact: pt   ..Patient wants to schedule a follow up appointment.     Patient's Name: Tommy Hopper  Patient's Phone Number:   Patient's : 1960

## 2017-02-13 DIAGNOSIS — Z76.82 LIVER TRANSPLANT CANDIDATE: Primary | ICD-10-CM

## 2017-02-13 DIAGNOSIS — C22.0 HCC (HEPATOCELLULAR CARCINOMA): ICD-10-CM

## 2017-02-15 ENCOUNTER — TELEPHONE (OUTPATIENT)
Dept: TRANSPLANT | Facility: CLINIC | Age: 57
End: 2017-02-15

## 2017-02-15 NOTE — TELEPHONE ENCOUNTER
TACE scheduled 2/22/17 @ 1130.  Patient notified.  Medication and allergy cards reviewed.  Pre-procedure instructions reviewed, including: fasting after midinight 2/21/17.  Understanding and agreement expressed.

## 2017-02-16 ENCOUNTER — TELEPHONE (OUTPATIENT)
Dept: ENDOSCOPY | Facility: HOSPITAL | Age: 57
End: 2017-02-16

## 2017-02-16 NOTE — TELEPHONE ENCOUNTER
Patient is rescheduled for EGD & Colonoscopy 3/9/2017 with Dr. PONCHO Green.  Instructions sent via mail.  Prep used: Suprep.

## 2017-02-21 DIAGNOSIS — C22.0 HCC (HEPATOCELLULAR CARCINOMA): Primary | ICD-10-CM

## 2017-02-22 ENCOUNTER — HOSPITAL ENCOUNTER (OUTPATIENT)
Dept: RADIOLOGY | Facility: HOSPITAL | Age: 57
Discharge: HOME | End: 2017-02-22
Attending: INTERNAL MEDICINE
Payer: MEDICARE

## 2017-02-22 DIAGNOSIS — Z76.82 LIVER TRANSPLANT CANDIDATE: ICD-10-CM

## 2017-02-22 DIAGNOSIS — C22.0 HCC (HEPATOCELLULAR CARCINOMA): ICD-10-CM

## 2017-02-22 DIAGNOSIS — C22.0 HCC (HEPATOCELLULAR CARCINOMA): Primary | ICD-10-CM

## 2017-02-22 PROCEDURE — 25500020 PHARM REV CODE 255: Performed by: INTERNAL MEDICINE

## 2017-02-22 PROCEDURE — 74178 CT ABD&PLV WO CNTR FLWD CNTR: CPT | Mod: 26,,, | Performed by: RADIOLOGY

## 2017-02-22 PROCEDURE — 74178 CT ABD&PLV WO CNTR FLWD CNTR: CPT | Mod: TC

## 2017-02-22 RX ADMIN — IOHEXOL 100 ML: 350 INJECTION, SOLUTION INTRAVENOUS at 04:02

## 2017-02-22 RX ADMIN — IOHEXOL 15 ML: 350 INJECTION, SOLUTION INTRAVENOUS at 12:02

## 2017-02-22 RX ADMIN — IOHEXOL 15 ML: 350 INJECTION, SOLUTION INTRAVENOUS at 01:02

## 2017-02-22 NOTE — PROGRESS NOTES
Pt to [facility] to meet with Dr Dorado about having CT performed in order to evaluate for need for TACE procedure.  Scheduled as Add-On for 2pm today.

## 2017-02-22 NOTE — PROGRESS NOTES
Dr. Dorado to bedside. TACE canceled at this time. IV DC'd. Pt to travel home with wife and return tomorrow for paracentesis.

## 2017-02-23 ENCOUNTER — HOSPITAL ENCOUNTER (OUTPATIENT)
Dept: INTERVENTIONAL RADIOLOGY/VASCULAR | Facility: HOSPITAL | Age: 57
Discharge: HOME | End: 2017-02-23
Attending: INTERNAL MEDICINE
Payer: MEDICARE

## 2017-02-23 VITALS
HEART RATE: 80 BPM | DIASTOLIC BLOOD PRESSURE: 60 MMHG | RESPIRATION RATE: 18 BRPM | OXYGEN SATURATION: 100 % | SYSTOLIC BLOOD PRESSURE: 136 MMHG

## 2017-02-23 DIAGNOSIS — R18.8 CIRRHOSIS OF LIVER WITH ASCITES, UNSPECIFIED HEPATIC CIRRHOSIS TYPE: ICD-10-CM

## 2017-02-23 DIAGNOSIS — Z76.82 LIVER TRANSPLANT CANDIDATE: ICD-10-CM

## 2017-02-23 DIAGNOSIS — K74.60 CIRRHOSIS OF LIVER WITH ASCITES, UNSPECIFIED HEPATIC CIRRHOSIS TYPE: ICD-10-CM

## 2017-02-23 DIAGNOSIS — C22.0 HCC (HEPATOCELLULAR CARCINOMA): ICD-10-CM

## 2017-02-23 LAB
APPEARANCE FLD: CLEAR
BODY FLD TYPE: NORMAL
COLOR FLD: YELLOW
LYMPHOCYTES NFR FLD MANUAL: 66 %
MESOTHL CELL NFR FLD MANUAL: 3 %
MONOS+MACROS NFR FLD MANUAL: 23 %
NEUTROPHILS NFR FLD MANUAL: 8 %
WBC # FLD: 104 /CU MM

## 2017-02-23 PROCEDURE — C1729 CATH, DRAINAGE: HCPCS

## 2017-02-23 PROCEDURE — A7048 VACUUM DRAIN BOTTLE/TUBE KIT: HCPCS

## 2017-02-23 PROCEDURE — 49083 ABD PARACENTESIS W/IMAGING: CPT | Mod: ,,, | Performed by: FAMILY MEDICINE

## 2017-02-23 PROCEDURE — 63600175 PHARM REV CODE 636 W HCPCS: Performed by: INTERNAL MEDICINE

## 2017-02-23 PROCEDURE — P9047 ALBUMIN (HUMAN), 25%, 50ML: HCPCS | Performed by: INTERNAL MEDICINE

## 2017-02-23 PROCEDURE — 89051 BODY FLUID CELL COUNT: CPT

## 2017-02-23 RX ORDER — ALBUMIN HUMAN 250 G/1000ML
12.5 SOLUTION INTRAVENOUS
Status: DISCONTINUED | OUTPATIENT
Start: 2017-02-23 | End: 2017-02-24 | Stop reason: HOSPADM

## 2017-02-23 RX ADMIN — ALBUMIN (HUMAN) 12.5 G: 25 SOLUTION INTRAVENOUS at 12:02

## 2017-02-23 RX ADMIN — ALBUMIN (HUMAN) 12.5 G: 25 SOLUTION INTRAVENOUS at 01:02

## 2017-02-23 NOTE — PROGRESS NOTES
Pt stable tolerated paracentesis well 7.8 L removed, labs sent,  200 mL albumin given. Discharge instructions and follow up care reviewed. Pt verbalized understanding, and denies further questions. Provided  With ROCU and after hours. Pt ambulated to lobby.

## 2017-02-23 NOTE — PROCEDURES
Radiology Post-Procedure Note    Pre Op Diagnosis: Ascites  Post Op Diagnosis: Same    Procedure: Ultrasound Guided Paracentesis    Procedure performed by: Danielle HODGES, Mariama     Written Informed Consent Obtained: Yes  Specimen Removed: YES clear yellow  Estimated Blood Loss: Minimal    Findings:   Successful paracentesis.  Albumin administered PRN per protocol.    Patient tolerated procedure well.    Mariama Santos, APRN, FNP  Interventional Radiology  [phone] spectralink

## 2017-02-23 NOTE — IP AVS SNAPSHOT
[facility]  [address]  [facility] [address]  Phone: [phone]           Patient Discharge Instructions     Our goal is to set you up for success. This packet includes information on your condition, medications, and your home care. It will help you to care for yourself so you don't get sicker and need to go back to the hospital.     Please ask your nurse if you have any questions.        There are many details to remember when preparing to leave the hospital. Here is what you will need to do:    1. Take your medicine. If you are prescribed medications, review your Medication List in the following pages. You may have new medications to  at the pharmacy and others that you'll need to stop taking. Review the instructions for how and when to take your medications. Talk with your doctor or nurses if you are unsure of what to do.     2. Go to your follow-up appointments. Specific follow-up information is listed in the following pages. Your may be contacted by a transition nurse or clinical provider about future appointments. Be sure we have all of the phone numbers to reach you, if needed. Please contact your provider's office if you are unable to make an appointment.     3. Watch for warning signs. Your doctor or nurse will give you detailed warning signs to watch for and when to call for assistance. These instructions may also include educational information about your condition. If you experience any of warning signs to your health, call your doctor.               [facility]  Unless otherwise directed by your provider, please contact [facility], our nurse care line that is available for 24/7 assistance.     [phone] (toll-free)    Registered nurses in the [facility] provide clinical advisement, health education, appointment booking, and other advisory services.                    ** Verify the list of medication(s) below is accurate and up  to date. Carry this with you in case of emergency. If your medications have changed, please notify your healthcare provider.             Medication List      TAKE these medications        Additional Info                      calcium-vitamin D 250-100 mg-unit per tablet   Refills:  0   Dose:  2 tablet    Instructions:  Take 2 tablets by mouth 2 (two) times daily.     Begin Date    AM    Noon    PM    Bedtime       ciprofloxacin HCl 500 MG tablet   Commonly known as:  CIPRO   Quantity:  30 tablet   Refills:  6   Dose:  500 mg    Instructions:  Take 1 tablet (500 mg total) by mouth once daily.     Begin Date    AM    Noon    PM    Bedtime       diazePAM 10 MG Tab   Commonly known as:  VALIUM   Refills:  0   Dose:  10 mg    Instructions:  Take 10 mg by mouth 2 (two) times daily.     Begin Date    AM    Noon    PM    Bedtime       furosemide 40 MG tablet   Commonly known as:  LASIX   Quantity:  30 tablet   Refills:  11   Dose:  40 mg    Instructions:  Take 1 tablet (40 mg total) by mouth once daily.     Begin Date    AM    Noon    PM    Bedtime       hydrocodone-ibuprofen 7.5-200 mg 7.5-200 mg per tablet   Commonly known as:  VICOPROFEN   Refills:  0    Instructions:  Take by mouth. 1 Tablet Oral Every 6 hours     Begin Date    AM    Noon    PM    Bedtime       * lactulose 20 gram/30 mL Soln   Commonly known as:  CHRONULAC   Quantity:  3000 mL   Refills:  11   Dose:  20 g    Instructions:  Take 30 mLs (20 g total) by mouth every 6 (six) hours as needed (titrate to have 2-3 bowle movements per day).     Begin Date    AM    Noon    PM    Bedtime       * lactulose 10 gram/15 mL solution   Commonly known as:  CHRONULAC   Refills:  0      Begin Date    AM    Noon    PM    Bedtime       morphine 30 MG 12 hr tablet   Commonly known as:  MS CONTIN   Refills:  0   Dose:  30 mg    Instructions:  Take 30 mg by mouth every 12 (twelve) hours.     Begin Date    AM    Noon    PM    Bedtime       nicotine 10 mg Crtg   Commonly known as:   NICOTROL   Quantity:  168 puff   Refills:  0   Dose:  1 puff    Instructions:  Inhale 1 puff into the lungs as needed. (6-16 cartridges daily as needed) inhaled     Begin Date    AM    Noon    PM    Bedtime       oxycodone 30 MG Tab   Commonly known as:  ROXICODONE   Refills:  0   Dose:  30 mg    Instructions:  Take 30 mg by mouth every 8 (eight) hours.     Begin Date    AM    Noon    PM    Bedtime       pantoprazole 40 MG tablet   Commonly known as:  PROTONIX   Quantity:  30 tablet   Refills:  11   Dose:  40 mg    Instructions:  Take 1 tablet (40 mg total) by mouth once daily.     Begin Date    AM    Noon    PM    Bedtime       sodium,potassium,mag sulfates 17.5-3.13-1.6 gram Solr   Commonly known as:  SUPREP BOWEL PREP KIT   Quantity:  354 mL   Refills:  0   Dose:  1 kit    Instructions:  Take 1 kit by mouth as directed.     Begin Date    AM    Noon    PM    Bedtime       spironolactone 50 MG tablet   Commonly known as:  ALDACTONE   Quantity:  30 tablet   Refills:  11   Dose:  50 mg    Instructions:  Take 1 tablet (50 mg total) by mouth once daily.     Begin Date    AM    Noon    PM    Bedtime       VOLTAREN 1 % Gel   Refills:  3   Generic drug:  diclofenac sodium    Instructions:  apply (2G) by topical route 3 times every day to the affected area(s)     Begin Date    AM    Noon    PM    Bedtime       * Notice:  This list has 2 medication(s) that are the same as other medications prescribed for you. Read the directions carefully, and ask your doctor or other care provider to review them with you.               Please bring to all follow up appointments:    1. A copy of your discharge instructions.  2. All medicines you are currently taking in their original bottles.  3. Identification and insurance card.    Please arrive 15 minutes ahead of scheduled appointment time.    Please call 24 hours in advance if you must reschedule your appointment and/or time.        Your Scheduled Appointments     Mar 09, 2017  1:00  PM CST   Non-Fasting Lab with LAB, APPOINT[place] [facility])    [address] [facility] [address] [phone]            Mar 09, 2017  2:20 PM CST   Established Patient with Sharyn Eaton NP   [facility] - Liver Transplant ([facility] )    [address]  [place] [address] [phone]            Apr 25, 2017  1:00 PM CDT   Non-Fasting Lab with LAB, APPOINTMENT [place] [facility] ([facility])    [address] [place] [address] [phone]            Apr 25, 2017  1:05 PM CDT   Urine with SPECIMEN, MAIN CAMPUS [facility] ([facility])    [facility] [facility] [address] [phone]            May 05, 2017 11:30 AM CDT   Established Patient Visit with Keely Luna MD   [facility] - Nephrology ([facility] )    [facility]  [place] [address] [phone]              Your Future Surgeries/Procedures     Mar 09, 2017   Surgery with Italia Green MD   [facility])    [facility] [facility] [address] [phone]                  Discharge Instructions       IF QUESTIONS CALL:    ROCU: [phone]  RADIOLOGY RESIDENT ON CALL: [phone]    Discharge References/Attachments     PARACENTESIS, DISCHARGE INSTRUCTIONS FOR (ENGLISH)        Admission Information     Date & Time Provider Department CSN    2/23/2017 11:00 AM Indira Oconnor MD [facility] [id number]      Care Providers     Provider Role Specialty Primary office phone    Indira Oconnor MD Attending Provider Gastroenterology [phone]      Your Vitals Were     BP Pulse Resp SpO2          97/56 80 18 100%        Recent Lab Values     No lab values to display.      Pending Labs     Order Current Status    WBC & Diff,Body Fluid Abdominal Fluid In process      Allergies  as of 2/23/2017        Reactions    Adhesive Blisters      Advance Directives     An advance directive is a document which, in the event you are no longer able to make decisions for yourself, tells your healthcare team what kind of treatment you do or do not want to receive, or who you would like to make those decisions for you.  If you do not currently have an advance directive, [facility] encourages you to create one.  For more information call:  [phone]), [phone] ([phone]),  or log on to [email or web address].        Smoking Cessation     If you would like to quit smoking:   You may be eligible for free services if you are a [place] resident and started smoking cigarettes before September 1, 1988.  Call the Smoking Cessation Trust (SCT) toll free at (173) 528-5297 or (668) 022-0908.   Call 1-534-QUIT-NOW if you do not meet the above criteria.            Language Assistance Services     ATTENTION: Language assistance services are available, free of charge. Please call [phone].      ATENCIÓN: Si habla español, tiene a sears disposición servicios gratuitos de asistencia lingüística. Llame al [phone].     MILLY Ý: N?u b?n nói Ti?ng Vi?t, có các d?ch v? h? tr? ngôn ng? mi?n phí dành cho b?n. G?i s? [phone].         [facility] complies with applicable Federal civil rights laws and does not discriminate on the basis of race, color, national origin, age, disability, or sex.
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
## 2017-02-27 DIAGNOSIS — K21.00 GASTROESOPHAGEAL REFLUX DISEASE WITH ESOPHAGITIS: ICD-10-CM

## 2017-02-27 RX ORDER — PANTOPRAZOLE SODIUM 40 MG/1
40 TABLET, DELAYED RELEASE ORAL DAILY
Qty: 30 TABLET | Refills: 11 | Status: SHIPPED | OUTPATIENT
Start: 2017-02-27

## 2017-03-01 ENCOUNTER — TELEPHONE (OUTPATIENT)
Dept: TRANSPLANT | Facility: CLINIC | Age: 57
End: 2017-03-01

## 2017-03-01 ENCOUNTER — CONFERENCE (OUTPATIENT)
Dept: TRANSPLANT | Facility: CLINIC | Age: 57
End: 2017-03-01
Payer: MEDICARE

## 2017-03-01 DIAGNOSIS — Z76.82 AWAITING ORGAN TRANSPLANT STATUS: ICD-10-CM

## 2017-03-01 DIAGNOSIS — C22.0 HCC (HEPATOCELLULAR CARCINOMA): Primary | ICD-10-CM

## 2017-03-01 NOTE — TELEPHONE ENCOUNTER
LIVER CONFERENCE 2/27/2017     Radiology review by Dr. Jevon Benites     Tommy Hopper [id number] (Elvin--Kamari) LTX--in eval  re:  CT for tumor restaging  56 y.o. male with ESLD secondary to alcoholic liver disease. First told of cirrhosis around Jan 2016 when abd swelling started, has had 2 paracenteses 2 weeks apart , drained 8-9 liters each time.   A ct done 1/23/16 revealed a 3.0 cm and 1.8 cm lesion.  An MRI done 1/25/16 showed a 4.0 cm and 1.0 cm which were nondiagnostic.  This was further evaluated by MRI done at [facility] which revealed a 3.3 indeterminate liver lesion and a pulmonary lesion so a CT was done to further evaluate which demonstrated a 3.3 cm lesion consistent with HCC.  He was treated with TACE on 10/3/16.  MELD (Na) 20.  Normal AFPs; currently 2.5.  Bld type A.   Labs 2/22/17: tbili 2.9; alb 3.0; crt 1.1; gfr >60; inr 1.3; plt ct 84.   CT 2/22/17:  Interval decrease in size of a 2.0 cm hypodense lesion in hepatic segment VII, compatible with a treated lesion (previously 2.8 cm).  There is slight interval increase in conspicuity of peripheral arterial enhancement posterior to the lesion which becomes isodense on portal venous and delayed images without evidence of washout.  Stable 2.0 cm arterial enhancing lesion in hepatic segment VII near hepatic dome.  This lesion demonstrates indeterminant characteristics without evidence of definite washout or pseudocapsule.  Stable 0.7 cm focus of arterial enhancement in hepatic segment II which demonstrates indeterminant characteristics without evidence  Previous IR review:  3 cm lesion within segment VI/VII shows good treatment response. Lesion high up in segment VII demonstrates increased size with blush on angio. Subcentimeter enhancing left lobe lesion.  Plan: TACE, segment VII lesion.  Discussion/Plan:   - Segment VI 2.0 cm lesion with enhancement in periphery and washout consistent with recurrence --> Rec TACE  - Segment VIII 2.6 cm lesion with  enhancement and subtle washout (best seen on delayed images) consistent with HCC --> Rec TACE   Patient notified of IR review and recommendations.  Understanding and agreement expressed.  Appointment card completed for TACE, CT Scan and Lab.

## 2017-03-02 NOTE — TELEPHONE ENCOUNTER
Imaging reviewed by IR 2/27/17:    Please inform patient:     Discussion/Plan:   - Segment VI 2.0 cm lesion with enhancement in periphery and washout consistent with recurrence --> Rec TACE   - Segment VIII 2.6 cm lesion with enhancement and subtle washout (best seen on delayed images) consistent with HCC --> Rec TACE     Pl schedule these procedures

## 2017-03-06 ENCOUNTER — TELEPHONE (OUTPATIENT)
Dept: ENDOSCOPY | Facility: HOSPITAL | Age: 57
End: 2017-03-06

## 2017-03-07 ENCOUNTER — DOCUMENTATION ONLY (OUTPATIENT)
Dept: TRANSPLANT | Facility: CLINIC | Age: 57
End: 2017-03-07

## 2017-03-07 DIAGNOSIS — Z76.82 LIVER TRANSPLANT CANDIDATE: ICD-10-CM

## 2017-03-07 DIAGNOSIS — C22.0 HCC (HEPATOCELLULAR CARCINOMA): Primary | ICD-10-CM

## 2017-03-07 NOTE — NURSING
Spoke with Sharon in endoscopy scheduling.  Confirmed patient's scheduled for egd/colon with Dr. Rees on 3/9.  Arrival time 0845.  Mariama Tsai,  notified and advised patient's clinic follow-up will need to be rescheduled.

## 2017-03-08 ENCOUNTER — TELEPHONE (OUTPATIENT)
Dept: TRANSPLANT | Facility: CLINIC | Age: 57
End: 2017-03-08

## 2017-03-08 DIAGNOSIS — C22.0 HCC (HEPATOCELLULAR CARCINOMA): ICD-10-CM

## 2017-03-08 DIAGNOSIS — Z01.818 PRE-TRANSPLANT EVALUATION FOR LIVER TRANSPLANT: ICD-10-CM

## 2017-03-08 DIAGNOSIS — K70.31 ALCOHOLIC CIRRHOSIS OF LIVER WITH ASCITES: Primary | ICD-10-CM

## 2017-03-08 NOTE — TELEPHONE ENCOUNTER
Patient and wife notified.  Patient requesting to schedule a paracentesis tomorrow afternoon (after egd/colon).  Says he's been requiring paracenteses every 7-14 days. Denies fever, abdominal pain, or shortness of breath at this time.      Message sent to IR requesting procedure.  Standing order entered for paracenteses every two weeks.      ----- Message from Indira Oconnor MD sent at 2/24/2017  6:01 AM CST -----  Please inform patient results are OK.

## 2017-03-09 ENCOUNTER — HOSPITAL ENCOUNTER (OUTPATIENT)
Dept: INTERVENTIONAL RADIOLOGY/VASCULAR | Facility: HOSPITAL | Age: 57
Discharge: HOME | End: 2017-03-09
Attending: INTERNAL MEDICINE
Payer: MEDICARE

## 2017-03-09 VITALS
SYSTOLIC BLOOD PRESSURE: 105 MMHG | OXYGEN SATURATION: 99 % | HEART RATE: 80 BPM | DIASTOLIC BLOOD PRESSURE: 65 MMHG | RESPIRATION RATE: 16 BRPM

## 2017-03-09 VITALS — RESPIRATION RATE: 22 BRPM

## 2017-03-09 DIAGNOSIS — K70.30 ALCOHOLIC CIRRHOSIS OF LIVER WITHOUT ASCITES: ICD-10-CM

## 2017-03-09 DIAGNOSIS — K70.31 ALCOHOLIC CIRRHOSIS OF LIVER WITH ASCITES: ICD-10-CM

## 2017-03-09 DIAGNOSIS — R18.8 OTHER ASCITES: ICD-10-CM

## 2017-03-09 DIAGNOSIS — C22.0 HCC (HEPATOCELLULAR CARCINOMA): ICD-10-CM

## 2017-03-09 DIAGNOSIS — Z01.818 PRE-TRANSPLANT EVALUATION FOR LIVER TRANSPLANT: ICD-10-CM

## 2017-03-09 PROBLEM — Z12.11 COLON CANCER SCREENING: Status: ACTIVE | Noted: 2017-03-09

## 2017-03-09 LAB
APPEARANCE FLD: CLEAR
BODY FLD TYPE: NORMAL
COLOR FLD: YELLOW
LYMPHOCYTES NFR FLD MANUAL: 63 %
MONOS+MACROS NFR FLD MANUAL: 27 %
NEUTROPHILS NFR FLD MANUAL: 10 %
WBC # FLD: 78 /CU MM

## 2017-03-09 PROCEDURE — 63600175 PHARM REV CODE 636 W HCPCS: Performed by: INTERNAL MEDICINE

## 2017-03-09 PROCEDURE — P9047 ALBUMIN (HUMAN), 25%, 50ML: HCPCS | Performed by: INTERNAL MEDICINE

## 2017-03-09 PROCEDURE — D9220A PRA ANESTHESIA: Mod: PT,CRNA,, | Performed by: NURSE ANESTHETIST, CERTIFIED REGISTERED

## 2017-03-09 PROCEDURE — C1729 CATH, DRAINAGE: HCPCS

## 2017-03-09 PROCEDURE — 89051 BODY FLUID CELL COUNT: CPT

## 2017-03-09 PROCEDURE — 49083 ABD PARACENTESIS W/IMAGING: CPT | Mod: ,,, | Performed by: NURSE PRACTITIONER

## 2017-03-09 PROCEDURE — D9220A PRA ANESTHESIA: Mod: PT,ANES,, | Performed by: ANESTHESIOLOGY

## 2017-03-09 RX ORDER — ALBUMIN HUMAN 250 G/1000ML
25 SOLUTION INTRAVENOUS
Status: DISCONTINUED | OUTPATIENT
Start: 2017-03-09 | End: 2017-03-10 | Stop reason: HOSPADM

## 2017-03-09 RX ORDER — PROPOFOL 10 MG/ML
VIAL (ML) INTRAVENOUS CONTINUOUS PRN
Status: DISCONTINUED | OUTPATIENT
Start: 2017-03-09 | End: 2017-03-09

## 2017-03-09 RX ORDER — LIDOCAINE HCL/PF 100 MG/5ML
SYRINGE (ML) INTRAVENOUS
Status: DISCONTINUED | OUTPATIENT
Start: 2017-03-09 | End: 2017-03-09

## 2017-03-09 RX ORDER — PHENYLEPHRINE HYDROCHLORIDE 10 MG/ML
INJECTION INTRAVENOUS
Status: DISCONTINUED | OUTPATIENT
Start: 2017-03-09 | End: 2017-03-09

## 2017-03-09 RX ORDER — PROPOFOL 10 MG/ML
INJECTION, EMULSION INTRAVENOUS
Status: DISCONTINUED | OUTPATIENT
Start: 2017-03-09 | End: 2017-03-09

## 2017-03-09 RX ORDER — SODIUM CHLORIDE 9 MG/ML
INJECTION, SOLUTION INTRAVENOUS CONTINUOUS PRN
Status: DISCONTINUED | OUTPATIENT
Start: 2017-03-09 | End: 2017-03-09

## 2017-03-09 RX ADMIN — PHENYLEPHRINE HYDROCHLORIDE 200 MCG: 10 INJECTION INTRAVENOUS at 10:03

## 2017-03-09 RX ADMIN — PROPOFOL 50 MG: 10 INJECTION, EMULSION INTRAVENOUS at 10:03

## 2017-03-09 RX ADMIN — ALBUMIN (HUMAN) 25 G: 25 SOLUTION INTRAVENOUS at 03:03

## 2017-03-09 RX ADMIN — PROPOFOL 150 MCG/KG/MIN: 10 INJECTION, EMULSION INTRAVENOUS at 10:03

## 2017-03-09 RX ADMIN — LIDOCAINE HYDROCHLORIDE 50 MG: 20 INJECTION, SOLUTION INTRAVENOUS at 10:03

## 2017-03-09 RX ADMIN — SODIUM CHLORIDE: 0.9 INJECTION, SOLUTION INTRAVENOUS at 10:03

## 2017-03-09 NOTE — IP AVS SNAPSHOT
[facility]  [address]  [facility] [address]  Phone: [phone]           Patient Discharge Instructions     Our goal is to set you up for success. This packet includes information on your condition, medications, and your home care. It will help you to care for yourself so you don't get sicker and need to go back to the hospital.     Please ask your nurse if you have any questions.        There are many details to remember when preparing to leave the hospital. Here is what you will need to do:    1. Take your medicine. If you are prescribed medications, review your Medication List in the following pages. You may have new medications to  at the pharmacy and others that you'll need to stop taking. Review the instructions for how and when to take your medications. Talk with your doctor or nurses if you are unsure of what to do.     2. Go to your follow-up appointments. Specific follow-up information is listed in the following pages. Your may be contacted by a transition nurse or clinical provider about future appointments. Be sure we have all of the phone numbers to reach you, if needed. Please contact your provider's office if you are unable to make an appointment.     3. Watch for warning signs. Your doctor or nurse will give you detailed warning signs to watch for and when to call for assistance. These instructions may also include educational information about your condition. If you experience any of warning signs to your health, call your doctor.               [facility]  Unless otherwise directed by your provider, please contact [facility], our nurse care line that is available for 24/7 assistance.     [phone] (toll-free)    Registered nurses in the [facility] provide clinical advisement, health education, appointment booking, and other advisory services.                    ** Verify the list of medication(s) below is accurate and up  to date. Carry this with you in case of emergency. If your medications have changed, please notify your healthcare provider.             Medication List      TAKE these medications        Additional Info                      calcium-vitamin D 250-100 mg-unit per tablet   Refills:  0   Dose:  2 tablet    Instructions:  Take 2 tablets by mouth 2 (two) times daily.     Begin Date    AM    Noon    PM    Bedtime       ciprofloxacin HCl 500 MG tablet   Commonly known as:  CIPRO   Quantity:  30 tablet   Refills:  6   Dose:  500 mg    Instructions:  Take 1 tablet (500 mg total) by mouth once daily.     Begin Date    AM    Noon    PM    Bedtime       diazePAM 10 MG Tab   Commonly known as:  VALIUM   Refills:  0   Dose:  10 mg    Instructions:  Take 10 mg by mouth 2 (two) times daily.     Begin Date    AM    Noon    PM    Bedtime       furosemide 40 MG tablet   Commonly known as:  LASIX   Quantity:  30 tablet   Refills:  11   Dose:  40 mg    Instructions:  Take 1 tablet (40 mg total) by mouth once daily.     Begin Date    AM    Noon    PM    Bedtime       hydrocodone-ibuprofen 7.5-200 mg 7.5-200 mg per tablet   Commonly known as:  VICOPROFEN   Refills:  0    Instructions:  Take by mouth. 1 Tablet Oral Every 6 hours     Begin Date    AM    Noon    PM    Bedtime       * lactulose 20 gram/30 mL Soln   Commonly known as:  CHRONULAC   Quantity:  3000 mL   Refills:  11   Dose:  20 g    Instructions:  Take 30 mLs (20 g total) by mouth every 6 (six) hours as needed (titrate to have 2-3 bowle movements per day).     Begin Date    AM    Noon    PM    Bedtime       * lactulose 10 gram/15 mL solution   Commonly known as:  CHRONULAC   Refills:  0      Begin Date    AM    Noon    PM    Bedtime       morphine 30 MG 12 hr tablet   Commonly known as:  MS CONTIN   Refills:  0   Dose:  30 mg    Instructions:  Take 30 mg by mouth every 12 (twelve) hours.     Begin Date    AM    Noon    PM    Bedtime       neomycin-polymyxin-dexamethasone 3.5  mg/g-10,000 unit/g-0.1 % Oint   Commonly known as:  DEXACINE   Refills:  0    Instructions:  every 6 (six) hours.     Begin Date    AM    Noon    PM    Bedtime       nicotine 10 mg Crtg   Commonly known as:  NICOTROL   Quantity:  168 puff   Refills:  0   Dose:  1 puff    Instructions:  Inhale 1 puff into the lungs as needed. (6-16 cartridges daily as needed) inhaled     Begin Date    AM    Noon    PM    Bedtime       oxycodone 30 MG Tab   Commonly known as:  ROXICODONE   Refills:  0   Dose:  30 mg    Instructions:  Take 30 mg by mouth every 8 (eight) hours.     Begin Date    AM    Noon    PM    Bedtime       pantoprazole 40 MG tablet   Commonly known as:  PROTONIX   Quantity:  30 tablet   Refills:  11   Dose:  40 mg    Instructions:  Take 1 tablet (40 mg total) by mouth once daily.     Begin Date    AM    Noon    PM    Bedtime       sodium,potassium,mag sulfates 17.5-3.13-1.6 gram Solr   Commonly known as:  SUPREP BOWEL PREP KIT   Quantity:  354 mL   Refills:  0   Dose:  1 kit    Instructions:  Take 1 kit by mouth as directed.     Begin Date    AM    Noon    PM    Bedtime       spironolactone 50 MG tablet   Commonly known as:  ALDACTONE   Quantity:  30 tablet   Refills:  11   Dose:  50 mg    Instructions:  Take 1 tablet (50 mg total) by mouth once daily.     Begin Date    AM    Noon    PM    Bedtime       VOLTAREN 1 % Gel   Refills:  3   Generic drug:  diclofenac sodium    Instructions:  apply (2G) by topical route 3 times every day to the affected area(s)     Begin Date    AM    Noon    PM    Bedtime       * Notice:  This list has 2 medication(s) that are the same as other medications prescribed for you. Read the directions carefully, and ask your doctor or other care provider to review them with you.               Please bring to all follow up appointments:    1. A copy of your discharge instructions.  2. All medicines you are currently taking in their original bottles.  3. Identification and insurance  card.    Please arrive 15 minutes ahead of scheduled appointment time.    Please call 24 hours in advance if you must reschedule your appointment and/or time.        Your Scheduled Appointments     Mar 15, 2017  9:45 AM CDT   Non-Fasting Lab with LAB, TRANSPLANT   [facility] ([facility] )    [address]  [place] [id number] [phone]            Mar 15, 2017 11:30 AM CDT   Established Patient with Indira Oconnor MD   [facility] - Liver Transplant ([facility] )    [address] [place] [address]   [phone]            Apr 25, 2017  1:00 PM CDT   Non-Fasting Lab with LAB, APPOINTMENT [place] [facility] ([facility])    [address] [place] [id number] [phone]            Apr 25, 2017  1:05 PM CDT   Urine with SPECIMEN, MAIN CAMPUS [facility] ([facility])    [address] [place] [address] [phone]            May 05, 2017 11:30 AM CDT   Established Patient Visit with Keely Luna MD   [facility] - Nephrology ([facility] )    [address] [place] [address] [phone]                  Discharge Instructions       For scheduling: Call Carlee at [phone]    For questions or concerns call: ROCU MON-FRI 8 AM- 5PM [phone]. Radiology resident on call [phone].    For immediate concerns that are not emergent, you may call our radiology clinic at: [phone]      Discharge References/Attachments     PARACENTESIS, DISCHARGE INSTRUCTIONS FOR (ENGLISH)        Admission Information     Date & Time Provider Department CSN    3/9/2017  1:30 PM Indira Oconnor MD [facility] [id number]      Care Providers     Provider Role Specialty Primary office phone    Indira Oconnor MD Attending Provider Gastroenterology [phone]      Your Vitals Were     BP Pulse Resp SpO2          103/57 87 16 95%         Recent Lab Values     No lab values to display.      Allergies as of 3/9/2017        Reactions    Adhesive Blisters      Advance Directives     An advance directive is a document which, in the event you are no longer able to make decisions for yourself, tells your healthcare team what kind of treatment you do or do not want to receive, or who you would like to make those decisions for you.  If you do not currently have an advance directive, [facility] encourages you to create one.  For more information call:  [phone]), [phone] ([phone]),  or log on to [email or web address].        Language Assistance Services     ATTENTION: Language assistance services are available, free of charge. Please call [phone].      ATENCIÓN: Si habla español, tiene a sears disposición servicios gratuitos de asistencia lingüística. Llame al [phone].     CHÚ Ý: N?u b?n nói Ti?ng Vi?t, có các d?ch v? h? tr? ngôn ng? mi?n phí dành cho b?n. G?i s? [phone].         [facility] complies with applicable Federal civil rights laws and does not discriminate on the basis of race, color, national origin, age, disability, or sex.
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
## 2017-03-09 NOTE — PROGRESS NOTES
Paracentesis complete. 8200 mLs peritoneal fluid drained. Pt tolerated well. Dressing to clean, dry, and intact. Albumin 25% given 200 mLs. Specimens sent per lab order. Discharge instructions and handouts provided. Pt verbalized understanding.

## 2017-03-09 NOTE — TRANSFER OF CARE
"Anesthesia Transfer of Care Note    Patient: Tommy Hopper    Procedure(s) Performed: Procedure(s) (LRB):  ESOPHAGOGASTRODUODENOSCOPY (EGD) (N/A)  COLONOSCOPY (N/A)    Patient location: PACU    Anesthesia Type: general    Transport from OR: Transported from OR on 2-3 L/min O2 by NC with adequate spontaneous ventilation    Post pain: adequate analgesia    Post assessment: no apparent anesthetic complications and tolerated procedure well    Post vital signs: stable    Level of consciousness: awake, alert and oriented    Nausea/Vomiting: no nausea/vomiting    Complications: none          Last vitals:   Visit Vitals    BP (!) 101/59    Pulse 75    Temp 36.8 °C (98.3 °F)    Resp 20    Ht 5' 9" (1.753 m)    Wt 72.6 kg (160 lb)    SpO2 96%    BMI 23.63 kg/m2     "

## 2017-03-09 NOTE — DISCHARGE INSTRUCTIONS
For scheduling: Call Carlee at [phone]    For questions or concerns call: JORY MON-FRI 8 AM- 5PM [phone]. Radiology resident on call [phone].    For immediate concerns that are not emergent, you may call our radiology clinic at: [phone]

## 2017-03-09 NOTE — ANESTHESIA RELEASE NOTE
Anesthesia Release from PACU note    Patient: Tommy Hopper    Procedure(s) Performed: Procedure(s) (LRB):  ESOPHAGOGASTRODUODENOSCOPY (EGD) (N/A)  COLONOSCOPY (N/A)    Anesthesia type: general    Post pain: Adequate analgesia    Post assessment: no apparent anesthetic complications, tolerated procedure well and no evidence of recall    Last Vitals:   Vitals:    03/09/17 1120   BP: 93/60   Pulse: 72   Resp: 16   Temp:    SpO2: 99%       Post vital signs: stable    Level of consciousness: awake, alert  and oriented    Nausea/Vomiting: no nausea/no vomiting    Complications: none    Airway Patency: patent    Respiratory: unassisted    Cardiovascular: stable and blood pressure at baseline    Hydration: euvolemic

## 2017-03-09 NOTE — PROCEDURES
Radiology Post-Procedure Note    Pre Op Diagnosis: Ascites  Post Op Diagnosis: Same    Procedure: Ultrasound Guided Paracentesis    Procedure performed by: Jose Jones NP/Bhupinder Dinh MD    Written Informed Consent Obtained: Yes  Specimen Removed: YES clear yellow fluid   Estimated Blood Loss: Minimal    Findings:   Successful paracentesis.  Albumin administered PRN per protocol.    Patient tolerated procedure well.    CHERRIE Diaz, FNP  Interventional Radiology  [phone] [employer]

## 2017-03-10 ENCOUNTER — TELEPHONE (OUTPATIENT)
Dept: TRANSPLANT | Facility: CLINIC | Age: 57
End: 2017-03-10

## 2017-03-10 RX ORDER — SPIRONOLACTONE 50 MG/1
50 TABLET, FILM COATED ORAL DAILY
Qty: 30 TABLET | Refills: 11 | Status: ON HOLD | OUTPATIENT
Start: 2017-03-10 | End: 2017-06-13 | Stop reason: HOSPADM

## 2017-03-10 NOTE — TELEPHONE ENCOUNTER
Called patients wife on 09Mar17 at 1630. Informed her that medication was forwarded to the provider, and then when approved will be forwarded to the pharmacy. Medication refill was approved by Dr Oconnor this morning. Called this morning on 10Mar17 at 0850 to inform him that his blood work came back with no signs of infection. Patient understood. Patient reports no other issues at this time.

## 2017-03-14 DIAGNOSIS — Z01.818 PRE-TRANSPLANT EVALUATION FOR LIVER TRANSPLANT: Primary | ICD-10-CM

## 2017-03-14 DIAGNOSIS — K70.31 ALCOHOLIC CIRRHOSIS OF LIVER WITH ASCITES: ICD-10-CM

## 2017-03-14 DIAGNOSIS — C22.0 HCC (HEPATOCELLULAR CARCINOMA): ICD-10-CM

## 2017-03-15 ENCOUNTER — OFFICE VISIT (OUTPATIENT)
Dept: TRANSPLANT | Facility: CLINIC | Age: 57
DRG: 433 | End: 2017-03-15
Payer: MEDICARE

## 2017-03-15 ENCOUNTER — HOSPITAL ENCOUNTER (INPATIENT)
Facility: HOSPITAL | Age: 57
LOS: 1 days | Discharge: HOME | DRG: 433 | End: 2017-03-16
Attending: EMERGENCY MEDICINE | Admitting: HOSPITALIST
Payer: MEDICARE

## 2017-03-15 ENCOUNTER — COMMITTEE REVIEW (OUTPATIENT)
Dept: TRANSPLANT | Facility: CLINIC | Age: 57
End: 2017-03-15

## 2017-03-15 ENCOUNTER — TELEPHONE (OUTPATIENT)
Dept: TRANSPLANT | Facility: CLINIC | Age: 57
End: 2017-03-15

## 2017-03-15 VITALS
BODY MASS INDEX: 27.26 KG/M2 | TEMPERATURE: 98 F | HEART RATE: 94 BPM | WEIGHT: 184.06 LBS | OXYGEN SATURATION: 94 % | RESPIRATION RATE: 18 BRPM | DIASTOLIC BLOOD PRESSURE: 70 MMHG | SYSTOLIC BLOOD PRESSURE: 111 MMHG | HEIGHT: 69 IN

## 2017-03-15 DIAGNOSIS — D63.8 ANEMIA OF CHRONIC DISEASE: ICD-10-CM

## 2017-03-15 DIAGNOSIS — R93.2 ABNORMAL LIVER DIAGNOSTIC IMAGING: ICD-10-CM

## 2017-03-15 DIAGNOSIS — E87.1 HYPONATREMIA: ICD-10-CM

## 2017-03-15 DIAGNOSIS — K74.60 DECOMPENSATED HEPATIC CIRRHOSIS: ICD-10-CM

## 2017-03-15 DIAGNOSIS — R91.8 LUNG MASS: ICD-10-CM

## 2017-03-15 DIAGNOSIS — Z01.818 PRE-TRANSPLANT EVALUATION FOR LIVER TRANSPLANT: ICD-10-CM

## 2017-03-15 DIAGNOSIS — K72.90 DECOMPENSATED HEPATIC CIRRHOSIS: ICD-10-CM

## 2017-03-15 DIAGNOSIS — R06.02 SHORTNESS OF BREATH: ICD-10-CM

## 2017-03-15 DIAGNOSIS — G93.40 ENCEPHALOPATHY: Primary | ICD-10-CM

## 2017-03-15 DIAGNOSIS — C22.0 HCC (HEPATOCELLULAR CARCINOMA): ICD-10-CM

## 2017-03-15 DIAGNOSIS — E87.5 HYPERKALEMIA: ICD-10-CM

## 2017-03-15 DIAGNOSIS — K52.9 GASTROENTERITIS, ACUTE: ICD-10-CM

## 2017-03-15 DIAGNOSIS — Z01.811 PRE-OPERATIVE RESPIRATORY EXAMINATION: ICD-10-CM

## 2017-03-15 DIAGNOSIS — K70.31 ASCITES DUE TO ALCOHOLIC CIRRHOSIS: ICD-10-CM

## 2017-03-15 DIAGNOSIS — K76.82 HEPATIC ENCEPHALOPATHY: Primary | ICD-10-CM

## 2017-03-15 DIAGNOSIS — R60.0 PERIPHERAL EDEMA: ICD-10-CM

## 2017-03-15 DIAGNOSIS — J43.2 CENTRILOBULAR EMPHYSEMA: ICD-10-CM

## 2017-03-15 DIAGNOSIS — B18.2 HEP C W/O COMA, CHRONIC: ICD-10-CM

## 2017-03-15 DIAGNOSIS — E46 HYPOALBUMINEMIA DUE TO PROTEIN-CALORIE MALNUTRITION: ICD-10-CM

## 2017-03-15 DIAGNOSIS — Z12.11 COLON CANCER SCREENING: ICD-10-CM

## 2017-03-15 DIAGNOSIS — K72.91 HEPATIC COMA/ENCEPHALOPATHY: ICD-10-CM

## 2017-03-15 DIAGNOSIS — E88.09 HYPOALBUMINEMIA DUE TO PROTEIN-CALORIE MALNUTRITION: ICD-10-CM

## 2017-03-15 DIAGNOSIS — J84.9 ILD (INTERSTITIAL LUNG DISEASE): ICD-10-CM

## 2017-03-15 DIAGNOSIS — K70.31 ALCOHOLIC CIRRHOSIS OF LIVER WITH ASCITES: ICD-10-CM

## 2017-03-15 DIAGNOSIS — K76.6 PORTAL HYPERTENSION: ICD-10-CM

## 2017-03-15 DIAGNOSIS — D69.6 THROMBOCYTOPENIA: ICD-10-CM

## 2017-03-15 LAB — AMMONIA PLAS-SCNC: 102 UMOL/L

## 2017-03-15 PROCEDURE — 25000003 PHARM REV CODE 250: Performed by: HOSPITALIST

## 2017-03-15 PROCEDURE — 99284 EMERGENCY DEPT VISIT MOD MDM: CPT | Mod: NTX,,, | Performed by: EMERGENCY MEDICINE

## 2017-03-15 PROCEDURE — 1160F RVW MEDS BY RX/DR IN RCRD: CPT | Mod: S$GLB,,, | Performed by: INTERNAL MEDICINE

## 2017-03-15 PROCEDURE — 20600001 HC STEP DOWN PRIVATE ROOM

## 2017-03-15 PROCEDURE — 82140 ASSAY OF AMMONIA: CPT

## 2017-03-15 PROCEDURE — 99214 OFFICE O/P EST MOD 30 MIN: CPT | Mod: S$GLB,,, | Performed by: INTERNAL MEDICINE

## 2017-03-15 PROCEDURE — 99285 EMERGENCY DEPT VISIT HI MDM: CPT

## 2017-03-15 PROCEDURE — 99999 PR PBB SHADOW E&M-EST. PATIENT-LVL V: CPT | Mod: PBBFAC,,, | Performed by: INTERNAL MEDICINE

## 2017-03-15 PROCEDURE — 99223 1ST HOSP IP/OBS HIGH 75: CPT | Mod: ,,, | Performed by: HOSPITALIST

## 2017-03-15 RX ORDER — MORPHINE SULFATE 15 MG/1
30 TABLET, FILM COATED, EXTENDED RELEASE ORAL EVERY 12 HOURS
Status: DISCONTINUED | OUTPATIENT
Start: 2017-03-15 | End: 2017-03-16 | Stop reason: HOSPADM

## 2017-03-15 RX ORDER — OXYCODONE HYDROCHLORIDE 5 MG/1
30 TABLET ORAL EVERY 8 HOURS
Status: DISCONTINUED | OUTPATIENT
Start: 2017-03-15 | End: 2017-03-16 | Stop reason: HOSPADM

## 2017-03-15 RX ORDER — HYDROMORPHONE HYDROCHLORIDE 1 MG/ML
0.5 INJECTION, SOLUTION INTRAMUSCULAR; INTRAVENOUS; SUBCUTANEOUS EVERY 6 HOURS PRN
Status: DISCONTINUED | OUTPATIENT
Start: 2017-03-15 | End: 2017-03-16 | Stop reason: HOSPADM

## 2017-03-15 RX ORDER — SPIRONOLACTONE 50 MG/1
50 TABLET, FILM COATED ORAL DAILY
Status: DISCONTINUED | OUTPATIENT
Start: 2017-03-16 | End: 2017-03-16 | Stop reason: HOSPADM

## 2017-03-15 RX ORDER — CIPROFLOXACIN 500 MG/1
500 TABLET ORAL DAILY
Status: DISCONTINUED | OUTPATIENT
Start: 2017-03-16 | End: 2017-03-16 | Stop reason: HOSPADM

## 2017-03-15 RX ORDER — DIAZEPAM 5 MG/1
10 TABLET ORAL 2 TIMES DAILY
Status: DISCONTINUED | OUTPATIENT
Start: 2017-03-15 | End: 2017-03-16 | Stop reason: HOSPADM

## 2017-03-15 RX ORDER — LACTULOSE 10 G/15ML
30 SOLUTION ORAL EVERY 6 HOURS
Status: DISCONTINUED | OUTPATIENT
Start: 2017-03-16 | End: 2017-03-16 | Stop reason: HOSPADM

## 2017-03-15 RX ORDER — LACTULOSE 10 G/15ML
30 SOLUTION ORAL ONCE
Status: COMPLETED | OUTPATIENT
Start: 2017-03-15 | End: 2017-03-15

## 2017-03-15 RX ORDER — FUROSEMIDE 40 MG/1
40 TABLET ORAL DAILY
Status: DISCONTINUED | OUTPATIENT
Start: 2017-03-16 | End: 2017-03-16 | Stop reason: HOSPADM

## 2017-03-15 RX ORDER — GLUCAGON 1 MG
1 KIT INJECTION
Status: DISCONTINUED | OUTPATIENT
Start: 2017-03-15 | End: 2017-03-16 | Stop reason: HOSPADM

## 2017-03-15 RX ORDER — LACTULOSE 10 G/15ML
20 SOLUTION ORAL EVERY 6 HOURS
Status: DISCONTINUED | OUTPATIENT
Start: 2017-03-15 | End: 2017-03-15

## 2017-03-15 RX ORDER — IBUPROFEN 200 MG
24 TABLET ORAL
Status: DISCONTINUED | OUTPATIENT
Start: 2017-03-15 | End: 2017-03-16 | Stop reason: HOSPADM

## 2017-03-15 RX ORDER — PANTOPRAZOLE SODIUM 40 MG/1
40 TABLET, DELAYED RELEASE ORAL DAILY
Status: DISCONTINUED | OUTPATIENT
Start: 2017-03-16 | End: 2017-03-16 | Stop reason: HOSPADM

## 2017-03-15 RX ORDER — VARENICLINE TARTRATE 0.5 (11)-1
KIT ORAL
Refills: 0 | COMMUNITY
Start: 2017-01-05

## 2017-03-15 RX ORDER — IBUPROFEN 200 MG
16 TABLET ORAL
Status: DISCONTINUED | OUTPATIENT
Start: 2017-03-15 | End: 2017-03-16 | Stop reason: HOSPADM

## 2017-03-15 RX ORDER — ONDANSETRON 8 MG/1
8 TABLET, ORALLY DISINTEGRATING ORAL EVERY 8 HOURS PRN
Status: DISCONTINUED | OUTPATIENT
Start: 2017-03-15 | End: 2017-03-16 | Stop reason: HOSPADM

## 2017-03-15 RX ORDER — ACETAMINOPHEN 325 MG/1
650 TABLET ORAL EVERY 4 HOURS PRN
Status: DISCONTINUED | OUTPATIENT
Start: 2017-03-15 | End: 2017-03-16 | Stop reason: HOSPADM

## 2017-03-15 RX ADMIN — RIFAXIMIN 550 MG: 550 TABLET ORAL at 09:03

## 2017-03-15 RX ADMIN — MORPHINE SULFATE 30 MG: 15 TABLET, EXTENDED RELEASE ORAL at 09:03

## 2017-03-15 RX ADMIN — OXYCODONE HYDROCHLORIDE 30 MG: 5 TABLET ORAL at 10:03

## 2017-03-15 RX ADMIN — LACTULOSE 20 G: 10 SOLUTION ORAL at 04:03

## 2017-03-15 RX ADMIN — LACTULOSE 30 G: 20 SOLUTION ORAL at 10:03

## 2017-03-15 RX ADMIN — DIAZEPAM 10 MG: 5 TABLET ORAL at 09:03

## 2017-03-15 NOTE — NURSING
Pt arrived to room 1027. Pt assessed. AOx3. AVSS. On RA, resp rate even & unlabored. Diet ordered. Wife at bedside. Fall risk band and socks applied. Bed alarm on, call bell w/in reach, bed in lowest position. Dr. Castro at bedside. Belongings w/ wife. Will cont to monit.

## 2017-03-15 NOTE — COMMITTEE REVIEW
Tommy Hopper's case presented to selection committee.  Patient has been deferred for liver transplant due to need for smoke cessation and prognostic assessment of UIP and risk of perioperative complications secondary to emphysema.  I was present at the committee meeting and attest to the decision of the committee.    Hiro Smallwood  03/15/2017

## 2017-03-15 NOTE — LETTER
March 15, 2017        Farshad Carney  [address] [address]  Phone: [phone]  Fax: [phone]             [facility]  [address]  [facility] [address]  Phone: [phone]   Patient: Tommy Hopper   MR Number: [id number]   YOB: 1960   Date of Visit: 3/15/2017       Dear Dr. Farshad Carney    Thank you for referring Tommy Hopper to me for evaluation. Attached you will find relevant portions of my assessment and plan of care.    If you have questions, please do not hesitate to call me. I look forward to following Tommy Hopper along with you.    Sincerely,    Indira Oconnor MD    Enclosure    If you would like to receive this communication electronically, please contact [email or web address] or [phone] to request [facility] [employer] access.    [employer] is a tool which provides read-only access to select patient information with whom you have a relationship. Its easy to use and provides real time access to review your patients record including encounter summaries, notes, results, and demographic information.    If you feel you have received this communication in error or would no longer like to receive these types of communications, please e-mail [email or web address]

## 2017-03-15 NOTE — PATIENT INSTRUCTIONS
Plan:  -  Start xifaxan 550 mg BID, in addition to lactulose (has 2-3 stools daily).   -  Hold lasix to 40 mg and spironolactone 50 mg x two days.    -   Low salt diet, no processed foods, bottled and canned foods.   -   Schedule paracentesis today if possible, he has not money to travel back and forth from [place], [place] which is 80 miles from here.   -   CBC, CMP, PT INR every month.   -   No driving.    -   Reduce sedatives (valium), and opiates (oxycodone, hydrocodone)  -   Return in 1 month.

## 2017-03-15 NOTE — TELEPHONE ENCOUNTER
CAN attempting to reach pt and pts wife, no answer on phones.  CAN unable to leave a msg.   CAN discovered pt in the hospital.  CAN was calling to review pts smoking status with pt and pts wife.  CAN will have a team member see pt tomorrow.   CAN remains available for all transplant resources, education and psychosocial support.
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
## 2017-03-15 NOTE — IP AVS SNAPSHOT
[facility]  [address]  [facility] [address]  Phone: [phone]           Patient Discharge Instructions     Our goal is to set you up for success. This packet includes information on your condition, medications, and your home care. It will help you to care for yourself so you don't get sicker and need to go back to the hospital.     Please ask your nurse if you have any questions.        There are many details to remember when preparing to leave the hospital. Here is what you will need to do:    1. Take your medicine. If you are prescribed medications, review your Medication List in the following pages. You may have new medications to  at the pharmacy and others that you'll need to stop taking. Review the instructions for how and when to take your medications. Talk with your doctor or nurses if you are unsure of what to do.     2. Go to your follow-up appointments. Specific follow-up information is listed in the following pages. Your may be contacted by a transition nurse or clinical provider about future appointments. Be sure we have all of the phone numbers to reach you, if needed. Please contact your provider's office if you are unable to make an appointment.     3. Watch for warning signs. Your doctor or nurse will give you detailed warning signs to watch for and when to call for assistance. These instructions may also include educational information about your condition. If you experience any of warning signs to your health, call your doctor.               [facility]  Unless otherwise directed by your provider, please contact [facility], our nurse care line that is available for 24/7 assistance.     [phone] (toll-free)    Registered nurses in the [facility] provide clinical advisement, health education, appointment booking, and other advisory services.                    ** Verify the list of medication(s) below is accurate and up  to date. Carry this with you in case of emergency. If your medications have changed, please notify your healthcare provider.             Medication List      CONTINUE taking these medications        Additional Info                      calcium-vitamin D 250-100 mg-unit per tablet   Refills:  0   Dose:  2 tablet    Instructions:  Take 2 tablets by mouth 2 (two) times daily.     Begin Date    AM    Noon    PM    Bedtime       CHANTIX STARTING MONTH BOX 0.5 mg (11)- 1 mg (42) tablet   Refills:  0   Generic drug:  varenicline    Instructions:  as directed Orally 30 days     Begin Date    AM    Noon    PM    Bedtime       ciprofloxacin HCl 500 MG tablet   Commonly known as:  CIPRO   Quantity:  30 tablet   Refills:  6   Dose:  500 mg    Last time this was given:  500 mg on 3/16/2017  8:54 AM   Instructions:  Take 1 tablet (500 mg total) by mouth once daily.     Begin Date    AM    Noon    PM    Bedtime       diazePAM 10 MG Tab   Commonly known as:  VALIUM   Refills:  0   Dose:  10 mg    Last time this was given:  10 mg on 3/16/2017  8:55 AM   Instructions:  Take 10 mg by mouth 2 (two) times daily.     Begin Date    AM    Noon    PM    Bedtime       furosemide 40 MG tablet   Commonly known as:  LASIX   Quantity:  30 tablet   Refills:  11   Dose:  40 mg    Last time this was given:  40 mg on 3/16/2017  8:55 AM   Instructions:  Take 1 tablet (40 mg total) by mouth once daily.     Begin Date    AM    Noon    PM    Bedtime       hydrocodone-ibuprofen 7.5-200 mg 7.5-200 mg per tablet   Commonly known as:  VICOPROFEN   Refills:  0    Instructions:  Take by mouth. 1 Tablet Oral Every 6 hours     Begin Date    AM    Noon    PM    Bedtime       * lactulose 20 gram/30 mL Soln   Commonly known as:  CHRONULAC   Quantity:  3000 mL   Refills:  11   Dose:  20 g    Last time this was given:  30 g on 3/16/2017  9:46 AM   Instructions:  Take 30 mLs (20 g total) by mouth every 6 (six) hours as needed (titrate to have 2-3 bowle movements per  day).     Begin Date    AM    Noon    PM    Bedtime       * lactulose 10 gram/15 mL solution   Commonly known as:  CHRONULAC   Refills:  0    Last time this was given:  30 g on 3/16/2017  9:46 AM     Begin Date    AM    Noon    PM    Bedtime       morphine 30 MG 12 hr tablet   Commonly known as:  MS CONTIN   Refills:  0   Dose:  30 mg    Last time this was given:  30 mg on 3/16/2017  8:55 AM   Instructions:  Take 30 mg by mouth every 12 (twelve) hours.     Begin Date    AM    Noon    PM    Bedtime       neomycin-polymyxin-dexamethasone 3.5 mg/g-10,000 unit/g-0.1 % Oint   Commonly known as:  DEXACINE   Refills:  0    Instructions:  every 6 (six) hours.     Begin Date    AM    Noon    PM    Bedtime       nicotine 10 mg Crtg   Commonly known as:  NICOTROL   Quantity:  168 puff   Refills:  0   Dose:  1 puff    Instructions:  Inhale 1 puff into the lungs as needed. (6-16 cartridges daily as needed) inhaled     Begin Date    AM    Noon    PM    Bedtime       oxycodone 30 MG Tab   Commonly known as:  ROXICODONE   Refills:  0   Dose:  30 mg    Last time this was given:  30 mg on 3/16/2017  6:36 AM   Instructions:  Take 30 mg by mouth every 8 (eight) hours.     Begin Date    AM    Noon    PM    Bedtime       pantoprazole 40 MG tablet   Commonly known as:  PROTONIX   Quantity:  30 tablet   Refills:  11   Dose:  40 mg    Last time this was given:  40 mg on 3/16/2017  8:55 AM   Instructions:  Take 1 tablet (40 mg total) by mouth once daily.     Begin Date    AM    Noon    PM    Bedtime       rifAXIMin 550 mg Tab   Commonly known as:  XIFAXAN   Quantity:  60 tablet   Refills:  11   Dose:  550 mg    Last time this was given:  550 mg on 3/16/2017  8:55 AM   Instructions:  Take 1 tablet (550 mg total) by mouth 2 (two) times daily.     Begin Date    AM    Noon    PM    Bedtime       sodium,potassium,mag sulfates 17.5-3.13-1.6 gram Solr   Commonly known as:  SUPREP BOWEL PREP KIT   Quantity:  354 mL   Refills:  0   Dose:  1 kit     Instructions:  Take 1 kit by mouth as directed.     Begin Date    AM    Noon    PM    Bedtime       spironolactone 50 MG tablet   Commonly known as:  ALDACTONE   Quantity:  30 tablet   Refills:  11   Dose:  50 mg    Last time this was given:  50 mg on 3/16/2017  8:55 AM   Instructions:  Take 1 tablet (50 mg total) by mouth once daily.     Begin Date    AM    Noon    PM    Bedtime       VOLTAREN 1 % Gel   Refills:  3   Generic drug:  diclofenac sodium    Instructions:  apply (2G) by topical route 3 times every day to the affected area(s)     Begin Date    AM    Noon    PM    Bedtime       * Notice:  This list has 2 medication(s) that are the same as other medications prescribed for you. Read the directions carefully, and ask your doctor or other care provider to review them with you.               Please bring to all follow up appointments:    1. A copy of your discharge instructions.  2. All medicines you are currently taking in their original bottles.  3. Identification and insurance card.    Please arrive 15 minutes ahead of scheduled appointment time.    Please call 24 hours in advance if you must reschedule your appointment and/or time.        Your Scheduled Appointments     Apr 25, 2017  1:00 PM CDT   Non-Fasting Lab with LAB, APPOINTMENT [place] [facility] ([facility])    [address] [address]   [phone]            Apr 25, 2017  1:05 PM CDT   Urine with SPECIMEN, MAIN CAMPUS [facility] ([facility])    [address] [address]   [phone]            May 05, 2017 11:30 AM CDT   Established Patient Visit with Keely Luna MD   [facility] ([facility] )    [address]  [place] [address]   [phone]              Follow-up Information     Follow up with Indira Oconnor MD.    Specialties:  Gastroenterology, Transplant, Hepatology    Contact information:     "[address]  [facility] [address]  [phone]          Discharge Instructions     Future Orders    Diet general     Questions:    Total calories:      Fat restriction, if any:      Protein restriction, if any:      Na restriction, if any:      Fluid restriction:      Additional restrictions:          Primary Diagnosis     Your primary diagnosis was:  Liver Encephalopathy      Admission Information     Date & Time Provider Department CSN    3/15/2017  1:57 PM Dung Castro MD [facility] [id number]      Care Providers     Provider Role Specialty Primary office phone    Dung Castro MD Attending Provider Hospitalist [phone]    Dung Castro MD Team Attending  Hospitalist [phone]    Nevaeh Guzmán MD Consulting Physician  Hepatology [phone]    Doug Moreira MD Consulting Physician  Hepatology [phone]    Indira Oconnor MD Consulting Physician  Gastroenterology [phone]    Hiro Smallwood MD Consulting Physician  Hepatology [phone]      Your Vitals Were     BP Pulse Temp Resp Height Weight    104/66 (BP Location: Right arm, Patient Position: Lying, BP Method: Automatic) 84 97.6 °F (36.4 °C) (Oral) 16 5' 10" (1.778 m) 83.9 kg (185 lb)    SpO2 BMI             97% 26.54 kg/m2         Recent Lab Values     No lab values to display.      Pending Labs     Order Current Status    WBC & Diff,Body Fluid Ascites In process      Allergies as of 3/16/2017        Reactions    Adhesive Blisters      Advance Directives     An advance directive is a document which, in the event you are no longer able to make decisions for yourself, tells your healthcare team what kind of treatment you do or do not want to receive, or who you would like to make those decisions for you.  If you do not currently have an advance directive, [facility] encourages you to create one.  For more information call:  [phone] ([phone]), [phone] ([phone]),  or log on to " [email or web address].        Language Assistance Services     ATTENTION: Language assistance services are available, free of charge. Please call [phone].      ATENCIÓN: Si habla hollie, tiene a sears disposición servicios gratuitos de asistencia lingüística. Llame al [phone].     CHÚ Ý: N?u b?n nói Ti?ng Vi?t, có các d?ch v? h? tr? ngôn ng? mi?n phí dành cho b?n. G?i s? [phone].         [facility] complies with applicable Federal civil rights laws and does not discriminate on the basis of race, color, national origin, age, disability, or sex.

## 2017-03-15 NOTE — Clinical Note
Plan: -  Start xifaxan 550 mg BID, in addition to lactulose (has 2-3 stools daily).  -  Hold lasix to 40 mg and spironolactone 50 mg x two days.   -   Low salt diet, no processed foods, bottled and canned foods.  -   Schedule paracentesis today if possible, he has not money to travel back and forth from [place], [place] which is 80 miles from here.  -   CBC, CMP, PT INR every month.  -   No driving.   -   Reduce sedatives (valium), and opiates (oxycodone, hydrocodone) -   Return in 1 month.

## 2017-03-15 NOTE — ED PROVIDER NOTES
INTAKE PHYSICIAN EVALUATION  57 y.o. presents with history of liver failure presents from clinic for paracentesis. Decompensated with large ascites      BRIEF PHYSICAL EXAM FINDINGS:  Ill appearing, significant abdominal distension     INITIAL INTAKE PLAN:  Labs obtained in clinic this morning. Will give dose of lactulose, send ammonia. Will need paracentesis.     I initially evaluated this patient and ordered workup while in intake.  The patient will receive a full evaluation in an ED pod when space is available.  All results from tests ordered in intake will not be followed by the intake team, including myself. All results will be followed by the ED Pod team.    ISAIAS Smith MD  Emergency Department Staff Physician    2:06 PM 03/15/2017           Bradley Smith MD  03/15/17 1599
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
## 2017-03-15 NOTE — ED NOTES
Placed on pulse ox and BP cuff; side rails up x 2.  Call bell handed to patient.  Wife at bedside.

## 2017-03-16 ENCOUNTER — TELEPHONE (OUTPATIENT)
Dept: ENDOSCOPY | Facility: HOSPITAL | Age: 57
End: 2017-03-16

## 2017-03-16 VITALS
TEMPERATURE: 97 F | OXYGEN SATURATION: 97 % | WEIGHT: 185 LBS | HEART RATE: 88 BPM | RESPIRATION RATE: 16 BRPM | SYSTOLIC BLOOD PRESSURE: 97 MMHG | HEIGHT: 70 IN | BODY MASS INDEX: 26.48 KG/M2 | DIASTOLIC BLOOD PRESSURE: 54 MMHG

## 2017-03-16 DIAGNOSIS — B18.2 HEP C W/O COMA, CHRONIC: Primary | ICD-10-CM

## 2017-03-16 PROBLEM — J43.2 CENTRILOBULAR EMPHYSEMA: Status: ACTIVE | Noted: 2017-03-16

## 2017-03-16 LAB
ALBUMIN SERPL BCP-MCNC: 2.4 G/DL
ALP SERPL-CCNC: 132 U/L
ALT SERPL W/O P-5'-P-CCNC: 28 U/L
AMMONIA PLAS-SCNC: 90 UMOL/L
ANION GAP SERPL CALC-SCNC: 6 MMOL/L
APPEARANCE FLD: CLEAR
AST SERPL-CCNC: 65 U/L
BASOPHILS # BLD AUTO: 0.04 K/UL
BASOPHILS NFR BLD: 0.9 %
BILIRUB SERPL-MCNC: 1.8 MG/DL
BODY FLD TYPE: NORMAL
BUN SERPL-MCNC: 13 MG/DL
CALCIUM SERPL-MCNC: 7.9 MG/DL
CHLORIDE SERPL-SCNC: 105 MMOL/L
CO2 SERPL-SCNC: 18 MMOL/L
COLOR FLD: YELLOW
CREAT SERPL-MCNC: 1 MG/DL
DIFFERENTIAL METHOD: ABNORMAL
EOSINOPHIL # BLD AUTO: 0.2 K/UL
EOSINOPHIL NFR BLD: 4 %
ERYTHROCYTE [DISTWIDTH] IN BLOOD BY AUTOMATED COUNT: 15.1 %
EST. GFR  (AFRICAN AMERICAN): >60 ML/MIN/1.73 M^2
EST. GFR  (NON AFRICAN AMERICAN): >60 ML/MIN/1.73 M^2
GLUCOSE SERPL-MCNC: 87 MG/DL
HCT VFR BLD AUTO: 25.5 %
HGB BLD-MCNC: 9 G/DL
INR PPP: 1.3
LYMPHOCYTES # BLD AUTO: 1.2 K/UL
LYMPHOCYTES NFR BLD: 27 %
LYMPHOCYTES NFR FLD MANUAL: 72 %
MAGNESIUM SERPL-MCNC: 1.5 MG/DL
MCH RBC QN AUTO: 34.7 PG
MCHC RBC AUTO-ENTMCNC: 35.3 %
MCV RBC AUTO: 99 FL
MESOTHL CELL NFR FLD MANUAL: 3 %
MONOCYTES # BLD AUTO: 0.5 K/UL
MONOCYTES NFR BLD: 12.1 %
MONOS+MACROS NFR FLD MANUAL: 16 %
NEUTROPHILS # BLD AUTO: 2.4 K/UL
NEUTROPHILS NFR BLD: 55.8 %
NEUTROPHILS NFR FLD MANUAL: 9 %
PHOSPHATE SERPL-MCNC: 3.8 MG/DL
PLATELET # BLD AUTO: 83 K/UL
PMV BLD AUTO: 9 FL
POTASSIUM SERPL-SCNC: 4.3 MMOL/L
PROT SERPL-MCNC: 4.8 G/DL
PROTHROMBIN TIME: 13.1 SEC
RBC # BLD AUTO: 2.59 M/UL
SODIUM SERPL-SCNC: 129 MMOL/L
WBC # BLD AUTO: 4.29 K/UL
WBC # FLD: 78 /CU MM

## 2017-03-16 PROCEDURE — 85025 COMPLETE CBC W/AUTO DIFF WBC: CPT

## 2017-03-16 PROCEDURE — 97166 OT EVAL MOD COMPLEX 45 MIN: CPT

## 2017-03-16 PROCEDURE — 82140 ASSAY OF AMMONIA: CPT

## 2017-03-16 PROCEDURE — 85610 PROTHROMBIN TIME: CPT

## 2017-03-16 PROCEDURE — 36415 COLL VENOUS BLD VENIPUNCTURE: CPT

## 2017-03-16 PROCEDURE — 80053 COMPREHEN METABOLIC PANEL: CPT

## 2017-03-16 PROCEDURE — 89051 BODY FLUID CELL COUNT: CPT

## 2017-03-16 PROCEDURE — 99222 1ST HOSP IP/OBS MODERATE 55: CPT | Mod: ,,, | Performed by: INTERNAL MEDICINE

## 2017-03-16 PROCEDURE — 97161 PT EVAL LOW COMPLEX 20 MIN: CPT

## 2017-03-16 PROCEDURE — 99406 BEHAV CHNG SMOKING 3-10 MIN: CPT

## 2017-03-16 PROCEDURE — 63600175 PHARM REV CODE 636 W HCPCS: Performed by: HOSPITALIST

## 2017-03-16 PROCEDURE — 84100 ASSAY OF PHOSPHORUS: CPT

## 2017-03-16 PROCEDURE — 99233 SBSQ HOSP IP/OBS HIGH 50: CPT | Mod: ,,, | Performed by: HOSPITALIST

## 2017-03-16 PROCEDURE — 83735 ASSAY OF MAGNESIUM: CPT

## 2017-03-16 PROCEDURE — 25000003 PHARM REV CODE 250: Performed by: HOSPITALIST

## 2017-03-16 PROCEDURE — 0W9G3ZZ DRAINAGE OF PERITONEAL CAVITY, PERCUTANEOUS APPROACH: ICD-10-PCS | Performed by: RADIOLOGY

## 2017-03-16 PROCEDURE — P9047 ALBUMIN (HUMAN), 25%, 50ML: HCPCS | Performed by: HOSPITALIST

## 2017-03-16 RX ORDER — ALBUMIN HUMAN 250 G/1000ML
25 SOLUTION INTRAVENOUS ONCE
Status: COMPLETED | OUTPATIENT
Start: 2017-03-16 | End: 2017-03-16

## 2017-03-16 RX ORDER — MAGNESIUM SULFATE HEPTAHYDRATE 40 MG/ML
2 INJECTION, SOLUTION INTRAVENOUS ONCE
Status: COMPLETED | OUTPATIENT
Start: 2017-03-16 | End: 2017-03-16

## 2017-03-16 RX ADMIN — LACTULOSE 30 G: 20 SOLUTION ORAL at 09:03

## 2017-03-16 RX ADMIN — ALBUMIN (HUMAN) 25 G: 25 SOLUTION INTRAVENOUS at 03:03

## 2017-03-16 RX ADMIN — CIPROFLOXACIN HYDROCHLORIDE 500 MG: 500 TABLET, FILM COATED ORAL at 08:03

## 2017-03-16 RX ADMIN — RIFAXIMIN 550 MG: 550 TABLET ORAL at 08:03

## 2017-03-16 RX ADMIN — FUROSEMIDE 40 MG: 40 TABLET ORAL at 08:03

## 2017-03-16 RX ADMIN — OXYCODONE HYDROCHLORIDE 30 MG: 5 TABLET ORAL at 06:03

## 2017-03-16 RX ADMIN — ALBUMIN (HUMAN) 25 G: 25 SOLUTION INTRAVENOUS at 02:03

## 2017-03-16 RX ADMIN — MAGNESIUM SULFATE IN WATER 2 G: 40 INJECTION, SOLUTION INTRAVENOUS at 08:03

## 2017-03-16 RX ADMIN — MORPHINE SULFATE 30 MG: 15 TABLET, EXTENDED RELEASE ORAL at 08:03

## 2017-03-16 RX ADMIN — DIAZEPAM 10 MG: 5 TABLET ORAL at 08:03

## 2017-03-16 RX ADMIN — SPIRONOLACTONE 50 MG: 50 TABLET, FILM COATED ORAL at 08:03

## 2017-03-16 RX ADMIN — PANTOPRAZOLE SODIUM 40 MG: 40 TABLET, DELAYED RELEASE ORAL at 08:03

## 2017-03-16 NOTE — PLAN OF CARE
"Problem: Fall Risk (Adult)  Goal: Absence of Falls  Patient will demonstrate the desired outcomes by discharge/transition of care.   Outcome: Ongoing (interventions implemented as appropriate)  Pt remains free from falls. Bed remains in lowest position with wheels locked. Personal items and call light remain within reach. Wife present at bedside. Pt up with assistance to bathroom with use of cane. Pt calls to get out of bed. Bed alarm remains activated.     Problem: Patient Care Overview  Goal: Plan of Care Review  Outcome: Ongoing (interventions implemented as appropriate)  AOx4, agitated and angry states "I do not want to be here. I want to go home." Yells at wife in room, loudly. RN educated pt on this. VSS (hypotensive, but pretty much basline - non-concerning to on call MD). Pain medication given as scheduled. Pt was very agitated about not getting the pain medication more frequently, requesting more. Will continue to monitor and assess.     Problem: Liver Failure, Acute/Chronic (Adult)  Goal: Signs and Symptoms of Listed Potential Problems Will be Absent, Minimized or Managed (Liver Failure, Acute/Chronic)  Signs and symptoms of listed potential problems will be absent, minimized or managed by discharge/transition of care (reference Liver Failure, Acute/Chronic (Adult) CPG).  Outcome: Ongoing (interventions implemented as appropriate)  Pt continuously refusing the administration of lactulose stating "I will go on my own. I do not need to take that." Will continue to educate pt on the importance of lactulose administration. No BM this shift. Remains AOx4.     Problem: Pressure Ulcer Risk (Aric Scale) (Adult,Obstetrics,Pediatric)  Goal: Skin Integrity  Patient will demonstrate the desired outcomes by discharge/transition of care.  Outcome: Ongoing (interventions implemented as appropriate)  Pt with little assistance with repositioning.     Problem: Infection, Risk/Actual (Adult)  Goal: Identify Related Risk " Factors and Signs and Symptoms  Related risk factors and signs and symptoms are identified upon initiation of Human Response Clinical Practice Guideline (CPG)  Outcome: Ongoing (interventions implemented as appropriate)  Pt remains afebrile this shift.

## 2017-03-16 NOTE — PROGRESS NOTES
Paracentesis complete. 7000 mLs peritoneal fluid drained. Pt tolerated well. Dressing to LLQ clean, dry, and intact. Albumin 25% given 200 mLs. Specimens sent per lab order. Report called to floor nurse. Pt awaiting transport at this time.

## 2017-03-16 NOTE — PROCEDURES
Radiology Post-Procedure Note    Pre Op Diagnosis: Ascites  Post Op Diagnosis: Same    Procedure: Ultrasound Guided Paracentesis    Procedure performed by: Danielle HODGES, Mariama     Written Informed Consent Obtained: Yes  Specimen Removed: YES clear yellow fluid  Estimated Blood Loss: Minimal    Findings:   Successful paracentesis.  Albumin administered PRN per protocol.    Patient tolerated procedure well.    Mariama Santos, APRN, FNP  Interventional Radiology  [phone] spectral[employer]

## 2017-03-16 NOTE — DISCHARGE SUMMARY
[facility]  Discharge Summary      Admit Date: 3/15/2017    Discharge Date and Time: 3/16/17    Attending Physician: Dung Castro MD     Reason for Admission: Hepatic Encephalopathy     Procedures Performed: * No surgery found *    Hospital Course   Patient is a 57 y.o. 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 Pt denies abdominal pain, n/v,chest pain, SOB. Patient was suppose to get an paracentesis today, however missed his appointment. Patient also seemed a little confused in the clinic today and found to have a ammonia of 102. Wife states he has not had a BM today and only 1 yesterday. Patient given one dose of lactulose in the ED, still no BM. Patient is currently in active liver transplant evaluation.      # Hepatic Encephalopathy   - starting on lactulose 30g PO q6 and rifaximin; needs 3 to 4 BMs daily      # Decompensated alcohol and Hep C cirrhosis with ascites  MELD-Na score: 19 at 3/16/2017 3:51 AM  MELD score: 12 at 3/16/2017 3:51 AM  Calculated from:  Serum Creatinine: 1.0 mg/dL at 3/16/2017 3:51 AM  Serum Sodium: 129 mmol/L at 3/16/2017 3:51 AM  Total Bilirubin: 1.8 mg/dL at 3/16/2017 3:51 AM  INR(ratio): 1.3 at 3/16/2017 3:51 AM  Age: 57 years         - hepatology consult;   - IR paracentesis today  - cont cipro for SBP prophylaxis  - continue lasix and aldactone      # Hyponatremia  - fluid restriction of 1200 cc      # Emphysema of lung due tobacco use  - smoking cessation; nicotine check  - pulmonology consult for stephany-operative risk:     With decline in pulmonary function and continued smoking I feel he is high risk for respiratory complications.  Recommend:  Smoking cessation for 6 months  Continue bronchodilator therapy (breo, spiriva and nebulizer) for emperic treatment of emphysema although CT most consistent with a fibrotic interstitial lung  disease.     # Anemia of chronic disease   # Thrombocytopenia   - H/H is stable monitor      # Hepatocellular Carcinoma  - s/p TACE, monitoring with abdominal U/S      # Hypoalbuminemia due to moderate protein vero malnutrition  - ensure          DVT prophylaxis- early ambulation       Discharge Disposition- d/c today       Consults: hepatology and pulmonology        Final Diagnoses:    Principal Problem: Hepatic encephalopathy   Secondary Diagnoses: Ascites     Discharged Condition: good    Disposition: Home or Self Care    Follow Up/Patient Instructions:     Medications:  Reconciled Home Medications:   Current Discharge Medication List      CONTINUE these medications which have NOT CHANGED    Details   hydrocodone-ibuprofen 7.5-200 mg (VICOPROFEN) 7.5-200 mg per tablet Take by mouth. 1 Tablet Oral Every 6 hours      pantoprazole (PROTONIX) 40 MG tablet Take 1 tablet (40 mg total) by mouth once daily.  Qty: 30 tablet, Refills: 11    Associated Diagnoses: Gastroesophageal reflux disease with esophagitis      calcium-vitamin D 250-100 mg-unit per tablet Take 2 tablets by mouth 2 (two) times daily.      CHANTIX STARTING MONTH BOX 0.5 mg (11)- 1 mg (42) tablet as directed Orally 30 days  Refills: 0      ciprofloxacin HCl (CIPRO) 500 MG tablet Take 1 tablet (500 mg total) by mouth once daily.  Qty: 30 tablet, Refills: 6    Associated Diagnoses: Spontaneous bacterial peritonitis      diazepam (VALIUM) 10 MG Tab Take 10 mg by mouth 2 (two) times daily.  Refills: 0      furosemide (LASIX) 40 MG tablet Take 1 tablet (40 mg total) by mouth once daily.  Qty: 30 tablet, Refills: 11    Associated Diagnoses: Cirrhosis of liver with ascites, unspecified hepatic cirrhosis type      !! lactulose (CHRONULAC) 10 gram/15 mL solution       !! lactulose (CHRONULAC) 20 gram/30 mL Soln Take 30 mLs (20 g total) by mouth every 6 (six) hours as needed (titrate to have 2-3 bowle movements per day).  Qty: 3000 mL, Refills: 11    Associated  Diagnoses: Other cirrhosis of liver      morphine (MS CONTIN) 30 MG 12 hr tablet Take 30 mg by mouth every 12 (twelve) hours.  Refills: 0      neomycin-polymyxin-dexamethasone (DEXACINE) 3.5 mg/g-10,000 unit/g-0.1 % Oint every 6 (six) hours.      nicotine (NICOTROL) 10 mg Crtg Inhale 1 puff into the lungs as needed. (6-16 cartridges daily as needed) inhaled  Qty: 168 puff, Refills: 0    Associated Diagnoses: Nicotine dependence      oxycodone (ROXICODONE) 30 MG Tab Take 30 mg by mouth every 8 (eight) hours.  Refills: 0      rifAXIMin (XIFAXAN) 550 mg Tab Take 1 tablet (550 mg total) by mouth 2 (two) times daily.  Qty: 60 tablet, Refills: 11    Associated Diagnoses: Encephalopathy      sodium,potassium,mag sulfates (SUPREP BOWEL PREP KIT) 17.5-3.13-1.6 gram SolR Take 1 kit by mouth as directed.  Qty: 354 mL, Refills: 0    Associated Diagnoses: Special screening for malignant neoplasms, colon      spironolactone (ALDACTONE) 50 MG tablet Take 1 tablet (50 mg total) by mouth once daily.  Qty: 30 tablet, Refills: 11    Associated Diagnoses: Alcoholic cirrhosis of liver without ascites; Other ascites      VOLTAREN 1 % Gel apply (2G) by topical route 3 times every day to the affected area(s)  Refills: 3       !! - Potential duplicate medications found. Please discuss with provider.          Discharge Procedure Orders  Diet general       Follow-up Information     Follow up with Indira Oconnor MD.    Specialties:  Gastroenterology, Transplant, Hepatology    Contact information:    [address]  [facility] [address] [phone]

## 2017-03-16 NOTE — NURSING
Pt arrived back to room. AVSS. D/C instructions reviewed w/ pt & wife. Both verbalized understanding. PIV removed, secured w/ gauze. Assisted pt w/ clothing & belongings with wife. Transport requested. Wife at bedside to drive pt home.

## 2017-03-16 NOTE — PLAN OF CARE
Problem: Physical Therapy Goal  Goal: Physical Therapy Goal  Goals to be met by: 3/26/17     Patient will increase functional independence with mobility by performin. Supine to sit with Modified [place]  2. Sit to supine with Modified [place]  3. Sit to stand transfer with Supervision  4. Bed to chair transfer with Supervision using SPC  5. Gait x 200 feet with Stand-by Assistance using Single-point Cane .   6. Lower extremity exercise program x 10 reps per handout, with independence  Outcome: Ongoing (interventions implemented as appropriate)  Upon initial PT evaluation, pt presents with weakness, decreased endurance, gait instability, impaired functional mobility, and pain. Pt completed bed mobility with SBA, transfers with CGA and SPC, and ambulated 10' with CGA and SPC. Pt would benefit from skilled PT services to address these deficits and improve return to PLOF. Anticipate d/c to PT.      Tali Lee DPT, PT  3/16/2017
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
## 2017-03-16 NOTE — PROGRESS NOTES
Progress Note  Hospital Medicine    Admit Date: 3/15/2017    SUBJECTIVE:     Follow-up For: Hepatic encephalopathy      HPI/Interval history (See H&P for complete P,F,SHx) : patient had a large BM this AM, seems to be back to patient's mentation baseline; pulmonology evaluated and appreciate recs    Review of Systems:  Constitutional- Negative for Fever, Negative for weakness  Neuro- Negative for Confusion, Negative for hallucinations  CV- Negative for Chest Pain, Negative for palpitations  Resp- Negative for Cough, Negative for SOB  GI- Negative for nausea, nomiting, Negative for diarrhea  Extrem- Negative for Pain, Negative for Swelling    OBJECTIVE:       Intake/Output Summary (Last 24 hours) at 03/16/17 1332  Last data filed at 03/16/17 0525   Gross per 24 hour   Intake              440 ml   Output                0 ml   Net              440 ml       Vital Signs Range (Last 24H):  Temp:  [97.6 °F (36.4 °C)-98.3 °F (36.8 °C)]   Pulse:  [74-86]   Resp:  [15-18]   BP: ()/(51-61)   SpO2:  [96 %-100 %]     Physical Exam:  General: Well developed, well nourished male in NAD  HEENT: Conjunctiva clear; Oropharynx clear  Neck: No JVD noted, Supple  CV: Normal S1, S2 with no murmurs,gallops,rubs  Resp: Lungs CTA Bilaterally, Unlabored  Abdomen: NT, BS normoactive x4 quads, soft, positive distension  Extrem: No cyanosis, clubbing, edema.  Skin: No rashes, lesions, ulcers  Neuro: motor strength in tact, CN 2-12 intact  PSYCH: Oriented x3    Medications:  Medication list was reviewed and changes noted under Assessment/Plan.    Laboratory:  Recent Labs      03/15/17   1000  03/16/17   0351   WBC  5.26  4.29   RBC  2.81*  2.59*   HGB  9.7*  9.0*   HCT  28.4*  25.5*   PLT  95*  83*   MCV  101*  99*   MCH  34.5*  34.7*   MCHC  34.2  35.3       Recent Labs      03/15/17   1000  03/16/17   0351   GLU  130*  87   NA  128*  129*   K  4.0  4.3   CL  104  105   CO2  15*  18*   BUN  14  13   CREATININE  1.3  1.0   MG   --   1.5*    PHOS   --   3.8       Diagnostic Results:    Labs reviewed     ASSESSMENT/PLAN:     Active Hospital Problems    Centrilobular emphysema      *Hepatic encephalopathy      Alcoholic cirrhosis of liver with ascites      Anemia of chronic disease      Decompensated hepatic cirrhosis      Hypoalbuminemia due to protein-calorie malnutrition      HCC (hepatocellular carcinoma)      Cirrhosis of liver with ascites      Hyponatremia      Thrombocytopenia        Plan:      # Hepatic Encephalopathy   - starting on lactulose 30g PO q6 and rifaximin; needs 3 to 4 BMs daily     # Decompensated alcohol and Hep C cirrhosis with ascites  MELD-Na score: 19 at 3/16/2017  3:51 AM  MELD score: 12 at 3/16/2017  3:51 AM  Calculated from:  Serum Creatinine: 1.0 mg/dL at 3/16/2017  3:51 AM  Serum Sodium: 129 mmol/L at 3/16/2017  3:51 AM  Total Bilirubin: 1.8 mg/dL at 3/16/2017  3:51 AM  INR(ratio): 1.3 at 3/16/2017  3:51 AM  Age: 57 years       - hepatology consult;   - IR paracentesis today  - cont cipro for SBP prophylaxis  - continue lasix and aldactone     # Hyponatremia  - fluid restriction of 1200 cc     # Emphysema of lung due tobacco use  - smoking cessation; nicotine check  - pulmonology consult for stephany-operative risk:    With decline in pulmonary function and continued smoking I feel he is high risk for respiratory complications.  Recommend:  Smoking cessation for 6 months  Continue bronchodilator therapy (breo, spiriva and nebulizer) for emperic treatment of emphysema although CT most consistent with a fibrotic interstitial lung disease.    # Anemia of chronic disease   # Thrombocytopenia   - H/H is stable monitor     # Hepatocellular Carcinoma  - s/p TACE, monitoring with abdominal U/S     # Hypoalbuminemia due to moderate protein vero malnutrition  - ensure        DVT prophylaxis- early ambulation      Discharge Disposition- d/c today       Time spent in care of patient (Greater than 1/2 spent in coordinating and counseling  patient care):  30 minutes

## 2017-03-16 NOTE — PROGRESS NOTES
Patient Consulted by CTTS:     The following was discussed by the Tobacco Treatment Specialist:  ? Relevance of Quitting  ? Risk to Health  ? Long Term Risk  ? Risk for Others  ? Rewards of Quitting  ? Motivation Intervention to Quit          Pt currently taking Chantix and has a nicotrol inhaler.

## 2017-03-16 NOTE — PLAN OF CARE
Problem: Occupational Therapy Goal  Goal: Occupational Therapy Goal  Goals to be met by: 3/27/2017     Patient will increase functional independence with ADLs by performing:    UE Dressing with Supervision.  Grooming while standing at sink with Supervision.  Rolling to Bilateral with Supervision.   Supine to sit with Supervision.  Stand pivot transfers with Supervision.  Toilet transfer to toilet with Supervision.  Upper extremity exercise program x15 reps per handout, with independence.  Outcome: Ongoing (interventions implemented as appropriate)  OT evaluation completed. OT goals and POC established.     DESTINEE Root  3/16/2017
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
## 2017-03-16 NOTE — NURSING
Discharge orders placed on pt, orders state when pt has IR para procedure. Pt currently in procedure, will assess once back on unit.
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
## 2017-03-16 NOTE — PROGRESS NOTES
"Transplant Social Work Consult:    SW presented to pt's room to address psychosocial concerns that were raised in Committee Review. Issues include pt is still smoking cigarettes, issues affording transportation, and potential compliance issues. Pt presented as oriented to person, situation, and time. Initially pt reported that he was in [place]. SW notified him that he was in [place]. It stated "I know." Pt stated he was joking, but it was difficult to assess if pt ws confused or joking. Pt was able to tell SW that he was at [facility] after prompting.  Pt appeared lethargic with slowed and slurred speech. Pt's tongue did not appear to have full control of his tongue as he spoke making it difficult to understand him at time. Despite speech difficulties, pt  was able to answer questions appropriately. Pt's wife was not present at this time. Pt reported she was getting a cup of coffee in the lobby.     Tobacco Use: Pt reported that he has not completely stopped smoking but has cut down to 2 cigarettes per day with the aid of Chantix prescription. Pt reported Chantix is the only assistance he has had for smoking cessation that actually works. Pt reported attempts to quit in the past without success. Pt expressed understanding of the importance of smoking cessation. Pt denied additional smoking cessation resources and stated that he feels the Chantix will help him quit altogether. Pt unable to recall last cigarette use. Pt requested SW ask his wife.     Transportation Issues: Pt reported he has very limited fiances and cannot always afford the gas to drive to [facility] for appointments. Pt reported that this has causes to barrier to following through with scheduled appointments in the past. SW reviewed the importance of transportation for scheduled appointments and urgent appointments. Pt stated "Well if I can't make it I can't make it. I'll just have to deal with the consequences. I'm not afraid to die if that's what " "God has planned for me." Pt denied past/current SI/HI and stated that his logan in God helps him to be at peace with death if that is in his future. SW and pt explored other transportation options for treatment. Pt reported there is no one else. Pt reported his daughter, Tee is local but does not have a vehicle of her own. Pt unable to verbalize a backup transportation plan.     Compliance: Pt reported that limited transportation and finances sometimes create barriers to pt making it to appointments. Pt also reported that he has "important doctor's appointments to go to" and expressed his intention to leave the hospital today despite what the doctors say. Per hepatology note, appointments are with PCP and Pain Management. Pt reported he is scheduled to discharge this afternoon and reported that even if discharge was not scheduled he would leave anyway because he has "things to do". CAN inquired if he had ever left AMA before. CAN explained AMA. Pt indicated that he had left AMA in the past but was unable to recall the situation or hospital this happened at. CAN will confirm with pt's wife. SW reviewed the importance of medical compliance and how it may contribute to his candidacy for transplant. Pt stated "I need to go when I need to go. If that's going to keep me from getting a transplant I will just go to another hospital."     CAN will confirm the above information with pt's wife before completing assessment. Due to pt's presentation noted above and somewhat questionable orientation to place, CAN will need to speak with caregiver that is fully alert and oriented. CAN requested verbal permission to address the above issues with pt's wife. Pt provided permission. CAN remains available.     CAN attempted to contact pt's wife. She was not available so CAN left  with contact information to set a time to meet. SW remains available.     Addendum:  (1:14 PM)    CAN met with wife in pt's room. Pt was not in room due to getting " "paracentesis. SW addressed above issues to gain a better understanding of potential psychosocial barriers.     Tobacco Use: Pt's wife reported pt has cut down on smoking significantly. She confirmed that pt smokes about 2 cigarettes per day at the most. Pt's wife reported in the past 6 weeks pt frequently went three days without smoking at all. Pt's wife expressed her believe that the Chantix is working and pt will not return to use once he quits. SW provided additional smoking cessation resources.     Transportation Issues: Pt's wife stated that in the past she and pt have had some issues with affording gas to get to pt's medical appointments. Pt's wife also stated that she has received help from people in her [employer] community and pt's friends to borrow money for travel expenses. Pt's wife stated they have not gone without a full tank of gas in the last few months. SW recommended a back up transportation plan in the event transportation is not available during and urgent situation. Pt's wife listed the following people that she suspected would be dedicated to provide transportation in urgent matters:    1) Valdemar Yuan (45)- friend  2) Dov "Christine" Bonnie Saha (56)- brother in law (wife's brother)  3) Kodak Nicole (30)- Niece    Pt's wife stated she has not spoken to them about including them in backup transportation plan yet and requested time to discuss it before SW contacts them. Pt's wife agreed to contact SW with contact information after she has spoken to them.     Compliance: SW addressed what pt had said to SW earlier about leaving the hospital despite what the doctors say. Pt's wife reported that she would never let pt AMA and she suspected that pt is still somewhat confused. SW inquired about the previous AMA episode that pt had disclosed to SW earlier. Pt's wife reported that it was a "misunderstanding". She stated that he was told he was being discharged, the IV was taken out, and pt reported they " "were good to go. Pt's wife reported that she asked pt where the discharge paperwork was when they got home and pt reported that he though pt's wife got it. Pt's wife insisted that pt was scheduled to discharge and he was labeled AMA based on a misunderstanding/miscommunication.     Pt's wife reported that pt did refuse lactulose a few times during this hospital stay and stated that it was be pt was afraid of "messing the bed." Pt's wife stated pt can be prideful and does not want other people to clean up his mess. Pt's wife stated that he agreed after she spoke with him about the necessity of lactulose.    Fundraising: SW reviewed fundraising resources with pt's wife. She stated that her daughter had already started researching fundraising foundations and discovered that they need a letter of listing in order to set up the fund raising account. SW suggested "Go Fund Me" or "[employer]" in the mean time. Pt's wife reported that she intends to reach out to her friend, who had previously agreed to start a Go Fund Me account. SW and pt's wife reviewed ideas for fundraising. Pt's wife expressed understanding and in agreement to start fund raising as soon as possible.     Psychosocial Suitability:    Pt continues to present as a fair candidate for transplant. Pt continues to be moderate to high risk due to continued smoking, some issues with transportation, and extensive hx of alcohol abuse with no formal treatment. Pt's compliance issues appear to be a misunderstanding per pt's wife, but transplant team should continue to monitor. Protective factors include continued commitment to sobriety, significant reduction in tobacco use, good social support, and stable caregiver plan.     Recommendations:    1) Cease all tobacco use  2) Continued sobriety  3) Addiction Psychiatry Consult. Pt's original appointment was canceled due to be admitted to the hospital in January.   4) Fundraising  5) Local Housing  6) Confirm backup " caregiver and transportation plan  7) Medical compliance
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
## 2017-03-16 NOTE — NURSING
"Pt continues to refuse to take morning lactulose. States he had one bowel movement states "I filled the toilet up."      Notified MD of BP 97/55 - stated it was okay to give meds as this is mostly baseline at home.   "

## 2017-03-17 NOTE — PT/OT/SLP DISCHARGE
Physical Therapy Discharge Summary    Tommy Hopper  MRN: [id number]   Hepatic encephalopathy   Patient Discharged from acute Physical Therapy on 3/16/17.  Please refer to prior PT noted date on 3/16/17 for functional status.     Assessment:   Patient appropriate for care in another setting.  GOALS:   Physical Therapy Goals        Problem: Physical Therapy Goal    Goal Priority Disciplines Outcome Goal Variances Interventions   Physical Therapy Goal     PT/OT, PT Ongoing (interventions implemented as appropriate)     Description:  Goals to be met by: 3/26/17     Patient will increase functional independence with mobility by performin. Supine to sit with Modified [place]  2. Sit to supine with Modified [place]  3. Sit to stand transfer with Supervision  4. Bed to chair transfer with Supervision using SPC  5. Gait  x 200 feet with Stand-by Assistance using Single-point Cane .   6. Lower extremity exercise program x 10 reps per handout, with independence              Reasons for Discontinuation of Therapy Services  Transfer to alternate level of care.      Plan:  Patient Discharged to: unclear via chart review. PT recommended HHPT.    Tali Lee DPT, PT  3/17/2017

## 2017-03-20 ENCOUNTER — PATIENT OUTREACH (OUTPATIENT)
Dept: ADMINISTRATIVE | Facility: CLINIC | Age: 57
End: 2017-03-20
Payer: MEDICARE

## 2017-03-20 NOTE — PT/OT/SLP DISCHARGE
Occupational Therapy Discharge Summary    Tommy Hopper  MRN: [id number]   Hepatic encephalopathy   Patient Discharged from acute Occupational Therapy on 3/16/2017.  Please refer to prior OT note dated on 3/16/2017 for functional status.     Assessment:   Patient appropriate for care in another setting. Patient has not met goals.  GOALS:   Occupational Therapy Goals        Problem: Occupational Therapy Goal    Goal Priority Disciplines Outcome Interventions   Occupational Therapy Goal     OT, PT/OT Ongoing (interventions implemented as appropriate)    Description:  Goals to be met by: 3/27/2017     Patient will increase functional independence with ADLs by performing:    UE Dressing with Supervision.  Grooming while standing at sink with Supervision.  Rolling to Bilateral with Supervision.   Supine to sit with Supervision.  Stand pivot transfers with Supervision.  Toilet transfer to toilet with Supervision.  Upper extremity exercise program x15 reps per handout, with independence.              Reasons for Discontinuation of Therapy Services  Transfer to alternate level of care.      Plan:  Patient Discharged to: OT reocmmending OT.    DESTINEE Root  3/20/2017

## 2017-03-20 NOTE — PATIENT INSTRUCTIONS
Cirrhosis    The liver is found on the right side of your abdomen, just below the rib cage. The liver has many essential functions. Among these, it filters toxins from the blood. It also helps blood clot to stop bleeding. Cirrhosis results from scarring and injury to the liver. This damage is permanent. It can lead to loss of liver function. At some point, the liver may stop working (liver failure).   Long-term heavy alcohol use and having hepatitis B or C are the two most common causes of cirrhosis. Other things that can damage the liver include toxins, certain medicines, and certain viruses.  Common symptoms of cirrhosis include:  · Tiredness, weakness  · Loss of appetite  · Nausea and vomiting  · Easy bleeding and bruising  · Abdominal swelling  · Weight loss  · Jaundice  · Itching  · Confusion  Treatment is aimed at managing symptoms and preventing further liver damage. Treatments may be given to fight the hepatitis virus. Quitting alcohol will help slow the progress of the disease and may prevent further complications. If cirrhosis progresses and becomes life threatening, a liver transplant may be an option in some cases.   Home care  · Avoid medicines that can worsen liver damage.  Your healthcare provider will explain if any of the medicines you now take need to be changed. Talk to you healthcare provider before taking any medicine, including mineral and vitamin supplements or herbs. Certain substances can worsen liver damage.  · Talk to your healthcare provider avoiding medicines containing acetaminophen or NSAIDs (such as ibuprofen and naproxen). These can affect your liver.   · Stop drinking alcohol. If you are dependent on alcohol or find it hard to stop drinking, seek professional help. Consider joining Alcoholics Anonymous or another type of treatment program for support.  · If you use IV drugs, you are at high risk for hepatitis B and C. Seek help to stop.   Follow-up care  Follow up with your  healthcare provider or as advised by our staff.  For more information and to learn about support groups for people with liver disease, contact:  · [employer]  www.liverfoundation.org  321.908.7134  · [employer]  www.hepfi.org  192- 631-6447  When to seek medical advice  Call your healthcare provider for any of the following:  · Rapid weight gain with increased size of your abdomen or leg swelling  · Increasing jaundice (yellow color of skin or eyes)  · Excess bleeding from cuts or injuries  Date Last Reviewed: 6/22/2015  © 2068-0288 [employer]. [address] [place] [address]. All rights reserved. This information is not intended as a substitute for professional medical care. Always follow your healthcare professional's instructions.

## 2017-03-20 NOTE — PROGRESS NOTES
The patient does not have a scheduled HOSFU appointment within 7-14 days post hospital discharge date 3/16/17.Unable to Message  Physician staff to assist with HOSFU appointment scheduling.Patient do not have a PCP at this time.

## 2017-03-22 ENCOUNTER — TELEPHONE (OUTPATIENT)
Dept: TRANSPLANT | Facility: CLINIC | Age: 57
End: 2017-03-22

## 2017-03-22 NOTE — TELEPHONE ENCOUNTER
"Call returned.  Mrs Hopper advised of previous conversation with Mr. Hopper and states Dr. Galvez is a "liver doctor we saw a long time ago.  We only saw him one time.  We don't need to see him now."   She also states they were informed by Dr. Oconnor to hold his diuretics x3 days, at his last clinic visit, but was admitted to the hospital after the visit.  She says told by the nurse, there was no order to hold any of his medications, so she's not sure if the medications were held.  She agreed to repeat labs at [facility] tomorrow (3/23).  Will review results with MD to reassess need to hold or adjust the diuretic dose.    Mrs. Hopper reminded of standing order for paracentesis.  Instructed to call 1-2 days in advance, so procedure can be scheduled in the outpatient setting. Understanding expressed.     ----- Message from Chaitanya Valenzuela sent at 3/22/2017  4:29 PM CDT -----  Contact: Mrs Hopper  Returning your call states that you spoke with pt but he's confused and she's not sure what exactly you told him to do please call  [phone]    "

## 2017-03-23 ENCOUNTER — TELEPHONE (OUTPATIENT)
Dept: TRANSPLANT | Facility: CLINIC | Age: 57
End: 2017-03-23

## 2017-03-23 LAB
EXT BASOPHIL%: 0.8 (ref ?–1)
EXT EOSINOPHIL%: 4.8 (ref ?–6)
EXT HEMATOCRIT: 29.8 (ref 40–52)
EXT HEMOGLOBIN: 10.5 (ref 13.7–17.5)
EXT LYMPH%: 20.8 (ref 20–50)
EXT MONOCYTES%: 9.3 (ref ?–14)
EXT PLATELETS: 81 (ref 140–450)
EXT SEGS%: 64.3 (ref 45–75)
EXT WBC: 7.1 (ref 4–11)

## 2017-03-23 NOTE — TELEPHONE ENCOUNTER
"Call received from telephone receptionists, stating a "nurse called to say they received the referral to Dr. Galvez, but Dr. Galvez no longer works at this office.  Says Dr. Galvez now works at [facility]."      Inquired as to the name of the caller and contact number, and Dr.. Galvez' speciality.  Telephone receptionists unable to provide this information and said the caller has hung up.    Unable to find information related to a referral to Dr. Galvez, in review of patient's chart.      Call placed to patient in attempts to get more information.  Patient states he doesn't know of a referral and thinks he was seen by Dr. Galvez a long time ago.  Patient unable to provide contact information for Dr. Galvez.    "

## 2017-03-23 NOTE — TELEPHONE ENCOUNTER
Call patients pharmacy on 23Mar17 at 1400. Spoke with pharmacist, informed him we submitted the PA to [employer]. [employer] approved medication Xifan 550mg. Pharmacist resubmitted for approval while on the phone. Informed me that he will have medication ready for patient and call him to let him know.

## 2017-03-23 NOTE — TELEPHONE ENCOUNTER
Call returned.  Mrs. Hopper states she spoke with [facility] and was told they have not received the Rifaximin rx.      Rx called to [facility], Rifaximin 550mg, 1 tablet twice daily, #60 w/11 refills.        ----- Message from Chayo Negrete sent at 3/23/2017  9:28 AM CDT -----  Calling to speak with Sarah about a prescription; please call

## 2017-03-24 ENCOUNTER — TELEPHONE (OUTPATIENT)
Dept: TRANSPLANT | Facility: CLINIC | Age: 57
End: 2017-03-24

## 2017-03-24 NOTE — TELEPHONE ENCOUNTER
Call returned.  Mrs Hopper states they picked up and started the Xifaxan. Labs were done 3/23.      Informed by Laron Izaguirre MA, [facility] only collected a CBC.  Patient advised of need for repeat labs and states they will go either Saturday or Monday.      ----- Message from La Law MA sent at 3/24/2017 12:30 PM CDT -----  Contact: spouse      ----- Message -----     From: Lilia Turcios     Sent: 3/23/2017  12:07 PM       To: [facility] Hepat Clinical Staff    Says that Elvin put pt on Xifaxan and should she get labs done before taking, please call back at [phone]

## 2017-03-27 ENCOUNTER — TELEPHONE (OUTPATIENT)
Dept: SMOKING CESSATION | Facility: CLINIC | Age: 57
End: 2017-03-27

## 2017-03-27 ENCOUNTER — TELEPHONE (OUTPATIENT)
Dept: TRANSPLANT | Facility: CLINIC | Age: 57
End: 2017-03-27

## 2017-03-27 LAB
EXT ALBUMIN: 3 (ref 3.5–4.7)
EXT ALKALINE PHOSPHATASE: 173 (ref 34–104)
EXT ALT: 32 (ref 7–52)
EXT AST: 77 (ref 13–39)
EXT BASOPHIL%: 0.6 (ref ?–1)
EXT BILIRUBIN TOTAL: 2 (ref 0.2–1)
EXT BUN: 9 (ref 7–25)
EXT CALCIUM: 8.3 (ref 8.6–10.3)
EXT CHLORIDE: 95 (ref 98–107)
EXT CO2: 22 (ref 22–32)
EXT CREATININE: 1 MG/DL (ref 0.7–1.3)
EXT EOSINOPHIL%: 3.3 (ref ?–6)
EXT GLUCOSE: 102 (ref 70–105)
EXT HEMATOCRIT: 29 (ref 40–52)
EXT HEMOGLOBIN: 10.4 (ref 13.7–17.4)
EXT INR: 1.29 (ref 2–3)
EXT LYMPH%: 21.7 (ref 20–50)
EXT MONOCYTES%: 11.4 (ref ?–14)
EXT PLATELETS: 90 (ref 140–450)
EXT POTASSIUM: 3.5 (ref 3.5–5.1)
EXT PROTEIN TOTAL: 6 (ref 6.4–8.9)
EXT PT: 16 (ref 11.6–14.3)
EXT SEGS%: 63 (ref 45–75)
EXT SODIUM: 124 MMOL/L (ref 136–145)
EXT WBC: 6.6 (ref 4–11)

## 2017-03-27 NOTE — TELEPHONE ENCOUNTER
Call returned.  Patient advised labs received and will be reviewed with Dr. Oconnor.  Understanding expressed.     ----- Message from Yessica Vargas sent at 3/27/2017  2:15 PM CDT -----  Contact: pt   Calling to let you know that pt completed labs today, vero if needed

## 2017-03-28 ENCOUNTER — CLINICAL SUPPORT (OUTPATIENT)
Dept: SMOKING CESSATION | Facility: CLINIC | Age: 57
End: 2017-03-28
Payer: COMMERCIAL

## 2017-03-28 DIAGNOSIS — F17.200 NICOTINE DEPENDENCE: Primary | ICD-10-CM

## 2017-03-28 PROCEDURE — 99407 BEHAV CHNG SMOKING > 10 MIN: CPT | Mod: S$GLB,,, | Performed by: GENERAL PRACTICE

## 2017-03-31 ENCOUNTER — TELEPHONE (OUTPATIENT)
Dept: TRANSPLANT | Facility: CLINIC | Age: 57
End: 2017-03-31

## 2017-03-31 NOTE — TELEPHONE ENCOUNTER
Call returned.  Patient's requesting to schedule a paracentesis Tuesday or Wednesday of next week.  Message sent to JAZZMINE Lawler  requesting a procedure date.     ---- Message from Chayo Negrete sent at 3/31/2017 11:03 AM CDT -----  Contact: patient    Calling to speak with kwame about kwame. Please call

## 2017-04-03 ENCOUNTER — TELEPHONE (OUTPATIENT)
Dept: TRANSPLANT | Facility: CLINIC | Age: 57
End: 2017-04-03

## 2017-04-03 NOTE — TELEPHONE ENCOUNTER
TACE tentatively scheduled 4/17/17 @ 0730  Voice message left, requesting a return call to confirm date/time.  Contact numbers provided

## 2017-04-03 NOTE — TELEPHONE ENCOUNTER
TACE scheduled 4/17/17 @ 0730.  Patient/spouse notified.  Medication and allergy cards reviewed.  Pre-procedure instructions reviewed, including: fasting after midinight 4/16/17.  Understanding and agreement expressed.     Reminded of paracentesis scheduled 4/4 @ 0830.      ----- Message from Yessica Vargas sent at 4/3/2017  4:25 PM CDT -----  Contact: pt   ..Tommy Hopper is returning call, please call

## 2017-04-04 ENCOUNTER — HOSPITAL ENCOUNTER (OUTPATIENT)
Dept: INTERVENTIONAL RADIOLOGY/VASCULAR | Facility: HOSPITAL | Age: 57
Discharge: HOME | End: 2017-04-04
Attending: INTERNAL MEDICINE
Payer: MEDICARE

## 2017-04-04 VITALS
OXYGEN SATURATION: 100 % | HEART RATE: 80 BPM | RESPIRATION RATE: 18 BRPM | SYSTOLIC BLOOD PRESSURE: 102 MMHG | DIASTOLIC BLOOD PRESSURE: 58 MMHG

## 2017-04-04 DIAGNOSIS — K70.31 ALCOHOLIC CIRRHOSIS OF LIVER WITH ASCITES: ICD-10-CM

## 2017-04-04 DIAGNOSIS — C22.0 HCC (HEPATOCELLULAR CARCINOMA): ICD-10-CM

## 2017-04-04 DIAGNOSIS — Z01.818 PRE-TRANSPLANT EVALUATION FOR LIVER TRANSPLANT: ICD-10-CM

## 2017-04-04 LAB
APPEARANCE FLD: CLEAR
BODY FLD TYPE: NORMAL
COLOR FLD: YELLOW
LYMPHOCYTES NFR FLD MANUAL: 61 %
MESOTHL CELL NFR FLD MANUAL: 3 %
MONOS+MACROS NFR FLD MANUAL: 29 %
NEUTROPHILS NFR FLD MANUAL: 7 %
WBC # FLD: 66 /CU MM

## 2017-04-04 PROCEDURE — 63600175 PHARM REV CODE 636 W HCPCS: Performed by: INTERNAL MEDICINE

## 2017-04-04 PROCEDURE — C1729 CATH, DRAINAGE: HCPCS

## 2017-04-04 PROCEDURE — P9047 ALBUMIN (HUMAN), 25%, 50ML: HCPCS | Performed by: INTERNAL MEDICINE

## 2017-04-04 PROCEDURE — 49083 ABD PARACENTESIS W/IMAGING: CPT | Mod: ,,, | Performed by: RADIOLOGY

## 2017-04-04 PROCEDURE — 89051 BODY FLUID CELL COUNT: CPT

## 2017-04-04 RX ORDER — ALBUMIN HUMAN 250 G/1000ML
25 SOLUTION INTRAVENOUS ONCE
Status: COMPLETED | OUTPATIENT
Start: 2017-04-04 | End: 2017-04-04

## 2017-04-04 RX ADMIN — ALBUMIN (HUMAN) 25 G: 25 SOLUTION INTRAVENOUS at 10:04

## 2017-04-04 RX ADMIN — ALBUMIN (HUMAN) 25 G: 25 SOLUTION INTRAVENOUS at 09:04
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
## 2017-04-04 NOTE — DISCHARGE INSTRUCTIONS
Interventional Radiology [phone]  Mon-Fri  8A-4P  24hr Radiology Resident on call [phone]

## 2017-04-04 NOTE — PROGRESS NOTES
Pt to rm 125. CONNER Patterson MD to bedside. Yueh Catheter placed to LLQ with use of ultrasound guidance. No complaints or signs of distress. Will continue to monitor.

## 2017-04-04 NOTE — PROCEDURES
Radiology Post-Procedure Note    Pre Op Diagnosis: Ascites  Post Op Diagnosis: Same    Procedure: Paracentesis    Procedure performed by: David Sheikh MD    Written Informed Consent Obtained: Yes  Specimen Removed: YES clear yellow fluid  Estimated Blood Loss: Minimal    Findings:   Successful paracentesis.  Albumin administered PRN per protocol.    Patient tolerated procedure well.    Geovanna Patterson MD  PGY-3  Department of Radiology  Pager: [phone]

## 2017-04-04 NOTE — IP AVS SNAPSHOT
[facility]  [address]  [facility] [address]  Phone: [phone]           Patient Discharge Instructions   Our goal is to set you up for success. This packet includes information on your condition, medications, and your home care.  It will help you care for yourself to prevent having to return to the hospital.     Please ask your nurse if you have any questions.      There are many details to remember when preparing to leave the hospital. Here is what you will need to do:    1. Take your medicine. If you are prescribed medications, review your Medication List on the following pages. You may have new medications to  at the pharmacy and others that you'll need to stop taking. Review the instructions for how and when to take your medications. Talk with your doctor or nurses if you are unsure of what to do.     2. Go to your follow-up appointments. Specific follow-up information is listed in the following pages. Your may be contacted by a nurse or clinical provider about future appointments. Be sure we have all of the phone numbers to reach you. Please contact your provider's office if you are unable to make an appointment.     3. Watch for warning signs. Your doctor or nurse will give you detailed warning signs to watch for and when to call for assistance. These instructions may also include educational information about your condition. If you experience any of warning signs to your health, call your doctor.           [facility]  Unless otherwise directed by your provider, please   contact [facility], our nurse care line   that is available for 24/7 assistance.     [phone] (toll-free)     Registered nurses in the [facility]   provide: appointment scheduling, clinical advisement, health education, and other advisory services.                  ** Verify the list of medication(s) below is accurate and up to date. Carry this with you in case of  emergency. If your medications have changed, please notify your healthcare provider.             Medication List      TAKE these medications        Additional Info                      calcium-vitamin D 250-100 mg-unit per tablet   Refills:  0   Dose:  2 tablet    Instructions:  Take 2 tablets by mouth 2 (two) times daily.     Begin Date    AM    Noon    PM    Bedtime       CHANTIX STARTING MONTH BOX 0.5 mg (11)- 1 mg (42) tablet   Refills:  0   Generic drug:  varenicline    Instructions:  as directed Orally 30 days     Begin Date    AM    Noon    PM    Bedtime       ciprofloxacin HCl 500 MG tablet   Commonly known as:  CIPRO   Quantity:  30 tablet   Refills:  6   Dose:  500 mg    Instructions:  Take 1 tablet (500 mg total) by mouth once daily.     Begin Date    AM    Noon    PM    Bedtime       diazePAM 10 MG Tab   Commonly known as:  VALIUM   Refills:  0   Dose:  10 mg    Instructions:  Take 10 mg by mouth 2 (two) times daily.     Begin Date    AM    Noon    PM    Bedtime       furosemide 40 MG tablet   Commonly known as:  LASIX   Quantity:  30 tablet   Refills:  11   Dose:  40 mg    Instructions:  Take 1 tablet (40 mg total) by mouth once daily.     Begin Date    AM    Noon    PM    Bedtime       * lactulose 20 gram/30 mL Soln   Commonly known as:  CHRONULAC   Quantity:  3000 mL   Refills:  11   Dose:  20 g    Instructions:  Take 30 mLs (20 g total) by mouth every 6 (six) hours as needed (titrate to have 2-3 bowle movements per day).     Begin Date    AM    Noon    PM    Bedtime       * lactulose 10 gram/15 mL solution   Commonly known as:  CHRONULAC   Refills:  0      Begin Date    AM    Noon    PM    Bedtime       morphine 30 MG 12 hr tablet   Commonly known as:  MS CONTIN   Refills:  0   Dose:  30 mg    Instructions:  Take 30 mg by mouth every 12 (twelve) hours.     Begin Date    AM    Noon    PM    Bedtime       neomycin-polymyxin-dexamethasone 3.5 mg/g-10,000 unit/g-0.1 % Oint   Commonly known as:  DEXACINE    Refills:  0    Instructions:  every 6 (six) hours.     Begin Date    AM    Noon    PM    Bedtime       nicotine 10 mg Crtg   Commonly known as:  NICOTROL   Quantity:  168 puff   Refills:  0   Dose:  1 puff    Instructions:  Inhale 1 puff into the lungs as needed. (6-16 cartridges daily as needed) inhaled     Begin Date    AM    Noon    PM    Bedtime       oxycodone 30 MG Tab   Commonly known as:  ROXICODONE   Refills:  0   Dose:  30 mg    Instructions:  Take 30 mg by mouth every 8 (eight) hours.     Begin Date    AM    Noon    PM    Bedtime       pantoprazole 40 MG tablet   Commonly known as:  PROTONIX   Quantity:  30 tablet   Refills:  11   Dose:  40 mg    Instructions:  Take 1 tablet (40 mg total) by mouth once daily.     Begin Date    AM    Noon    PM    Bedtime       rifAXIMin 550 mg Tab   Commonly known as:  XIFAXAN   Quantity:  60 tablet   Refills:  11   Dose:  550 mg    Instructions:  Take 1 tablet (550 mg total) by mouth 2 (two) times daily.     Begin Date    AM    Noon    PM    Bedtime       sodium,potassium,mag sulfates 17.5-3.13-1.6 gram Solr   Commonly known as:  SUPREP BOWEL PREP KIT   Quantity:  354 mL   Refills:  0   Dose:  1 kit    Instructions:  Take 1 kit by mouth as directed.     Begin Date    AM    Noon    PM    Bedtime       spironolactone 50 MG tablet   Commonly known as:  ALDACTONE   Quantity:  30 tablet   Refills:  11   Dose:  50 mg    Instructions:  Take 1 tablet (50 mg total) by mouth once daily.     Begin Date    AM    Noon    PM    Bedtime       VOLTAREN 1 % Gel   Refills:  3   Generic drug:  diclofenac sodium    Instructions:  apply (2G) by topical route 3 times every day to the affected area(s)     Begin Date    AM    Noon    PM    Bedtime       * Notice:  This list has 2 medication(s) that are the same as other medications prescribed for you. Read the directions carefully, and ask your doctor or other care provider to review them with you.               Please bring to all follow up  appointments:    1. A copy of your discharge instructions.  2. All medicines you are currently taking in their original bottles.  3. Identification and insurance card.    Please arrive 15 minutes ahead of scheduled appointment time.    Please call 24 hours in advance if you must reschedule your appointment and/or time.        Your Scheduled Appointments     Apr 25, 2017  1:00 PM CDT   Non-Fasting Lab with LAB, APPOINTMENT [place] [facility] ([facility])    [address] [address]   [phone]            Apr 25, 2017  1:05 PM CDT   Urine with SPECIMEN, MAIN CAMPUS [facility] ([facility])    [address] [id number] [phone]            Apr 27, 2017 11:00 AM CDT   Established Patient Visit with Binta Castaneda MD   [facility] - Pulmonary Services ([facility] )    [address]  [place] [address]   [phone]            Apr 27, 2017  2:20 PM CDT   Established Patient with Sharyn Eaton NP   [facility] - Liver Transplant ([facility] )    [address]  [place] [address] [phone]            May 05, 2017 11:30 AM CDT   Established Patient Visit with Keely Luna MD   [facility] - Nephrology ([facility] )    [address]  [place] [address] [phone]                  Discharge Instructions       Interventional Radiology [phone]  Mon-Fri  8A-4P  24hr Radiology Resident on call [phone]      Discharge References/Attachments     PARACENTESIS, DISCHARGE INSTRUCTIONS FOR (ENGLISH)        Admission Information     Date & Time Provider Department CSN    4/4/2017  8:30 AM Indira Oconnor MD [facility] [id number]      Care Providers     Provider Role Specialty Primary office phone    Indira Oconnor MD Attending Provider Gastroenterology [phone]      Your Vitals Were      BP Pulse Resp SpO2          100/55 (BP Location: Right arm, Patient Position: Lying, BP Method: Automatic) 79 18 100%        Recent Lab Values     No lab values to display.      Pending Labs     Order Current Status    WBC & Diff,Body Fluid Ascites In process      Allergies as of 4/4/2017        Reactions    Adhesive Blisters      Advance Directives     An advance directive is a document which, in the event you are no longer able to make decisions for yourself, tells your healthcare team what kind of treatment you do or do not want to receive, or who you would like to make those decisions for you.  If you do not currently have an advance directive, [facility] encourages you to create one.  For more information call:  [phone]), [phone] ([phone]),  or log on to [email or web address].        Language Assistance Services     ATTENTION: Language assistance services are available, free of charge. Please call [phone].      ATENCIÓN: Si habla español, tiene a sears disposición servicios gratuitos de asistencia lingüística. Llame al [phone].     [facility] Ý: N?u b?n nói Ti?ng Vi?t, có các d?ch v? h? tr? ngôn ng? mi?n phí dành cho b?n. G?i s? [phone].         [facility] complies with applicable Federal civil rights laws and does not discriminate on the basis of race, color, national origin, age, disability, or sex.

## 2017-04-04 NOTE — PROGRESS NOTES
Paracentesis complete. Pt tolerated well. Total 8,000 ml peritoneal fluid removed. 200 ml Albumin 25% IV administered.   Pt verbalized instructions on care for puncture site to Q.  AVS reviewed and provided.

## 2017-04-05 DIAGNOSIS — C22.0 HCC (HEPATOCELLULAR CARCINOMA): Primary | ICD-10-CM

## 2017-04-13 DIAGNOSIS — C22.0 HCC (HEPATOCELLULAR CARCINOMA): Primary | ICD-10-CM

## 2017-04-13 NOTE — NURSING
Spoke to patient's wife, Ms. Altamirano, about his TACE procedure for Monday, 4/17/2017--- They will be here, He will be NPO, and will go to lab at 0700;  Patient further states he will need to take his pain pill in a.m..;  Instructed to take his Oxycodone with a small sip of water before leaving home;

## 2017-04-17 ENCOUNTER — HOSPITAL ENCOUNTER (OUTPATIENT)
Facility: HOSPITAL | Age: 57
Discharge: HOME | End: 2017-04-18
Attending: INTERNAL MEDICINE | Admitting: INTERNAL MEDICINE
Payer: MEDICARE

## 2017-04-17 ENCOUNTER — SURGERY (OUTPATIENT)
Age: 57
End: 2017-04-17

## 2017-04-17 DIAGNOSIS — C22.0 HCC (HEPATOCELLULAR CARCINOMA): ICD-10-CM

## 2017-04-17 DIAGNOSIS — Z76.82 LIVER TRANSPLANT CANDIDATE: ICD-10-CM

## 2017-04-17 PROCEDURE — 25000003 PHARM REV CODE 250: Mod: NTX | Performed by: NURSE PRACTITIONER

## 2017-04-17 PROCEDURE — P9047 ALBUMIN (HUMAN), 25%, 50ML: HCPCS | Mod: NTX | Performed by: INTERNAL MEDICINE

## 2017-04-17 PROCEDURE — 25500020 PHARM REV CODE 255: Mod: NTX | Performed by: INTERNAL MEDICINE

## 2017-04-17 PROCEDURE — 25000003 PHARM REV CODE 250: Mod: NTX | Performed by: STUDENT IN AN ORGANIZED HEALTH CARE EDUCATION/TRAINING PROGRAM

## 2017-04-17 PROCEDURE — 63600175 PHARM REV CODE 636 W HCPCS: Performed by: RADIOLOGY

## 2017-04-17 PROCEDURE — 63600175 PHARM REV CODE 636 W HCPCS: Mod: NTX | Performed by: INTERNAL MEDICINE

## 2017-04-17 PROCEDURE — 63600175 PHARM REV CODE 636 W HCPCS

## 2017-04-17 PROCEDURE — 63600175 PHARM REV CODE 636 W HCPCS: Performed by: NURSE PRACTITIONER

## 2017-04-17 PROCEDURE — 25000003 PHARM REV CODE 250: Performed by: RADIOLOGY

## 2017-04-17 PROCEDURE — 25000003 PHARM REV CODE 250

## 2017-04-17 RX ORDER — SODIUM CHLORIDE 9 MG/ML
INJECTION, SOLUTION INTRAVENOUS CONTINUOUS
Status: DISCONTINUED | OUTPATIENT
Start: 2017-04-17 | End: 2017-04-18 | Stop reason: HOSPADM

## 2017-04-17 RX ORDER — SODIUM CHLORIDE 9 MG/ML
INJECTION, SOLUTION INTRAVENOUS CONTINUOUS
Status: DISCONTINUED | OUTPATIENT
Start: 2017-04-17 | End: 2017-04-17

## 2017-04-17 RX ORDER — MIDAZOLAM HYDROCHLORIDE 1 MG/ML
INJECTION, SOLUTION INTRAMUSCULAR; INTRAVENOUS CODE/TRAUMA/SEDATION MEDICATION
Status: COMPLETED | OUTPATIENT
Start: 2017-04-17 | End: 2017-04-17

## 2017-04-17 RX ORDER — IODIXANOL 320 MG/ML
200 INJECTION, SOLUTION INTRAVASCULAR
Status: COMPLETED | OUTPATIENT
Start: 2017-04-17 | End: 2017-04-17

## 2017-04-17 RX ORDER — OXYCODONE HYDROCHLORIDE 5 MG/1
5 TABLET ORAL EVERY 6 HOURS PRN
Status: DISCONTINUED | OUTPATIENT
Start: 2017-04-17 | End: 2017-04-18 | Stop reason: HOSPADM

## 2017-04-17 RX ORDER — LIDOCAINE HYDROCHLORIDE 10 MG/ML
1 INJECTION, SOLUTION EPIDURAL; INFILTRATION; INTRACAUDAL; PERINEURAL ONCE
Status: DISCONTINUED | OUTPATIENT
Start: 2017-04-17 | End: 2017-04-17 | Stop reason: HOSPADM

## 2017-04-17 RX ORDER — FENTANYL CITRATE 50 UG/ML
INJECTION, SOLUTION INTRAMUSCULAR; INTRAVENOUS CODE/TRAUMA/SEDATION MEDICATION
Status: COMPLETED | OUTPATIENT
Start: 2017-04-17 | End: 2017-04-17

## 2017-04-17 RX ORDER — ONDANSETRON 2 MG/ML
INJECTION INTRAMUSCULAR; INTRAVENOUS CODE/TRAUMA/SEDATION MEDICATION
Status: COMPLETED | OUTPATIENT
Start: 2017-04-17 | End: 2017-04-17

## 2017-04-17 RX ORDER — ONDANSETRON 4 MG/1
4 TABLET, FILM COATED ORAL EVERY 6 HOURS PRN
Status: DISCONTINUED | OUTPATIENT
Start: 2017-04-17 | End: 2017-04-18 | Stop reason: HOSPADM

## 2017-04-17 RX ORDER — ALBUMIN HUMAN 250 G/1000ML
25 SOLUTION INTRAVENOUS CONTINUOUS PRN
Status: DISCONTINUED | OUTPATIENT
Start: 2017-04-17 | End: 2017-04-18 | Stop reason: HOSPADM

## 2017-04-17 RX ORDER — DIPHENHYDRAMINE HYDROCHLORIDE 50 MG/ML
50 INJECTION INTRAMUSCULAR; INTRAVENOUS ONCE
Status: COMPLETED | OUTPATIENT
Start: 2017-04-17 | End: 2017-04-17

## 2017-04-17 RX ORDER — FUROSEMIDE 40 MG/1
40 TABLET ORAL DAILY
Status: DISCONTINUED | OUTPATIENT
Start: 2017-04-17 | End: 2017-04-18 | Stop reason: HOSPADM

## 2017-04-17 RX ORDER — DIAZEPAM 5 MG/1
10 TABLET ORAL 2 TIMES DAILY
Status: DISCONTINUED | OUTPATIENT
Start: 2017-04-17 | End: 2017-04-18 | Stop reason: HOSPADM

## 2017-04-17 RX ORDER — PANTOPRAZOLE SODIUM 40 MG/1
40 TABLET, DELAYED RELEASE ORAL DAILY
Status: DISCONTINUED | OUTPATIENT
Start: 2017-04-17 | End: 2017-04-18 | Stop reason: HOSPADM

## 2017-04-17 RX ORDER — DEXAMETHASONE SODIUM PHOSPHATE 4 MG/ML
8 INJECTION, SOLUTION INTRA-ARTICULAR; INTRALESIONAL; INTRAMUSCULAR; INTRAVENOUS; SOFT TISSUE ONCE
Status: COMPLETED | OUTPATIENT
Start: 2017-04-18 | End: 2017-04-18

## 2017-04-17 RX ADMIN — FENTANYL CITRATE 25 MCG: 50 INJECTION, SOLUTION INTRAMUSCULAR; INTRAVENOUS at 11:04

## 2017-04-17 RX ADMIN — AMPICILLIN SODIUM AND SULBACTAM SODIUM 3 G: 2; 1 INJECTION, POWDER, FOR SOLUTION INTRAMUSCULAR; INTRAVENOUS at 02:04

## 2017-04-17 RX ADMIN — SPIRONOLACTONE 50 MG: 25 TABLET, FILM COATED ORAL at 02:04

## 2017-04-17 RX ADMIN — DIPHENHYDRAMINE HYDROCHLORIDE 50 MG: 50 INJECTION, SOLUTION INTRAMUSCULAR; INTRAVENOUS at 08:04

## 2017-04-17 RX ADMIN — OXYCODONE HYDROCHLORIDE 5 MG: 5 TABLET ORAL at 02:04

## 2017-04-17 RX ADMIN — FENTANYL CITRATE 50 MCG: 50 INJECTION, SOLUTION INTRAMUSCULAR; INTRAVENOUS at 11:04

## 2017-04-17 RX ADMIN — DIAZEPAM 5 MG: 5 TABLET ORAL at 09:04

## 2017-04-17 RX ADMIN — PANTOPRAZOLE SODIUM 40 MG: 40 TABLET, DELAYED RELEASE ORAL at 02:04

## 2017-04-17 RX ADMIN — SODIUM CHLORIDE: 0.9 INJECTION, SOLUTION INTRAVENOUS at 08:04

## 2017-04-17 RX ADMIN — FENTANYL CITRATE 25 MCG: 50 INJECTION, SOLUTION INTRAMUSCULAR; INTRAVENOUS at 10:04

## 2017-04-17 RX ADMIN — ALBUMIN (HUMAN) 25 G: 25 SOLUTION INTRAVENOUS at 11:04

## 2017-04-17 RX ADMIN — FUROSEMIDE 40 MG: 40 TABLET ORAL at 02:04

## 2017-04-17 RX ADMIN — AMPICILLIN SODIUM AND SULBACTAM SODIUM 3 G: 2; 1 INJECTION, POWDER, FOR SOLUTION INTRAMUSCULAR; INTRAVENOUS at 08:04

## 2017-04-17 RX ADMIN — SODIUM CHLORIDE: 0.9 INJECTION, SOLUTION INTRAVENOUS at 02:04

## 2017-04-17 RX ADMIN — IODIXANOL 60 ML: 320 INJECTION, SOLUTION INTRAVASCULAR at 11:04

## 2017-04-17 RX ADMIN — RIFAXIMIN 550 MG: 550 TABLET ORAL at 09:04

## 2017-04-17 RX ADMIN — MIDAZOLAM HYDROCHLORIDE 1 MG: 1 INJECTION, SOLUTION INTRAMUSCULAR; INTRAVENOUS at 11:04

## 2017-04-17 RX ADMIN — MIDAZOLAM HYDROCHLORIDE 0.5 MG: 1 INJECTION, SOLUTION INTRAMUSCULAR; INTRAVENOUS at 11:04

## 2017-04-17 RX ADMIN — MIDAZOLAM HYDROCHLORIDE 0.5 MG: 1 INJECTION, SOLUTION INTRAMUSCULAR; INTRAVENOUS at 10:04

## 2017-04-17 RX ADMIN — HYDROCORTISONE SODIUM SUCCINATE 200 MG: 100 INJECTION, POWDER, FOR SOLUTION INTRAMUSCULAR; INTRAVENOUS at 08:04

## 2017-04-17 RX ADMIN — ONDANSETRON 4 MG: 2 INJECTION INTRAMUSCULAR; INTRAVENOUS at 11:04

## 2017-04-17 NOTE — PLAN OF CARE
Problem: Patient Care Overview  Goal: Plan of Care Review  Pt with no falls or injuries this shift. Pt with no skin breakdown. Pt afebrile, procedure sites with dressing clean dry and intact. No s/s infection. On prophylactic iv antibiotic.

## 2017-04-17 NOTE — PROCEDURES
Radiology Post-Procedure Note    Pre Op Diagnosis: Hepatocellular carcinoma    Post Op Diagnosis: Hepatocellular carcinoma    Procedure: Chemoembolization    Procedure Performed by: Estrada GAR, Linda    Written Informed Consent Obtained: Yes    Specimen Removed: None    Estimated Blood Loss: Minimal    Findings:     After placement of a right femoral artery sheath, a 5-Uzbek catheter was inserted and angiography of the celiac artery for anatomic evaluation and localization of hepatic tumor.  A microcatheter was introduced into feeding arteries of a right hepatic lobe tumor and LC beads coated with doxorubicin were injected until near stagnant flow was achieved.  Post procedural angiography revealed no complications.    Right femoral artery angiogram was performed and the sheath was removed.  Hemostasis was achieved using Exoseal closure device.  There was no hematoma at the time of hemostasis.    The patient tolerated procedure well.  Please see Imaging report for further details.    Oziel Nava MD  Resident  Department of Radiology  Pager: [phone]

## 2017-04-17 NOTE — PROGRESS NOTES
Pt arrived to IR room 189 for TACE and paracentesis, no acute distress noted. Orders and labs reviewed on chart. Awaiting consent.
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
## 2017-04-17 NOTE — PROGRESS NOTES
Pt arrived to [facility] bay 4 s/p paracentesis and TACE procedure.  NAD noted.  Site to RLQ and right groin CDI.  Pt to lay flat until 1335.  Will continue to monitor.

## 2017-04-17 NOTE — PROGRESS NOTES
TACE and parcentesis completed, pt tolerated well. No apparent distress noted. 5.5 Liters removed from right abdomen, mepore applied CDI. Albumin 100 ml given. Exoseal deployed at 11:35, HOB to remain flat for to hours. HOB to be elevated at 13:35. Groin site CDI. Pt to be transferred to [facility], report to be given at bedside.

## 2017-04-17 NOTE — NURSING TRANSFER
Nursing Transfer Note      4/17/2017     Transfer To: OBSERVATION unit    Transfer via stretcher    [facility]    Medicines sent: none    Chart send with patient: Yes    Notified: spouse

## 2017-04-17 NOTE — PROCEDURES
Radiology Post-Procedure Note    Pre Op Diagnosis: Ascites  Post Op Diagnosis: Same    Procedure: Paracentesis    Procedure performed by: Geovanna Patterson and Fernando Dorado    Written Informed Consent Obtained: Yes  Specimen Removed: YES clear yellow fluid  Estimated Blood Loss: Minimal    Findings:   Successful paracentesis.  Albumin administered PRN per protocol.    Patient tolerated procedure well.    Geovanna Patterson MD  PGY-3  Department of Radiology  Pager: [phone]

## 2017-04-17 NOTE — NURSING
Received report from Carmen COBIAN in IR. Pt aaox4. Dressing to right groin clean,dry and intact. Dressing to right abd clean dry and intact. No distress noted. Bed placed to lowest level, wheels locked. Call light within pt reach. Spouse in with pt.

## 2017-04-18 VITALS
HEIGHT: 69 IN | HEART RATE: 71 BPM | RESPIRATION RATE: 18 BRPM | OXYGEN SATURATION: 96 % | TEMPERATURE: 98 F | DIASTOLIC BLOOD PRESSURE: 70 MMHG | SYSTOLIC BLOOD PRESSURE: 111 MMHG | WEIGHT: 155 LBS | BODY MASS INDEX: 22.96 KG/M2

## 2017-04-18 PROCEDURE — 63600175 PHARM REV CODE 636 W HCPCS: Performed by: RADIOLOGY

## 2017-04-18 PROCEDURE — 25000003 PHARM REV CODE 250: Performed by: RADIOLOGY

## 2017-04-18 RX ORDER — ONDANSETRON 4 MG/1
4 TABLET, FILM COATED ORAL EVERY 6 HOURS PRN
Qty: 28 TABLET | Refills: 0 | Status: SHIPPED | OUTPATIENT
Start: 2017-04-18 | End: 2017-05-26 | Stop reason: SDUPTHER

## 2017-04-18 RX ORDER — OXYCODONE HYDROCHLORIDE 5 MG/1
5 TABLET ORAL EVERY 6 HOURS PRN
Qty: 20 TABLET | Refills: 0 | Status: SHIPPED | OUTPATIENT
Start: 2017-04-18 | End: 2017-04-27

## 2017-04-18 RX ORDER — AMOXICILLIN AND CLAVULANATE POTASSIUM 500; 125 MG/1; MG/1
1 TABLET, FILM COATED ORAL 2 TIMES DAILY
Qty: 14 TABLET | Refills: 0 | Status: SHIPPED | OUTPATIENT
Start: 2017-04-18 | End: 2017-04-25

## 2017-04-18 RX ADMIN — DEXAMETHASONE SODIUM PHOSPHATE 8 MG: 4 INJECTION, SOLUTION INTRAMUSCULAR; INTRAVENOUS at 05:04

## 2017-04-18 RX ADMIN — RIFAXIMIN 550 MG: 550 TABLET ORAL at 09:04

## 2017-04-18 RX ADMIN — DIAZEPAM 10 MG: 5 TABLET ORAL at 09:04

## 2017-04-18 RX ADMIN — OXYCODONE HYDROCHLORIDE 5 MG: 5 TABLET ORAL at 05:04

## 2017-04-18 RX ADMIN — AMPICILLIN SODIUM AND SULBACTAM SODIUM 3 G: 2; 1 INJECTION, POWDER, FOR SOLUTION INTRAMUSCULAR; INTRAVENOUS at 02:04

## 2017-04-18 RX ADMIN — PANTOPRAZOLE SODIUM 40 MG: 40 TABLET, DELAYED RELEASE ORAL at 09:04

## 2017-04-18 NOTE — DISCHARGE SUMMARY
Radiology Discharge Summary      Hospital Course: No complications    Admit Date: 4/17/2017  Discharge Date: 04/18/2017     Instructions Given to Patient: Yes  Diet: Resume prior diet  Activity: activity as tolerated    Description of Condition on Discharge: Stable  Vital Signs (Most Recent): Temp: 98.8 °F (37.1 °C) (04/18/17 0400)  Pulse: 73 (04/18/17 0400)  Resp: 18 (04/18/17 0400)  BP: (!) 96/55 (04/18/17 0400)  SpO2: 98 % (04/18/17 0400)    Discharge Disposition: Home    Discharge Diagnosis: HCC s/p TACE    Follow-up: IR clinic in 1 month with triple phase CT or MRI    Oziel Nava MD  Resident  Department of Radiology  Pager: [phone]

## 2017-04-18 NOTE — PROGRESS NOTES
Discharge instructions given to patient and wife. Pt has paper prescription for antibiotic and Zofran. No distress noted at this time. Peripheral IV removed x1 intact. Pt verbalizes understanding of follow up appts. Transport requested. Pt left unit without incident.

## 2017-04-18 NOTE — NURSING
MD Martín Grove on unit talking with patient at this time. Notified of pts low BP, last BP 88/57, and patient requesting pain medication and also has 10mg of scheduled Valium ordered. New order given to hold pain medication at this time, may give 5mg of Valium at this time. Will carry out orders and continue to monitor.

## 2017-04-18 NOTE — PLAN OF CARE
Problem: Patient Care Overview  Goal: Plan of Care Review  Outcome: Ongoing (interventions implemented as appropriate)  Fall precautions maintained. Bed in lowest position with wheels locked. Bed alarm set. No signs of skin breakdown noted. No s/s of infection noted. Pt afebrile. Will continue to monitor.

## 2017-04-20 ENCOUNTER — TELEPHONE (OUTPATIENT)
Dept: TRANSPLANT | Facility: CLINIC | Age: 57
End: 2017-04-20

## 2017-04-20 NOTE — TELEPHONE ENCOUNTER
----- Message from Yessica Vargas sent at 4/19/2017  3:19 PM CDT -----  Contact: pt wife  .Patient calling to speak with coordinator about being denied with assistance and getting help with [facility], please call

## 2017-04-20 NOTE — TELEPHONE ENCOUNTER
CAN spoke with pt this afternoon, as pts wife was still in bed.   Pt's wife was requesting [facility] for check in 4/25 and check out 4/27.  CAN had spoken with Rom[facility] at [facility], they have no availability next week.  CAN explained this to patient, he did repeat information back to CAN.   CAN asked that pt please share the information with his wife.   SW remains available.

## 2017-04-20 NOTE — TELEPHONE ENCOUNTER
Returned call to patients wife on [id number] at 1130. Spoke with patients POC. She is requesting [facility] for appointments next week, 25Apr to 27Apr. Will send message to CAN.

## 2017-04-21 ENCOUNTER — TELEPHONE (OUTPATIENT)
Dept: TRANSPLANT | Facility: CLINIC | Age: 57
End: 2017-04-21

## 2017-04-21 NOTE — TELEPHONE ENCOUNTER
Called patient on 21Apr2017 at 1235 to schedule his f/u abdominal CT. Patient stated wife who schedules everything was in bed. Patient requested that we just make appointment and send him a letter. I asked for her to return call.  Left contact information. Patient read information back.

## 2017-04-21 NOTE — TELEPHONE ENCOUNTER
Appointment card completed for post TACE CT/AFP to be scheduled ~5/15/17    ---- Message from Indira Oconnor MD sent at 4/20/2017  5:22 PM CDT -----  Pl order post-TACE CT  of abd in 1 month to evaluate treatment response.

## 2017-04-24 ENCOUNTER — TELEPHONE (OUTPATIENT)
Dept: INTERVENTIONAL RADIOLOGY/VASCULAR | Facility: CLINIC | Age: 57
End: 2017-04-24

## 2017-04-24 ENCOUNTER — TELEPHONE (OUTPATIENT)
Dept: TRANSPLANT | Facility: CLINIC | Age: 57
End: 2017-04-24

## 2017-04-24 NOTE — TELEPHONE ENCOUNTER
Left message for pt...need to schedule 1 month follow with CT and IR clinic appt. (s/p Tace 04/17/2017)

## 2017-04-25 ENCOUNTER — LAB VISIT (OUTPATIENT)
Dept: LAB | Facility: HOSPITAL | Age: 57
End: 2017-04-25
Attending: INTERNAL MEDICINE
Payer: MEDICARE

## 2017-04-25 DIAGNOSIS — E87.6 HYPOKALEMIA: ICD-10-CM

## 2017-04-25 DIAGNOSIS — K76.6 PORTAL HYPERTENSION: ICD-10-CM

## 2017-04-25 DIAGNOSIS — D69.6 THROMBOCYTOPENIA: ICD-10-CM

## 2017-04-25 DIAGNOSIS — R18.8 CIRRHOSIS OF LIVER WITH ASCITES, UNSPECIFIED HEPATIC CIRRHOSIS TYPE: ICD-10-CM

## 2017-04-25 DIAGNOSIS — R60.0 PERIPHERAL EDEMA: ICD-10-CM

## 2017-04-25 DIAGNOSIS — E87.1 HYPONATREMIA: ICD-10-CM

## 2017-04-25 DIAGNOSIS — C22.0 HCC (HEPATOCELLULAR CARCINOMA): Primary | ICD-10-CM

## 2017-04-25 DIAGNOSIS — Z00.6 RESEARCH STUDY PATIENT: ICD-10-CM

## 2017-04-25 DIAGNOSIS — K74.60 CIRRHOSIS OF LIVER WITH ASCITES, UNSPECIFIED HEPATIC CIRRHOSIS TYPE: ICD-10-CM

## 2017-04-25 DIAGNOSIS — R91.8 LUNG MASS: ICD-10-CM

## 2017-04-25 LAB
ALBUMIN SERPL BCP-MCNC: 2.6 G/DL
ANION GAP SERPL CALC-SCNC: 9 MMOL/L
BUN SERPL-MCNC: 8 MG/DL
CALCIUM SERPL-MCNC: 7.9 MG/DL
CHLORIDE SERPL-SCNC: 96 MMOL/L
CO2 SERPL-SCNC: 21 MMOL/L
CORTIS SERPL-MCNC: 5.9 UG/DL
CREAT SERPL-MCNC: 0.9 MG/DL
EST. GFR  (AFRICAN AMERICAN): >60 ML/MIN/1.73 M^2
EST. GFR  (NON AFRICAN AMERICAN): >60 ML/MIN/1.73 M^2
GLUCOSE SERPL-MCNC: 134 MG/DL
OSMOLALITY SERPL: 270 MOSM/KG
PHOSPHATE SERPL-MCNC: 2.2 MG/DL
POTASSIUM SERPL-SCNC: 3.1 MMOL/L
SODIUM SERPL-SCNC: 126 MMOL/L
T4 FREE SERPL-MCNC: 1.32 NG/DL
TSH SERPL DL<=0.005 MIU/L-ACNC: 4.22 UIU/ML

## 2017-04-25 PROCEDURE — 82533 TOTAL CORTISOL: CPT | Mod: TXP

## 2017-04-25 PROCEDURE — 84439 ASSAY OF FREE THYROXINE: CPT | Mod: TXP

## 2017-04-25 PROCEDURE — 83930 ASSAY OF BLOOD OSMOLALITY: CPT | Mod: TXP

## 2017-04-25 PROCEDURE — 36415 COLL VENOUS BLD VENIPUNCTURE: CPT | Mod: TXP

## 2017-04-25 PROCEDURE — 80069 RENAL FUNCTION PANEL: CPT | Mod: TXP

## 2017-04-25 PROCEDURE — 84443 ASSAY THYROID STIM HORMONE: CPT | Mod: TXP

## 2017-04-25 RX ORDER — POTASSIUM CHLORIDE 20 MEQ/1
20 TABLET, EXTENDED RELEASE ORAL DAILY
Qty: 2 TABLET | Refills: 0 | Status: SHIPPED | OUTPATIENT
Start: 2017-04-25 | End: 2017-04-27

## 2017-04-26 ENCOUNTER — TELEPHONE (OUTPATIENT)
Dept: NEPHROLOGY | Facility: CLINIC | Age: 57
End: 2017-04-26

## 2017-04-26 NOTE — TELEPHONE ENCOUNTER
Your thyroid test indicates a borderline low functioning thyroid please speak with your PCP  asap

## 2017-04-27 ENCOUNTER — LAB VISIT (OUTPATIENT)
Dept: LAB | Facility: HOSPITAL | Age: 57
End: 2017-04-27
Payer: MEDICARE

## 2017-04-27 ENCOUNTER — OFFICE VISIT (OUTPATIENT)
Dept: PULMONOLOGY | Facility: CLINIC | Age: 57
End: 2017-04-27
Payer: MEDICARE

## 2017-04-27 ENCOUNTER — TELEPHONE (OUTPATIENT)
Dept: HEPATOLOGY | Facility: CLINIC | Age: 57
End: 2017-04-27

## 2017-04-27 ENCOUNTER — OFFICE VISIT (OUTPATIENT)
Dept: TRANSPLANT | Facility: CLINIC | Age: 57
End: 2017-04-27
Payer: MEDICARE

## 2017-04-27 VITALS
HEART RATE: 95 BPM | DIASTOLIC BLOOD PRESSURE: 65 MMHG | TEMPERATURE: 98 F | BODY MASS INDEX: 28.24 KG/M2 | WEIGHT: 190.69 LBS | HEIGHT: 69 IN | SYSTOLIC BLOOD PRESSURE: 103 MMHG | OXYGEN SATURATION: 95 %

## 2017-04-27 VITALS
SYSTOLIC BLOOD PRESSURE: 110 MMHG | BODY MASS INDEX: 29.06 KG/M2 | DIASTOLIC BLOOD PRESSURE: 66 MMHG | WEIGHT: 196.19 LBS | OXYGEN SATURATION: 97 % | HEART RATE: 82 BPM | HEIGHT: 69 IN

## 2017-04-27 DIAGNOSIS — K70.31 ALCOHOLIC CIRRHOSIS OF LIVER WITH ASCITES: Primary | ICD-10-CM

## 2017-04-27 DIAGNOSIS — K74.60 CIRRHOSIS OF LIVER WITH ASCITES, UNSPECIFIED HEPATIC CIRRHOSIS TYPE: Primary | ICD-10-CM

## 2017-04-27 DIAGNOSIS — E87.6 HYPOKALEMIA: ICD-10-CM

## 2017-04-27 DIAGNOSIS — C22.0 HEPATOCELLULAR CARCINOMA: ICD-10-CM

## 2017-04-27 DIAGNOSIS — E87.1 HYPONATREMIA: ICD-10-CM

## 2017-04-27 DIAGNOSIS — R18.8 CIRRHOSIS OF LIVER WITH ASCITES, UNSPECIFIED HEPATIC CIRRHOSIS TYPE: Primary | ICD-10-CM

## 2017-04-27 DIAGNOSIS — R18.8 CIRRHOSIS OF LIVER WITH ASCITES, UNSPECIFIED HEPATIC CIRRHOSIS TYPE: ICD-10-CM

## 2017-04-27 DIAGNOSIS — K74.60 CIRRHOSIS OF LIVER WITH ASCITES, UNSPECIFIED HEPATIC CIRRHOSIS TYPE: ICD-10-CM

## 2017-04-27 DIAGNOSIS — G93.40 ENCEPHALOPATHY: ICD-10-CM

## 2017-04-27 DIAGNOSIS — C22.0 HCC (HEPATOCELLULAR CARCINOMA): ICD-10-CM

## 2017-04-27 DIAGNOSIS — B18.2 HEP C W/O COMA, CHRONIC: ICD-10-CM

## 2017-04-27 PROBLEM — K72.91 HEPATIC COMA/ENCEPHALOPATHY: Status: RESOLVED | Noted: 2017-01-18 | Resolved: 2017-04-27

## 2017-04-27 PROBLEM — E87.5 HYPERKALEMIA: Status: RESOLVED | Noted: 2017-01-18 | Resolved: 2017-04-27

## 2017-04-27 LAB
ALBUMIN SERPL BCP-MCNC: 2.5 G/DL
ALP SERPL-CCNC: 189 U/L
ALT SERPL W/O P-5'-P-CCNC: 32 U/L
ANION GAP SERPL CALC-SCNC: 7 MMOL/L
AST SERPL-CCNC: 68 U/L
BASOPHILS # BLD AUTO: 0.07 K/UL
BASOPHILS NFR BLD: 1 %
BILIRUB SERPL-MCNC: 2.6 MG/DL
BUN SERPL-MCNC: 10 MG/DL
CALCIUM SERPL-MCNC: 7.8 MG/DL
CHLORIDE SERPL-SCNC: 97 MMOL/L
CO2 SERPL-SCNC: 20 MMOL/L
CREAT SERPL-MCNC: 1.1 MG/DL
DIFFERENTIAL METHOD: ABNORMAL
EOSINOPHIL # BLD AUTO: 0.3 K/UL
EOSINOPHIL NFR BLD: 4.3 %
ERYTHROCYTE [DISTWIDTH] IN BLOOD BY AUTOMATED COUNT: 15 %
EST. GFR  (AFRICAN AMERICAN): >60 ML/MIN/1.73 M^2
EST. GFR  (NON AFRICAN AMERICAN): >60 ML/MIN/1.73 M^2
GLUCOSE SERPL-MCNC: 112 MG/DL
HCT VFR BLD AUTO: 26.8 %
HGB BLD-MCNC: 9.6 G/DL
INR PPP: 1.3
LYMPHOCYTES # BLD AUTO: 1.4 K/UL
LYMPHOCYTES NFR BLD: 20.6 %
MCH RBC QN AUTO: 34.4 PG
MCHC RBC AUTO-ENTMCNC: 35.8 %
MCV RBC AUTO: 96 FL
MONOCYTES # BLD AUTO: 1.3 K/UL
MONOCYTES NFR BLD: 18.8 %
NEUTROPHILS # BLD AUTO: 3.8 K/UL
NEUTROPHILS NFR BLD: 54.9 %
PLATELET # BLD AUTO: 94 K/UL
PMV BLD AUTO: 9.2 FL
POTASSIUM SERPL-SCNC: 3.7 MMOL/L
PROT SERPL-MCNC: 5.4 G/DL
PROTHROMBIN TIME: 13.3 SEC
RBC # BLD AUTO: 2.79 M/UL
SODIUM SERPL-SCNC: 124 MMOL/L
WBC # BLD AUTO: 6.95 K/UL

## 2017-04-27 PROCEDURE — 99215 OFFICE O/P EST HI 40 MIN: CPT | Mod: S$GLB,TXP,, | Performed by: NURSE PRACTITIONER

## 2017-04-27 PROCEDURE — 80053 COMPREHEN METABOLIC PANEL: CPT | Mod: TXP

## 2017-04-27 PROCEDURE — 99214 OFFICE O/P EST MOD 30 MIN: CPT | Mod: S$GLB,TXP,, | Performed by: INTERNAL MEDICINE

## 2017-04-27 PROCEDURE — 85025 COMPLETE CBC W/AUTO DIFF WBC: CPT | Mod: TXP

## 2017-04-27 PROCEDURE — 36415 COLL VENOUS BLD VENIPUNCTURE: CPT | Mod: TXP

## 2017-04-27 PROCEDURE — 99999 PR PBB SHADOW E&M-EST. PATIENT-LVL IV: CPT | Mod: PBBFAC,TXP,, | Performed by: NURSE PRACTITIONER

## 2017-04-27 PROCEDURE — 99999 PR PBB SHADOW E&M-EST. PATIENT-LVL III: CPT | Mod: PBBFAC,TXP,, | Performed by: INTERNAL MEDICINE

## 2017-04-27 PROCEDURE — 85610 PROTHROMBIN TIME: CPT | Mod: TXP

## 2017-04-27 PROCEDURE — 1160F RVW MEDS BY RX/DR IN RCRD: CPT | Mod: S$GLB,TXP,, | Performed by: INTERNAL MEDICINE

## 2017-04-27 PROCEDURE — 1160F RVW MEDS BY RX/DR IN RCRD: CPT | Mod: S$GLB,TXP,, | Performed by: NURSE PRACTITIONER

## 2017-04-27 RX ORDER — AMOXICILLIN AND CLAVULANATE POTASSIUM 500; 125 MG/1; MG/1
TABLET, FILM COATED ORAL
COMMUNITY
Start: 2017-04-26 | End: 2017-05-18

## 2017-04-27 RX ORDER — FUROSEMIDE 40 MG/1
40 TABLET ORAL DAILY
Qty: 30 TABLET | Refills: 11 | Status: ON HOLD | OUTPATIENT
Start: 2017-04-27 | End: 2017-07-15 | Stop reason: HOSPADM
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
## 2017-04-27 NOTE — TELEPHONE ENCOUNTER
----- Message from Katya Solano sent at 4/27/2017  3:37 PM CDT -----  Received Rx for Xifaxan, insurance is charging a $711 copay, I called the pt and informed them that I was referring them to [facility] to further handle this.

## 2017-04-27 NOTE — LETTER
April 27, 2017      Indira Oconnor MD  [address] [facility] [address]           [facility] Pulmonary Services  [address]  [place] [address]  Phone: [phone]          Patient: Tommy Hopper   MR Number: [id number]   YOB: 1960   Date of Visit: 4/27/2017       Dear Dr. Indira Oconnor:    Thank you for referring Tommy Hopper to me for evaluation. Attached you will find relevant portions of my assessment and plan of care.    If you have questions, please do not hesitate to call me. I look forward to following Tommy Hopper along with you.    Sincerely,    Binta Castaneda MD    Enclosure  CC:  No Recipients    If you would like to receive this communication electronically, please contact [email or web address] or [phone] to request more information on [employer] access.    For providers and/or their staff who would like to refer a patient to [facility], please contact us through our one-stop-shop provider referral line, [facility], at [phone].    If you feel you have received this communication in error or would no longer like to receive these types of communications, please e-mail [email or web address]

## 2017-04-27 NOTE — MR AVS SNAPSHOT
[facility] - Liver Transplant  [address]  [place] [address]  Phone: [phone]                  Tommy Hopper   2017 2:20 PM   Office Visit    Description:  Male : 1960   Provider:  Sharyn Eaton NP   Department:  [facility] - Liver Transplant           Diagnoses this Visit        Comments    Cirrhosis of liver with ascites, unspecified hepatic cirrhosis type         Encephalopathy                To Do List           Future Appointments        Provider Department Dept Phone    2017 9:00 AM LAB, APPOINTMENT [place] [facility] [phone]    2017 11:30 AM Keely Luna MD [facility] Nephrology [phone]    2017 8:15 AM LAB, APPOINTMENT [place] [facility] [phone]    2017 9:40 AM [facility] CT6 MOBILE LIMIT 450 LBS [facility] [phone]    2017 1:30 PM [facility] INTERVENTIONAL RADIOLOGY [facility] Interventional Rad [phone]      Goals (5 Years of Data)     None       These Medications        Disp Refills Start End    furosemide (LASIX) 40 MG tablet 30 tablet 11 2017    Take 1 tablet (40 mg total) by mouth once daily. - Oral    [facility] [address] [facility] Ph #: [phone]       rifAXIMin (XIFAXAN) 550 mg Tab 60 tablet 11 2017     Take 1 tablet (550 mg total) by mouth 2 (two) times daily. - Oral    Pharmacy: [facility] [address] Ph #: [phone]       Prior authorization:  Closed - Prior Authorization not required for patient/medication      [facility]     [facility] Nurse Care Line -  Assistance  Unless otherwise directed by your provider, please contact [facility], our nurse care line that is available for  assistance.     Registered nurses in the [facility] provide: appointment scheduling, clinical advisement, health  education, and other advisory services.  Call: [phone] (toll free)               Medications           Message regarding Medications     Verify the changes and/or additions to your medication regime listed below are the same as discussed with your clinician today.  If any of these changes or additions are incorrect, please notify your healthcare provider.        STOP taking these medications     sodium,potassium,mag sulfates (SUPREP BOWEL PREP KIT) 17.5-3.13-1.6 gram SolR Take 1 kit by mouth as directed.           Verify that the below list of medications is an accurate representation of the medications you are currently taking.  If none reported, the list may be blank. If incorrect, please contact your healthcare provider. Carry this list with you in case of emergency.           Current Medications     amoxicillin-clavulanate 500-125mg (AUGMENTIN) 500-125 mg Tab     calcium-vitamin D 250-100 mg-unit per tablet Take 2 tablets by mouth 2 (two) times daily.    CHANTIX STARTING MONTH BOX 0.5 mg (11)- 1 mg (42) tablet as directed Orally 30 days    ciprofloxacin HCl (CIPRO) 500 MG tablet Take 1 tablet (500 mg total) by mouth once daily.    diazepam (VALIUM) 10 MG Tab Take 10 mg by mouth 2 (two) times daily.    furosemide (LASIX) 40 MG tablet Take 1 tablet (40 mg total) by mouth once daily.    lactulose (CHRONULAC) 10 gram/15 mL solution     lactulose (CHRONULAC) 20 gram/30 mL Soln Take 30 mLs (20 g total) by mouth every 6 (six) hours as needed (titrate to have 2-3 bowle movements per day).    morphine (MS CONTIN) 30 MG 12 hr tablet Take 30 mg by mouth every 12 (twelve) hours.    neomycin-polymyxin-dexamethasone (DEXACINE) 3.5 mg/g-10,000 unit/g-0.1 % Oint every 6 (six) hours.    nicotine (NICOTROL) 10 mg Crtg Inhale 1 puff into the lungs as needed. (6-16 cartridges daily as needed) inhaled    ondansetron (ZOFRAN) 4 MG tablet Take 1 tablet (4 mg total) by mouth every 6 (six) hours as needed for Nausea.    oxycodone  "(ROXICODONE) 30 MG Tab Take 30 mg by mouth every 8 (eight) hours.    pantoprazole (PROTONIX) 40 MG tablet Take 1 tablet (40 mg total) by mouth once daily.    potassium chloride SA (K-DUR,KLOR-CON) 20 MEQ tablet Take 1 tablet (20 mEq total) by mouth once daily.    rifAXIMin (XIFAXAN) 550 mg Tab Take 1 tablet (550 mg total) by mouth 2 (two) times daily.    spironolactone (ALDACTONE) 50 MG tablet Take 1 tablet (50 mg total) by mouth once daily.    VOLTAREN 1 % Gel apply (2G) by topical route 3 times every day to the affected area(s)           Clinical Reference Information           Your Vitals Were     BP Pulse Temp Height Weight SpO2    103/65 (BP Location: Left arm, Patient Position: Sitting, BP Method: Automatic) 95 97.8 °F (36.6 °C) (Oral) 5' 9" (1.753 m) 86.5 kg (190 lb 11.2 oz) 95%    BMI                28.16 kg/m2          Blood Pressure          Most Recent Value    BP  103/65      Allergies as of 4/27/2017     Adhesive      Immunizations Administered on Date of Encounter - 4/27/2017     None      Orders Placed During Today's Visit     Future Labs/Procedures Expected by Expires    Comprehensive metabolic panel  4/27/2017 6/26/2018    Protime-INR  4/27/2017 6/26/2018    CBC auto differential  As directed 6/26/2018      Maintenance Dialysis History     Patient has no recorded history of maintenance dialysis.      Transplant Information        Txp Date Organ Coordinator Care Team     Liver [facility] Referring Physician:  Farshad Carney MD   Corresponding Physician:  Farshad Carney MD   Current Hepatologist:  Indira Oconnor MD         Smoking Cessation     If you would like to quit smoking:   You may be eligible for free services if you are a [place] resident and started smoking cigarettes before September 1, 1988.  Call the Smoking Cessation Trust (SCT) toll free at (206) [phone] or [phone].   Call 4-800-QUIT-NOW if you do not meet the above criteria.   Contact us via email: " [email or web address]   View our website for more information: [email or web address]        Language Assistance Services     ATTENTION: Language assistance services are available, free of charge. Please call [phone].      ATENCIÓN: Si habla hollie, tiene a sears disposición servicios gratuitos de asistencia lingüística. Llame al [phone].     CHÚ Ý: N?u b?n nói Ti?ng Vi?t, có các d?ch v? h? tr? ngôn ng? mi?n phí dành cho b?n. G?i s? [phone].         [facility] complies with applicable Federal civil rights laws and does not discriminate on the basis of race, color, national origin, age, disability, or sex.

## 2017-04-27 NOTE — PROGRESS NOTES
Subjective:       Patient ID: Tommy Hopper is a 57 y.o. male.    Chief Complaint: No chief complaint on file.    HPI   Tommy Hopper 57 y.o. male    has a past medical history of Cancer (liver); Cirrhosis; and Encounter for blood transfusion.    has a past surgical history that includes Joint replacement (Right); Cholecystectomy; Hernia repair; Facial reconstruction surgery; and Colonoscopy (N/A, 3/9/2017).   reports that he has been smoking Cigarettes.  He started smoking about 43 years ago. He has a 10.50 pack-year smoking history. He quit smokeless tobacco use about 47 years ago. His smokeless tobacco use included Chew. He reports that he does not drink alcohol or use illicit drugs.  Referred by: Dr. Indira Oconnor  Who had no chief complaint listed for this encounter.  The patient's last visit with me was on 8/9/2016.    Patient doing ok, started biking again, tolerated well  Still smoking 5 cigs per day , on chantix that causes his bp to decrease  Extensive discussion regarding smoking cessation  Notes dyspnea prior to paracentesis  No fever chills, ns, wt changes, nausea, vomiting, diarrhea, constipation, chest pain, tightness, pressure    Review of Systems    Objective:      Physical Exam  Personal Diagnostic Review    No flowsheet data found.      Assessment:       1. Alcoholic cirrhosis of liver with ascites    2. Hepatocellular carcinoma        Outpatient Encounter Prescriptions as of 4/27/2017   Medication Sig Dispense Refill    calcium-vitamin D 250-100 mg-unit per tablet Take 2 tablets by mouth 2 (two) times daily.      CHANTIX STARTING MONTH BOX 0.5 mg (11)- 1 mg (42) tablet as directed Orally 30 days  0    ciprofloxacin HCl (CIPRO) 500 MG tablet Take 1 tablet (500 mg total) by mouth once daily. 30 tablet 6    diazepam (VALIUM) 10 MG Tab Take 10 mg by mouth 2 (two) times daily.  0    lactulose (CHRONULAC) 10 gram/15 mL solution       lactulose (CHRONULAC) 20 gram/30 mL Soln Take 30 mLs (20 g  total) by mouth every 6 (six) hours as needed (titrate to have 2-3 bowle movements per day). 3000 mL 11    morphine (MS CONTIN) 30 MG 12 hr tablet Take 30 mg by mouth every 12 (twelve) hours.  0    neomycin-polymyxin-dexamethasone (DEXACINE) 3.5 mg/g-10,000 unit/g-0.1 % Oint every 6 (six) hours.      nicotine (NICOTROL) 10 mg Crtg Inhale 1 puff into the lungs as needed. (6-16 cartridges daily as needed) inhaled 168 puff 0    ondansetron (ZOFRAN) 4 MG tablet Take 1 tablet (4 mg total) by mouth every 6 (six) hours as needed for Nausea. 28 tablet 0    oxycodone (ROXICODONE) 30 MG Tab Take 30 mg by mouth every 8 (eight) hours.  0    pantoprazole (PROTONIX) 40 MG tablet Take 1 tablet (40 mg total) by mouth once daily. 30 tablet 11    potassium chloride SA (K-DUR,KLOR-CON) 20 MEQ tablet Take 1 tablet (20 mEq total) by mouth once daily. 2 tablet 0    spironolactone (ALDACTONE) 50 MG tablet Take 1 tablet (50 mg total) by mouth once daily. 30 tablet 11    VOLTAREN 1 % Gel apply (2G) by topical route 3 times every day to the affected area(s)  3    [DISCONTINUED] furosemide (LASIX) 40 MG tablet Take 1 tablet (40 mg total) by mouth once daily. 30 tablet 11    [DISCONTINUED] oxycodone (ROXICODONE) 5 MG immediate release tablet Take 1 tablet (5 mg total) by mouth every 6 (six) hours as needed for Pain. 20 tablet 0    [DISCONTINUED] sodium,potassium,mag sulfates (SUPREP BOWEL PREP KIT) 17.5-3.13-1.6 gram SolR Take 1 kit by mouth as directed. 354 mL 0    [DISCONTINUED] rifAXIMin (XIFAXAN) 550 mg Tab Take 1 tablet (550 mg total) by mouth 2 (two) times daily. 60 tablet 11     Facility-Administered Encounter Medications as of 4/27/2017   Medication Dose Route Frequency Provider Last Rate Last Dose    Chemoembolization/LC beads (doxorubicin in sterile water)  50 mg Intra-arterial Once Mariama Santos NP        And    Chemoembolization/LC beads (doxorubicin in sterile water)  50 mg Intra-arterial Once Mariama ESPARZA  TRACIE Santos         No orders of the defined types were placed in this encounter.    Plan:             I personally reviewed the      1. Echo report   2. PFT   3. 6MWD   4. CXR   5. CXR report   6. CT chest   7. CT chest report     Assessment:  Diagnoses and all orders for this visit:    Alcoholic cirrhosis of liver with ascites    Hepatocellular carcinoma        Plan:  Pfts done last year after paracentesis are essentially normal.  Those done this year 2 days before paracentesis are abnormal  Given tolerance for exercise, patient would be at moderate to low risk for perioperative pulmonary complications from transplant. I have strongly encouraged him to quit smoking.      Return if symptoms worsen or fail to improve.    There are no Patient Instructions on file for this visit.    Immunization History   Administered Date(s) Administered    Hepatitis A, Adult 12/30/2016    Pneumococcal Conjugate - 13 Valent 12/30/2016    Tdap 12/30/2016    influenza - Quadrivalent 12/30/2016

## 2017-04-27 NOTE — LETTER
May 1, 2017        Farshad Carney  [address] [address]  Phone: [phone]  Fax: [phone]             [facility]  [address]  [facility] [address]  Phone: [phone]   Patient: Tommy Hopper   MR Number: [id number]   YOB: 1960   Date of Visit: 4/27/2017       Dear Dr. Farshad Carney    Thank you for referring Tommy Hopper to me for evaluation. Attached you will find relevant portions of my assessment and plan of care.    If you have questions, please do not hesitate to call me. I look forward to following Tommy Hopper along with you.    Sincerely,    Sharyn Eaton, TRACIE    Enclosure    If you would like to receive this communication electronically, please contact [email or web address] or [phone] to request [employer] access.    [employer] is a tool which provides read-only access to select patient information with whom you have a relationship. Its easy to use and provides real time access to review your patients record including encounter summaries, notes, results, and demographic information.    If you feel you have received this communication in error or would no longer like to receive these types of communications, please e-mail [email or web address]

## 2017-04-28 ENCOUNTER — TELEPHONE (OUTPATIENT)
Dept: PHARMACY | Facility: CLINIC | Age: 57
End: 2017-04-28

## 2017-04-28 ENCOUNTER — TELEPHONE (OUTPATIENT)
Dept: TRANSPLANT | Facility: CLINIC | Age: 57
End: 2017-04-28

## 2017-04-28 NOTE — TELEPHONE ENCOUNTER
----- Message from Erika Rodas RN sent at 4/28/2017 10:36 AM CDT -----  Laron can you schedule this patient with Dr. Rendon? He is a patient of Dr. Calloway but would like to get him in earlier.   Put in appt. comments: deferred for transplant pending smoking cessation and pulmonary clearance

## 2017-04-28 NOTE — TELEPHONE ENCOUNTER
Called tamia on 28Apr17 at 1050. Spoke with patients caregiver/ Fani Hopper. Informed her that appointments with provider are scarce. Offered her appointment with with another hepatologist. She agreed. Appointment made on 18May17 at 1130 per patients schedule.Mrs Hopper denies an other issues at this time. Informed her if she had any questions or concerns, please contact us. Left contact information.

## 2017-04-28 NOTE — TELEPHONE ENCOUNTER
Call received from Mrs Hopper with questions related to lab review, anticipated call from NP, diuretic instructions and request for paracentesis.      States Mr. Hopper had labs done after his office visit yesterday but results were not available before they left.  States they were instructed to hold the Lasix but was awaiting a call with further instructions related to the Spironolactone.  Also states Mr. Hopper, will need a paracentesis soon.  Patient denies shortness of breath, pain, or fever at this time, but would like to schedule a para next week.      Spoke with Carlee Brice, para scheduled Tuesday @1000.  Patient notified and is agreeable to date and time.    Spoke with Sharyn Eaton NP to clarify medication instructions and follow-up plan.  Per Sharyn, patient instructed to Hold Lasix and Spironolactone, have weekly labs, and proceed with clinic follow-up as scheduled on 5/18.      TXP labs added to lab collection already scheduled 5/5/17.  Patient agreed to have subsequent labs at [facility] every Thursday.    Mrs Hopper inquired about transplant status.  Advised that the committee deferred listing until abstinent from tobacco.  Understanding expressed and she states patient has decreased cigarette use to ~2 per day.      Advised clinic follow-up scheduled 5/18 with Dr. Rendon, to reassess transplant candidacy.  Understanding expressed.

## 2017-05-01 ENCOUNTER — TELEPHONE (OUTPATIENT)
Dept: PHARMACY | Facility: CLINIC | Age: 57
End: 2017-05-01

## 2017-05-01 ENCOUNTER — TELEPHONE (OUTPATIENT)
Dept: TRANSPLANT | Facility: CLINIC | Age: 57
End: 2017-05-01

## 2017-05-01 NOTE — TELEPHONE ENCOUNTER
Call returned.  Patient states his wife was calling to follow-up on the medication clearance.  Review of chart shows Jacquelyn Post, Pharmacy Patient Assistance, is assisting patient with rx authorization.  Message forwarded to Jacquelyn to follow-up.    Patient confirmed paracentesis tomorrow at 1000 and agreed to have labs done prior.  States he's not sure if he'll be able to return on the 5th for follow-up with Nephrology, but will contact the office to reschedule if needed.  Patient states he stopped smoking as of 3 days ago and understanding he must maintain abstinence to be considered for liver transplant.      ----- Message from Erika Rodas RN sent at 5/1/2017  4:03 PM CDT -----  Contact: patient       ----- Message -----     From: La Law MA     Sent: 5/1/2017   2:47 PM       To: [facility] Pre-Liver Transplant Clinical        ----- Message -----     From: Chayo Negrete     Sent: 5/1/2017   2:00 PM       To: [facility] Hepat Clinical Staff    Patient wife has a few questions. Please call

## 2017-05-02 ENCOUNTER — HOSPITAL ENCOUNTER (OUTPATIENT)
Dept: INTERVENTIONAL RADIOLOGY/VASCULAR | Facility: HOSPITAL | Age: 57
Discharge: HOME | End: 2017-05-02
Attending: INTERNAL MEDICINE
Payer: MEDICARE

## 2017-05-02 VITALS
SYSTOLIC BLOOD PRESSURE: 112 MMHG | OXYGEN SATURATION: 98 % | HEART RATE: 84 BPM | RESPIRATION RATE: 20 BRPM | DIASTOLIC BLOOD PRESSURE: 54 MMHG

## 2017-05-02 DIAGNOSIS — Z01.818 PRE-TRANSPLANT EVALUATION FOR LIVER TRANSPLANT: ICD-10-CM

## 2017-05-02 DIAGNOSIS — K70.31 ALCOHOLIC CIRRHOSIS OF LIVER WITH ASCITES: ICD-10-CM

## 2017-05-02 DIAGNOSIS — C22.0 HCC (HEPATOCELLULAR CARCINOMA): ICD-10-CM

## 2017-05-02 LAB
APPEARANCE FLD: CLEAR
BODY FLD TYPE: NORMAL
COLOR FLD: YELLOW
EOSINOPHIL NFR FLD MANUAL: 1 %
LYMPHOCYTES NFR FLD MANUAL: 81 %
MESOTHL CELL NFR FLD MANUAL: 2 %
MONOS+MACROS NFR FLD MANUAL: 11 %
NEUTROPHILS NFR FLD MANUAL: 5 %
WBC # FLD: 41 /CU MM

## 2017-05-02 PROCEDURE — 27100111 IR PARACENTESIS WITH IMAGING: Mod: TXP

## 2017-05-02 PROCEDURE — C1729 CATH, DRAINAGE: HCPCS | Mod: TXP

## 2017-05-02 PROCEDURE — 63600175 PHARM REV CODE 636 W HCPCS: Mod: TXP | Performed by: INTERNAL MEDICINE

## 2017-05-02 PROCEDURE — 89051 BODY FLUID CELL COUNT: CPT | Mod: TXP

## 2017-05-02 PROCEDURE — P9047 ALBUMIN (HUMAN), 25%, 50ML: HCPCS | Mod: TXP | Performed by: INTERNAL MEDICINE

## 2017-05-02 PROCEDURE — 49083 ABD PARACENTESIS W/IMAGING: CPT | Mod: NTX,,, | Performed by: NURSE PRACTITIONER

## 2017-05-02 RX ORDER — ALBUMIN HUMAN 250 G/1000ML
25 SOLUTION INTRAVENOUS
Status: DISCONTINUED | OUTPATIENT
Start: 2017-05-02 | End: 2017-05-03 | Stop reason: HOSPADM

## 2017-05-02 RX ADMIN — ALBUMIN (HUMAN) 25 G: 25 SOLUTION INTRAVENOUS at 11:05

## 2017-05-02 RX ADMIN — ALBUMIN (HUMAN) 25 G: 25 SOLUTION INTRAVENOUS at 12:05

## 2017-05-02 NOTE — PROGRESS NOTES
Pt to rm 124. T Robert RTR to bedside. Yueh Catheter placed to LLQ with use of ultrasound guidance. No complaints or signs of distress. Will continue to monitor.

## 2017-05-02 NOTE — IP AVS SNAPSHOT
[facility]  [address]  [facility] [address]  Phone: [phone]           Patient Discharge Instructions   Our goal is to set you up for success. This packet includes information on your condition, medications, and your home care.  It will help you care for yourself to prevent having to return to the hospital.     Please ask your nurse if you have any questions.      There are many details to remember when preparing to leave the hospital. Here is what you will need to do:    1. Take your medicine. If you are prescribed medications, review your Medication List on the following pages. You may have new medications to  at the pharmacy and others that you'll need to stop taking. Review the instructions for how and when to take your medications. Talk with your doctor or nurses if you are unsure of what to do.     2. Go to your follow-up appointments. Specific follow-up information is listed in the following pages. Your may be contacted by a nurse or clinical provider about future appointments. Be sure we have all of the phone numbers to reach you. Please contact your provider's office if you are unable to make an appointment.     3. Watch for warning signs. Your doctor or nurse will give you detailed warning signs to watch for and when to call for assistance. These instructions may also include educational information about your condition. If you experience any of warning signs to your health, call your doctor.           [facility]  Unless otherwise directed by your provider, please   contact [facility], our nurse care line   that is available for 24/7 assistance.     [phone] (toll-free)     Registered nurses in the [facility]   provide: appointment scheduling, clinical advisement, health education, and other advisory services.                  ** Verify the list of medication(s) below is accurate and up to date. Carry this with you in case of  emergency. If your medications have changed, please notify your healthcare provider.             Medication List      TAKE these medications        Additional Info                      amoxicillin-clavulanate 500-125mg 500-125 mg Tab   Commonly known as:  AUGMENTIN   Refills:  0      Begin Date    AM    Noon    PM    Bedtime       calcium-vitamin D 250-100 mg-unit per tablet   Refills:  0   Dose:  2 tablet    Instructions:  Take 2 tablets by mouth 2 (two) times daily.     Begin Date    AM    Noon    PM    Bedtime       CHANTIX STARTING MONTH BOX 0.5 mg (11)- 1 mg (42) tablet   Refills:  0   Generic drug:  varenicline    Instructions:  as directed Orally 30 days     Begin Date    AM    Noon    PM    Bedtime       ciprofloxacin HCl 500 MG tablet   Commonly known as:  CIPRO   Quantity:  30 tablet   Refills:  6   Dose:  500 mg    Instructions:  Take 1 tablet (500 mg total) by mouth once daily.     Begin Date    AM    Noon    PM    Bedtime       diazePAM 10 MG Tab   Commonly known as:  VALIUM   Refills:  0   Dose:  10 mg    Instructions:  Take 10 mg by mouth 2 (two) times daily.     Begin Date    AM    Noon    PM    Bedtime       furosemide 40 MG tablet   Commonly known as:  LASIX   Quantity:  30 tablet   Refills:  11   Dose:  40 mg    Instructions:  Take 1 tablet (40 mg total) by mouth once daily.     Begin Date    AM    Noon    PM    Bedtime       * lactulose 20 gram/30 mL Soln   Commonly known as:  CHRONULAC   Quantity:  3000 mL   Refills:  11   Dose:  20 g    Instructions:  Take 30 mLs (20 g total) by mouth every 6 (six) hours as needed (titrate to have 2-3 bowle movements per day).     Begin Date    AM    Noon    PM    Bedtime       * lactulose 10 gram/15 mL solution   Commonly known as:  CHRONULAC   Refills:  0      Begin Date    AM    Noon    PM    Bedtime       morphine 30 MG 12 hr tablet   Commonly known as:  MS CONTIN   Refills:  0   Dose:  30 mg    Instructions:  Take 30 mg by mouth every 12 (twelve) hours.      Begin Date    AM    Noon    PM    Bedtime       neomycin-polymyxin-dexamethasone 3.5 mg/g-10,000 unit/g-0.1 % Oint   Commonly known as:  DEXACINE   Refills:  0    Instructions:  every 6 (six) hours.     Begin Date    AM    Noon    PM    Bedtime       nicotine 10 mg Crtg   Commonly known as:  NICOTROL   Quantity:  168 puff   Refills:  0   Dose:  1 puff    Instructions:  Inhale 1 puff into the lungs as needed. (6-16 cartridges daily as needed) inhaled     Begin Date    AM    Noon    PM    Bedtime       ondansetron 4 MG tablet   Commonly known as:  ZOFRAN   Quantity:  28 tablet   Refills:  0   Dose:  4 mg    Instructions:  Take 1 tablet (4 mg total) by mouth every 6 (six) hours as needed for Nausea.     Begin Date    AM    Noon    PM    Bedtime       oxycodone 30 MG Tab   Commonly known as:  ROXICODONE   Refills:  0   Dose:  30 mg    Instructions:  Take 30 mg by mouth every 8 (eight) hours.     Begin Date    AM    Noon    PM    Bedtime       pantoprazole 40 MG tablet   Commonly known as:  PROTONIX   Quantity:  30 tablet   Refills:  11   Dose:  40 mg    Instructions:  Take 1 tablet (40 mg total) by mouth once daily.     Begin Date    AM    Noon    PM    Bedtime       rifAXIMin 550 mg Tab   Commonly known as:  XIFAXAN   Quantity:  60 tablet   Refills:  11   Dose:  550 mg    Instructions:  Take 1 tablet (550 mg total) by mouth 2 (two) times daily.     Begin Date    AM    Noon    PM    Bedtime       spironolactone 50 MG tablet   Commonly known as:  ALDACTONE   Quantity:  30 tablet   Refills:  11   Dose:  50 mg    Instructions:  Take 1 tablet (50 mg total) by mouth once daily.     Begin Date    AM    Noon    PM    Bedtime       VOLTAREN 1 % Gel   Refills:  3   Generic drug:  diclofenac sodium    Instructions:  apply (2G) by topical route 3 times every day to the affected area(s)     Begin Date    AM    Noon    PM    Bedtime       * Notice:  This list has 2 medication(s) that are the same as other medications prescribed for  you. Read the directions carefully, and ask your doctor or other care provider to review them with you.               Please bring to all follow up appointments:    1. A copy of your discharge instructions.  2. All medicines you are currently taking in their original bottles.  3. Identification and insurance card.    Please arrive 15 minutes ahead of scheduled appointment time.    Please call 24 hours in advance if you must reschedule your appointment and/or time.        Your Scheduled Appointments     May 05, 2017  9:00 AM CDT   Non-Fasting Lab with LAB, APPOINTMENT [place] [facility] ([facility])    [address] [address]   [phone]            May 05, 2017 11:30 AM CDT   Established Patient Visit with Keely Luna MD   [facility] - Nephrology ([facility] )    [address]  [place] [address]   [phone]            May 18, 2017  8:15 AM CDT   Non-Fasting Lab with LAB, APPOINTMENT [place] [facility] ([facility])    [address] [address]   [phone]            May 18, 2017  9:40 AM CDT   Ct Abd Pel W Contrast with [facility] CT6 MOBILE LIMIT 450 LBS   [facility] ([facility] )    [address] [id number] [phone]            May 18, 2017  1:30 PM CDT   Established Patient Visit with [facility] INTERVENTIONAL RADIOLOGY   [facility] - Interventional Rad ([facility] )    [address]  [place] [id number] [phone]                  Discharge Instructions       Interventional Radiology [phone]  Mon-Fri  8A-4P  24hr Radiology Resident on call [phone]      Discharge References/Attachments     PARACENTESIS, DISCHARGE INSTRUCTIONS FOR (ENGLISH)        Admission Information     Date & Time Provider Department CSN    5/2/2017 10:00 AM Indira Oconnor MD [facility] [id number]      Care Providers     Provider Role Specialty Primary office phone    Indira Oconnor MD Attending Provider Gastroenterology [phone]      Your Vitals Were     BP Pulse Resp SpO2          110/67 (BP Location: Left arm, Patient Position: Lying, BP Method: Automatic) 81 18 96%        Recent Lab Values     No lab values to display.      Pending Labs     Order Current Status    WBC & Diff,Body Fluid Ascites In process      Allergies as of 5/2/2017        Reactions    Adhesive Blisters      Advance Directives     An advance directive is a document which, in the event you are no longer able to make decisions for yourself, tells your healthcare team what kind of treatment you do or do not want to receive, or who you would like to make those decisions for you.  If you do not currently have an advance directive, [facility] encourages you to create one.  For more information call:  [phone]), [phone] ([phone]),  or log on to [email or web address].        Smoking Cessation     If you would like to quit smoking:   You may be eligible for free services if you are a [place] resident and started smoking cigarettes before September 1, 1988.  Call the Smoking Cessation Trust (SCT) toll free at (484) 850-3957 or ([phone] 515-6693.   Call 6-360-QUIT-NOW if you do not meet the above criteria.   Contact us via email: [email or web address]   View our website for more information: [email or web address]        Language Assistance Services     ATTENTION: Language assistance services are available, free of charge. Please call [phone].      ATENCIÓN: Si habla español, tiene a sears disposición servicios gratuitos de asistencia lingüística. Llame al [phone].     CHÚ Ý: N?u b?n nói Ti?ng Vi?t, có các d?ch v? h? tr? ngôn ng? mi?n phí dành cho b?n. G?i s? [phone].         [facility] complies with applicable Federal civil rights laws and  does not discriminate on the basis of race, color, national origin, age, disability, or sex.

## 2017-05-02 NOTE — DISCHARGE INSTRUCTIONS
Interventional Radiology [phone]  Mon-Fri  8A-4P  24hr Radiology Resident on call [phone]
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
## 2017-05-02 NOTE — PROCEDURES
Radiology Post-Procedure Note    Pre Op Diagnosis: Ascites  Post Op Diagnosis: Same    Procedure: Ultrasound Guided Paracentesis    Procedure performed by: Jose Jones NP/ Bhupinder Dinh MD    Written Informed Consent Obtained: Yes  Specimen Removed: YES clear yellow fluid   Estimated Blood Loss: Minimal    Findings:   Successful paracentesis.  Albumin administered PRN per protocol.    Patient tolerated procedure well.    CHERRIE Diaz, FNP  Interventional Radiology  [phone] spectral[employer]

## 2017-05-02 NOTE — PROGRESS NOTES
Paracentesis complete. Pt tolerated well. Total 10,600 ml peritoneal fluid removed. 300 ml Albumin 25% IV administered.   Pt verbalized instructions on care for puncture site to [facility].  AVS reviewd and provided.

## 2017-05-03 ENCOUNTER — TELEPHONE (OUTPATIENT)
Dept: TRANSPLANT | Facility: CLINIC | Age: 57
End: 2017-05-03

## 2017-05-03 LAB
EXT ALBUMIN: 3.1 (ref 3.5–4.7)
EXT ALKALINE PHOSPHATASE: 125 (ref 34–104)
EXT ALT: 21 (ref 7–52)
EXT AST: 41 (ref 13–39)
EXT BASOPHIL%: 1.1 (ref ?–1)
EXT BILIRUBIN DIRECT: 0.67 MG/DL (ref 0–0.4)
EXT BILIRUBIN TOTAL: 1.7 (ref 0.3–1)
EXT BUN: 11 (ref 7–25)
EXT CALCIUM: 8.1 (ref 8.6–10.3)
EXT CHLORIDE: 100 (ref 98–107)
EXT CO2: 22 (ref 22–32)
EXT CREATININE: 1.1 MG/DL (ref 0.7–1.3)
EXT EOSINOPHIL%: 4.1 (ref ?–6)
EXT GLUCOSE: 130 (ref 70–105)
EXT HEMATOCRIT: 24.5 (ref 40–52)
EXT HEMOGLOBIN: 8.7 (ref 13.7–17.5)
EXT INR: 1.53 (ref 2–3)
EXT LYMPH%: 18.5 (ref 20–50)
EXT MONOCYTES%: 11.3 (ref ?–14)
EXT PLATELETS: 80 (ref 140–450)
EXT POTASSIUM: 4 (ref 3.5–5.1)
EXT PROTEIN TOTAL: 5.2 (ref 6.4–8.9)
EXT PT: 18.4 (ref 11.6–14.3)
EXT SEGS%: 65 (ref 45–75)
EXT SODIUM: 127 MMOL/L (ref 136–145)
EXT WBC: 6.6 (ref 4–11)

## 2017-05-03 NOTE — TELEPHONE ENCOUNTER
Call returned.  Mrs Hopper advised patient will need repeat labs to reassess his sodium and potassium prior to making a decision about restarting diuretics.  Advised a standing order for weekly labs was faxed to [facility] last week.  Also requesting a paracentesis to be scheduled with next clinic follow-up on 5/18.      Mrs Hopper agreed to have labs done today.  Advised labs will be reviewed with provider, upon receipt.    Carlee Brice notified of request for paracentesis.  Procedure scheduled 5/18 @1000    Per patient's request, message sent to Elayne Chamorro Nephrology MA, requesting to reschedule follow-up with Dr. Luna to 5/18, as well.      ----- Message from Lilia Turcios sent at 5/2/2017  9:19 AM CDT -----  Contact: spouse  Asks if pt can start back taking Lasix and Aldactone for swelling, please call back at [phone]

## 2017-05-08 ENCOUNTER — TELEPHONE (OUTPATIENT)
Dept: TRANSPLANT | Facility: CLINIC | Age: 57
End: 2017-05-08

## 2017-05-08 NOTE — TELEPHONE ENCOUNTER
Call received from Devi Palma and JUVENCIO Hopper with questions regarding resuming diuretics.  Advised diuretics were held due to hyponatremia and rising creatinine.  Patient was previously instructed to have weekly labs; orders were faxed to [facility] but have not been received on a weekly basis.  Patient states labs were done last week.  Advised we will request labs and send to provider for review.  Unable to resume diuretics until improvement in sodium and creatinine.   Patient instructed to contact Nephrology to reschedule missed appointment with Dr. Luna; contact number given, and reminded of upcoming appointment with Dr. Rendon.  Patient also reminded of standing order for paracentesis and instructed to contact transplant office 1-2 days in advance to ensure timely scheduling of procedure.  Understanding expressed.

## 2017-05-17 ENCOUNTER — TELEPHONE (OUTPATIENT)
Dept: INTERVENTIONAL RADIOLOGY/VASCULAR | Facility: HOSPITAL | Age: 57
End: 2017-05-17

## 2017-05-17 ENCOUNTER — TELEPHONE (OUTPATIENT)
Dept: RADIOLOGY | Facility: HOSPITAL | Age: 57
End: 2017-05-17

## 2017-05-18 ENCOUNTER — DOCUMENTATION ONLY (OUTPATIENT)
Dept: TRANSPLANT | Facility: CLINIC | Age: 57
End: 2017-05-18

## 2017-05-18 ENCOUNTER — HOSPITAL ENCOUNTER (OUTPATIENT)
Dept: RADIOLOGY | Facility: HOSPITAL | Age: 57
Discharge: HOME | End: 2017-05-18
Attending: INTERNAL MEDICINE
Payer: MEDICARE

## 2017-05-18 ENCOUNTER — HOSPITAL ENCOUNTER (OUTPATIENT)
Dept: INTERVENTIONAL RADIOLOGY/VASCULAR | Facility: HOSPITAL | Age: 57
Discharge: HOME | End: 2017-05-18
Attending: INTERNAL MEDICINE
Payer: MEDICARE

## 2017-05-18 ENCOUNTER — OFFICE VISIT (OUTPATIENT)
Dept: INTERVENTIONAL RADIOLOGY/VASCULAR | Facility: CLINIC | Age: 57
End: 2017-05-18
Payer: MEDICARE

## 2017-05-18 ENCOUNTER — OFFICE VISIT (OUTPATIENT)
Dept: TRANSPLANT | Facility: CLINIC | Age: 57
End: 2017-05-18
Payer: MEDICARE

## 2017-05-18 VITALS
DIASTOLIC BLOOD PRESSURE: 61 MMHG | HEART RATE: 93 BPM | OXYGEN SATURATION: 98 % | SYSTOLIC BLOOD PRESSURE: 104 MMHG | RESPIRATION RATE: 15 BRPM | HEIGHT: 69 IN | BODY MASS INDEX: 25.18 KG/M2 | SYSTOLIC BLOOD PRESSURE: 104 MMHG | DIASTOLIC BLOOD PRESSURE: 61 MMHG | WEIGHT: 170 LBS | HEART RATE: 94 BPM

## 2017-05-18 VITALS
DIASTOLIC BLOOD PRESSURE: 55 MMHG | SYSTOLIC BLOOD PRESSURE: 93 MMHG | HEART RATE: 91 BPM | BODY MASS INDEX: 25.18 KG/M2 | WEIGHT: 170 LBS | TEMPERATURE: 98 F | OXYGEN SATURATION: 93 % | HEIGHT: 69 IN

## 2017-05-18 DIAGNOSIS — K70.31 ALCOHOLIC CIRRHOSIS OF LIVER WITH ASCITES: ICD-10-CM

## 2017-05-18 DIAGNOSIS — C22.0 HCC (HEPATOCELLULAR CARCINOMA): Primary | ICD-10-CM

## 2017-05-18 DIAGNOSIS — Z76.82 ORGAN TRANSPLANT CANDIDATE: ICD-10-CM

## 2017-05-18 DIAGNOSIS — C22.0 HCC (HEPATOCELLULAR CARCINOMA): ICD-10-CM

## 2017-05-18 DIAGNOSIS — K74.69 DECOMPENSATED CIRRHOSIS RELATED TO HEPATITIS C VIRUS (HCV): Primary | ICD-10-CM

## 2017-05-18 DIAGNOSIS — B19.20 DECOMPENSATED CIRRHOSIS RELATED TO HEPATITIS C VIRUS (HCV): Primary | ICD-10-CM

## 2017-05-18 DIAGNOSIS — R91.8 LUNG MASS: ICD-10-CM

## 2017-05-18 DIAGNOSIS — Z76.82 LIVER TRANSPLANT CANDIDATE: ICD-10-CM

## 2017-05-18 DIAGNOSIS — Z01.818 PRE-TRANSPLANT EVALUATION FOR LIVER TRANSPLANT: ICD-10-CM

## 2017-05-18 LAB
APPEARANCE FLD: CLEAR
BODY FLD TYPE: NORMAL
COLOR FLD: YELLOW
LYMPHOCYTES NFR FLD MANUAL: 31 %
MONOS+MACROS NFR FLD MANUAL: 61 %
NEUTROPHILS NFR FLD MANUAL: 8 %
WBC # FLD: 39 /CU MM

## 2017-05-18 PROCEDURE — 99999 PR PBB SHADOW E&M-EST. PATIENT-LVL III: CPT | Mod: PBBFAC,TXP,, | Performed by: INTERNAL MEDICINE

## 2017-05-18 PROCEDURE — 99999 PR PBB SHADOW E&M-EST. PATIENT-LVL IV: CPT | Mod: PBBFAC,TXP,,

## 2017-05-18 PROCEDURE — 99213 OFFICE O/P EST LOW 20 MIN: CPT | Mod: TXP,S$GLB,, | Performed by: FAMILY MEDICINE

## 2017-05-18 PROCEDURE — 99215 OFFICE O/P EST HI 40 MIN: CPT | Mod: S$GLB,TXP,, | Performed by: INTERNAL MEDICINE

## 2017-05-18 PROCEDURE — 1160F RVW MEDS BY RX/DR IN RCRD: CPT | Mod: TXP,S$GLB,, | Performed by: FAMILY MEDICINE

## 2017-05-18 PROCEDURE — 71250 CT THORAX DX C-: CPT | Mod: 26,TXP,, | Performed by: RADIOLOGY

## 2017-05-18 PROCEDURE — 1160F RVW MEDS BY RX/DR IN RCRD: CPT | Mod: S$GLB,TXP,, | Performed by: INTERNAL MEDICINE

## 2017-05-18 PROCEDURE — 74177 CT ABD & PELVIS W/CONTRAST: CPT | Mod: 26,TXP,, | Performed by: RADIOLOGY

## 2017-05-18 PROCEDURE — 49083 ABD PARACENTESIS W/IMAGING: CPT | Mod: NTX,,, | Performed by: NURSE PRACTITIONER

## 2017-05-18 RX ORDER — ALBUMIN HUMAN 250 G/1000ML
25 SOLUTION INTRAVENOUS ONCE
Status: COMPLETED | OUTPATIENT
Start: 2017-05-18 | End: 2017-05-18

## 2017-05-18 RX ADMIN — ALBUMIN (HUMAN) 25 G: 25 SOLUTION INTRAVENOUS at 01:05

## 2017-05-18 RX ADMIN — ALBUMIN (HUMAN) 25 G: 25 SOLUTION INTRAVENOUS at 12:05

## 2017-05-18 RX ADMIN — IOHEXOL 15 ML: 350 INJECTION, SOLUTION INTRAVENOUS at 07:05

## 2017-05-18 RX ADMIN — IOHEXOL 100 ML: 350 INJECTION, SOLUTION INTRAVENOUS at 10:05

## 2017-05-18 RX ADMIN — IOHEXOL 15 ML: 350 INJECTION, SOLUTION INTRAVENOUS at 08:05
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
## 2017-05-18 NOTE — PROGRESS NOTES
Subjective:       Patient ID: Tommy Hopper is a 57 y.o. male.    Chief Complaint: Cancer    HPI Comments: Patient here for follow up of his hepatocellular carcinoma recently treated with chemoembolization on 4/17/2017. He c/o distention and abdominal pain due to his ascites. He had a paracentesis today and feels better now that the fluid has been drained. He had a CT scan this morning.    Review of Systems   Constitutional: Positive for fatigue. Negative for activity change, appetite change, chills and fever.   Respiratory: Positive for shortness of breath (with ascites accumulation). Negative for cough, wheezing and stridor.    Cardiovascular: Positive for leg swelling. Negative for chest pain and palpitations.   Gastrointestinal: Positive for abdominal distention (with ascites accumulation) and abdominal pain (with ascites accumulation). Negative for constipation, nausea and vomiting.       Objective:      Physical Exam   Constitutional: He appears well-developed and well-nourished. No distress.   Cardiovascular: Normal rate, regular rhythm and normal heart sounds.  Exam reveals no gallop and no friction rub.    No murmur heard.  Pulmonary/Chest: Effort normal and breath sounds normal. No respiratory distress. He has no wheezes. He has no rales.   Abdominal: Soft. Bowel sounds are normal. He exhibits no distension and no mass. There is no hepatosplenomegaly. There is no tenderness. There is no rebound and no guarding.   Neurological: He is alert.   Skin: Skin is warm and dry. He is not diaphoretic.   Psychiatric: He has a normal mood and affect.   Vitals reviewed.      Assessment:       1. HCC (hepatocellular carcinoma)        Plan:         Explained to patient still waiting for final report of CT from this morning. Once final report is resulted, I will message Dr. Dorado and call patient with his recommendations. Patient verbalized understanding and agreement.

## 2017-05-18 NOTE — MR AVS SNAPSHOT
[facility]  [address]  [facility] [address]  Phone: [phone]                  Tommy Hopper   2017 11:30 AM   Office Visit    Description:  Male : 1960   Provider:  Hollie Rendon MD   Department:  [facility]                To Do List           Future Appointments        Provider Department Dept Phone    2017 4:30 PM LAB, SAME DAY [facility] [phone]      Goals (5 Years of Data)     None      [facility]     [facility] Nurse Care Line -  Assistance  Unless otherwise directed by your provider, please contact [facility], our nurse care line that is available for  assistance.     Registered nurses in the [facility] provide: appointment scheduling, clinical advisement, health education, and other advisory services.  Call: [phone] (toll free)               Medications           Message regarding Medications     Verify the changes and/or additions to your medication regime listed below are the same as discussed with your clinician today.  If any of these changes or additions are incorrect, please notify your healthcare provider.        STOP taking these medications     amoxicillin-clavulanate 500-125mg (AUGMENTIN) 500-125 mg Tab     nicotine (NICOTROL) 10 mg Crtg Inhale 1 puff into the lungs as needed. (6-16 cartridges daily as needed) inhaled           Verify that the below list of medications is an accurate representation of the medications you are currently taking.  If none reported, the list may be blank. If incorrect, please contact your healthcare provider. Carry this list with you in case of emergency.           Current Medications     calcium-vitamin D 250-100 mg-unit per tablet Take 2 tablets by mouth 2 (two) times daily.    CHANTIX STARTING MONTH BOX 0.5 mg (11)- 1 mg (42) tablet as directed Orally 30 days    ciprofloxacin HCl (CIPRO) 500 MG tablet Take 1 tablet (500 mg  "total) by mouth once daily.    diazepam (VALIUM) 10 MG Tab Take 10 mg by mouth 2 (two) times daily.    furosemide (LASIX) 40 MG tablet Take 1 tablet (40 mg total) by mouth once daily.    lactulose (CHRONULAC) 10 gram/15 mL solution     lactulose (CHRONULAC) 20 gram/30 mL Soln Take 30 mLs (20 g total) by mouth every 6 (six) hours as needed (titrate to have 2-3 bowle movements per day).    morphine (MS CONTIN) 30 MG 12 hr tablet Take 30 mg by mouth every 12 (twelve) hours.    neomycin-polymyxin-dexamethasone (DEXACINE) 3.5 mg/g-10,000 unit/g-0.1 % Oint every 6 (six) hours.    ondansetron (ZOFRAN) 4 MG tablet Take 1 tablet (4 mg total) by mouth every 6 (six) hours as needed for Nausea.    oxycodone (ROXICODONE) 30 MG Tab Take 30 mg by mouth every 8 (eight) hours.    pantoprazole (PROTONIX) 40 MG tablet Take 1 tablet (40 mg total) by mouth once daily.    rifAXIMin (XIFAXAN) 550 mg Tab Take 1 tablet (550 mg total) by mouth 2 (two) times daily.    spironolactone (ALDACTONE) 50 MG tablet Take 1 tablet (50 mg total) by mouth once daily.    VOLTAREN 1 % Gel apply (2G) by topical route 3 times every day to the affected area(s)           Clinical Reference Information           Your Vitals Were     BP Pulse Temp Height Weight SpO2    93/55 (BP Location: Right arm, Patient Position: Sitting, BP Method: Automatic) 91 97.6 °F (36.4 °C) (Oral) 5' 9" (1.753 m) 77.1 kg (169 lb 15.6 oz) 93%    BMI                25.1 kg/m2          Blood Pressure          Most Recent Value    BP  (!)  93/55      Allergies as of 5/18/2017     Adhesive      Immunizations Administered on Date of Encounter - 5/18/2017     None      Maintenance Dialysis History     Patient has no recorded history of maintenance dialysis.      Transplant Information        Txp Date Organ Coordinator Care Team     Liver [facility]argents Referring Physician:  Farshad Carney MD   Corresponding Physician:  Farshad Carney MD   Current Hepatologist:  Indira Oconnor MD       "   Instructions    Your CT scan suggests ongoing cancer.  We will discuss in IR conference next week.   We will determine if you need further treatment.    You should continue low sodium diet.  Continue to hold fluid pills based on low sodium.    Labs next week with nicotine screen.    Continue smoking cessation.    Return to clinic in 6 weeks.         Smoking Cessation     If you would like to quit smoking:   You may be eligible for free services if you are a [place] resident and started smoking cigarettes before September 1, 1988.  Call the Smoking Cessation Trust ([facility]) toll free at (134) 718-8143 or (159) 218-9791.   Call 1-800-QUIT-NOW if you do not meet the above criteria.   Contact us via email: [email or web address]   View our website for more information: [email or web address]        Language Assistance Services     ATTENTION: Language assistance services are available, free of charge. Please call [phone].      ATENCIÓN: Si habla español, tiene a sears disposición servicios gratuitos de asistencia lingüística. Llame al [phone].     CHÚ Ý: N?u b?n nói Ti?ng Vi?t, có các d?ch v? h? tr? ngôn ng? mi?n phí dành cho b?n. G?i s? [phone].         [facility] complies with applicable Federal civil rights laws and does not discriminate on the basis of race, color, national origin, age, disability, or sex.

## 2017-05-18 NOTE — PATIENT INSTRUCTIONS
Your CT scan suggests ongoing cancer.  We will discuss in IR conference next week.   We will determine if you need further treatment.    You should continue low sodium diet.  Continue to hold fluid pills based on low sodium.    Labs next week with nicotine screen.    Continue smoking cessation.    Return to clinic in 6 weeks.

## 2017-05-18 NOTE — PROGRESS NOTES
Pt transferred to IR room 125 for paracentesis via wheelchair. Pt awake, alert, and oriented. Awaiting consent.

## 2017-05-18 NOTE — IP AVS SNAPSHOT
[facility]  [address]  [facility] [address]  Phone: [phone]           Patient Discharge Instructions   Our goal is to set you up for success. This packet includes information on your condition, medications, and your home care.  It will help you care for yourself to prevent having to return to the hospital.     Please ask your nurse if you have any questions.      There are many details to remember when preparing to leave the hospital. Here is what you will need to do:    1. Take your medicine. If you are prescribed medications, review your Medication List on the following pages. You may have new medications to  at the pharmacy and others that you'll need to stop taking. Review the instructions for how and when to take your medications. Talk with your doctor or nurses if you are unsure of what to do.     2. Go to your follow-up appointments. Specific follow-up information is listed in the following pages. Your may be contacted by a nurse or clinical provider about future appointments. Be sure we have all of the phone numbers to reach you. Please contact your provider's office if you are unable to make an appointment.     3. Watch for warning signs. Your doctor or nurse will give you detailed warning signs to watch for and when to call for assistance. These instructions may also include educational information about your condition. If you experience any of warning signs to your health, call your doctor.           [facility]  Unless otherwise directed by your provider, please   contact [facility], our nurse care line   that is available for 24/7 assistance.     [phone] (toll-free)     Registered nurses in the [facility]   provide: appointment scheduling, clinical advisement, health education, and other advisory services.                  ** Verify the list of medication(s) below is accurate and up to date. Carry this with you in case of  emergency. If your medications have changed, please notify your healthcare provider.             Medication List      TAKE these medications        Additional Info                      amoxicillin-clavulanate 500-125mg 500-125 mg Tab   Commonly known as:  AUGMENTIN   Refills:  0      Begin Date    AM    Noon    PM    Bedtime       calcium-vitamin D 250-100 mg-unit per tablet   Refills:  0   Dose:  2 tablet    Instructions:  Take 2 tablets by mouth 2 (two) times daily.     Begin Date    AM    Noon    PM    Bedtime       CHANTIX STARTING MONTH BOX 0.5 mg (11)- 1 mg (42) tablet   Refills:  0   Generic drug:  varenicline    Instructions:  as directed Orally 30 days     Begin Date    AM    Noon    PM    Bedtime       ciprofloxacin HCl 500 MG tablet   Commonly known as:  CIPRO   Quantity:  30 tablet   Refills:  6   Dose:  500 mg    Instructions:  Take 1 tablet (500 mg total) by mouth once daily.     Begin Date    AM    Noon    PM    Bedtime       diazePAM 10 MG Tab   Commonly known as:  VALIUM   Refills:  0   Dose:  10 mg    Instructions:  Take 10 mg by mouth 2 (two) times daily.     Begin Date    AM    Noon    PM    Bedtime       furosemide 40 MG tablet   Commonly known as:  LASIX   Quantity:  30 tablet   Refills:  11   Dose:  40 mg    Instructions:  Take 1 tablet (40 mg total) by mouth once daily.     Begin Date    AM    Noon    PM    Bedtime       * lactulose 20 gram/30 mL Soln   Commonly known as:  CHRONULAC   Quantity:  3000 mL   Refills:  11   Dose:  20 g    Instructions:  Take 30 mLs (20 g total) by mouth every 6 (six) hours as needed (titrate to have 2-3 bowle movements per day).     Begin Date    AM    Noon    PM    Bedtime       * lactulose 10 gram/15 mL solution   Commonly known as:  CHRONULAC   Refills:  0      Begin Date    AM    Noon    PM    Bedtime       morphine 30 MG 12 hr tablet   Commonly known as:  MS CONTIN   Refills:  0   Dose:  30 mg    Instructions:  Take 30 mg by mouth every 12 (twelve) hours.      Begin Date    AM    Noon    PM    Bedtime       neomycin-polymyxin-dexamethasone 3.5 mg/g-10,000 unit/g-0.1 % Oint   Commonly known as:  DEXACINE   Refills:  0    Instructions:  every 6 (six) hours.     Begin Date    AM    Noon    PM    Bedtime       nicotine 10 mg Crtg   Commonly known as:  NICOTROL   Quantity:  168 puff   Refills:  0   Dose:  1 puff    Instructions:  Inhale 1 puff into the lungs as needed. (6-16 cartridges daily as needed) inhaled     Begin Date    AM    Noon    PM    Bedtime       ondansetron 4 MG tablet   Commonly known as:  ZOFRAN   Quantity:  28 tablet   Refills:  0   Dose:  4 mg    Instructions:  Take 1 tablet (4 mg total) by mouth every 6 (six) hours as needed for Nausea.     Begin Date    AM    Noon    PM    Bedtime       oxycodone 30 MG Tab   Commonly known as:  ROXICODONE   Refills:  0   Dose:  30 mg    Instructions:  Take 30 mg by mouth every 8 (eight) hours.     Begin Date    AM    Noon    PM    Bedtime       pantoprazole 40 MG tablet   Commonly known as:  PROTONIX   Quantity:  30 tablet   Refills:  11   Dose:  40 mg    Instructions:  Take 1 tablet (40 mg total) by mouth once daily.     Begin Date    AM    Noon    PM    Bedtime       rifAXIMin 550 mg Tab   Commonly known as:  XIFAXAN   Quantity:  60 tablet   Refills:  11   Dose:  550 mg    Instructions:  Take 1 tablet (550 mg total) by mouth 2 (two) times daily.     Begin Date    AM    Noon    PM    Bedtime       spironolactone 50 MG tablet   Commonly known as:  ALDACTONE   Quantity:  30 tablet   Refills:  11   Dose:  50 mg    Instructions:  Take 1 tablet (50 mg total) by mouth once daily.     Begin Date    AM    Noon    PM    Bedtime       VOLTAREN 1 % Gel   Refills:  3   Generic drug:  diclofenac sodium    Instructions:  apply (2G) by topical route 3 times every day to the affected area(s)     Begin Date    AM    Noon    PM    Bedtime       * Notice:  This list has 2 medication(s) that are the same as other medications prescribed for  you. Read the directions carefully, and ask your doctor or other care provider to review them with you.               Please bring to all follow up appointments:    1. A copy of your discharge instructions.  2. All medicines you are currently taking in their original bottles.  3. Identification and insurance card.    Please arrive 15 minutes ahead of scheduled appointment time.    Please call 24 hours in advance if you must reschedule your appointment and/or time.        Your Scheduled Appointments     May 18, 2017  1:30 PM CDT   Established Patient Visit with [facility] INTERVENTIONAL RADIOLOGY   [facility] [address] [address]    [address]  [facility] [address]   [phone]                Discharge References/Attachments     PARACENTESIS, DISCHARGE INSTRUCTIONS FOR (ENGLISH)        Admission Information     Date & Time Provider Department CSN    5/18/2017 10:00 AM Indira Oconnor MD [facility] [id number]      Care Providers     Provider Role Specialty Primary office phone    Indira Oconnor MD Attending Provider Gastroenterology [phone]      Your Vitals Were     BP Pulse Resp SpO2          121/70 (BP Location: Left arm, Patient Position: Lying, BP Method: Automatic) 82 18 99%        Recent Lab Values     No lab values to display.      Allergies as of 5/18/2017        Reactions    Adhesive Blisters      Advance Directives     An advance directive is a document which, in the event you are no longer able to make decisions for yourself, tells your healthcare team what kind of treatment you do or do not want to receive, or who you would like to make those decisions for you.  If you do not currently have an advance directive, [facility] encourages you to create one.  For more information call:  [phone]), [phone] ([phone]),  or log on to [email or web address].        Smoking Cessation     If you would like to quit smoking:   You may  be eligible for free services if you are a [place] resident and started smoking cigarettes before September 1, 1988.  Call the Smoking Cessation Trust ([employer]) toll free at (109) 888-5014 or (635) 972-1585.   Call 1-800-QUIT-NOW if you do not meet the above criteria.   Contact us via email: [email or web address]   View our website for more information: [email or web address]        Language Assistance Services     ATTENTION: Language assistance services are available, free of charge. Please call [phone].      ATENCIÓN: Si habla español, tiene a sears disposición servicios gratuitos de asistencia lingüística. Llame al [phone].     CHÚ Ý: N?u b?n nói Ti?ng Vi?t, có các d?ch v? h? tr? ngôn ng? mi?n phí dành cho b?n. G?i s? [phone].         [facility] complies with applicable Federal civil rights laws and does not discriminate on the basis of race, color, national origin, age, disability, or sex.
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
## 2017-05-18 NOTE — LETTER
May 21, 2017        Farshad Carney  [address] [address]  Phone: [phone]  Fax: [phone]             [facility]  [address]  [facility] [address]  Phone: [phone]   Patient: Tommy Hopper   MR Number: [id number]   YOB: 1960   Date of Visit: 5/18/2017       Dear Dr. Farshad Carney    Thank you for referring Tommy Hopper to me for evaluation. Attached you will find relevant portions of my assessment and plan of care.    If you have questions, please do not hesitate to call me. I look forward to following Tommy Hopper along with you.    Sincerely,    Hollie Rendon MD    Enclosure    If you would like to receive this communication electronically, please contact [email or web address] or [phone] to request [employer] Link access.    [employer] Link is a tool which provides read-only access to select patient information with whom you have a relationship. Its easy to use and provides real time access to review your patients record including encounter summaries, notes, results, and demographic information.    If you feel you have received this communication in error or would no longer like to receive these types of communications, please e-mail [email or web address]

## 2017-05-18 NOTE — PROCEDURES
Radiology Post-Procedure Note    Pre Op Diagnosis: Ascites  Post Op Diagnosis: Same    Procedure: Ultrasound Guided Paracentesis    Procedure performed by: Jose Jones NP/  Bhupinder Dinh MD    Written Informed Consent Obtained: Yes  Specimen Removed: YES clear yellow fluid   Estimated Blood Loss: Minimal    Findings:   Successful paracentesis.  Albumin administered PRN per protocol.    Patient tolerated procedure well.    CHERRIE Diaz, FNP  Interventional Radiology  [phone] spectralink

## 2017-05-18 NOTE — PROGRESS NOTES
9200 mls removed per paracentesis. Specimen sent to lab as per order. Albumin replaced as per Dr. Oconnor's order. Discharge instructions reviewed with pt and pt's spouse. Handout given for s/p paracentesis care. Pt verbalized understanding of instructions. IV D/C. Pt to d/c to next appt via wheelchair with spouse at side.

## 2017-05-18 NOTE — Clinical Note
Patient needs labs this week with nicotine screen.  Review in IR conference.  F/u on rifaximin prescription

## 2017-05-19 ENCOUNTER — TELEPHONE (OUTPATIENT)
Dept: HEPATOLOGY | Facility: CLINIC | Age: 57
End: 2017-05-19

## 2017-05-19 ENCOUNTER — TELEPHONE (OUTPATIENT)
Dept: TRANSPLANT | Facility: CLINIC | Age: 57
End: 2017-05-19

## 2017-05-19 DIAGNOSIS — J84.9 ILD (INTERSTITIAL LUNG DISEASE): ICD-10-CM

## 2017-05-19 DIAGNOSIS — Z01.818 PRE-TRANSPLANT EVALUATION FOR LIVER TRANSPLANT: ICD-10-CM

## 2017-05-19 DIAGNOSIS — R91.8 LUNG MASS: Primary | ICD-10-CM

## 2017-05-19 NOTE — TELEPHONE ENCOUNTER
Called patient on 19May17 at 0815 to remind patient that he needs blood work. No answer. Patient had previous conversation with coordinator  That if blood work could be done at home he could get them done weekly. Patient has only gotten blood drawn once per [facility]. Patient was also instructed by Dr Rendon on 18May17 to get blood drawn after appointment. Patient left hospital without getting his blood work done as he was instructed.  Left message on cell phone and house machine instructing patient i needed a call back today and for him to get blood work completed ASAP. Left contact information.

## 2017-05-19 NOTE — TELEPHONE ENCOUNTER
Patient: Tommy Hopper       MRN: [id number]      : 1960     Age: 57 y.o.  [address]  [facility] [place] [facility] [address]    Provider: Hepatologist Pavel Oconnor    Patient Transplant Status: In Evaluation    Reason for presentation: Reassessment    Clinical Summary: 56 y/o male alcoholic cirrhosis, ascites needing frequent paracenteses, review post-TACE CT.    Original liver lesion 3.0 cm 16, TACEd x 2 on 10/3/16, 17. AFP consistently 3.6 to 2.5.  Has right pleural effusion, emphysematous changes, ground glass opacifications, reticulation, changes consistent with UIP, findings similar to prior exam 17.   Pulmonary consult requested to see if candidate for transplant.     Imaging to be reviewed: CT scan 17    HCC Treatment History: none    ABO: A POS    Platelets:   Lab Results   Component Value Date/Time    PLT 94 (L) 2017 03:37 PM    EXTPLATELETS 80 (A) 2017 10:10 PM     Creatinine:   Lab Results   Component Value Date/Time    CREATININE 1.1 2017 03:37 PM    EXTCREATININ 1.1 2017 10:10 PM     Bilirubin:   Lab Results   Component Value Date/Time    BILITOT 2.6 (H) 2017 03:37 PM    EXTBILITOTAL 1.7 (A) 2017 10:10 PM    EXTBILIRUBIN 0.67 (A) 2017 10:10 PM     AFP Last 3 each if available:   Lab Results   Component Value Date/Time    AFP 2.5 2017 07:30 AM    AFP 2.5 2017 11:15 AM    AFP 2.9 2016 05:27 PM       MELD: MELD-Na score: 21 at 5/3/2017 10:10 PM  MELD score: 12 at 5/3/2017 10:10 PM  Calculated from:  Serum Creatinine: 1.1 mg/dL at 5/3/2017 10:10 PM  Serum Sodium: 127 mmol/L at 5/3/2017 10:10 PM  Total Bilirubin: 0.67 mg/dL (Rounded to 1) at 5/3/2017 10:10 PM  INR(ratio): 1.53 at 5/3/2017 10:10 PM  Age: 57 years    Plan:     Follow-up Provider:

## 2017-05-19 NOTE — TELEPHONE ENCOUNTER
Appointment card completed for Pulmonary consult ASAP    ----- Message from Indira Oconnor MD sent at 5/19/2017  1:19 PM CDT -----  Pl get pulm consult from Dr. Guthrie, is he a candidate for transplant with findings of UIP in lungs?

## 2017-05-19 NOTE — TELEPHONE ENCOUNTER
Mrs Hopper notified.  Understanding expressed.      Advised of need for weekly labs, as previously ordered.  Standing order previously faxed to [facility].  Patient agreed to labs every Friday morning and he will be going this afternoon.      Reminded of need for pt to reschedule Nephrology consult.  Mrs. Hopper expressed understanding but states they will have to delay scheduling until June.  Call transferred to Nephrology to schedule.       ----- Message from Indira Oconnor MD sent at 5/19/2017  9:01 AM CDT -----  Please inform patient results are OK.

## 2017-05-19 NOTE — TELEPHONE ENCOUNTER
Patient: Tommy Hopper       MRN: [id number]      : 1960     Age: 57 y.o.  [address]  [place] [address]    Provider: Hepatologist - Luci    Patient Transplant Status: In Evaluation    Reason for presentation: Reassessment    Clinical Summary: 58yo male currently deferred for transplant listing due to UIP and ongoing smoking that returns to clinic for routine follow-up.  Hep C and alcoholic cirrhosis, active hepatitis C viremia from 2016.  CT performed today for HCC surveillance.       Imaging to be reviewed: CT 17    HCC Treatment History: TACE x 2  10/2016, 17      ABO: A POS    Platelets:   Lab Results   Component Value Date/Time    PLT 94 (L) 2017 03:37 PM    EXTPLATELETS 80 (A) 2017 10:10 PM     Creatinine:   Lab Results   Component Value Date/Time    CREATININE 1.1 2017 03:37 PM    EXTCREATININ 1.1 2017 10:10 PM     Bilirubin:   Lab Results   Component Value Date/Time    BILITOT 2.6 (H) 2017 03:37 PM    EXTBILITOTAL 1.7 (A) 2017 10:10 PM    EXTBILIRUBIN 0.67 (A) 2017 10:10 PM     AFP Last 3 each if available:   Lab Results   Component Value Date/Time    AFP 2.5 2017 07:30 AM    AFP 2.5 2017 11:15 AM    AFP 2.9 2016 05:27 PM       MELD: MELD-Na score: 21 at 5/3/2017 10:10 PM  MELD score: 12 at 5/3/2017 10:10 PM  Calculated from:  Serum Creatinine: 1.1 mg/dL at 5/3/2017 10:10 PM  Serum Sodium: 127 mmol/L at 5/3/2017 10:10 PM  Total Bilirubin: 0.67 mg/dL (Rounded to 1) at 5/3/2017 10:10 PM  INR(ratio): 1.53 at 5/3/2017 10:10 PM  Age: 57 years    Plan:     Follow-up Provider:

## 2017-05-19 NOTE — NURSING
Encounter to enter external labs received from [facility] dated 5/3/17, 3/27/17, and [facility] dated 3/23/17

## 2017-05-23 ENCOUNTER — TELEPHONE (OUTPATIENT)
Dept: INTERVENTIONAL RADIOLOGY/VASCULAR | Facility: HOSPITAL | Age: 57
End: 2017-05-23

## 2017-05-24 ENCOUNTER — TELEPHONE (OUTPATIENT)
Dept: TRANSPLANT | Facility: CLINIC | Age: 57
End: 2017-05-24

## 2017-05-24 ENCOUNTER — TELEPHONE (OUTPATIENT)
Dept: INTERVENTIONAL RADIOLOGY/VASCULAR | Facility: HOSPITAL | Age: 57
End: 2017-05-24

## 2017-05-24 DIAGNOSIS — Z01.818 PRE-TRANSPLANT EVALUATION FOR LIVER TRANSPLANT: ICD-10-CM

## 2017-05-24 DIAGNOSIS — J43.2 CENTRILOBULAR EMPHYSEMA: ICD-10-CM

## 2017-05-24 DIAGNOSIS — J84.9 ILD (INTERSTITIAL LUNG DISEASE): Primary | ICD-10-CM

## 2017-05-24 NOTE — TELEPHONE ENCOUNTER
Spoke with patient and his wife. Notified of recommendation from liver conference is to repeat CT scan in 2 months. We will call to schedule this. Verbalized understanding and agreement.

## 2017-05-24 NOTE — TELEPHONE ENCOUNTER
Called patient on [id number] at 1000 to schedule appt for paracentisis. Patients caregiver was not available. Left message with patient to have his caregiver to give me a call when she returns. Contact info left.

## 2017-05-25 LAB
ALT SERPL W P-5'-P-CCNC: 21 U/L (ref 7–52)
EXT ALBUMIN: 3.1 (ref 3.5–4.7)
EXT ALKALINE PHOSPHATASE: 100 (ref 34–104)
EXT BASOPHIL%: 0.8 (ref 0–1)
EXT BILIRUBIN TOTAL: 1.6 (ref 0.3–1)
EXT BUN: 14 (ref 7–25)
EXT CALCIUM: 8.1 (ref 8.6–10.3)
EXT CHLORIDE: 101 (ref 98–107)
EXT CO2: 17 (ref 22–32)
EXT CREATININE: 1.4 MG/DL (ref 0.7–1.3)
EXT EOSINOPHIL%: 3.8 (ref 0–6)
EXT GFR MDRD AF AMER: 67
EXT GFR MDRD NON AF AMER: 55
EXT GLUCOSE: 133 (ref 70–105)
EXT HEMATOCRIT: 25.4 (ref 40–52)
EXT HEMOGLOBIN: 8.8 (ref 13.7–17.5)
EXT INR: 1.66 (ref 2–3)
EXT LYMPH%: 20.2 (ref 20–50)
EXT MONOCYTES%: 12.9 (ref 0–14)
EXT PLATELETS: 88 (ref 140–450)
EXT POTASSIUM: 3.9 (ref 3.5–5.1)
EXT PT: 19.7 (ref 11.6–14.3)
EXT SODIUM: 126 MMOL/L (ref 136–145)
EXT WBC: 5.2 (ref 4–11)
MCH RBC QN AUTO: 34.4 PG (ref 26–34)
MCHC RBC AUTO-ENTMCNC: 34.6 G/DL (ref 31–36)
MCV RBC AUTO: 99 FL (ref 80–98)
NEUTROPHILS ABSOLUTE COUNT: 3 /ΜL (ref 2–7)

## 2017-05-26 ENCOUNTER — HOSPITAL ENCOUNTER (OUTPATIENT)
Dept: INTERVENTIONAL RADIOLOGY/VASCULAR | Facility: HOSPITAL | Age: 57
Discharge: HOME | End: 2017-05-26
Attending: INTERNAL MEDICINE
Payer: MEDICARE

## 2017-05-26 ENCOUNTER — OFFICE VISIT (OUTPATIENT)
Dept: PULMONOLOGY | Facility: CLINIC | Age: 57
End: 2017-05-26
Payer: MEDICARE

## 2017-05-26 ENCOUNTER — DOCUMENTATION ONLY (OUTPATIENT)
Dept: TRANSPLANT | Facility: CLINIC | Age: 57
End: 2017-05-26

## 2017-05-26 ENCOUNTER — TELEPHONE (OUTPATIENT)
Dept: TRANSPLANT | Facility: CLINIC | Age: 57
End: 2017-05-26

## 2017-05-26 ENCOUNTER — OFFICE VISIT (OUTPATIENT)
Dept: HEPATOLOGY | Facility: CLINIC | Age: 57
End: 2017-05-26
Payer: MEDICARE

## 2017-05-26 VITALS
WEIGHT: 190.5 LBS | HEIGHT: 66 IN | OXYGEN SATURATION: 99 % | HEART RATE: 81 BPM | BODY MASS INDEX: 30.62 KG/M2 | RESPIRATION RATE: 20 BRPM | SYSTOLIC BLOOD PRESSURE: 107 MMHG | DIASTOLIC BLOOD PRESSURE: 62 MMHG

## 2017-05-26 VITALS
HEIGHT: 66 IN | DIASTOLIC BLOOD PRESSURE: 76 MMHG | SYSTOLIC BLOOD PRESSURE: 148 MMHG | HEART RATE: 79 BPM | OXYGEN SATURATION: 97 %

## 2017-05-26 VITALS
OXYGEN SATURATION: 98 % | RESPIRATION RATE: 18 BRPM | SYSTOLIC BLOOD PRESSURE: 92 MMHG | HEART RATE: 86 BPM | DIASTOLIC BLOOD PRESSURE: 54 MMHG

## 2017-05-26 DIAGNOSIS — R60.0 PERIPHERAL EDEMA: ICD-10-CM

## 2017-05-26 DIAGNOSIS — E87.1 HYPONATREMIA: ICD-10-CM

## 2017-05-26 DIAGNOSIS — R11.0 NAUSEA: ICD-10-CM

## 2017-05-26 DIAGNOSIS — K70.31 ALCOHOLIC CIRRHOSIS OF LIVER WITH ASCITES: ICD-10-CM

## 2017-05-26 DIAGNOSIS — J43.2 CENTRILOBULAR EMPHYSEMA: Primary | ICD-10-CM

## 2017-05-26 DIAGNOSIS — K70.31 ASCITES DUE TO ALCOHOLIC CIRRHOSIS: Primary | ICD-10-CM

## 2017-05-26 DIAGNOSIS — K74.60 DECOMPENSATED HEPATIC CIRRHOSIS: ICD-10-CM

## 2017-05-26 DIAGNOSIS — K76.82 HEPATIC ENCEPHALOPATHY: ICD-10-CM

## 2017-05-26 DIAGNOSIS — C22.0 HCC (HEPATOCELLULAR CARCINOMA): ICD-10-CM

## 2017-05-26 DIAGNOSIS — Z01.811 PRE-OPERATIVE RESPIRATORY EXAMINATION: ICD-10-CM

## 2017-05-26 DIAGNOSIS — K72.90 DECOMPENSATED HEPATIC CIRRHOSIS: ICD-10-CM

## 2017-05-26 DIAGNOSIS — Z01.818 PRE-TRANSPLANT EVALUATION FOR LIVER TRANSPLANT: ICD-10-CM

## 2017-05-26 DIAGNOSIS — B18.2 HEP C W/O COMA, CHRONIC: ICD-10-CM

## 2017-05-26 LAB
APPEARANCE FLD: CLEAR
BODY FLD TYPE: NORMAL
COLOR FLD: YELLOW
LYMPHOCYTES NFR FLD MANUAL: 81 %
MONOS+MACROS NFR FLD MANUAL: 8 %
NEUTROPHILS NFR FLD MANUAL: 11 %
WBC # FLD: 25 /CU MM

## 2017-05-26 PROCEDURE — 99999 PR PBB SHADOW E&M-EST. PATIENT-LVL III: CPT | Mod: PBBFAC,TXP,, | Performed by: INTERNAL MEDICINE

## 2017-05-26 PROCEDURE — 99214 OFFICE O/P EST MOD 30 MIN: CPT | Mod: S$GLB,,, | Performed by: INTERNAL MEDICINE

## 2017-05-26 PROCEDURE — 99999 PR PBB SHADOW E&M-EST. PATIENT-LVL IV: CPT | Mod: PBBFAC,TXP,, | Performed by: NURSE PRACTITIONER

## 2017-05-26 PROCEDURE — C1729 CATH, DRAINAGE: HCPCS | Mod: NTX

## 2017-05-26 PROCEDURE — 63600175 PHARM REV CODE 636 W HCPCS: Mod: TXP | Performed by: INTERNAL MEDICINE

## 2017-05-26 PROCEDURE — A7048 VACUUM DRAIN BOTTLE/TUBE KIT: HCPCS | Mod: TXP

## 2017-05-26 PROCEDURE — 49083 ABD PARACENTESIS W/IMAGING: CPT | Mod: NTX,,, | Performed by: FAMILY MEDICINE

## 2017-05-26 PROCEDURE — 89051 BODY FLUID CELL COUNT: CPT | Mod: TXP

## 2017-05-26 PROCEDURE — 99214 OFFICE O/P EST MOD 30 MIN: CPT | Mod: S$GLB,TXP,, | Performed by: NURSE PRACTITIONER

## 2017-05-26 PROCEDURE — P9047 ALBUMIN (HUMAN), 25%, 50ML: HCPCS | Mod: TXP | Performed by: INTERNAL MEDICINE

## 2017-05-26 RX ORDER — ONDANSETRON 4 MG/1
4 TABLET, FILM COATED ORAL EVERY 6 HOURS PRN
Qty: 30 TABLET | Refills: 5 | Status: SHIPPED | OUTPATIENT
Start: 2017-05-26

## 2017-05-26 RX ORDER — SODIUM BICARBONATE 650 MG/1
650 TABLET ORAL 2 TIMES DAILY
Status: ON HOLD | COMMUNITY
Start: 2017-05-26 | End: 2017-07-15 | Stop reason: HOSPADM

## 2017-05-26 RX ORDER — ALBUMIN HUMAN 250 G/1000ML
25 SOLUTION INTRAVENOUS
Status: DISCONTINUED | OUTPATIENT
Start: 2017-05-26 | End: 2017-05-27 | Stop reason: HOSPADM

## 2017-05-26 RX ADMIN — ALBUMIN (HUMAN) 25 G: 25 SOLUTION INTRAVENOUS at 03:05

## 2017-05-26 NOTE — PROGRESS NOTES
Paracentesis complete. 8600 mLs peritoneal fluid drained. Pt tolerated well. Dressing to clean, dry, and intact. Albumin 25% given 200 mLs. Specimens sent per lab order. Discharge instructions and handouts provided. Pt verbalized understanding.
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
## 2017-05-26 NOTE — PROGRESS NOTES
Subjective:       Patient ID: Tommy Hopper is a 57 y.o. male.    Chief Complaint: Liver/Kidney Transplant Pre-evaluation    HPI   Tommy Hopper 57 y.o. male    has a past medical history of Cancer (liver); Cirrhosis; and Encounter for blood transfusion.    has a past surgical history that includes Joint replacement (Right); Cholecystectomy; Hernia repair; Facial reconstruction surgery; and Colonoscopy (N/A, 3/9/2017).   reports that he quit smoking about 3 weeks ago. His smoking use included Cigarettes. He started smoking about 43 years ago. He has a 10.50 pack-year smoking history. He quit smokeless tobacco use about 47 years ago. His smokeless tobacco use included Chew. He reports that he does not drink alcohol or use drugs.  Referred by: No ref. provider found  Who had concerns including Liver/Kidney Transplant Pre-evaluation.  The patient's last visit with me was on 4/27/2017.    Here for preop evaluation again  Stopped lasix by liver transplant because of hyponatremia  STOPPED SMOKING!!  Very edematous, abdominal distention  Feels sob because of abdominal distention  No fever chills, ns, wt changes, nausea, vomiting, diarrhea, constipation, chest pain, tightness, pressure  Patient states having trouble urinating    Review of Systems    Objective:      Physical Exam  Personal Diagnostic Review    No flowsheet data found.      Assessment:       No diagnosis found.    Outpatient Encounter Prescriptions as of 5/26/2017   Medication Sig Dispense Refill    calcium-vitamin D 250-100 mg-unit per tablet Take 2 tablets by mouth 2 (two) times daily.      CHANTIX STARTING MONTH BOX 0.5 mg (11)- 1 mg (42) tablet as directed Orally 30 days  0    ciprofloxacin HCl (CIPRO) 500 MG tablet Take 1 tablet (500 mg total) by mouth once daily. 30 tablet 6    diazepam (VALIUM) 10 MG Tab Take 10 mg by mouth 2 (two) times daily.  0    furosemide (LASIX) 40 MG tablet Take 1 tablet (40 mg total) by mouth once daily. 30 tablet 11     lactulose (CHRONULAC) 10 gram/15 mL solution       lactulose (CHRONULAC) 20 gram/30 mL Soln Take 30 mLs (20 g total) by mouth every 6 (six) hours as needed (titrate to have 2-3 bowle movements per day). 3000 mL 11    morphine (MS CONTIN) 30 MG 12 hr tablet Take 30 mg by mouth every 12 (twelve) hours.  0    neomycin-polymyxin-dexamethasone (DEXACINE) 3.5 mg/g-10,000 unit/g-0.1 % Oint every 6 (six) hours.      ondansetron (ZOFRAN) 4 MG tablet Take 1 tablet (4 mg total) by mouth every 6 (six) hours as needed for Nausea. 28 tablet 0    oxycodone (ROXICODONE) 30 MG Tab Take 30 mg by mouth every 8 (eight) hours.  0    pantoprazole (PROTONIX) 40 MG tablet Take 1 tablet (40 mg total) by mouth once daily. 30 tablet 11    rifAXIMin (XIFAXAN) 550 mg Tab Take 1 tablet (550 mg total) by mouth 2 (two) times daily. 60 tablet 11    spironolactone (ALDACTONE) 50 MG tablet Take 1 tablet (50 mg total) by mouth once daily. 30 tablet 11    VOLTAREN 1 % Gel apply (2G) by topical route 3 times every day to the affected area(s)  3     Facility-Administered Encounter Medications as of 5/26/2017   Medication Dose Route Frequency Provider Last Rate Last Dose    Chemoembolization/LC beads (doxorubicin in sterile water)  50 mg Intra-arterial Once Mariama Santos NP        And    Chemoembolization/LC beads (doxorubicin in sterile water)  50 mg Intra-arterial Once Mariama Santos NP         No orders of the defined types were placed in this encounter.    Plan:            I personally reviewed the      1. Echo report   2. PFT pfts done after paracentesis last year were wnl except dlco, those done before paracentesis- show restriction as expected  3. CXR   4. CXR report     Assessment:  There are no diagnoses linked to this encounter.    Plan:  Will contact liver transplant- patient needs to see them, regarding volume removal, paracentesis, possibly admission for diuresis  At the least, needs repeat labs today  Will need to  have pfts done after next paracentesis      No Follow-up on file.    There are no Patient Instructions on file for this visit.    Immunization History   Administered Date(s) Administered    Hepatitis A, Adult 12/30/2016    Pneumococcal Conjugate - 13 Valent 12/30/2016    Tdap 12/30/2016    influenza - Quadrivalent 12/30/2016

## 2017-05-26 NOTE — PROCEDURES
Radiology Post-Procedure Note    Pre Op Diagnosis: Ascites  Post Op Diagnosis: Same    Procedure: Ultrasound Guided Paracentesis    Procedure performed by: Danielle HODGES, Mariama     Written Informed Consent Obtained: Yes  Specimen Removed: YES clear yellow fluid  Estimated Blood Loss: Minimal    Findings:   Successful paracentesis.  Albumin administered PRN per protocol.    Patient tolerated procedure well.    Mariama Santos, APRN, FNP  Interventional Radiology  [phone] [employer]

## 2017-05-26 NOTE — DISCHARGE INSTRUCTIONS
For scheduling: Call Carlee at [phone]    For questions or concerns call: [facility]-F[facility] 8 AM- 5PM [phone]. Radiology resident on call [phone].    For immediate concerns that are not emergent, you may call our radiology clinic at: [phone]

## 2017-05-26 NOTE — TELEPHONE ENCOUNTER
Call received from Dr. Castaneda, Pulmonary.  States patient presents with fluid overload, generalized edema and ascites, as well as c/o difficulty voiding.  States pulmonary assessment does not support a diagnosis of UIP, however, a focal area of fibrosis is seen.  States PFTs are much better after paracentesis.  Recommending patient be seen by transplant provider and have paracentesis scheduled today.      Reviewed with Sharyn Eaton NP.  Office visit scheduled now and paracentesis scheduled @1:30p.  Patient notified by Laron Izaguirre Ma
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
## 2017-06-02 ENCOUNTER — TELEPHONE (OUTPATIENT)
Dept: TRANSPLANT | Facility: CLINIC | Age: 57
End: 2017-06-02

## 2017-06-02 NOTE — TELEPHONE ENCOUNTER
Message left for patient, instructing him to continue to hold diuretics and repeat labs in 2 weeks.  Standing order already on file at [facility].     ----- Message from Hollie Rendon MD sent at 5/31/2017 10:57 AM CDT -----  Patient needs to continue to hold diuretics based on Na 126 and creatinine 1.4.  Nicotine metabolites remain positive and therefore cannot be listed at this time.  Repeat lab in 2 weeks with nicotine.

## 2017-06-05 LAB
EXT ALBUMIN: 2.8 (ref 3.5–4.7)
EXT ALKALINE PHOSPHATASE: 179 (ref 34–104)
EXT ALT: 23 (ref 7–52)
EXT AST: 67 (ref 13–39)
EXT BASOPHIL%: 0.5 (ref 0–1)
EXT BILIRUBIN TOTAL: 1.7 (ref 0.3–1)
EXT BUN: 38 (ref 7–25)
EXT CALCIUM: 8.2 (ref 8.6–10.3)
EXT CHLORIDE: 101 (ref 98–107)
EXT CO2: 16 (ref 22–32)
EXT CREATININE: 3.1 MG/DL (ref 0.7–1.3)
EXT EOSINOPHIL%: 0.9 (ref 0–6)
EXT GLUCOSE: 126 (ref 70–105)
EXT HEMATOCRIT: 25.1 (ref 40–52)
EXT HEMOGLOBIN: 8.9 (ref 13.7–17.5)
EXT INR: 1.49 (ref 2–3)
EXT LYMPH%: 10.1 (ref 20–50)
EXT MONOCYTES%: 7.5 (ref 0–14)
EXT PLATELETS: 81 (ref 140–450)
EXT POTASSIUM: 3.7 (ref 3.5–5.1)
EXT PT: 18 (ref 11.6–14.3)
EXT SODIUM: 126 MMOL/L (ref 136–145)
EXT WBC: 11.1 (ref 4–11)
NEUTROPHILS RELATIVE PERCENT: 81 % (ref 46–78)

## 2017-06-06 ENCOUNTER — HOSPITAL ENCOUNTER (OUTPATIENT)
Dept: PULMONOLOGY | Facility: CLINIC | Age: 57
Discharge: HOME | DRG: 683 | End: 2017-06-06
Payer: MEDICARE

## 2017-06-06 ENCOUNTER — TELEPHONE (OUTPATIENT)
Dept: TRANSPLANT | Facility: CLINIC | Age: 57
End: 2017-06-06

## 2017-06-06 ENCOUNTER — HOSPITAL ENCOUNTER (OUTPATIENT)
Dept: INTERVENTIONAL RADIOLOGY/VASCULAR | Facility: HOSPITAL | Age: 57
Discharge: HOME | DRG: 683 | End: 2017-06-06
Attending: INTERNAL MEDICINE
Payer: MEDICARE

## 2017-06-06 VITALS
RESPIRATION RATE: 20 BRPM | OXYGEN SATURATION: 97 % | DIASTOLIC BLOOD PRESSURE: 61 MMHG | HEART RATE: 79 BPM | SYSTOLIC BLOOD PRESSURE: 90 MMHG

## 2017-06-06 DIAGNOSIS — R91.8 LUNG MASS: ICD-10-CM

## 2017-06-06 DIAGNOSIS — Z01.818 PRE-TRANSPLANT EVALUATION FOR LIVER TRANSPLANT: ICD-10-CM

## 2017-06-06 DIAGNOSIS — K70.31 ALCOHOLIC CIRRHOSIS OF LIVER WITH ASCITES: ICD-10-CM

## 2017-06-06 DIAGNOSIS — C22.0 HCC (HEPATOCELLULAR CARCINOMA): ICD-10-CM

## 2017-06-06 LAB
APPEARANCE FLD: CLEAR
BASOPHILS NFR FLD MANUAL: 1 %
BODY FLD TYPE: NORMAL
COLOR FLD: YELLOW
LYMPHOCYTES NFR FLD MANUAL: 54 %
MESOTHL CELL NFR FLD MANUAL: 2 %
MONOS+MACROS NFR FLD MANUAL: 24 %
NEUTROPHILS NFR FLD MANUAL: 19 %
PRE FEV1 FVC: 63
PRE FEV1: 1.63
PRE FVC: 2.59
PREDICTED FEV1 FVC: 81
PREDICTED FEV1: 3.29
PREDICTED FVC: 4.06
WBC # FLD: 36 /CU MM

## 2017-06-06 PROCEDURE — 82803 BLOOD GASES ANY COMBINATION: CPT | Mod: S$GLB,,, | Performed by: INTERNAL MEDICINE

## 2017-06-06 PROCEDURE — 49083 ABD PARACENTESIS W/IMAGING: CPT | Mod: NTX,,, | Performed by: NURSE PRACTITIONER

## 2017-06-06 PROCEDURE — 36600 WITHDRAWAL OF ARTERIAL BLOOD: CPT | Mod: S$GLB,,, | Performed by: INTERNAL MEDICINE

## 2017-06-06 PROCEDURE — 94010 BREATHING CAPACITY TEST: CPT | Mod: S$GLB,,, | Performed by: INTERNAL MEDICINE

## 2017-06-06 NOTE — PROCEDURES
Radiology Post-Procedure Note    Pre Op Diagnosis: Ascites  Post Op Diagnosis: Same    Procedure: Ultrasound Guided Paracentesis    Procedure performed by: Jose Jones NP/Bhupinder Dinh MD    Written Informed Consent Obtained: Yes  Specimen Removed: YES clear yellow fluid  Estimated Blood Loss: Minimal    Findings:   Successful paracentesis.  Albumin administered PRN per protocol.    Patient tolerated procedure well.    CHERRIE Diaz, FNP  Interventional Radiology  [phone] [employer]
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
## 2017-06-06 NOTE — DISCHARGE INSTRUCTIONS
[facility] [phone] (MON-FRI 8 AM- 5PM). Radiology Resident on call [phone].

## 2017-06-06 NOTE — PROGRESS NOTES
Pt arrived to IR room 125 for para, no acute distress noted. Orders and labs reviewed on chart. Awaiting consent.

## 2017-06-06 NOTE — PROGRESS NOTES
Paracentesis completed, pt tolerated well. No apparent distress noted. 3 Liters removed from left abd, mepore applied CDI. Labs collected and sent. Albumin not given due to inability to obtain IV access. Team informed of inability to gain IV access and having to stop para at 3 liters.  Discharge instructions reviewed and acknowledged. Pt discharged via wheelchair and private vehicle, accompanied by family.

## 2017-06-07 ENCOUNTER — HOSPITAL ENCOUNTER (INPATIENT)
Facility: HOSPITAL | Age: 57
LOS: 6 days | Discharge: HOME | DRG: 683 | End: 2017-06-13
Attending: INTERNAL MEDICINE | Admitting: INTERNAL MEDICINE
Payer: MEDICARE

## 2017-06-07 DIAGNOSIS — N17.0 ACUTE RENAL FAILURE WITH TUBULAR NECROSIS: ICD-10-CM

## 2017-06-07 DIAGNOSIS — K76.82 HEPATIC ENCEPHALOPATHY: ICD-10-CM

## 2017-06-07 DIAGNOSIS — R57.9 SHOCK: ICD-10-CM

## 2017-06-07 DIAGNOSIS — K76.9 LIVER DISEASE: ICD-10-CM

## 2017-06-07 DIAGNOSIS — R06.02 SOB (SHORTNESS OF BREATH): ICD-10-CM

## 2017-06-07 DIAGNOSIS — C22.0 HCC (HEPATOCELLULAR CARCINOMA): ICD-10-CM

## 2017-06-07 DIAGNOSIS — D69.6 THROMBOCYTOPENIA: ICD-10-CM

## 2017-06-07 DIAGNOSIS — K72.90 DECOMPENSATED HEPATIC CIRRHOSIS: ICD-10-CM

## 2017-06-07 DIAGNOSIS — I95.9 HYPOTENSION: ICD-10-CM

## 2017-06-07 DIAGNOSIS — N17.9 AKI (ACUTE KIDNEY INJURY): ICD-10-CM

## 2017-06-07 DIAGNOSIS — K70.31 ALCOHOLIC CIRRHOSIS OF LIVER WITH ASCITES: ICD-10-CM

## 2017-06-07 DIAGNOSIS — I95.9 HYPOTENSION, UNSPECIFIED HYPOTENSION TYPE: ICD-10-CM

## 2017-06-07 DIAGNOSIS — K74.60 CIRRHOSIS OF LIVER WITH ASCITES: ICD-10-CM

## 2017-06-07 DIAGNOSIS — R18.8 CIRRHOSIS OF LIVER WITH ASCITES: ICD-10-CM

## 2017-06-07 DIAGNOSIS — I48.91 A-FIB: ICD-10-CM

## 2017-06-07 DIAGNOSIS — K74.60 DECOMPENSATED HEPATIC CIRRHOSIS: ICD-10-CM

## 2017-06-07 DIAGNOSIS — R18.8 CIRRHOSIS OF LIVER WITH ASCITES, UNSPECIFIED HEPATIC CIRRHOSIS TYPE: ICD-10-CM

## 2017-06-07 DIAGNOSIS — K74.60 CIRRHOSIS OF LIVER WITH ASCITES, UNSPECIFIED HEPATIC CIRRHOSIS TYPE: ICD-10-CM

## 2017-06-07 PROBLEM — T79.7XXA SUBCUTANEOUS EMPHYSEMA: Status: ACTIVE | Noted: 2017-06-07

## 2017-06-07 PROBLEM — G89.29 CHRONIC PAIN: Status: ACTIVE | Noted: 2017-06-07

## 2017-06-07 LAB
ABO + RH BLD: NORMAL
ALBUMIN SERPL BCP-MCNC: 2.5 G/DL
ALLENS TEST: ABNORMAL
ALP SERPL-CCNC: 167 U/L
ALT SERPL W/O P-5'-P-CCNC: 23 U/L
AMMONIA PLAS-SCNC: 100 UMOL/L
AMMONIA PLAS-SCNC: 127 UMOL/L
AMPHET+METHAMPHET UR QL: NEGATIVE
ANION GAP SERPL CALC-SCNC: 10 MMOL/L
AST SERPL-CCNC: 63 U/L
BACTERIA #/AREA URNS AUTO: ABNORMAL /HPF
BACTERIA #/AREA URNS AUTO: ABNORMAL /HPF
BARBITURATES UR QL SCN>200 NG/ML: NEGATIVE
BASOPHILS # BLD AUTO: 0.03 K/UL
BASOPHILS # BLD AUTO: 0.04 K/UL
BASOPHILS NFR BLD: 0.6 %
BASOPHILS NFR BLD: 0.7 %
BENZODIAZ UR QL SCN>200 NG/ML: NORMAL
BILIRUB SERPL-MCNC: 1.4 MG/DL
BILIRUB UR QL STRIP: NEGATIVE
BILIRUB UR QL STRIP: NEGATIVE
BLD GP AB SCN CELLS X3 SERPL QL: NORMAL
BUN SERPL-MCNC: 38 MG/DL
BZE UR QL SCN: NEGATIVE
CALCIUM SERPL-MCNC: 7.6 MG/DL
CANNABINOIDS UR QL SCN: NEGATIVE
CHLORIDE SERPL-SCNC: 103 MMOL/L
CLARITY UR REFRACT.AUTO: ABNORMAL
CLARITY UR REFRACT.AUTO: ABNORMAL
CO2 SERPL-SCNC: 14 MMOL/L
COLOR UR AUTO: YELLOW
COLOR UR AUTO: YELLOW
CREAT SERPL-MCNC: 3 MG/DL
CREAT UR-MCNC: 107 MG/DL
CREAT UR-MCNC: 21 MG/DL
DELSYS: ABNORMAL
DIFFERENTIAL METHOD: ABNORMAL
DIFFERENTIAL METHOD: ABNORMAL
EOSINOPHIL # BLD AUTO: 0.2 K/UL
EOSINOPHIL # BLD AUTO: 0.3 K/UL
EOSINOPHIL NFR BLD: 4.6 %
EOSINOPHIL NFR BLD: 4.7 %
EOSINOPHIL URNS QL WRIGHT STN: ABNORMAL
ERYTHROCYTE [DISTWIDTH] IN BLOOD BY AUTOMATED COUNT: 14.5 %
ERYTHROCYTE [DISTWIDTH] IN BLOOD BY AUTOMATED COUNT: 14.7 %
EST. GFR  (AFRICAN AMERICAN): 25.3 ML/MIN/1.73 M^2
EST. GFR  (NON AFRICAN AMERICAN): 21.9 ML/MIN/1.73 M^2
ETHANOL UR-MCNC: <10 MG/DL
GLUCOSE SERPL-MCNC: 111 MG/DL
GLUCOSE UR QL STRIP: NEGATIVE
GLUCOSE UR QL STRIP: NEGATIVE
HCO3 UR-SCNC: 16.5 MMOL/L (ref 24–28)
HCT VFR BLD AUTO: 21.3 %
HCT VFR BLD AUTO: 22.2 %
HGB BLD-MCNC: 7.6 G/DL
HGB BLD-MCNC: 7.9 G/DL
HGB UR QL STRIP: ABNORMAL
HGB UR QL STRIP: ABNORMAL
HYALINE CASTS UR QL AUTO: 1 /LPF
HYALINE CASTS UR QL AUTO: 1 /LPF
INR PPP: 1.5
KETONES UR QL STRIP: NEGATIVE
KETONES UR QL STRIP: NEGATIVE
LACTATE SERPL-SCNC: 1 MMOL/L
LEUKOCYTE ESTERASE UR QL STRIP: ABNORMAL
LEUKOCYTE ESTERASE UR QL STRIP: ABNORMAL
LYMPHOCYTES # BLD AUTO: 0.9 K/UL
LYMPHOCYTES # BLD AUTO: 1.1 K/UL
LYMPHOCYTES NFR BLD: 16.5 %
LYMPHOCYTES NFR BLD: 20.7 %
MAGNESIUM SERPL-MCNC: 1.1 MG/DL
MAGNESIUM SERPL-MCNC: 1.9 MG/DL
MCH RBC QN AUTO: 33.5 PG
MCH RBC QN AUTO: 33.9 PG
MCHC RBC AUTO-ENTMCNC: 35.6 %
MCHC RBC AUTO-ENTMCNC: 35.7 %
MCV RBC AUTO: 94 FL
MCV RBC AUTO: 95 FL
METHADONE UR QL SCN>300 NG/ML: NEGATIVE
MICROSCOPIC COMMENT: ABNORMAL
MICROSCOPIC COMMENT: ABNORMAL
MONOCYTES # BLD AUTO: 0.5 K/UL
MONOCYTES # BLD AUTO: 0.7 K/UL
MONOCYTES NFR BLD: 10.8 %
MONOCYTES NFR BLD: 9.6 %
NEUTROPHILS # BLD AUTO: 2.6 K/UL
NEUTROPHILS # BLD AUTO: 4.7 K/UL
NEUTROPHILS NFR BLD: 62.7 %
NEUTROPHILS NFR BLD: 68.3 %
NITRITE UR QL STRIP: NEGATIVE
NITRITE UR QL STRIP: NEGATIVE
OPIATES UR QL SCN: NORMAL
PCO2 BLDA: 29.9 MMHG (ref 35–45)
PCP UR QL SCN>25 NG/ML: NEGATIVE
PH SMN: 7.35 [PH] (ref 7.35–7.45)
PH UR STRIP: 5 [PH] (ref 5–8)
PH UR STRIP: 6 [PH] (ref 5–8)
PHOSPHATE SERPL-MCNC: 3.8 MG/DL
PLATELET # BLD AUTO: 62 K/UL
PLATELET # BLD AUTO: 92 K/UL
PMV BLD AUTO: 8.9 FL
PMV BLD AUTO: 9.5 FL
PO2 BLDA: 37 MMHG (ref 40–60)
POC BE: -9 MMOL/L
POC SATURATED O2: 69 % (ref 95–100)
POC TCO2: 17 MMOL/L (ref 24–29)
POCT GLUCOSE: 130 MG/DL (ref 70–110)
POCT GLUCOSE: 151 MG/DL (ref 70–110)
POCT GLUCOSE: 163 MG/DL (ref 70–110)
POCT GLUCOSE: 80 MG/DL (ref 70–110)
POTASSIUM SERPL-SCNC: 3.9 MMOL/L
PROT SERPL-MCNC: 4.7 G/DL
PROT UR QL STRIP: NEGATIVE
PROT UR QL STRIP: NEGATIVE
PROTHROMBIN TIME: 15.1 SEC
RBC # BLD AUTO: 2.24 M/UL
RBC # BLD AUTO: 2.36 M/UL
RBC #/AREA URNS AUTO: 11 /HPF (ref 0–4)
RBC #/AREA URNS AUTO: 34 /HPF (ref 0–4)
SAMPLE: ABNORMAL
SITE: ABNORMAL
SODIUM SERPL-SCNC: 127 MMOL/L
SODIUM UR-SCNC: <20 MMOL/L
SP GR UR STRIP: 1.01 (ref 1–1.03)
SP GR UR STRIP: 1.01 (ref 1–1.03)
SQUAMOUS #/AREA URNS AUTO: 0 /HPF
TOXICOLOGY INFORMATION: NORMAL
TROPONIN I SERPL DL<=0.01 NG/ML-MCNC: 0.01 NG/ML
TSH SERPL DL<=0.005 MIU/L-ACNC: 1.66 UIU/ML
URN SPEC COLLECT METH UR: ABNORMAL
URN SPEC COLLECT METH UR: ABNORMAL
UROBILINOGEN UR STRIP-ACNC: NEGATIVE EU/DL
UROBILINOGEN UR STRIP-ACNC: NEGATIVE EU/DL
UUN UR-MCNC: 480 MG/DL
WBC # BLD AUTO: 4.16 K/UL
WBC # BLD AUTO: 6.86 K/UL
WBC #/AREA URNS AUTO: 25 /HPF (ref 0–5)
WBC #/AREA URNS AUTO: 30 /HPF (ref 0–5)

## 2017-06-07 PROCEDURE — 84540 ASSAY OF URINE/UREA-N: CPT | Mod: NTX

## 2017-06-07 PROCEDURE — 20000000 HC ICU ROOM: Mod: NTX

## 2017-06-07 PROCEDURE — C9113 INJ PANTOPRAZOLE SODIUM, VIA: HCPCS | Mod: NTX | Performed by: NURSE PRACTITIONER

## 2017-06-07 PROCEDURE — 86850 RBC ANTIBODY SCREEN: CPT | Mod: NTX

## 2017-06-07 PROCEDURE — 99291 CRITICAL CARE FIRST HOUR: CPT | Mod: GC,NTX,, | Performed by: INTERNAL MEDICINE

## 2017-06-07 PROCEDURE — 84484 ASSAY OF TROPONIN QUANT: CPT | Mod: NTX

## 2017-06-07 PROCEDURE — 94761 N-INVAS EAR/PLS OXIMETRY MLT: CPT | Mod: NTX

## 2017-06-07 PROCEDURE — 93005 ELECTROCARDIOGRAM TRACING: CPT | Mod: NTX

## 2017-06-07 PROCEDURE — 87086 URINE CULTURE/COLONY COUNT: CPT | Mod: NTX

## 2017-06-07 PROCEDURE — 87205 SMEAR GRAM STAIN: CPT | Mod: NTX

## 2017-06-07 PROCEDURE — 84300 ASSAY OF URINE SODIUM: CPT | Mod: 91,NTX

## 2017-06-07 PROCEDURE — C1751 CATH, INF, PER/CENT/MIDLINE: HCPCS | Mod: NTX

## 2017-06-07 PROCEDURE — 36415 COLL VENOUS BLD VENIPUNCTURE: CPT | Mod: NTX

## 2017-06-07 PROCEDURE — 25000003 PHARM REV CODE 250: Mod: NTX | Performed by: STUDENT IN AN ORGANIZED HEALTH CARE EDUCATION/TRAINING PROGRAM

## 2017-06-07 PROCEDURE — 25000003 PHARM REV CODE 250: Mod: NTX

## 2017-06-07 PROCEDURE — 82570 ASSAY OF URINE CREATININE: CPT | Mod: 91,NTX

## 2017-06-07 PROCEDURE — 80053 COMPREHEN METABOLIC PANEL: CPT | Mod: NTX

## 2017-06-07 PROCEDURE — 25000003 PHARM REV CODE 250: Mod: NTX | Performed by: NURSE PRACTITIONER

## 2017-06-07 PROCEDURE — 80307 DRUG TEST PRSMV CHEM ANLYZR: CPT | Mod: NTX

## 2017-06-07 PROCEDURE — 85610 PROTHROMBIN TIME: CPT | Mod: NTX

## 2017-06-07 PROCEDURE — 81001 URINALYSIS AUTO W/SCOPE: CPT | Mod: NTX

## 2017-06-07 PROCEDURE — 86920 COMPATIBILITY TEST SPIN: CPT | Mod: NTX

## 2017-06-07 PROCEDURE — 63600175 PHARM REV CODE 636 W HCPCS: Mod: NTX | Performed by: STUDENT IN AN ORGANIZED HEALTH CARE EDUCATION/TRAINING PROGRAM

## 2017-06-07 PROCEDURE — 82803 BLOOD GASES ANY COMBINATION: CPT | Mod: NTX

## 2017-06-07 PROCEDURE — 93010 ELECTROCARDIOGRAM REPORT: CPT | Mod: S$GLB,NTX,, | Performed by: INTERNAL MEDICINE

## 2017-06-07 PROCEDURE — 83735 ASSAY OF MAGNESIUM: CPT | Mod: NTX

## 2017-06-07 PROCEDURE — 99233 SBSQ HOSP IP/OBS HIGH 50: CPT | Mod: GC,NTX,, | Performed by: INTERNAL MEDICINE

## 2017-06-07 PROCEDURE — 36556 INSERT NON-TUNNEL CV CATH: CPT | Mod: NTX

## 2017-06-07 PROCEDURE — 83735 ASSAY OF MAGNESIUM: CPT | Mod: 91,NTX

## 2017-06-07 PROCEDURE — 85025 COMPLETE CBC W/AUTO DIFF WBC: CPT | Mod: 91,NTX

## 2017-06-07 PROCEDURE — 86900 BLOOD TYPING SEROLOGIC ABO: CPT | Mod: NTX

## 2017-06-07 PROCEDURE — 87040 BLOOD CULTURE FOR BACTERIA: CPT | Mod: NTX

## 2017-06-07 PROCEDURE — 84300 ASSAY OF URINE SODIUM: CPT | Mod: NTX

## 2017-06-07 PROCEDURE — 81001 URINALYSIS AUTO W/SCOPE: CPT | Mod: 91,NTX

## 2017-06-07 PROCEDURE — 82570 ASSAY OF URINE CREATININE: CPT | Mod: NTX

## 2017-06-07 PROCEDURE — 83605 ASSAY OF LACTIC ACID: CPT | Mod: NTX

## 2017-06-07 PROCEDURE — 84443 ASSAY THYROID STIM HORMONE: CPT | Mod: NTX

## 2017-06-07 PROCEDURE — 63600175 PHARM REV CODE 636 W HCPCS: Mod: NTX | Performed by: NURSE PRACTITIONER

## 2017-06-07 PROCEDURE — P9047 ALBUMIN (HUMAN), 25%, 50ML: HCPCS | Mod: NTX | Performed by: STUDENT IN AN ORGANIZED HEALTH CARE EDUCATION/TRAINING PROGRAM

## 2017-06-07 PROCEDURE — 84100 ASSAY OF PHOSPHORUS: CPT | Mod: NTX

## 2017-06-07 PROCEDURE — 02H633Z INSERTION OF INFUSION DEVICE INTO RIGHT ATRIUM, PERCUTANEOUS APPROACH: ICD-10-PCS | Performed by: INTERNAL MEDICINE

## 2017-06-07 PROCEDURE — 93010 ELECTROCARDIOGRAM REPORT: CPT | Mod: 76,S$GLB,NTX, | Performed by: INTERNAL MEDICINE

## 2017-06-07 PROCEDURE — 63600175 PHARM REV CODE 636 W HCPCS: Mod: NTX | Performed by: GENERAL ACUTE CARE HOSPITAL

## 2017-06-07 PROCEDURE — 82140 ASSAY OF AMMONIA: CPT | Mod: 91,NTX

## 2017-06-07 RX ORDER — LACTULOSE 10 G/15ML
20 SOLUTION ORAL 3 TIMES DAILY
Status: DISCONTINUED | OUTPATIENT
Start: 2017-06-07 | End: 2017-06-07

## 2017-06-07 RX ORDER — CIPROFLOXACIN 500 MG/1
500 TABLET ORAL DAILY
Status: DISCONTINUED | OUTPATIENT
Start: 2017-06-07 | End: 2017-06-07

## 2017-06-07 RX ORDER — ONDANSETRON 2 MG/ML
4 INJECTION INTRAMUSCULAR; INTRAVENOUS EVERY 8 HOURS PRN
Status: DISCONTINUED | OUTPATIENT
Start: 2017-06-07 | End: 2017-06-13 | Stop reason: HOSPADM

## 2017-06-07 RX ORDER — LIDOCAINE HYDROCHLORIDE 10 MG/ML
INJECTION INFILTRATION; PERINEURAL
Status: COMPLETED
Start: 2017-06-07 | End: 2017-06-07

## 2017-06-07 RX ORDER — OCTREOTIDE ACETATE 100 UG/ML
100 INJECTION, SOLUTION INTRAVENOUS; SUBCUTANEOUS EVERY 8 HOURS
Status: DISCONTINUED | OUTPATIENT
Start: 2017-06-07 | End: 2017-06-13 | Stop reason: HOSPADM

## 2017-06-07 RX ORDER — SODIUM BICARBONATE 650 MG/1
650 TABLET ORAL 2 TIMES DAILY
Status: DISCONTINUED | OUTPATIENT
Start: 2017-06-07 | End: 2017-06-13 | Stop reason: HOSPADM

## 2017-06-07 RX ORDER — FUROSEMIDE 10 MG/ML
80 INJECTION INTRAMUSCULAR; INTRAVENOUS 2 TIMES DAILY
Status: DISCONTINUED | OUTPATIENT
Start: 2017-06-07 | End: 2017-06-07

## 2017-06-07 RX ORDER — PANTOPRAZOLE SODIUM 40 MG/10ML
40 INJECTION, POWDER, LYOPHILIZED, FOR SOLUTION INTRAVENOUS DAILY
Status: DISCONTINUED | OUTPATIENT
Start: 2017-06-07 | End: 2017-06-12

## 2017-06-07 RX ORDER — LIDOCAINE HYDROCHLORIDE 20 MG/ML
INJECTION, SOLUTION INFILTRATION; PERINEURAL
Status: DISPENSED
Start: 2017-06-07 | End: 2017-06-07

## 2017-06-07 RX ORDER — NOREPINEPHRINE BITARTRATE/D5W 4MG/250ML
0.05 PLASTIC BAG, INJECTION (ML) INTRAVENOUS CONTINUOUS
Status: DISCONTINUED | OUTPATIENT
Start: 2017-06-07 | End: 2017-06-10

## 2017-06-07 RX ORDER — LACTULOSE 10 G/15ML
200 SOLUTION ORAL; RECTAL 3 TIMES DAILY
Status: DISCONTINUED | OUTPATIENT
Start: 2017-06-07 | End: 2017-06-09

## 2017-06-07 RX ORDER — PANTOPRAZOLE SODIUM 40 MG/1
40 TABLET, DELAYED RELEASE ORAL DAILY
Status: DISCONTINUED | OUTPATIENT
Start: 2017-06-07 | End: 2017-06-07

## 2017-06-07 RX ORDER — SPIRONOLACTONE 25 MG/1
50 TABLET ORAL DAILY
Status: DISCONTINUED | OUTPATIENT
Start: 2017-06-07 | End: 2017-06-08

## 2017-06-07 RX ORDER — FUROSEMIDE 10 MG/ML
INJECTION INTRAMUSCULAR; INTRAVENOUS
Status: DISPENSED
Start: 2017-06-07 | End: 2017-06-07

## 2017-06-07 RX ORDER — MAGNESIUM SULFATE HEPTAHYDRATE 40 MG/ML
2 INJECTION, SOLUTION INTRAVENOUS ONCE
Status: COMPLETED | OUTPATIENT
Start: 2017-06-07 | End: 2017-06-07

## 2017-06-07 RX ORDER — ALBUMIN HUMAN 250 G/1000ML
100 SOLUTION INTRAVENOUS DAILY
Status: COMPLETED | OUTPATIENT
Start: 2017-06-07 | End: 2017-06-08

## 2017-06-07 RX ADMIN — ALBUMIN (HUMAN) 100 G: 12.5 SOLUTION INTRAVENOUS at 06:06

## 2017-06-07 RX ADMIN — OCTREOTIDE ACETATE 100 MCG: 100 INJECTION, SOLUTION INTRAVENOUS; SUBCUTANEOUS at 10:06

## 2017-06-07 RX ADMIN — VANCOMYCIN HYDROCHLORIDE 1250 MG: 100 INJECTION, POWDER, LYOPHILIZED, FOR SOLUTION INTRAVENOUS at 11:06

## 2017-06-07 RX ADMIN — Medication 0.12 MCG/KG/MIN: at 10:06

## 2017-06-07 RX ADMIN — PIPERACILLIN AND TAZOBACTAM 4.5 G: 4; .5 INJECTION, POWDER, LYOPHILIZED, FOR SOLUTION INTRAVENOUS; PARENTERAL at 08:06

## 2017-06-07 RX ADMIN — Medication 0.08 MCG/KG/MIN: at 08:06

## 2017-06-07 RX ADMIN — PANTOPRAZOLE SODIUM 40 MG: 40 INJECTION, POWDER, FOR SOLUTION INTRAVENOUS at 10:06

## 2017-06-07 RX ADMIN — FUROSEMIDE 80 MG: 10 INJECTION, SOLUTION INTRAVENOUS at 08:06

## 2017-06-07 RX ADMIN — LACTULOSE 200 G: 10 SOLUTION ORAL at 10:06

## 2017-06-07 RX ADMIN — MAGNESIUM SULFATE IN WATER 2 G: 40 INJECTION, SOLUTION INTRAVENOUS at 08:06

## 2017-06-07 RX ADMIN — OCTREOTIDE ACETATE 100 MCG: 100 INJECTION, SOLUTION INTRAVENOUS; SUBCUTANEOUS at 02:06

## 2017-06-07 RX ADMIN — ONDANSETRON 4 MG: 2 INJECTION INTRAMUSCULAR; INTRAVENOUS at 10:06

## 2017-06-07 RX ADMIN — PIPERACILLIN AND TAZOBACTAM 4.5 G: 4; .5 INJECTION, POWDER, LYOPHILIZED, FOR SOLUTION INTRAVENOUS; PARENTERAL at 12:06

## 2017-06-07 RX ADMIN — LACTULOSE 200 G: 10 SOLUTION ORAL at 02:06

## 2017-06-07 RX ADMIN — OCTREOTIDE ACETATE 50 MCG/HR: 1000 INJECTION, SOLUTION INTRAVENOUS; SUBCUTANEOUS at 08:06

## 2017-06-07 RX ADMIN — Medication 0.1 MCG/KG/MIN: at 04:06

## 2017-06-07 RX ADMIN — LIDOCAINE HYDROCHLORIDE 200 MG: 10 INJECTION, SOLUTION INFILTRATION; PERINEURAL at 09:06

## 2017-06-07 NOTE — PROGRESS NOTES
Pt calm, cooperative. Pt opens eyes to voice. Following all commands, nods/gestures appropriately. Moving all extremities freely/equally. Plan of care reviewed with pt. See flowsheet for details.
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
## 2017-06-07 NOTE — PROCEDURES
"Tommy Hopper is a 57 y.o. male patient.    Temp: 97.6 °F (36.4 °C) (06/07/17 0425)  Pulse: 79 (06/07/17 0800)  Resp: 17 (06/07/17 0800)  BP: (!) 75/50 (06/07/17 0800)  SpO2: 98 % (06/07/17 0800)  Weight: 87 kg (191 lb 12.8 oz) (06/07/17 0600)  Height: 5' 6" (167.6 cm) (06/07/17 0844)       Central Line  Date/Time: 6/7/2017 8:51 AM  Location procedure was performed: [facility] CRITICAL CARE MEDICINE  Performed by: CEZAR GOMEZ  Assisting provider: MARCELLUS ROD  Pre-operative Diagnosis: sepsis  Post-operative diagnosis: sepsis  Consent Done: Emergent Situation  Time out: Immediately prior to procedure a "time out" was called to verify the correct patient, procedure, equipment, support staff and site/side marked as required.  Indications: vascular access  Description of findings: none   Preparation: skin prepped with ChloraPrep  Location details: right internal jugular  Catheter type: triple lumen  Ultrasound guidance: yes  Vessel Caliber: large, mediumManometry: Yes  Number of attempts: 1  Technical procedures used: sterile  Significant surgical tasks conducted by the assistant(s): none  Complications: none  Estimated blood loss (mL): 1  Specimens: No  Implants: No  Complications: No  Comments: Line overlaying right atria; will move back 2-3cm          Cezar Gomez  6/7/2017  "

## 2017-06-07 NOTE — HOSPITAL COURSE
Patient found to be hypotensive SBP 70-80s, so started on levophed 6/7. GYPSY on CKD, so started on albumin and octreotide 6/7 for suspected hepatorenal syndrome. Resumed bicarb tabs, lactulose, and rifaximin. Holding home lasix and aldactone. Patient evaluated by hepatology and they are recommending repeat paracentesis and echo. Echo does not show any decreased EF or wall motion abnormality to account for patient's hypotension. Bedside parcentesis completed 6/8 and fluid showing  and segs 67%, Cx pending. Patient evaluated by nephrology and etiology of GYPSY felt to be ischemic ATN due to hypotension and anemia, thus cannot diagnose hepatorenal syndrome at this time. Fena 2%, in support of intrinsic disease. US of kidneys 6/7 shows bilateral medical renal disease. No dialysis indicated at this time. Patient failed nursing bedside swallow test and unable to place NGT, so receiving lactulose enemas. Consulted speech for swallow study- recommend dental soft liquid diet. Started on vanc and zosyn  6/7. Paracentesis for 5L on 6/10. Patient off pressors for over 24hrs, back to baseline neurological function, and ready for step-down today.

## 2017-06-07 NOTE — CARE UPDATE
Right IJ central line in right atrium on chest xray.  Pulled line back from 15 cm to 11 cm per sterile technique.  Patient tolerated well.  Repeat chest xray pending.     MIGUELINA Pena APRN, [facility]  Critical Care Medicine  [phone]

## 2017-06-07 NOTE — PROGRESS NOTES
Consult for skin tears  Nurse Annmarie and family present. With pt verbal permission, assessed and photographed wounds.  Left arm with skin tear and resolving stage 2 pressure to left elbow that family reports to be related to a fall pt had at home.  Right knee with resolving scabbed abrasion which family reports to be related to fall at home.   Left scrotum with ulceration.  Unknown cause. Family not aware this wound existed.  See flowsheet below for assessment.  Discussed recommendations with Dr. Nassar.    Recommendations:  1. Hydrocolloid to left arm and elbow  2. Triad to scrotum bid  3. Leave knee scabbed abrasion open to air.                           06/07/17 1632       Wound 06/07/17 Skin tear lower arm   Date First Assessed: 06/07/17   Pre-existing: Yes  Wound Type: Skin tear  Side: Left  Orientation: lower  Location: arm   Wound WDL ex   Dressing Appearance dressing loose   Drainage Amount small   Drainage Characteristics/Odor serosanguineous   Wound Base moist;pink   Periwound Area ecchymotic   Wound Length (cm) 8.2   Wound Width (cm) 4.5   Depth (cm) 0.1   Cleansed W/ sterile normal saline   Dressing hydrocolloid       Wound 06/07/17 scrotum   Date First Assessed: 06/07/17   Side: Left  Location: scrotum   Wound WDL ex   Dressing Appearance no dressing   Drainage Amount small   Drainage Characteristics/Odor serosanguineous;yellow   Wound Base moist;yellow;pink   Periwound Area intact   Wound Length (cm) 3   Wound Width (cm) 1.5   Depth (cm) 0.1   Cleansed W/ sterile normal saline   Dressing other (see comments)  (Triad)       Wound 06/07/17 Abrasion(s) knee   Date First Assessed: 06/07/17   Wound Type: Abrasion(s)  Side: Right  Location: knee   Wound WDL ex   Dressing Appearance no dressing   Drainage Amount none   Wound Base dry;other (see comments)  (scab)   Wound Length (cm) 2   Wound Width (cm) 1.4   Depth (cm) 0   Dressing none       Pressure Ulcer 06/07/17 Left elbow Stage II   Date First  Assessed: 06/07/17   Pressure Ulcer Present on Admission: yes  Side: Left  Location: elbow  Staging: Stage II   Staging Stage II   Healing Pressure Ulcer yes   Pressure Ulcer Risk Factors activity;mobility;nutrition;shear/friction   Dressing Appearance dressing loose   Drainage Amount scant   Drainage Characteristics/Odor serosanguineous   Appearance pink   Periwound Area intact   Wound Length (cm) 1.4   Wound Width (cm) 1   Depth (cm) 0.1   Dressing hydrocolloid

## 2017-06-07 NOTE — PROGRESS NOTES
Pt transferred via EMS to SICU room 6075. Pt connected to ICU monitors and oriented to room. Skin tears noted to left forearm and elbow upon arrival. Bandages applied. Critical Care service notified and awaiting orders at this time. Will continue to monitor pt closely.

## 2017-06-07 NOTE — PROGRESS NOTES
Notified Critical Care of BP 75/49, MAP 58. Will hold scheduled Lasix. Also notified of AM labs. No new orders at this time. Will continue to monitor pt closely.

## 2017-06-07 NOTE — PLAN OF CARE
[facility] [place] [address]  [address]  [place] [address]  Phone: [phone] Fax: [phone]    Payor: [employer] MANAGED MEDICARE / Plan: HU[employer] MEDICARE HMO / Product Type: Capitation /     Future Appointments  Date Time Provider Department Center   7/26/2017 9:40 AM [facility] CT6 MOBILE LIMIT 450 LBS [facility] CT SCAN Kaleb edilberto   8/4/2017 11:00 AM Keely Luna MD [facility] NEPHRO Kaleb [facility]     Extended Emergency Contact Information  Primary Emergency Contact: Fani Hopper  Address: [id number] [place] [address]           MARTHA RESTREPO [address] [facility]  Home Phone: [phone]  Mobile Phone: [phone]  Relation: Spouse     06/07/17 1454   Discharge Assessment   Assessment Type Discharge Planning Assessment   Confirmed/corrected address and phone number on facesheet? Yes   Assessment information obtained from? Caregiver;Medical Record   Expected Length of Stay (days) 4   Communicated expected length of stay with patient/caregiver no   Prior to hospitilization cognitive status: Alert/Oriented   Prior to hospitalization functional status: Assistive Equipment   Current cognitive status: Not Oriented to Place;Not Oriented to Time   Current Functional Status: Needs Assistance;Assistive Equipment   Arrived From home or self-care   Lives With spouse   Able to Return to Prior Arrangements unable to determine at this time (comments)   Is patient able to care for self after discharge? No   How many people do you have in your home that can help with your care after discharge? 1   Who are your caregiver(s) and their phone number(s)? Fani Hopper (Spouse) [phone]; [phone]    Patient's perception of discharge disposition home health;home or selfcare   Readmission Within The Last 30 Days no previous admission in last 30 days   Patient currently being followed by outpatient case management? No   Patient currently receives home health services? Yes   Patient previously received home  health services and would like to resume services if necessary? Yes   If yes, name of home health provider: Fahad REEVES   Does the patient currently use HME? No   Patient currently receives private duty nursing? N/A   Patient currently receives any other outside agency services? No   Equipment Currently Used at Home cane, straight   Do you have any problems affording any of your prescribed medications? No   Is the patient taking medications as prescribed? yes   Do you have any financial concerns preventing you from receiving the healthcare you need? No   Does the patient have transportation to healthcare appointments? Yes   Transportation Available family or friend will provide   On Dialysis? No   Does the patient receive services at the Coumadin Clinic? No   Are there any open cases? No   Discharge Plan A Home Health   Discharge Plan B Home with family   Patient/Family In Agreement With Plan yes   Steffi Elmore RN, BSN  Case Management  [facility]  Ext. [phone]

## 2017-06-07 NOTE — ASSESSMENT & PLAN NOTE
- alcoholic liver disease   - MELD-Na score: 29 at 6/7/2017  4:57 AM  MELD score: 23 at 6/7/2017  4:57 AM  Calculated from:  Serum Creatinine: 3.0 mg/dL at 6/7/2017  4:57 AM  Serum Sodium: 127 mmol/L at 6/7/2017  4:57 AM  Total Bilirubin: 1.4 mg/dL at 6/7/2017  4:57 AM  INR(ratio): 1.5 at 6/7/2017  4:57 AM  Age: 57 years  - hepatology consult   - continue lactulose and rifaximin   - resume lasix and aldactone
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
## 2017-06-07 NOTE — TELEPHONE ENCOUNTER
Reviewed labs from earlier (2:30 PM) today.  Seems patient has GYPSY, ATN vs hepatorenal syndrome, creatinine has continued to increase from 1.7 to 3.3 despite holding diuretics.  Patient was in IR earlier today for paracentesis when 3 liters of yellow fluid was drained, albumin could not be given because of lack of IV access.  Ascitic fluid WBC count is 36 with 19% segs, not showing SBP. Labs also show hyponatremia, and hypoalbuminemia.      I called Mr. Hopper, talked to his wife, Mrs. Hopper, they live in [place] [facility].  She sounded tired from having driven the distance back home.  I told her to take her  to the nearest ER, he needs to be transferred to [facility] for further evaluation and treatment of GYPSY.  She showed understanding, she said she would take him to [facility] in [place], [place].  I have called our transfer center and our ER and informed both to expect arrival of Mr. Hopper.
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
## 2017-06-07 NOTE — ASSESSMENT & PLAN NOTE
GYPSY on baseline Cr of 0.8  Diuretics held in April due to elevation in Cr to 1.3. Clinic notes also document decreasing SBP to 100s during this time.   Over the past 48 hrs GYPSY due to ischemic ATN due to hypotension with SBP 80-90s and anemia.   Urine sediment shows casts in support of ATN diagnosis.   Cr has improved this am. Continue to support BP with Levophed and transition to midodrine when able to take po.   Unable to diagnose HRS in light of current ATN. Will also get renal ultrasound.       No indication for RRT at this time.   Avoid IV contrast, gadolinium, NSAIDS, Bactrim, phosphate enemas and aminoglycosides.   Strict ins/outs.   Dose medications to GFR

## 2017-06-07 NOTE — ASSESSMENT & PLAN NOTE
- probalble related to hepatorenal syndrome   - CXR : showed increase congestion   - started on albumin and octerotide   - start lasix 80 bid and monitor UOP

## 2017-06-07 NOTE — HPI
Mr Ruchi 57 y.o with history of alcoholic cirrhosis, HCV, HCC s/p TACE x 2(10/3/16, 4/17/17) who is transferred for volume overload and hypotension. Patient had outpatient paracentesis done yesterday 3 L removed , did not receive albumin because unable to get IV access. His wife states that he has worsening lower limb swelling , abdominal distension, and SOB. She states that he is not taking he lasix and aldactone and it was held from the clinic since April, 17. She reports that Dr. Oconnor recommend to come to hospital for evaluation.   Patient roxana chest pain or fever, reports chills and feeling cold. Reports back pain. No chest pain,

## 2017-06-07 NOTE — PLAN OF CARE
Problem: Patient Care Overview  Goal: Plan of Care Review  Outcome: Ongoing (interventions implemented as appropriate)  Pt arrived via EMS to SICU. Pt is AAOx4, follows commands, but is lethargic. Abdomen is distended and taut. Pt on RA, SpO2 > 95%.  Labs and cultures drawn and sent. CXR obtained. 12 lead EKG obtained. Awaiting central access at this time. Pt updated on POC. Will continue to monitor.

## 2017-06-07 NOTE — HPI
Mr Ruchi 57 y.o with Hx of ETOH  cirrhosis, HCV, HCC s/p TACE x 2(10/3/16, 4/17/17). He is a patient of Dr Oconnor in Hepatology. His lasix and aldactone was held April 17 due to elevation in Cr in March and April from baseline of 0.8 to 1.3. Since holding lasix he has had increasing edema, SOB and ascites. His last paracentesis was 5/26 and he had increasing LE edema and abd swelling. He had labs drawn on 5/26 that showed elevation in Cr to 1.7 and yesterday was called by Dr Oconnor to report to the nearest hospital for evaluation. He presented to [facility] yesterday and was transferred to [facility] for paracentesis. Paracentesis was done yesterday by IR but due to inability to get IV and albumin infusion- only 3 liters was removed and he was admitted. He was found to have hypotension upon evaluation and admitted to the ICU. He has been started on levophed and albumin and octreotide. His Cr increased to 3.3 yesterday and today has improved to 3.0.   Urine sediment shows hyaline and muddy brown casts due to ATN.

## 2017-06-08 LAB
ALBUMIN FLD-MCNC: 0.8 G/DL
ALBUMIN SERPL BCP-MCNC: 3 G/DL
ALLENS TEST: ABNORMAL
ALP SERPL-CCNC: 141 U/L
ALT SERPL W/O P-5'-P-CCNC: 19 U/L
AMMONIA PLAS-SCNC: 54 UMOL/L
ANION GAP SERPL CALC-SCNC: 11 MMOL/L
APPEARANCE FLD: CLEAR
AST SERPL-CCNC: 48 U/L
BACTERIA UR CULT: NO GROWTH
BASOPHILS # BLD AUTO: 0.05 K/UL
BASOPHILS # BLD AUTO: 0.05 K/UL
BASOPHILS # BLD AUTO: 0.06 K/UL
BASOPHILS # BLD AUTO: 0.06 K/UL
BASOPHILS NFR BLD: 0.3 %
BASOPHILS NFR BLD: 0.4 %
BASOPHILS NFR BLD: 0.6 %
BASOPHILS NFR BLD: 0.7 %
BILIRUB SERPL-MCNC: 4 MG/DL
BODY FLD TYPE: NORMAL
BUN SERPL-MCNC: 38 MG/DL
CALCIUM SERPL-MCNC: 8 MG/DL
CHLORIDE SERPL-SCNC: 103 MMOL/L
CO2 SERPL-SCNC: 16 MMOL/L
COLOR FLD: YELLOW
CREAT SERPL-MCNC: 2.9 MG/DL
CREAT UR-MCNC: 66 MG/DL
DELSYS: ABNORMAL
DIASTOLIC DYSFUNCTION: NO
DIFFERENTIAL METHOD: ABNORMAL
EOSINOPHIL # BLD AUTO: 0.1 K/UL
EOSINOPHIL # BLD AUTO: 0.1 K/UL
EOSINOPHIL # BLD AUTO: 0.2 K/UL
EOSINOPHIL # BLD AUTO: 0.2 K/UL
EOSINOPHIL NFR BLD: 0.2 %
EOSINOPHIL NFR BLD: 0.8 %
EOSINOPHIL NFR BLD: 2.3 %
EOSINOPHIL NFR BLD: 3.1 %
ERYTHROCYTE [DISTWIDTH] IN BLOOD BY AUTOMATED COUNT: 14.7 %
ERYTHROCYTE [DISTWIDTH] IN BLOOD BY AUTOMATED COUNT: 14.9 %
ERYTHROCYTE [SEDIMENTATION RATE] IN BLOOD BY WESTERGREN METHOD: 21 MM/H
EST. GFR  (AFRICAN AMERICAN): 26.4 ML/MIN/1.73 M^2
EST. GFR  (NON AFRICAN AMERICAN): 22.8 ML/MIN/1.73 M^2
ESTIMATED PA SYSTOLIC PRESSURE: 43.52
FIO2: 28
FLOW: 2
GLUCOSE FLD-MCNC: 130 MG/DL
GLUCOSE SERPL-MCNC: 136 MG/DL
GRAM STN SPEC: NORMAL
HCO3 UR-SCNC: 18.9 MMOL/L (ref 24–28)
HCT VFR BLD AUTO: 18.1 %
HCT VFR BLD AUTO: 19.1 %
HCT VFR BLD AUTO: 19.7 %
HCT VFR BLD AUTO: 21.1 %
HGB BLD-MCNC: 6.5 G/DL
HGB BLD-MCNC: 6.7 G/DL
HGB BLD-MCNC: 7 G/DL
HGB BLD-MCNC: 7.6 G/DL
INR PPP: 1.6
LYMPHOCYTES # BLD AUTO: 1 K/UL
LYMPHOCYTES # BLD AUTO: 1 K/UL
LYMPHOCYTES # BLD AUTO: 1.1 K/UL
LYMPHOCYTES # BLD AUTO: 1.2 K/UL
LYMPHOCYTES NFR BLD: 11.5 %
LYMPHOCYTES NFR BLD: 15.7 %
LYMPHOCYTES NFR BLD: 4.6 %
LYMPHOCYTES NFR BLD: 8.6 %
LYMPHOCYTES NFR FLD MANUAL: 22 %
MAGNESIUM SERPL-MCNC: 1.5 MG/DL
MCH RBC QN AUTO: 33 PG
MCH RBC QN AUTO: 33.2 PG
MCH RBC QN AUTO: 33.3 PG
MCH RBC QN AUTO: 33.6 PG
MCHC RBC AUTO-ENTMCNC: 35.1 %
MCHC RBC AUTO-ENTMCNC: 35.5 %
MCHC RBC AUTO-ENTMCNC: 35.9 %
MCHC RBC AUTO-ENTMCNC: 36 %
MCV RBC AUTO: 92 FL
MCV RBC AUTO: 93 FL
MCV RBC AUTO: 94 FL
MCV RBC AUTO: 94 FL
MITRAL VALVE REGURGITATION: ABNORMAL
MODE: ABNORMAL
MONOCYTES # BLD AUTO: 0.6 K/UL
MONOCYTES # BLD AUTO: 0.8 K/UL
MONOCYTES # BLD AUTO: 1 K/UL
MONOCYTES # BLD AUTO: 1.1 K/UL
MONOCYTES NFR BLD: 4.7 %
MONOCYTES NFR BLD: 7.2 %
MONOCYTES NFR BLD: 9 %
MONOCYTES NFR BLD: 9.8 %
MONOS+MACROS NFR FLD MANUAL: 11 %
NEUTROPHILS # BLD AUTO: 11.8 K/UL
NEUTROPHILS # BLD AUTO: 20 K/UL
NEUTROPHILS # BLD AUTO: 4.8 K/UL
NEUTROPHILS # BLD AUTO: 6.4 K/UL
NEUTROPHILS NFR BLD: 71.2 %
NEUTROPHILS NFR BLD: 75.8 %
NEUTROPHILS NFR BLD: 82.6 %
NEUTROPHILS NFR BLD: 90.2 %
NEUTROPHILS NFR FLD MANUAL: 67 %
PCO2 BLDA: 31.6 MMHG (ref 35–45)
PH SMN: 7.38 [PH] (ref 7.35–7.45)
PHOSPHATE SERPL-MCNC: 4.9 MG/DL
PLATELET # BLD AUTO: 103 K/UL
PLATELET # BLD AUTO: 53 K/UL
PLATELET # BLD AUTO: 57 K/UL
PLATELET # BLD AUTO: 83 K/UL
PMV BLD AUTO: 8.3 FL
PMV BLD AUTO: 8.4 FL
PMV BLD AUTO: 8.5 FL
PMV BLD AUTO: 9 FL
PO2 BLDA: 37 MMHG (ref 40–60)
POC BE: -6 MMOL/L
POC SATURATED O2: 70 % (ref 95–100)
POC TCO2: 20 MMOL/L (ref 24–29)
POCT GLUCOSE: 168 MG/DL (ref 70–110)
POCT GLUCOSE: 192 MG/DL (ref 70–110)
POTASSIUM SERPL-SCNC: 3.6 MMOL/L
PROT FLD-MCNC: 1 G/DL
PROT SERPL-MCNC: 4.9 G/DL
PROT UR-MCNC: 19 MG/DL
PROT/CREAT RATIO, UR: 0.29
PROTHROMBIN TIME: 16.3 SEC
RBC # BLD AUTO: 1.96 M/UL
RBC # BLD AUTO: 2.03 M/UL
RBC # BLD AUTO: 2.1 M/UL
RBC # BLD AUTO: 2.26 M/UL
RETIRED EF AND QEF - SEE NOTES: 60 (ref 55–65)
SAMPLE: ABNORMAL
SITE: ABNORMAL
SODIUM SERPL-SCNC: 130 MMOL/L
SODIUM UR-SCNC: <20 MMOL/L
SP02: 97
SPECIMEN SOURCE: NORMAL
TRICUSPID VALVE REGURGITATION: ABNORMAL
VANCOMYCIN SERPL-MCNC: 10.9 UG/ML
WBC # BLD AUTO: 14.25 K/UL
WBC # BLD AUTO: 22.31 K/UL
WBC # BLD AUTO: 6.76 K/UL
WBC # BLD AUTO: 8.44 K/UL
WBC # FLD: 110 /CU MM

## 2017-06-08 PROCEDURE — 99232 SBSQ HOSP IP/OBS MODERATE 35: CPT | Mod: GC,NTX,, | Performed by: INTERNAL MEDICINE

## 2017-06-08 PROCEDURE — 87070 CULTURE OTHR SPECIMN AEROBIC: CPT | Mod: NTX

## 2017-06-08 PROCEDURE — 63600175 PHARM REV CODE 636 W HCPCS: Mod: NTX | Performed by: STUDENT IN AN ORGANIZED HEALTH CARE EDUCATION/TRAINING PROGRAM

## 2017-06-08 PROCEDURE — 80202 ASSAY OF VANCOMYCIN: CPT | Mod: NTX

## 2017-06-08 PROCEDURE — 85610 PROTHROMBIN TIME: CPT | Mod: NTX

## 2017-06-08 PROCEDURE — 87205 SMEAR GRAM STAIN: CPT | Mod: NTX

## 2017-06-08 PROCEDURE — 85025 COMPLETE CBC W/AUTO DIFF WBC: CPT | Mod: NTX

## 2017-06-08 PROCEDURE — 80053 COMPREHEN METABOLIC PANEL: CPT | Mod: NTX

## 2017-06-08 PROCEDURE — 20000000 HC ICU ROOM: Mod: NTX

## 2017-06-08 PROCEDURE — 82042 OTHER SOURCE ALBUMIN QUAN EA: CPT | Mod: NTX

## 2017-06-08 PROCEDURE — 89051 BODY FLUID CELL COUNT: CPT | Mod: NTX

## 2017-06-08 PROCEDURE — 49082 ABD PARACENTESIS: CPT | Mod: NTX,,, | Performed by: INTERNAL MEDICINE

## 2017-06-08 PROCEDURE — 82140 ASSAY OF AMMONIA: CPT | Mod: NTX

## 2017-06-08 PROCEDURE — 82945 GLUCOSE OTHER FLUID: CPT | Mod: NTX

## 2017-06-08 PROCEDURE — 93306 TTE W/DOPPLER COMPLETE: CPT | Mod: 26,NTX,, | Performed by: INTERNAL MEDICINE

## 2017-06-08 PROCEDURE — 63600175 PHARM REV CODE 636 W HCPCS: Mod: NTX | Performed by: GENERAL ACUTE CARE HOSPITAL

## 2017-06-08 PROCEDURE — 25000003 PHARM REV CODE 250: Mod: NTX | Performed by: STUDENT IN AN ORGANIZED HEALTH CARE EDUCATION/TRAINING PROGRAM

## 2017-06-08 PROCEDURE — 93306 TTE W/DOPPLER COMPLETE: CPT | Mod: NTX

## 2017-06-08 PROCEDURE — 84157 ASSAY OF PROTEIN OTHER: CPT | Mod: NTX

## 2017-06-08 PROCEDURE — 87075 CULTR BACTERIA EXCEPT BLOOD: CPT | Mod: NTX

## 2017-06-08 PROCEDURE — 99233 SBSQ HOSP IP/OBS HIGH 50: CPT | Mod: 25,GC,NTX, | Performed by: INTERNAL MEDICINE

## 2017-06-08 PROCEDURE — C9113 INJ PANTOPRAZOLE SODIUM, VIA: HCPCS | Mod: NTX | Performed by: NURSE PRACTITIONER

## 2017-06-08 PROCEDURE — 25000003 PHARM REV CODE 250: Mod: NTX | Performed by: NURSE PRACTITIONER

## 2017-06-08 PROCEDURE — P9047 ALBUMIN (HUMAN), 25%, 50ML: HCPCS | Mod: NTX | Performed by: STUDENT IN AN ORGANIZED HEALTH CARE EDUCATION/TRAINING PROGRAM

## 2017-06-08 PROCEDURE — 25000003 PHARM REV CODE 250: Mod: NTX

## 2017-06-08 PROCEDURE — 83735 ASSAY OF MAGNESIUM: CPT | Mod: NTX

## 2017-06-08 PROCEDURE — 84100 ASSAY OF PHOSPHORUS: CPT | Mod: NTX

## 2017-06-08 PROCEDURE — 92610 EVALUATE SWALLOWING FUNCTION: CPT | Mod: NTX

## 2017-06-08 PROCEDURE — 63600175 PHARM REV CODE 636 W HCPCS: Mod: NTX | Performed by: NURSE PRACTITIONER

## 2017-06-08 PROCEDURE — 0W9G3ZZ DRAINAGE OF PERITONEAL CAVITY, PERCUTANEOUS APPROACH: ICD-10-PCS | Performed by: INTERNAL MEDICINE

## 2017-06-08 RX ORDER — MAGNESIUM SULFATE HEPTAHYDRATE 40 MG/ML
2 INJECTION, SOLUTION INTRAVENOUS ONCE
Status: COMPLETED | OUTPATIENT
Start: 2017-06-08 | End: 2017-06-08

## 2017-06-08 RX ORDER — ALBUMIN HUMAN 250 G/1000ML
100 SOLUTION INTRAVENOUS DAILY
Status: DISCONTINUED | OUTPATIENT
Start: 2017-06-09 | End: 2017-06-08

## 2017-06-08 RX ORDER — ALBUMIN HUMAN 250 G/1000ML
25 SOLUTION INTRAVENOUS EVERY 6 HOURS
Status: DISCONTINUED | OUTPATIENT
Start: 2017-06-08 | End: 2017-06-13 | Stop reason: HOSPADM

## 2017-06-08 RX ORDER — LIDOCAINE HYDROCHLORIDE 10 MG/ML
INJECTION INFILTRATION; PERINEURAL
Status: COMPLETED
Start: 2017-06-08 | End: 2017-06-08

## 2017-06-08 RX ORDER — LIDOCAINE HYDROCHLORIDE 10 MG/ML
1 INJECTION INFILTRATION; PERINEURAL ONCE
Status: COMPLETED | OUTPATIENT
Start: 2017-06-08 | End: 2017-06-08

## 2017-06-08 RX ADMIN — ONDANSETRON 4 MG: 2 INJECTION INTRAMUSCULAR; INTRAVENOUS at 09:06

## 2017-06-08 RX ADMIN — RIFAXIMIN 550 MG: 550 TABLET ORAL at 09:06

## 2017-06-08 RX ADMIN — MAGNESIUM SULFATE IN WATER 2 G: 40 INJECTION, SOLUTION INTRAVENOUS at 09:06

## 2017-06-08 RX ADMIN — OCTREOTIDE ACETATE 100 MCG: 100 INJECTION, SOLUTION INTRAVENOUS; SUBCUTANEOUS at 09:06

## 2017-06-08 RX ADMIN — PANTOPRAZOLE SODIUM 40 MG: 40 INJECTION, POWDER, FOR SOLUTION INTRAVENOUS at 09:06

## 2017-06-08 RX ADMIN — ALBUMIN (HUMAN) 25 G: 12.5 SOLUTION INTRAVENOUS at 11:06

## 2017-06-08 RX ADMIN — VANCOMYCIN HYDROCHLORIDE 1250 MG: 100 INJECTION, POWDER, LYOPHILIZED, FOR SOLUTION INTRAVENOUS at 06:06

## 2017-06-08 RX ADMIN — OCTREOTIDE ACETATE 100 MCG: 100 INJECTION, SOLUTION INTRAVENOUS; SUBCUTANEOUS at 01:06

## 2017-06-08 RX ADMIN — Medication 0.12 MCG/KG/MIN: at 04:06

## 2017-06-08 RX ADMIN — ALBUMIN (HUMAN) 100 G: 12.5 SOLUTION INTRAVENOUS at 05:06

## 2017-06-08 RX ADMIN — SODIUM BICARBONATE 650 MG: 650 TABLET ORAL at 09:06

## 2017-06-08 RX ADMIN — OCTREOTIDE ACETATE 100 MCG: 100 INJECTION, SOLUTION INTRAVENOUS; SUBCUTANEOUS at 05:06

## 2017-06-08 RX ADMIN — LACTULOSE 200 G: 10 SOLUTION ORAL at 05:06

## 2017-06-08 RX ADMIN — LACTULOSE 200 G: 10 SOLUTION ORAL at 01:06

## 2017-06-08 RX ADMIN — LACTULOSE 200 G: 10 SOLUTION ORAL at 09:06

## 2017-06-08 RX ADMIN — PIPERACILLIN AND TAZOBACTAM 4.5 G: 4; .5 INJECTION, POWDER, LYOPHILIZED, FOR SOLUTION INTRAVENOUS; PARENTERAL at 11:06

## 2017-06-08 RX ADMIN — PIPERACILLIN AND TAZOBACTAM 4.5 G: 4; .5 INJECTION, POWDER, LYOPHILIZED, FOR SOLUTION INTRAVENOUS; PARENTERAL at 03:06

## 2017-06-08 RX ADMIN — PIPERACILLIN AND TAZOBACTAM 4.5 G: 4; .5 INJECTION, POWDER, LYOPHILIZED, FOR SOLUTION INTRAVENOUS; PARENTERAL at 07:06

## 2017-06-08 RX ADMIN — ALBUMIN (HUMAN) 25 G: 12.5 SOLUTION INTRAVENOUS at 05:06

## 2017-06-08 RX ADMIN — Medication 0.1 MCG/KG/MIN: at 02:06

## 2017-06-08 RX ADMIN — LIDOCAINE HYDROCHLORIDE 1 ML: 10 INJECTION, SOLUTION INFILTRATION; PERINEURAL at 04:06

## 2017-06-08 RX ADMIN — ONDANSETRON 4 MG: 2 INJECTION INTRAMUSCULAR; INTRAVENOUS at 10:06

## 2017-06-08 RX ADMIN — LIDOCAINE HYDROCHLORIDE 1 ML: 10 INJECTION INFILTRATION; PERINEURAL at 04:06

## 2017-06-08 NOTE — ASSESSMENT & PLAN NOTE
- nephrology consulted- GYPSY is 2/2 ischemic ATN from hypotension and anemia, given this cannot diagnosed hepatorenal syndrome at this time  - US retroperitoneum 6/7 shows bilateral medical renal disease  - CXR on admission: showed increase congestion   - continue albumin and octerotide   - holding lasix and aldactone  - continue to monitor UO- 2.9 cc/24hrs  - continue levophed- 0.1 most recently

## 2017-06-08 NOTE — PROGRESS NOTES
Dr. HAVEN Linares at bedside. MD notified of pts current vs, gtts, labs, UOP, mental status. Plan of care reviewed with pt and family. See flowsheet for details.

## 2017-06-08 NOTE — ASSESSMENT & PLAN NOTE
- Hb today 7 (7.6 yesterday)  - baseline ~9-10  - patient asymptomatic- denies SOB, abdo pain, hemoptysis, hematochezia, hematuria  - continue to monitor with q 8 CBCs

## 2017-06-08 NOTE — PROGRESS NOTES
Notified Critical Care resident of AM labs and VBG results. No new orders at this time. Will continue to monitor pt closely.

## 2017-06-08 NOTE — PLAN OF CARE
Problem: Patient Care Overview  Goal: Plan of Care Review  Outcome: Ongoing (interventions implemented as appropriate)  VSS, NAD overnight. Pt still very lethargic, hard to keep awake. Disoriented to time and situation, follows commands. Denies pain. Moves all extremities freely.  Pt traveled to CT for pelvic. HR 50-60s. Levo @ 0.12 mcg/kg/min to maintain MAP > 60-65. Pt on 2 L NC, SpO2 95%. Berger with adequate UO. BMS inserted for lactulose administration. POC reviewed with pt and spouse, all questions and concerns addressed. See flowsheets and notes for details. Will continue to monitor pt closely.
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
## 2017-06-08 NOTE — PROGRESS NOTES
Notified Critical Care resident of CBC results. No new orders at this time. Will continue to monitor.
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
## 2017-06-08 NOTE — PLAN OF CARE
Problem: Patient Care Overview  Goal: Plan of Care Review  Outcome: Ongoing (interventions implemented as appropriate)  Patient continued on levo gtt to maintain MAP >65. Magnesium replaced. Albumin given throughout shift. IV antibiotics given as ordered. Lactulose enema given via BMS with effective results. IV zofran given for prn nausea/vomitings. CBC monitored q8. MD aware of latest H&H results. No new orders at this time. Paracentesis performed at bedside with 3 L of fluid removed. Samples sent to lab. VSS. Patient and spouse updated on POC. Will continue to monitor.

## 2017-06-08 NOTE — ASSESSMENT & PLAN NOTE
- alcoholic liver disease, HCV  - MELD-Na score: 30 at 6/8/2017  3:42 AM  MELD score: 27 at 6/8/2017  3:42 AM  Calculated from:  Serum Creatinine: 2.9 mg/dL at 6/8/2017  3:42 AM  Serum Sodium: 130 mmol/L at 6/8/2017  3:42 AM  Total Bilirubin: 4.0 mg/dL at 6/8/2017  3:42 AM  INR(ratio): 1.6 at 6/8/2017  3:42 AM  Age: 57 years  - hepatology consult- recommend echo (ordered), paracentesis, continue IV albumin, continue aggressive medical therapy since patient is still a candidate for liver transplant at this time, his transplant evaluation is deferred for now and is pending return of nicotine laboratory metabolites to ensure patient is no longer smoking (he reports quit date of April 2017)  - continue lactulose, sodium bicarb tabs, and rifaximin   - holding aldactone and lasix in setting of hypotension  - continue pressors- levophed

## 2017-06-08 NOTE — PROGRESS NOTES
Hepatology  Progress note      SUBJECTIVE:     Reason for Consult: ESLD    History of Present Illness:    Patient is a 57 y.o. male w/ a history of ETOH+HCV+HCC cirrhosis who [place] [facility] [place].    The patient has previously been presented to Selection Committee -- deferred due to concerns with smoking history (patient now states he is 6 weeks without smoking) and CT evidence of UIP.      That patient reports fatigue.  No fevers, chills, rigors.  No abdominal pain.       Interval Hx:  No acute events overnight.  Patient somnolent this AM.         OBJECTIVE:     Vital Signs (Most Recent)  Temp: 98.1 °F (36.7 °C) (06/08/17 1100)  Pulse: 61 (06/08/17 1115)  Resp: (!) 25 (06/08/17 1115)  BP: 95/62 (06/08/17 1115)  SpO2: (!) 92 % (06/08/17 1115)    Physical Exam:  General appearance: chronically ill appearing, no acute distress  Eyes: anicteric sclerae  Lungs: bronchial breath sounds, productive cough  Heart: regular rate and rhythm, S1, S2 clearly audible,  Abdomen: soft, non-tender, distended, moderately tense; no rebound tenderness, rigidity, or guarding  Pulses: 2+ and symmetric  Skin: Skin color, texture, turgor normal.   Neurologic: No focal deficits noted      Laboratory    CBC:     Recent Labs  Lab 06/07/17  1420 06/07/17  2354 06/08/17  0722   WBC 6.86 22.31* 14.25*   RBC 2.36* 2.26* 2.10*   HGB 7.9* 7.6* 7.0*   HCT 22.2* 21.1* 19.7*   PLT 92* 103* 83*   MCV 94 93 94   MCH 33.5* 33.6* 33.3*   MCHC 35.6 36.0 35.5     BMP:     Recent Labs  Lab 06/06/17  1430 06/07/17  0457  06/08/17  0342   * 111*  --  136*   * 127*  --  130*   K 3.5 3.9  --  3.6   CL 97 103  --  103   CO2 18* 14*  --  16*   BUN 39* 38*  --  38*   CREATININE 3.3* 3.0*  --  2.9*   CALCIUM 7.9* 7.6*  --  8.0*   MG  --  1.1*  < > 1.5*   < > = values in this interval not displayed.  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   Appreciate Nephrology recommendations  Await blood and urine cultures  Await ECHO read given hypotension and bradycardia  Recheck Nicotine metabolites      David Resendiz   Gastroenterology Fellow PGY V  [phone]

## 2017-06-08 NOTE — PROGRESS NOTES
Pt transported to 2nd floor CT scan with RN and PCT. Pt connected to portable monitor and supplemental O2 with nasal cannula. Pt tolerated CT and travel well. Pt returned to ICU 6075. Reconnected to ICU monitor and O2. VSS, NAD noted. Will continue to monitor.

## 2017-06-08 NOTE — PROGRESS NOTES
Pts HR <50. Pts HR sustaining in the 40s with AFib. MAPs 60-65. Pt denies pain/discomfort. Following commands. Moving all extremities freely/equally. Answering questions appropriately. MIGUELINA Pena NP notified. EKG ordered.
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
## 2017-06-08 NOTE — ASSESSMENT & PLAN NOTE
- hypervolemic , hyponatremia - improved today to 130  - in setting of decompensated cirrhosis   - fluid restriction 1500 cc

## 2017-06-08 NOTE — PHYSICIAN QUERY
PT Name: Tommy Hopper  MR #: [id number]    Physician Query Form - Perfusion Diagnosis Clarification     CDS/: Isis Keita RN                 Contact information:[email or web address]  This form is a permanent document in the medical record.     Query Date: June 8, 2017    By submitting this query, we are merely seeking further clarification of documentation. Please utilize your independent clinical judgment when addressing the question(s) below.    The medical record contains the following:    Indicators   Supporting Clinical Findings   Location in Medical Record   x Acute Illness (e.g. AMI, Sepsis, etc.) Patient admitted with acute on chronic renal failure in the setting of hypotension and bradycardia. Currently on pressor therapy.  Septic w/u with empiric antimicrobial therapy     Patient had outpatient paracentesis done yesterday 3 L removed , did not receive albumin because unable to get IV access Hepatology CN 6/7            CC H&P 6/7    Acidosis documented      ABGs / Labs      Vital Signs     x Hypotension or Low Blood Pressure documented Hypotension - titrate pressors. Ddx includes sepsis - broaden abx.    Patient found to be hypotensive SBP 70-80s CC H&P 6/7    Altered Mental Status or Confusion      Diaphoresis, Cold Extremities or Cyanosis      Oliguria     x Medication/Treatment:  -Vasopressors  -Inotropic Drugs  -IV Fluids  -Cardiac Assist Devices  -Hemodynamic Monitoring  -Blood/Blood Products started on levophed. GYPSY on CKD started on albumin and octreotide for hepatorenal syndrome, and lasix . Resume lactulose and rifaximin.  CC H&P 6/7   x Other: Discussed with ICU the current long-term plan for management if patient's renal function does not improve with management of shock and requires RRT Hepatology CN 6/7     Provider, please specify diagnosis or diagnoses associated with above clinical findings.    [  ] Hypovolemic Shock  [  ] Septic Shock  [  ] Other Shock (please specify):  _________________________________  [ x ] Shock Unspecified  [  ] Other Condition (please specify): ___________________________________  [  ] Clinically Undetermined    Please document in your progress notes daily for the duration of treatment until resolved and include in your discharge summary.

## 2017-06-08 NOTE — PHYSICIAN QUERY
PT Name: Tommy Hopper  MR #: [id number]  Physician Query Form - CKD Clarification     CDS/: Isis Keita RN                Contact information:[email or web address]  This form is a permanent document in the medical record.     Query Date: June 8, 2017    By submitting this query, we are merely seeking further clarification of documentation. Please utilize your independent clinical judgment when addressing the question(s) below.    The Medical record contains the following:     Indicators   Supporting Clinical Findings   Location in Medical Record   x CKD or Chronic Kidney (Renal) Failure / Disease GYPSY on CKD started on albumin and octreotide for hepatorenal syndrome, and lasix  CC H&P 6/7    BUN/Creatinine                          GFR      Dehydration      Nausea / Vomiting      Dialysis / CRRT     x Medication - started on albumin and octerotide   - start lasix 80 bid and monitor UOP  CC H&P 6/7    Treatment     x Other Chronic Conditions history of alcoholic cirrhosis, HCV, HCC s/p TACE x 2(10/3/16, 4/17/17) who is transferred for volume overload and hypotension CC H&P 6/7   x Other His lasix and aldactone was held April 17 due to elevation in Cr in March and April from baseline of 0.8 to 1.3 Nephrology CN 6/7     Provider, please further specify the stage of CKD.      [  ] Chronic Kidney Disease (CKD) (please specify stage* below)       [employer] Definitions  Stage Description  eGFR (mL/min)   [  ]     I Slight kidney damage with normal or increased filtration 90+   [  ]     II Mildly reduced kidney function 60-89   [ x ]     III Moderately reduced kidney function 30-59   [  ]     IV Severely reduced kidney function 15-29   [  ]     V Kidney failure, requiring transplant or dialysis <15     [  ] CKD on Chronic Hemodialysis (please specify stage*): __________  [  ] End Stage Renal Disease (ESRD)  [  ] Other (please specify): ________________________  [  ] Clinically Undetermined    Please  document in your progress notes daily for the duration of treatment until resolved and include in your discharge summary.

## 2017-06-08 NOTE — PROCEDURES
"Tommy Hopper is a 57 y.o. male patient.    Temp: 98.1 °F (36.7 °C) (17 1500)  Pulse: 61 (17 1700)  Resp: (!) 28 (17 1700)  BP: 104/63 (17 1700)  SpO2: 95 % (17 1700)  Weight: 87 kg (191 lb 12.8 oz) (17 0600)  Height: 5' 6" (167.6 cm) (17 0844)       Paracentesis  Date/Time: 2017 5:06 PM  Performed by: JOANA NASSAR  Authorized by: JOANA NASSAR   Consent Done: Yes  Consent: Verbal consent obtained. Written consent obtained.  Consent given by: spouse  Patient understanding: patient states understanding of the procedure being performed  Patient consent: the patient's understanding of the procedure matches consent given  Procedure consent: procedure consent matches procedure scheduled  Relevant documents: relevant documents present and verified  Test results: test results available and properly labeled  Site marked: the operative site was marked  Imaging studies: imaging studies available  Required items: required blood products, implants, devices, and special equipment available  Patient identity confirmed: , MRN and name  Time out: Immediately prior to procedure a "time out" was called to verify the correct patient, procedure, equipment, support staff and site/side marked as required.  Initial or subsequent exam: subsequent  Procedure purpose: diagnostic and therapeutic  Indications: abdominal discomfort secondary to ascites  Anesthesia: local infiltration    Anesthesia:  Anesthesia: local infiltration  Local Anesthetic: lidocaine 1% without epinephrine   Patient sedated: no  Preparation: Patient was prepped and draped in the usual sterile fashion.  Ultrasound guidance: yes  Puncture site: right lower quadrant  Fluid removed: 3000(ml)  Fluid appearance: serous  Complications: No  Estimated blood loss (mL): 0  Specimens: Yes  Patient tolerance: Patient tolerated the procedure well with no immediate complications          Joana Nassar  2017 "   IM PGY-1

## 2017-06-08 NOTE — PLAN OF CARE
Problem: SLP Goal  Goal: SLP Goal  Swallow eval completed. Pt safe for dental soft diet and thin liquids with aspiration precautions.     MEENU Wing, CCC-SLP  6/8/2017

## 2017-06-09 PROBLEM — R06.02 SOB (SHORTNESS OF BREATH): Status: ACTIVE | Noted: 2017-06-09

## 2017-06-09 PROBLEM — N17.0 ACUTE RENAL FAILURE WITH TUBULAR NECROSIS: Status: ACTIVE | Noted: 2017-06-07

## 2017-06-09 LAB
ALBUMIN SERPL BCP-MCNC: 4 G/DL
ALP SERPL-CCNC: 86 U/L
ALT SERPL W/O P-5'-P-CCNC: 12 U/L
ANION GAP SERPL CALC-SCNC: 13 MMOL/L
ANION GAP SERPL CALC-SCNC: 13 MMOL/L
AST SERPL-CCNC: 31 U/L
BASOPHILS # BLD AUTO: 0.04 K/UL
BASOPHILS # BLD AUTO: 0.05 K/UL
BASOPHILS # BLD AUTO: 0.07 K/UL
BASOPHILS NFR BLD: 0.5 %
BASOPHILS NFR BLD: 0.7 %
BASOPHILS NFR BLD: 1.1 %
BILIRUB SERPL-MCNC: 4.4 MG/DL
BLD PROD TYP BPU: NORMAL
BLOOD UNIT EXPIRATION DATE: NORMAL
BLOOD UNIT TYPE CODE: 6200
BLOOD UNIT TYPE: NORMAL
BUN SERPL-MCNC: 34 MG/DL
BUN SERPL-MCNC: 37 MG/DL
CALCIUM SERPL-MCNC: 8.5 MG/DL
CALCIUM SERPL-MCNC: 8.5 MG/DL
CHLORIDE SERPL-SCNC: 104 MMOL/L
CHLORIDE SERPL-SCNC: 105 MMOL/L
CO2 SERPL-SCNC: 17 MMOL/L
CO2 SERPL-SCNC: 17 MMOL/L
CODING SYSTEM: NORMAL
CREAT SERPL-MCNC: 2.3 MG/DL
CREAT SERPL-MCNC: 2.5 MG/DL
DIFFERENTIAL METHOD: ABNORMAL
DISPENSE STATUS: NORMAL
EOSINOPHIL # BLD AUTO: 0.3 K/UL
EOSINOPHIL NFR BLD: 3.2 %
EOSINOPHIL NFR BLD: 3.4 %
EOSINOPHIL NFR BLD: 4.3 %
ERYTHROCYTE [DISTWIDTH] IN BLOOD BY AUTOMATED COUNT: 15.5 %
ERYTHROCYTE [DISTWIDTH] IN BLOOD BY AUTOMATED COUNT: 15.6 %
ERYTHROCYTE [DISTWIDTH] IN BLOOD BY AUTOMATED COUNT: 16 %
EST. GFR  (AFRICAN AMERICAN): 31.5 ML/MIN/1.73 M^2
EST. GFR  (AFRICAN AMERICAN): 34.9 ML/MIN/1.73 M^2
EST. GFR  (NON AFRICAN AMERICAN): 27.3 ML/MIN/1.73 M^2
EST. GFR  (NON AFRICAN AMERICAN): 30.2 ML/MIN/1.73 M^2
GLUCOSE SERPL-MCNC: 113 MG/DL
GLUCOSE SERPL-MCNC: 135 MG/DL
HCT VFR BLD AUTO: 20.9 %
HCT VFR BLD AUTO: 20.9 %
HCT VFR BLD AUTO: 21.6 %
HGB BLD-MCNC: 7.3 G/DL
HGB BLD-MCNC: 7.4 G/DL
HGB BLD-MCNC: 7.6 G/DL
INR PPP: 1.8
LYMPHOCYTES # BLD AUTO: 1 K/UL
LYMPHOCYTES # BLD AUTO: 1.1 K/UL
LYMPHOCYTES # BLD AUTO: 1.1 K/UL
LYMPHOCYTES NFR BLD: 12.9 %
LYMPHOCYTES NFR BLD: 14.3 %
LYMPHOCYTES NFR BLD: 15.4 %
MAGNESIUM SERPL-MCNC: 1.6 MG/DL
MAGNESIUM SERPL-MCNC: 1.7 MG/DL
MAGNESIUM SERPL-MCNC: 1.9 MG/DL
MCH RBC QN AUTO: 32.3 PG
MCH RBC QN AUTO: 32.7 PG
MCH RBC QN AUTO: 32.9 PG
MCHC RBC AUTO-ENTMCNC: 34.9 %
MCHC RBC AUTO-ENTMCNC: 35.2 %
MCHC RBC AUTO-ENTMCNC: 35.4 %
MCV RBC AUTO: 93 FL
MCV RBC AUTO: 93 FL
MCV RBC AUTO: 94 FL
MONOCYTES # BLD AUTO: 0.6 K/UL
MONOCYTES # BLD AUTO: 0.7 K/UL
MONOCYTES # BLD AUTO: 0.7 K/UL
MONOCYTES NFR BLD: 8.9 %
MONOCYTES NFR BLD: 8.9 %
MONOCYTES NFR BLD: 9.3 %
NEUTROPHILS # BLD AUTO: 4.6 K/UL
NEUTROPHILS # BLD AUTO: 5.5 K/UL
NEUTROPHILS # BLD AUTO: 6 K/UL
NEUTROPHILS NFR BLD: 69.6 %
NEUTROPHILS NFR BLD: 72.4 %
NEUTROPHILS NFR BLD: 74.1 %
PHOSPHATE SERPL-MCNC: 4.2 MG/DL
PLATELET # BLD AUTO: 55 K/UL
PLATELET # BLD AUTO: 56 K/UL
PLATELET # BLD AUTO: 62 K/UL
PMV BLD AUTO: 8.8 FL
PMV BLD AUTO: 9.3 FL
PMV BLD AUTO: 9.7 FL
POCT GLUCOSE: 127 MG/DL (ref 70–110)
POCT GLUCOSE: 150 MG/DL (ref 70–110)
POCT GLUCOSE: 158 MG/DL (ref 70–110)
POTASSIUM SERPL-SCNC: 3 MMOL/L
POTASSIUM SERPL-SCNC: 3 MMOL/L
POTASSIUM SERPL-SCNC: 3.1 MMOL/L
PROT SERPL-MCNC: 5.2 G/DL
PROTHROMBIN TIME: 17.9 SEC
RBC # BLD AUTO: 2.26 M/UL
RBC # BLD AUTO: 2.26 M/UL
RBC # BLD AUTO: 2.31 M/UL
SODIUM SERPL-SCNC: 134 MMOL/L
SODIUM SERPL-SCNC: 135 MMOL/L
TRANS ERYTHROCYTES VOL PATIENT: NORMAL ML
WBC # BLD AUTO: 6.56 K/UL
WBC # BLD AUTO: 7.64 K/UL
WBC # BLD AUTO: 8.13 K/UL

## 2017-06-09 PROCEDURE — P9047 ALBUMIN (HUMAN), 25%, 50ML: HCPCS | Mod: NTX | Performed by: STUDENT IN AN ORGANIZED HEALTH CARE EDUCATION/TRAINING PROGRAM

## 2017-06-09 PROCEDURE — 80048 BASIC METABOLIC PNL TOTAL CA: CPT | Mod: NTX

## 2017-06-09 PROCEDURE — 20000000 HC ICU ROOM: Mod: NTX

## 2017-06-09 PROCEDURE — 97161 PT EVAL LOW COMPLEX 20 MIN: CPT | Mod: NTX

## 2017-06-09 PROCEDURE — 83735 ASSAY OF MAGNESIUM: CPT | Mod: 91,NTX

## 2017-06-09 PROCEDURE — 63600175 PHARM REV CODE 636 W HCPCS: Mod: NTX | Performed by: GENERAL ACUTE CARE HOSPITAL

## 2017-06-09 PROCEDURE — 63600175 PHARM REV CODE 636 W HCPCS: Mod: NTX | Performed by: STUDENT IN AN ORGANIZED HEALTH CARE EDUCATION/TRAINING PROGRAM

## 2017-06-09 PROCEDURE — 97116 GAIT TRAINING THERAPY: CPT | Mod: NTX

## 2017-06-09 PROCEDURE — 25000242 PHARM REV CODE 250 ALT 637 W/ HCPCS: Mod: NTX | Performed by: GENERAL ACUTE CARE HOSPITAL

## 2017-06-09 PROCEDURE — 85025 COMPLETE CBC W/AUTO DIFF WBC: CPT | Mod: 91,NTX

## 2017-06-09 PROCEDURE — 25000003 PHARM REV CODE 250: Mod: NTX | Performed by: NURSE PRACTITIONER

## 2017-06-09 PROCEDURE — 25000003 PHARM REV CODE 250: Mod: NTX | Performed by: STUDENT IN AN ORGANIZED HEALTH CARE EDUCATION/TRAINING PROGRAM

## 2017-06-09 PROCEDURE — P9021 RED BLOOD CELLS UNIT: HCPCS | Mod: NTX

## 2017-06-09 PROCEDURE — 63600175 PHARM REV CODE 636 W HCPCS: Mod: NTX | Performed by: NURSE PRACTITIONER

## 2017-06-09 PROCEDURE — 83735 ASSAY OF MAGNESIUM: CPT | Mod: NTX

## 2017-06-09 PROCEDURE — 94761 N-INVAS EAR/PLS OXIMETRY MLT: CPT | Mod: NTX

## 2017-06-09 PROCEDURE — 99233 SBSQ HOSP IP/OBS HIGH 50: CPT | Mod: GC,NTX,, | Performed by: INTERNAL MEDICINE

## 2017-06-09 PROCEDURE — 84100 ASSAY OF PHOSPHORUS: CPT | Mod: NTX

## 2017-06-09 PROCEDURE — 97530 THERAPEUTIC ACTIVITIES: CPT | Mod: NTX

## 2017-06-09 PROCEDURE — 99233 SBSQ HOSP IP/OBS HIGH 50: CPT | Mod: 25,GC,NTX, | Performed by: INTERNAL MEDICINE

## 2017-06-09 PROCEDURE — 27000221 HC OXYGEN, UP TO 24 HOURS: Mod: NTX

## 2017-06-09 PROCEDURE — C9113 INJ PANTOPRAZOLE SODIUM, VIA: HCPCS | Mod: NTX | Performed by: NURSE PRACTITIONER

## 2017-06-09 PROCEDURE — 94640 AIRWAY INHALATION TREATMENT: CPT | Mod: NTX

## 2017-06-09 PROCEDURE — 63600175 PHARM REV CODE 636 W HCPCS: Mod: NTX | Performed by: INTERNAL MEDICINE

## 2017-06-09 PROCEDURE — 84132 ASSAY OF SERUM POTASSIUM: CPT | Mod: NTX

## 2017-06-09 PROCEDURE — 85610 PROTHROMBIN TIME: CPT | Mod: NTX

## 2017-06-09 PROCEDURE — 36430 TRANSFUSION BLD/BLD COMPNT: CPT | Mod: NTX

## 2017-06-09 PROCEDURE — 25000003 PHARM REV CODE 250: Mod: NTX | Performed by: INTERNAL MEDICINE

## 2017-06-09 PROCEDURE — 97166 OT EVAL MOD COMPLEX 45 MIN: CPT | Mod: NTX

## 2017-06-09 PROCEDURE — 80053 COMPREHEN METABOLIC PANEL: CPT | Mod: NTX

## 2017-06-09 PROCEDURE — 25000003 PHARM REV CODE 250: Mod: NTX | Performed by: GENERAL ACUTE CARE HOSPITAL

## 2017-06-09 RX ORDER — MAGNESIUM SULFATE HEPTAHYDRATE 40 MG/ML
4 INJECTION, SOLUTION INTRAVENOUS
Status: DISCONTINUED | OUTPATIENT
Start: 2017-06-09 | End: 2017-06-09

## 2017-06-09 RX ORDER — IPRATROPIUM BROMIDE AND ALBUTEROL SULFATE 2.5; .5 MG/3ML; MG/3ML
3 SOLUTION RESPIRATORY (INHALATION) EVERY 6 HOURS PRN
Status: DISCONTINUED | OUTPATIENT
Start: 2017-06-09 | End: 2017-06-13 | Stop reason: HOSPADM

## 2017-06-09 RX ORDER — POTASSIUM CHLORIDE 7.45 MG/ML
20 INJECTION INTRAVENOUS
Status: DISCONTINUED | OUTPATIENT
Start: 2017-06-09 | End: 2017-06-10

## 2017-06-09 RX ORDER — POTASSIUM CHLORIDE 20 MEQ/15ML
40 SOLUTION ORAL
Status: DISCONTINUED | OUTPATIENT
Start: 2017-06-09 | End: 2017-06-09

## 2017-06-09 RX ORDER — POTASSIUM CHLORIDE 7.45 MG/ML
10 INJECTION INTRAVENOUS
Status: DISCONTINUED | OUTPATIENT
Start: 2017-06-09 | End: 2017-06-09

## 2017-06-09 RX ORDER — SODIUM,POTASSIUM PHOSPHATES 280-250MG
2 POWDER IN PACKET (EA) ORAL
Status: DISCONTINUED | OUTPATIENT
Start: 2017-06-09 | End: 2017-06-09

## 2017-06-09 RX ORDER — POTASSIUM CHLORIDE 7.45 MG/ML
40 INJECTION INTRAVENOUS
Status: DISCONTINUED | OUTPATIENT
Start: 2017-06-09 | End: 2017-06-09

## 2017-06-09 RX ORDER — POTASSIUM CHLORIDE 20 MEQ/15ML
60 SOLUTION ORAL
Status: DISCONTINUED | OUTPATIENT
Start: 2017-06-09 | End: 2017-06-09

## 2017-06-09 RX ORDER — MAGNESIUM SULFATE HEPTAHYDRATE 40 MG/ML
2 INJECTION, SOLUTION INTRAVENOUS
Status: COMPLETED | OUTPATIENT
Start: 2017-06-09 | End: 2017-06-10

## 2017-06-09 RX ORDER — MAGNESIUM SULFATE HEPTAHYDRATE 40 MG/ML
2 INJECTION, SOLUTION INTRAVENOUS
Status: DISCONTINUED | OUTPATIENT
Start: 2017-06-09 | End: 2017-06-09

## 2017-06-09 RX ORDER — POTASSIUM CHLORIDE 7.45 MG/ML
20 INJECTION INTRAVENOUS
Status: DISPENSED | OUTPATIENT
Start: 2017-06-09 | End: 2017-06-09

## 2017-06-09 RX ORDER — POTASSIUM CHLORIDE 14.9 MG/ML
20 INJECTION INTRAVENOUS ONCE
Status: DISCONTINUED | OUTPATIENT
Start: 2017-06-09 | End: 2017-06-09

## 2017-06-09 RX ORDER — OXYCODONE HYDROCHLORIDE 5 MG/1
15 TABLET ORAL EVERY 6 HOURS PRN
Status: DISCONTINUED | OUTPATIENT
Start: 2017-06-09 | End: 2017-06-10

## 2017-06-09 RX ORDER — LACTULOSE 10 G/15ML
20 SOLUTION ORAL 3 TIMES DAILY
Status: DISCONTINUED | OUTPATIENT
Start: 2017-06-09 | End: 2017-06-13 | Stop reason: HOSPADM

## 2017-06-09 RX ORDER — MIDODRINE HYDROCHLORIDE 5 MG/1
10 TABLET ORAL 3 TIMES DAILY
Status: DISCONTINUED | OUTPATIENT
Start: 2017-06-09 | End: 2017-06-13 | Stop reason: HOSPADM

## 2017-06-09 RX ORDER — HYDROCODONE BITARTRATE AND ACETAMINOPHEN 500; 5 MG/1; MG/1
TABLET ORAL
Status: DISCONTINUED | OUTPATIENT
Start: 2017-06-09 | End: 2017-06-10

## 2017-06-09 RX ADMIN — OXYCODONE HYDROCHLORIDE 15 MG: 5 TABLET ORAL at 10:06

## 2017-06-09 RX ADMIN — PIPERACILLIN AND TAZOBACTAM 4.5 G: 4; .5 INJECTION, POWDER, LYOPHILIZED, FOR SOLUTION INTRAVENOUS; PARENTERAL at 08:06

## 2017-06-09 RX ADMIN — Medication 0.04 MCG/KG/MIN: at 03:06

## 2017-06-09 RX ADMIN — OCTREOTIDE ACETATE 100 MCG: 100 INJECTION, SOLUTION INTRAVENOUS; SUBCUTANEOUS at 09:06

## 2017-06-09 RX ADMIN — ONDANSETRON 4 MG: 2 INJECTION INTRAMUSCULAR; INTRAVENOUS at 09:06

## 2017-06-09 RX ADMIN — MIDODRINE HYDROCHLORIDE 10 MG: 5 TABLET ORAL at 01:06

## 2017-06-09 RX ADMIN — MAGNESIUM SULFATE IN WATER 2 G: 40 INJECTION, SOLUTION INTRAVENOUS at 11:06

## 2017-06-09 RX ADMIN — RIFAXIMIN 550 MG: 550 TABLET ORAL at 08:06

## 2017-06-09 RX ADMIN — PANTOPRAZOLE SODIUM 40 MG: 40 INJECTION, POWDER, FOR SOLUTION INTRAVENOUS at 09:06

## 2017-06-09 RX ADMIN — OXYCODONE HYDROCHLORIDE 15 MG: 5 TABLET ORAL at 08:06

## 2017-06-09 RX ADMIN — POTASSIUM CHLORIDE 20 MEQ: 10 INJECTION, SOLUTION INTRAVENOUS at 01:06

## 2017-06-09 RX ADMIN — VANCOMYCIN HYDROCHLORIDE 1250 MG: 100 INJECTION, POWDER, LYOPHILIZED, FOR SOLUTION INTRAVENOUS at 05:06

## 2017-06-09 RX ADMIN — LACTULOSE 20 G: 10 SOLUTION ORAL at 09:06

## 2017-06-09 RX ADMIN — IPRATROPIUM BROMIDE AND ALBUTEROL SULFATE 3 ML: .5; 3 SOLUTION RESPIRATORY (INHALATION) at 08:06

## 2017-06-09 RX ADMIN — PIPERACILLIN AND TAZOBACTAM 4.5 G: 4; .5 INJECTION, POWDER, LYOPHILIZED, FOR SOLUTION INTRAVENOUS; PARENTERAL at 03:06

## 2017-06-09 RX ADMIN — ALBUMIN (HUMAN) 25 G: 12.5 SOLUTION INTRAVENOUS at 05:06

## 2017-06-09 RX ADMIN — SODIUM BICARBONATE 650 MG: 650 TABLET ORAL at 08:06

## 2017-06-09 RX ADMIN — ALBUMIN (HUMAN) 25 G: 12.5 SOLUTION INTRAVENOUS at 11:06

## 2017-06-09 RX ADMIN — SODIUM BICARBONATE 650 MG: 650 TABLET ORAL at 09:06

## 2017-06-09 RX ADMIN — MAGNESIUM SULFATE IN WATER 2 G: 40 INJECTION, SOLUTION INTRAVENOUS at 09:06

## 2017-06-09 RX ADMIN — ALBUMIN (HUMAN) 25 G: 12.5 SOLUTION INTRAVENOUS at 12:06

## 2017-06-09 RX ADMIN — POTASSIUM CHLORIDE 10 MEQ: 10 INJECTION, SOLUTION INTRAVENOUS at 05:06

## 2017-06-09 RX ADMIN — MIDODRINE HYDROCHLORIDE 10 MG: 5 TABLET ORAL at 09:06

## 2017-06-09 RX ADMIN — OCTREOTIDE ACETATE 100 MCG: 100 INJECTION, SOLUTION INTRAVENOUS; SUBCUTANEOUS at 01:06

## 2017-06-09 RX ADMIN — RIFAXIMIN 550 MG: 550 TABLET ORAL at 09:06

## 2017-06-09 RX ADMIN — PIPERACILLIN AND TAZOBACTAM 4.5 G: 4; .5 INJECTION, POWDER, LYOPHILIZED, FOR SOLUTION INTRAVENOUS; PARENTERAL at 12:06

## 2017-06-09 RX ADMIN — OCTREOTIDE ACETATE 100 MCG: 100 INJECTION, SOLUTION INTRAVENOUS; SUBCUTANEOUS at 05:06

## 2017-06-09 RX ADMIN — ONDANSETRON 4 MG: 2 INJECTION INTRAMUSCULAR; INTRAVENOUS at 08:06

## 2017-06-09 RX ADMIN — LACTULOSE 200 G: 10 SOLUTION ORAL at 01:06

## 2017-06-09 RX ADMIN — POTASSIUM CHLORIDE 20 MEQ: 10 INJECTION, SOLUTION INTRAVENOUS at 09:06

## 2017-06-09 RX ADMIN — IPRATROPIUM BROMIDE AND ALBUTEROL SULFATE 3 ML: .5; 3 SOLUTION RESPIRATORY (INHALATION) at 05:06

## 2017-06-09 RX ADMIN — POTASSIUM CHLORIDE 10 MEQ: 10 INJECTION, SOLUTION INTRAVENOUS at 11:06

## 2017-06-09 RX ADMIN — LACTULOSE 200 G: 10 SOLUTION ORAL at 05:06

## 2017-06-09 NOTE — ASSESSMENT & PLAN NOTE
- back pain, chronic   - holding MS contin due to renal/hepatic failure   - resumed home Roxicodone, 15 mg q 6 hrs PRN
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
## 2017-06-09 NOTE — PLAN OF CARE
Problem: Patient Care Overview  Goal: Plan of Care Review  Outcome: Ongoing (interventions implemented as appropriate)  No acute events throughout shift. Levo gtt currently off maintaining MAP >65. PO midodrine started. Patient transitioned to dental soft diet and tolerated fine. Electrolytes replaced. UOP monitored hourly (see flowsheet). Continued lactulose enemas. PRN breathing treatments given for wheezing. CXR done. VSS. Patient and spouse updated on POC. Will continue to monitor.

## 2017-06-09 NOTE — PLAN OF CARE
Problem: Physical Therapy Goal  Goal: Physical Therapy Goal  Goals to be met by: 17     Patient will increase functional independence with mobility by performin. Supine to sit with Contact Guard Assistance  2. Sit to supine with Contact Guard Assistance  3. Sit to stand transfer with Contact Guard Assistance  4. Bed to chair transfer with Contact Guard Assistance using Rolling Walker  5. Gait  x 100 feet with Contact Guard Assistance using Rolling Walker.   6. Lower extremity exercise program x15 reps, with supervision    Outcome: Ongoing (interventions implemented as appropriate)  PT eval completed. Goals initiated. Will continue to progress as tolerated.

## 2017-06-09 NOTE — PLAN OF CARE
Problem: Patient Care Overview  Goal: Plan of Care Review  Outcome: Revised  Patient and patient's spouse updated on patient's plan of care including medications, need for blood transfusion, and comfort. No acute events over night. Patient is on 4L NC with oxygen saturations ranging from 91-96%. Levo gtt continued and is currently at 0.05. Patient received 1 unit of PRBC's due to Hgb level of 6.5 and Hct of 18.1. Wife at the bedside throughout the night. All questions and concerns acknowledged and answered. Will continue to monitor.

## 2017-06-09 NOTE — ASSESSMENT & PLAN NOTE
- nephrology consulted- YGPSY is 2/2 ischemic ATN from hypotension and anemia, given this cannot diagnosed hepatorenal syndrome at this time  - US retroperitoneum 6/7 shows bilateral medical renal disease  - CXR on admission: showed increase congestion   - continue albumin and octerotide   - holding lasix and aldactone  - Cr today improved to 2.5 (2.9 yesterday), bline ~ 1-1.3  - continue to monitor UO- 2.5 L /24hrs  - weaning levophed- 0.04 most recently, started midodrine today
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
## 2017-06-09 NOTE — PROGRESS NOTES
Hepatology  Progress note      SUBJECTIVE:     Reason for Consult: ESLD    History of Present Illness:    Patient is a 57 y.o. male w/ a history of ETOH+HCV+HCC cirrhosis who was admitted to [facility] with an GYPSY.    The patient has previously been presented to Selection Committee -- deferred due to concerns with smoking history (patient now states he is 6 weeks without smoking) and CT evidence of UIP.      That patient reports fatigue.  No fevers, chills, rigors.  No abdominal pain.       Interval Hx:  No acute events overnight.  Patient somnolent this AM.         OBJECTIVE:     Vital Signs (Most Recent)  Temp: 98.1 °F (36.7 °C) (06/09/17 1100)  Pulse: 61 (06/09/17 1200)  Resp: (!) 25 (06/09/17 1200)  BP: 112/75 (06/09/17 1200)  SpO2: 97 % (06/09/17 1200)    Physical Exam:  General appearance: chronically ill appearing, no acute distress  Eyes: anicteric sclerae  Lungs: bronchial breath sounds, productive cough  Heart: regular rate and rhythm, S1, S2 clearly audible,  Abdomen: soft, non-tender, distended, moderately tense; no rebound tenderness, rigidity, or guarding  Pulses: 2+ and symmetric  Skin: Skin color, texture, turgor normal.   Neurologic: No focal deficits noted      Laboratory    CBC:     Recent Labs  Lab 06/08/17  2305 06/09/17  0645 06/09/17  0744   WBC 6.76 8.13 7.64   RBC 1.96* 2.26* 2.26*   HGB 6.5* 7.4* 7.3*   HCT 18.1* 20.9* 20.9*   PLT 53* 62* 56*   MCV 92 93 93   MCH 33.2* 32.7* 32.3*   MCHC 35.9 35.4 34.9     BMP:     Recent Labs  Lab 06/07/17  0457  06/08/17  0342 06/09/17  0307 06/09/17  1200   *  --  136* 113*  --    *  --  130* 134*  --    K 3.9  --  3.6 3.0* 3.1*     --  103 104  --    CO2 14*  --  16* 17*  --    BUN 38*  --  38* 37*  --    CREATININE 3.0*  --  2.9* 2.5*  --    CALCIUM 7.6*  --  8.0* 8.5*  --    MG 1.1*  < > 1.5* 1.9 1.7   < > = values in this interval not displayed.  Coagulation:     Recent Labs  Lab 06/09/17  0307   INR 1.8*           ASSESSMENT/PLAN:      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  HCC s/p TACE  MELD 28      Recommendations:   Continue supportive care  Patient will need repeat PFTs following a paracentesis when he is stepped down to floor  Appreciate Nephrology recommendations  Await blood and urine cultures      David Resendiz   Gastroenterology Fellow PGY V  [phone]

## 2017-06-09 NOTE — ASSESSMENT & PLAN NOTE
- hypervolemic , hyponatremia - improved today to 134  - in setting of decompensated cirrhosis   - fluid restriction 1500 cc
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
## 2017-06-09 NOTE — NURSING
Called and informed Dr. Moreno about pateint's Hgb of 6.5 and Hct of 18.1. Dr. Moreno stated she would come to the patient's bedside and get consent for a blood transfusion. Patient's VS stable. Will continue to monitor.

## 2017-06-09 NOTE — ASSESSMENT & PLAN NOTE
- alcoholic liver disease, HCV  - MELD-Na score: 28 at 6/9/2017  3:07 AM  MELD score: 27 at 6/9/2017  3:07 AM  Calculated from:  Serum Creatinine: 2.5 mg/dL at 6/9/2017  3:07 AM  Serum Sodium: 134 mmol/L at 6/9/2017  3:07 AM  Total Bilirubin: 4.4 mg/dL at 6/9/2017  3:07 AM  INR(ratio): 1.8 at 6/9/2017  3:07 AM  Age: 57 years  - hepatology consult- recommend echo, paracentesis, continue IV albumin, continue aggressive medical therapy since patient is still a candidate for liver transplant at this time, his transplant listing status will be discussed next week and is pending return of nicotine laboratory metabolites (he reports quit date of April 2017)-metabolites are negative for nicotine, positive for cotinine (16)  -paracentesis completed 6/8/17 with WBCs 110 segs 67%, not suggestive of SBP  -echo shows no decreased in EF, or decrease wall motion to account for hypotension  - continue lactulose, sodium bicarb tabs, and rifaximin   - holding aldactone and lasix in setting of hypotension  - weaning levophed, started midodrine today
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
## 2017-06-09 NOTE — ASSESSMENT & PLAN NOTE
-increased wheezing today, crackles bilaterally, and reporting mild SOB  -received PRN breathing treatment   -repeat CXR today shows worsening congestion/possible infection from admission CXR  -continue abx therapy with vanc and zosyn

## 2017-06-09 NOTE — ASSESSMENT & PLAN NOTE
- Hb 6.5 overnight, received 1 u pRBCs with improvement in Hb to 7.5 today  - baseline ~9-10  - reporting LLQ pain at site of IR paracentesis done prior to admission, CT abdo/pevlis during this admission unremarkable  - continue to monitor with q 8 CBCs

## 2017-06-09 NOTE — ASSESSMENT & PLAN NOTE
-baseline systlic BP in 100-110 per wife  -BP past 24 hrs: 71//72  -weaning- levophed, started midodrine today

## 2017-06-10 PROBLEM — N17.9 AKI (ACUTE KIDNEY INJURY): Status: ACTIVE | Noted: 2017-06-10

## 2017-06-10 PROBLEM — B18.2 HEP C W/O COMA, CHRONIC: Status: RESOLVED | Noted: 2017-01-03 | Resolved: 2017-06-10

## 2017-06-10 LAB
ABO + RH BLD: NORMAL
ALBUMIN SERPL BCP-MCNC: 3.8 G/DL
ALBUMIN SERPL BCP-MCNC: 4.4 G/DL
ALP SERPL-CCNC: 64 U/L
ALP SERPL-CCNC: 70 U/L
ALT SERPL W/O P-5'-P-CCNC: 21 U/L
ALT SERPL W/O P-5'-P-CCNC: 28 U/L
ANION GAP SERPL CALC-SCNC: 11 MMOL/L
ANION GAP SERPL CALC-SCNC: 11 MMOL/L
ANION GAP SERPL CALC-SCNC: 14 MMOL/L
ANISOCYTOSIS BLD QL SMEAR: SLIGHT
APPEARANCE FLD: CLEAR
AST SERPL-CCNC: 46 U/L
AST SERPL-CCNC: 65 U/L
BASOPHILS # BLD AUTO: 0.03 K/UL
BASOPHILS # BLD AUTO: 0.04 K/UL
BASOPHILS # BLD AUTO: 0.05 K/UL
BASOPHILS # BLD AUTO: 0.06 K/UL
BASOPHILS # BLD AUTO: 0.07 K/UL
BASOPHILS NFR BLD: 0.6 %
BASOPHILS NFR BLD: 0.8 %
BASOPHILS NFR BLD: 1 %
BASOPHILS NFR BLD: 1 %
BASOPHILS NFR BLD: 1.1 %
BILIRUB SERPL-MCNC: 3 MG/DL
BILIRUB SERPL-MCNC: 7.2 MG/DL
BLD GP AB SCN CELLS X3 SERPL QL: NORMAL
BLD PROD TYP BPU: NORMAL
BLOOD UNIT EXPIRATION DATE: NORMAL
BLOOD UNIT TYPE CODE: 6200
BLOOD UNIT TYPE: NORMAL
BODY FLD TYPE: NORMAL
BUN SERPL-MCNC: 30 MG/DL
BUN SERPL-MCNC: 31 MG/DL
BUN SERPL-MCNC: 32 MG/DL
BURR CELLS BLD QL SMEAR: ABNORMAL
CALCIUM SERPL-MCNC: 8.2 MG/DL
CALCIUM SERPL-MCNC: 8.6 MG/DL
CALCIUM SERPL-MCNC: 8.7 MG/DL
CHLORIDE SERPL-SCNC: 105 MMOL/L
CHLORIDE SERPL-SCNC: 106 MMOL/L
CHLORIDE SERPL-SCNC: 106 MMOL/L
CO2 SERPL-SCNC: 15 MMOL/L
CO2 SERPL-SCNC: 18 MMOL/L
CO2 SERPL-SCNC: 18 MMOL/L
CODING SYSTEM: NORMAL
COLOR FLD: YELLOW
CREAT SERPL-MCNC: 1.9 MG/DL
CREAT SERPL-MCNC: 2 MG/DL
CREAT SERPL-MCNC: 2.2 MG/DL
DIFFERENTIAL METHOD: ABNORMAL
DISPENSE STATUS: NORMAL
EOSINOPHIL # BLD AUTO: 0.4 K/UL
EOSINOPHIL # BLD AUTO: 0.5 K/UL
EOSINOPHIL # BLD AUTO: 0.6 K/UL
EOSINOPHIL NFR BLD: 6.9 %
EOSINOPHIL NFR BLD: 7.3 %
EOSINOPHIL NFR BLD: 7.6 %
EOSINOPHIL NFR BLD: 8.7 %
EOSINOPHIL NFR BLD: 8.9 %
ERYTHROCYTE [DISTWIDTH] IN BLOOD BY AUTOMATED COUNT: 16.2 %
ERYTHROCYTE [DISTWIDTH] IN BLOOD BY AUTOMATED COUNT: 16.2 %
ERYTHROCYTE [DISTWIDTH] IN BLOOD BY AUTOMATED COUNT: 16.3 %
ERYTHROCYTE [DISTWIDTH] IN BLOOD BY AUTOMATED COUNT: 16.4 %
ERYTHROCYTE [DISTWIDTH] IN BLOOD BY AUTOMATED COUNT: 16.6 %
EST. GFR  (AFRICAN AMERICAN): 36.8 ML/MIN/1.73 M^2
EST. GFR  (AFRICAN AMERICAN): 41.3 ML/MIN/1.73 M^2
EST. GFR  (AFRICAN AMERICAN): 43.9 ML/MIN/1.73 M^2
EST. GFR  (NON AFRICAN AMERICAN): 31.8 ML/MIN/1.73 M^2
EST. GFR  (NON AFRICAN AMERICAN): 35.7 ML/MIN/1.73 M^2
EST. GFR  (NON AFRICAN AMERICAN): 38 ML/MIN/1.73 M^2
GLUCOSE SERPL-MCNC: 102 MG/DL
GLUCOSE SERPL-MCNC: 106 MG/DL
GLUCOSE SERPL-MCNC: 109 MG/DL
HCT VFR BLD AUTO: 20.5 %
HCT VFR BLD AUTO: 20.5 %
HCT VFR BLD AUTO: 20.8 %
HCT VFR BLD AUTO: 24.6 %
HCT VFR BLD AUTO: 24.9 %
HGB BLD-MCNC: 6.9 G/DL
HGB BLD-MCNC: 7.1 G/DL
HGB BLD-MCNC: 7.2 G/DL
HGB BLD-MCNC: 8.6 G/DL
HGB BLD-MCNC: 8.7 G/DL
HYPOCHROMIA BLD QL SMEAR: ABNORMAL
INR PPP: 1.9
LYMPHOCYTES # BLD AUTO: 0.8 K/UL
LYMPHOCYTES # BLD AUTO: 0.9 K/UL
LYMPHOCYTES # BLD AUTO: 0.9 K/UL
LYMPHOCYTES # BLD AUTO: 1 K/UL
LYMPHOCYTES # BLD AUTO: 1 K/UL
LYMPHOCYTES NFR BLD: 12.7 %
LYMPHOCYTES NFR BLD: 14.8 %
LYMPHOCYTES NFR BLD: 17 %
LYMPHOCYTES NFR BLD: 19.2 %
LYMPHOCYTES NFR BLD: 20.2 %
LYMPHOCYTES NFR FLD MANUAL: 25 %
MAGNESIUM SERPL-MCNC: 2.1 MG/DL
MAGNESIUM SERPL-MCNC: 2.7 MG/DL
MCH RBC QN AUTO: 31.9 PG
MCH RBC QN AUTO: 32.4 PG
MCH RBC QN AUTO: 32.5 PG
MCH RBC QN AUTO: 33 PG
MCH RBC QN AUTO: 33 PG
MCHC RBC AUTO-ENTMCNC: 33.7 %
MCHC RBC AUTO-ENTMCNC: 34.1 %
MCHC RBC AUTO-ENTMCNC: 34.5 %
MCHC RBC AUTO-ENTMCNC: 35.1 %
MCHC RBC AUTO-ENTMCNC: 35.4 %
MCV RBC AUTO: 93 FL
MCV RBC AUTO: 94 FL
MCV RBC AUTO: 94 FL
MCV RBC AUTO: 95 FL
MCV RBC AUTO: 95 FL
MESOTHL CELL NFR FLD MANUAL: 1 %
MONOCYTES # BLD AUTO: 0.5 K/UL
MONOCYTES # BLD AUTO: 0.5 K/UL
MONOCYTES # BLD AUTO: 0.6 K/UL
MONOCYTES NFR BLD: 10.2 %
MONOCYTES NFR BLD: 11 %
MONOCYTES NFR BLD: 11.5 %
MONOCYTES NFR BLD: 6.9 %
MONOCYTES NFR BLD: 9.1 %
MONOS+MACROS NFR FLD MANUAL: 46 %
NEUTROPHILS # BLD AUTO: 2.9 K/UL
NEUTROPHILS # BLD AUTO: 3.1 K/UL
NEUTROPHILS # BLD AUTO: 3.4 K/UL
NEUTROPHILS # BLD AUTO: 4.2 K/UL
NEUTROPHILS # BLD AUTO: 4.5 K/UL
NEUTROPHILS NFR BLD: 60.6 %
NEUTROPHILS NFR BLD: 61.2 %
NEUTROPHILS NFR BLD: 63.8 %
NEUTROPHILS NFR BLD: 68.5 %
NEUTROPHILS NFR BLD: 69.2 %
NEUTROPHILS NFR FLD MANUAL: 28 %
OVALOCYTES BLD QL SMEAR: ABNORMAL
PHOSPHATE SERPL-MCNC: 3 MG/DL
PLATELET # BLD AUTO: 44 K/UL
PLATELET # BLD AUTO: 45 K/UL
PLATELET # BLD AUTO: 47 K/UL
PLATELET # BLD AUTO: 48 K/UL
PLATELET # BLD AUTO: 51 K/UL
PLATELET BLD QL SMEAR: ABNORMAL
PMV BLD AUTO: 8.9 FL
PMV BLD AUTO: 9.2 FL
PMV BLD AUTO: 9.4 FL
PMV BLD AUTO: 9.6 FL
PMV BLD AUTO: ABNORMAL FL
POCT GLUCOSE: 124 MG/DL (ref 70–110)
POCT GLUCOSE: 129 MG/DL (ref 70–110)
POCT GLUCOSE: 131 MG/DL (ref 70–110)
POCT GLUCOSE: 148 MG/DL (ref 70–110)
POCT GLUCOSE: 169 MG/DL (ref 70–110)
POCT GLUCOSE: 209 MG/DL (ref 70–110)
POIKILOCYTOSIS BLD QL SMEAR: SLIGHT
POLYCHROMASIA BLD QL SMEAR: ABNORMAL
POTASSIUM SERPL-SCNC: 3.5 MMOL/L
POTASSIUM SERPL-SCNC: 3.7 MMOL/L
POTASSIUM SERPL-SCNC: 4 MMOL/L
PROT SERPL-MCNC: 5.2 G/DL
PROT SERPL-MCNC: 5.7 G/DL
PROTHROMBIN TIME: 18.9 SEC
RBC # BLD AUTO: 2.16 M/UL
RBC # BLD AUTO: 2.18 M/UL
RBC # BLD AUTO: 2.19 M/UL
RBC # BLD AUTO: 2.64 M/UL
RBC # BLD AUTO: 2.65 M/UL
SCHISTOCYTES BLD QL SMEAR: ABNORMAL
SCHISTOCYTES BLD QL SMEAR: PRESENT
SODIUM SERPL-SCNC: 134 MMOL/L
SODIUM SERPL-SCNC: 135 MMOL/L
SODIUM SERPL-SCNC: 135 MMOL/L
TRANS ERYTHROCYTES VOL PATIENT: NORMAL ML
VANCOMYCIN SERPL-MCNC: 18.8 UG/ML
WBC # BLD AUTO: 4.78 K/UL
WBC # BLD AUTO: 5.06 K/UL
WBC # BLD AUTO: 5.36 K/UL
WBC # BLD AUTO: 6.05 K/UL
WBC # BLD AUTO: 6.64 K/UL
WBC # FLD: 149 /CU MM

## 2017-06-10 PROCEDURE — 63600175 PHARM REV CODE 636 W HCPCS: Mod: NTX | Performed by: GENERAL ACUTE CARE HOSPITAL

## 2017-06-10 PROCEDURE — 20000000 HC ICU ROOM: Mod: NTX

## 2017-06-10 PROCEDURE — 80053 COMPREHEN METABOLIC PANEL: CPT | Mod: 91,NTX

## 2017-06-10 PROCEDURE — 63600175 PHARM REV CODE 636 W HCPCS: Mod: NTX | Performed by: CLINICAL NURSE SPECIALIST

## 2017-06-10 PROCEDURE — 80053 COMPREHEN METABOLIC PANEL: CPT | Mod: NTX

## 2017-06-10 PROCEDURE — 83735 ASSAY OF MAGNESIUM: CPT | Mod: 91,NTX

## 2017-06-10 PROCEDURE — 83735 ASSAY OF MAGNESIUM: CPT | Mod: NTX

## 2017-06-10 PROCEDURE — 87205 SMEAR GRAM STAIN: CPT | Mod: NTX

## 2017-06-10 PROCEDURE — 80048 BASIC METABOLIC PNL TOTAL CA: CPT | Mod: NTX

## 2017-06-10 PROCEDURE — 25000003 PHARM REV CODE 250: Mod: NTX | Performed by: NURSE PRACTITIONER

## 2017-06-10 PROCEDURE — 94640 AIRWAY INHALATION TREATMENT: CPT | Mod: NTX

## 2017-06-10 PROCEDURE — 84100 ASSAY OF PHOSPHORUS: CPT | Mod: NTX

## 2017-06-10 PROCEDURE — 63600175 PHARM REV CODE 636 W HCPCS: Mod: NTX | Performed by: STUDENT IN AN ORGANIZED HEALTH CARE EDUCATION/TRAINING PROGRAM

## 2017-06-10 PROCEDURE — 86850 RBC ANTIBODY SCREEN: CPT | Mod: NTX

## 2017-06-10 PROCEDURE — 25000003 PHARM REV CODE 250: Mod: NTX | Performed by: INTERNAL MEDICINE

## 2017-06-10 PROCEDURE — 99233 SBSQ HOSP IP/OBS HIGH 50: CPT | Mod: NTX,,, | Performed by: INTERNAL MEDICINE

## 2017-06-10 PROCEDURE — 85025 COMPLETE CBC W/AUTO DIFF WBC: CPT | Mod: 91,NTX

## 2017-06-10 PROCEDURE — 25000242 PHARM REV CODE 250 ALT 637 W/ HCPCS: Mod: NTX | Performed by: GENERAL ACUTE CARE HOSPITAL

## 2017-06-10 PROCEDURE — C9113 INJ PANTOPRAZOLE SODIUM, VIA: HCPCS | Mod: NTX | Performed by: NURSE PRACTITIONER

## 2017-06-10 PROCEDURE — 63600175 PHARM REV CODE 636 W HCPCS: Mod: NTX | Performed by: INTERNAL MEDICINE

## 2017-06-10 PROCEDURE — P9047 ALBUMIN (HUMAN), 25%, 50ML: HCPCS | Mod: NTX | Performed by: STUDENT IN AN ORGANIZED HEALTH CARE EDUCATION/TRAINING PROGRAM

## 2017-06-10 PROCEDURE — 87075 CULTR BACTERIA EXCEPT BLOOD: CPT | Mod: NTX

## 2017-06-10 PROCEDURE — 63600175 PHARM REV CODE 636 W HCPCS: Mod: NTX | Performed by: NURSE PRACTITIONER

## 2017-06-10 PROCEDURE — 85610 PROTHROMBIN TIME: CPT | Mod: NTX

## 2017-06-10 PROCEDURE — 89051 BODY FLUID CELL COUNT: CPT | Mod: NTX

## 2017-06-10 PROCEDURE — 27000221 HC OXYGEN, UP TO 24 HOURS: Mod: NTX

## 2017-06-10 PROCEDURE — P9021 RED BLOOD CELLS UNIT: HCPCS | Mod: NTX

## 2017-06-10 PROCEDURE — 80202 ASSAY OF VANCOMYCIN: CPT | Mod: NTX

## 2017-06-10 PROCEDURE — 86900 BLOOD TYPING SEROLOGIC ABO: CPT | Mod: NTX

## 2017-06-10 PROCEDURE — 87102 FUNGUS ISOLATION CULTURE: CPT | Mod: NTX

## 2017-06-10 PROCEDURE — 25000003 PHARM REV CODE 250: Mod: NTX | Performed by: STUDENT IN AN ORGANIZED HEALTH CARE EDUCATION/TRAINING PROGRAM

## 2017-06-10 PROCEDURE — 99291 CRITICAL CARE FIRST HOUR: CPT | Mod: NTX,,, | Performed by: CLINICAL NURSE SPECIALIST

## 2017-06-10 PROCEDURE — 94761 N-INVAS EAR/PLS OXIMETRY MLT: CPT | Mod: NTX

## 2017-06-10 PROCEDURE — 87070 CULTURE OTHR SPECIMN AEROBIC: CPT | Mod: NTX

## 2017-06-10 PROCEDURE — 0W9G3ZZ DRAINAGE OF PERITONEAL CAVITY, PERCUTANEOUS APPROACH: ICD-10-PCS | Performed by: INTERNAL MEDICINE

## 2017-06-10 PROCEDURE — 25000003 PHARM REV CODE 250: Mod: NTX | Performed by: GENERAL ACUTE CARE HOSPITAL

## 2017-06-10 RX ORDER — HYDROCODONE BITARTRATE AND ACETAMINOPHEN 500; 5 MG/1; MG/1
TABLET ORAL
Status: DISCONTINUED | OUTPATIENT
Start: 2017-06-10 | End: 2017-06-13 | Stop reason: HOSPADM

## 2017-06-10 RX ORDER — OXYCODONE HYDROCHLORIDE 5 MG/1
5 TABLET ORAL EVERY 6 HOURS PRN
Status: DISCONTINUED | OUTPATIENT
Start: 2017-06-10 | End: 2017-06-12

## 2017-06-10 RX ORDER — DIAZEPAM 10 MG/2ML
2.5 INJECTION INTRAMUSCULAR ONCE
Status: COMPLETED | OUTPATIENT
Start: 2017-06-10 | End: 2017-06-10

## 2017-06-10 RX ORDER — DIAZEPAM 5 MG/1
5 TABLET ORAL EVERY 6 HOURS PRN
Status: DISCONTINUED | OUTPATIENT
Start: 2017-06-10 | End: 2017-06-10

## 2017-06-10 RX ORDER — LIDOCAINE HYDROCHLORIDE 20 MG/ML
5 INJECTION, SOLUTION INFILTRATION; PERINEURAL ONCE
Status: COMPLETED | OUTPATIENT
Start: 2017-06-10 | End: 2017-06-10

## 2017-06-10 RX ORDER — ALBUMIN HUMAN 250 G/1000ML
25 SOLUTION INTRAVENOUS ONCE
Status: COMPLETED | OUTPATIENT
Start: 2017-06-10 | End: 2017-06-10

## 2017-06-10 RX ORDER — POTASSIUM CHLORIDE 14.9 MG/ML
20 INJECTION INTRAVENOUS
Status: DISCONTINUED | OUTPATIENT
Start: 2017-06-10 | End: 2017-06-10

## 2017-06-10 RX ADMIN — MIDODRINE HYDROCHLORIDE 10 MG: 5 TABLET ORAL at 09:06

## 2017-06-10 RX ADMIN — ALBUMIN (HUMAN) 25 G: 12.5 SOLUTION INTRAVENOUS at 06:06

## 2017-06-10 RX ADMIN — POTASSIUM CHLORIDE 20 MEQ: 200 INJECTION, SOLUTION INTRAVENOUS at 03:06

## 2017-06-10 RX ADMIN — LIDOCAINE HYDROCHLORIDE 5 ML: 20 INJECTION, SOLUTION INFILTRATION; PERINEURAL at 06:06

## 2017-06-10 RX ADMIN — MIDODRINE HYDROCHLORIDE 10 MG: 5 TABLET ORAL at 05:06

## 2017-06-10 RX ADMIN — SODIUM BICARBONATE 650 MG: 650 TABLET ORAL at 09:06

## 2017-06-10 RX ADMIN — OCTREOTIDE ACETATE 100 MCG: 100 INJECTION, SOLUTION INTRAVENOUS; SUBCUTANEOUS at 05:06

## 2017-06-10 RX ADMIN — PIPERACILLIN AND TAZOBACTAM 4.5 G: 4; .5 INJECTION, POWDER, LYOPHILIZED, FOR SOLUTION INTRAVENOUS; PARENTERAL at 07:06

## 2017-06-10 RX ADMIN — PANTOPRAZOLE SODIUM 40 MG: 40 INJECTION, POWDER, FOR SOLUTION INTRAVENOUS at 09:06

## 2017-06-10 RX ADMIN — ONDANSETRON 4 MG: 2 INJECTION INTRAMUSCULAR; INTRAVENOUS at 01:06

## 2017-06-10 RX ADMIN — OXYCODONE HYDROCHLORIDE 15 MG: 5 TABLET ORAL at 06:06

## 2017-06-10 RX ADMIN — PIPERACILLIN AND TAZOBACTAM 4.5 G: 4; .5 INJECTION, POWDER, LYOPHILIZED, FOR SOLUTION INTRAVENOUS; PARENTERAL at 01:06

## 2017-06-10 RX ADMIN — ALBUMIN (HUMAN) 25 G: 12.5 SOLUTION INTRAVENOUS at 08:06

## 2017-06-10 RX ADMIN — ONDANSETRON 4 MG: 2 INJECTION INTRAMUSCULAR; INTRAVENOUS at 05:06

## 2017-06-10 RX ADMIN — IPRATROPIUM BROMIDE AND ALBUTEROL SULFATE 3 ML: .5; 3 SOLUTION RESPIRATORY (INHALATION) at 02:06

## 2017-06-10 RX ADMIN — RIFAXIMIN 550 MG: 550 TABLET ORAL at 09:06

## 2017-06-10 RX ADMIN — DIAZEPAM 2.5 MG: 5 INJECTION, SOLUTION INTRAMUSCULAR; INTRAVENOUS at 06:06

## 2017-06-10 RX ADMIN — LACTULOSE 20 G: 10 SOLUTION ORAL at 01:06

## 2017-06-10 RX ADMIN — LACTULOSE 20 G: 10 SOLUTION ORAL at 09:06

## 2017-06-10 RX ADMIN — ALBUMIN (HUMAN) 25 G: 12.5 SOLUTION INTRAVENOUS at 01:06

## 2017-06-10 RX ADMIN — VANCOMYCIN HYDROCHLORIDE 1250 MG: 100 INJECTION, POWDER, LYOPHILIZED, FOR SOLUTION INTRAVENOUS at 06:06

## 2017-06-10 RX ADMIN — PIPERACILLIN AND TAZOBACTAM 4.5 G: 4; .5 INJECTION, POWDER, LYOPHILIZED, FOR SOLUTION INTRAVENOUS; PARENTERAL at 03:06

## 2017-06-10 RX ADMIN — OCTREOTIDE ACETATE 100 MCG: 100 INJECTION, SOLUTION INTRAVENOUS; SUBCUTANEOUS at 01:06

## 2017-06-10 RX ADMIN — POTASSIUM CHLORIDE 20 MEQ: 200 INJECTION, SOLUTION INTRAVENOUS at 12:06

## 2017-06-10 RX ADMIN — OCTREOTIDE ACETATE 100 MCG: 100 INJECTION, SOLUTION INTRAVENOUS; SUBCUTANEOUS at 09:06

## 2017-06-10 RX ADMIN — POTASSIUM CHLORIDE 20 MEQ: 200 INJECTION, SOLUTION INTRAVENOUS at 02:06

## 2017-06-10 RX ADMIN — LACTULOSE 20 G: 10 SOLUTION ORAL at 05:06

## 2017-06-10 RX ADMIN — MIDODRINE HYDROCHLORIDE 10 MG: 5 TABLET ORAL at 01:06

## 2017-06-10 RX ADMIN — ALBUMIN (HUMAN) 25 G: 12.5 SOLUTION INTRAVENOUS at 05:06

## 2017-06-10 NOTE — PROGRESS NOTES
Hepatology  Progress note      SUBJECTIVE:     Reason for Consult: ESLD    History of Present Illness:    Patient is a 57 y.o. male w/ a history of ETOH+HCV+HCC cirrhosis who was admitted to [facility] with an GYPSY.    The patient has previously been presented to Selection Committee -- deferred due to concerns with smoking history (patient now states he is 6 weeks without smoking) and CT evidence of UIP.      That patient reports fatigue.  No fevers, chills, rigors.  No abdominal pain.       Interval Hx:  No acute events overnight.  Patient appears much more alert and awake today.         OBJECTIVE:     Vital Signs (Most Recent)  Temp: 99.1 °F (37.3 °C) (06/10/17 0950)  Pulse: 60 (06/10/17 0845)  Resp: (!) 49 (06/10/17 0845)  BP: 99/68 (06/10/17 0950)  SpO2: 98 % (06/10/17 0845)    Physical Exam:  General appearance: chronically ill appearing, no acute distress  Eyes: anicteric sclerae  Lungs: bronchial breath sounds, productive cough  Heart: regular rate and rhythm, S1, S2 clearly audible,  Abdomen: soft, non-tender, distended, moderately tense; no rebound tenderness, rigidity, or guarding  Pulses: 2+ and symmetric  Skin: Skin color, texture, turgor normal.   Neurologic: No focal deficits noted      Laboratory    CBC:     Recent Labs  Lab 06/09/17  2326 06/10/17  0254 06/10/17  0810   WBC 5.36 5.06 4.78   RBC 2.16* 2.18* 2.19*   HGB 6.9* 7.2* 7.1*   HCT 20.5* 20.5* 20.8*   PLT 47* 51* 45*   MCV 95 94 95   MCH 31.9* 33.0* 32.4*   MCHC 33.7 35.1 34.1     BMP:     Recent Labs  Lab 06/09/17  0307 06/09/17  1200 06/09/17  1917 06/10/17  0254   *  --  135* 106   *  --  135* 134*   K 3.0* 3.1* 3.0* 4.0     --  105 105   CO2 17*  --  17* 18*   BUN 37*  --  34* 32*   CREATININE 2.5*  --  2.3* 2.2*   CALCIUM 8.5*  --  8.5* 8.2*   MG 1.9 1.7 1.6 2.7*     Coagulation:     Recent Labs  Lab 06/10/17  0254   INR 1.9*           ASSESSMENT/PLAN:     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 admitted with hypotension and renal dysfunction  HCC  s/p TACE  MELD 26    Recommendations:   Continue supportive care  Patient will need repeat PFTs following a paracentesis when he is stepped down to floor  Appreciate Nephrology recommendations  Await blood and urine cultures      David Resendiz   Gastroenterology Fellow PGY V  [phone]

## 2017-06-10 NOTE — NURSING
Spoke with Dr. Mack about patient's Potassium replacement order to verify that he wanted to replace 80 mEq even though patient's Creatine level is 2.3 Dr. Mack stated that the patient's Cr level has steadily been trending down and wants to replace 80 mEq of Potassium. Will continue to monitor.

## 2017-06-10 NOTE — PLAN OF CARE
Problem: Patient Care Overview  Goal: Plan of Care Review  Outcome: Ongoing (interventions implemented as appropriate)  Patient and patient's spouse updated on patient's plan of care including medications, electrolyte replacement, and pain management. No acute events over night. Patient is on 3L NC with oxygen saturation ranging from 95-99%. Levo gtt remained off throughout shift. No new skin breakdown noted. Potassium and Magnesium electrolyte replacements provided due to patient's K of 3.0 and Mag of 1.6. Lactulose now scheduled PO instead of enemas since patient can swallow. Patient is on a mechanical soft diet and tolerating it well. All questions and concerns acknowledged and answered. Will continue to monitor.

## 2017-06-11 PROBLEM — Z12.11 COLON CANCER SCREENING: Status: RESOLVED | Noted: 2017-03-09 | Resolved: 2017-06-11

## 2017-06-11 PROBLEM — J84.9 ILD (INTERSTITIAL LUNG DISEASE): Status: RESOLVED | Noted: 2017-01-24 | Resolved: 2017-06-11

## 2017-06-11 PROBLEM — T79.7XXA SUBCUTANEOUS EMPHYSEMA: Status: RESOLVED | Noted: 2017-06-07 | Resolved: 2017-06-11

## 2017-06-11 PROBLEM — N17.9 AKI (ACUTE KIDNEY INJURY): Status: RESOLVED | Noted: 2017-06-10 | Resolved: 2017-06-11

## 2017-06-11 PROBLEM — K70.31 ALCOHOLIC CIRRHOSIS OF LIVER WITH ASCITES: Status: RESOLVED | Noted: 2017-01-18 | Resolved: 2017-06-11

## 2017-06-11 PROBLEM — Z01.811 PRE-OPERATIVE RESPIRATORY EXAMINATION: Status: RESOLVED | Noted: 2017-01-24 | Resolved: 2017-06-11

## 2017-06-11 PROBLEM — J43.2 CENTRILOBULAR EMPHYSEMA: Status: RESOLVED | Noted: 2017-03-16 | Resolved: 2017-06-11

## 2017-06-11 PROBLEM — I95.9 ARTERIAL HYPOTENSION: Status: RESOLVED | Noted: 2017-01-21 | Resolved: 2017-06-11

## 2017-06-11 LAB
ALBUMIN SERPL BCP-MCNC: 4.2 G/DL
ALP SERPL-CCNC: 57 U/L
ALT SERPL W/O P-5'-P-CCNC: 21 U/L
ANION GAP SERPL CALC-SCNC: 12 MMOL/L
ANISOCYTOSIS BLD QL SMEAR: ABNORMAL
ANISOCYTOSIS BLD QL SMEAR: SLIGHT
AST SERPL-CCNC: 46 U/L
BASO STIPL BLD QL SMEAR: ABNORMAL
BASO STIPL BLD QL SMEAR: ABNORMAL
BASOPHILS # BLD AUTO: 0.06 K/UL
BASOPHILS # BLD AUTO: 0.07 K/UL
BASOPHILS # BLD AUTO: 0.09 K/UL
BASOPHILS NFR BLD: 1 %
BASOPHILS NFR BLD: 1.3 %
BASOPHILS NFR BLD: 1.5 %
BILIRUB SERPL-MCNC: 6.1 MG/DL
BUN SERPL-MCNC: 29 MG/DL
BURR CELLS BLD QL SMEAR: ABNORMAL
CALCIUM SERPL-MCNC: 8.6 MG/DL
CHLORIDE SERPL-SCNC: 106 MMOL/L
CO2 SERPL-SCNC: 18 MMOL/L
CREAT SERPL-MCNC: 1.7 MG/DL
DIFFERENTIAL METHOD: ABNORMAL
EOSINOPHIL # BLD AUTO: 0.4 K/UL
EOSINOPHIL # BLD AUTO: 0.4 K/UL
EOSINOPHIL # BLD AUTO: 0.5 K/UL
EOSINOPHIL NFR BLD: 6.7 %
EOSINOPHIL NFR BLD: 6.8 %
EOSINOPHIL NFR BLD: 8 %
ERYTHROCYTE [DISTWIDTH] IN BLOOD BY AUTOMATED COUNT: 16.5 %
ERYTHROCYTE [DISTWIDTH] IN BLOOD BY AUTOMATED COUNT: 16.7 %
ERYTHROCYTE [DISTWIDTH] IN BLOOD BY AUTOMATED COUNT: 16.8 %
EST. GFR  (AFRICAN AMERICAN): 50.3 ML/MIN/1.73 M^2
EST. GFR  (NON AFRICAN AMERICAN): 43.5 ML/MIN/1.73 M^2
GLUCOSE SERPL-MCNC: 106 MG/DL
HCT VFR BLD AUTO: 24.1 %
HCT VFR BLD AUTO: 24.5 %
HCT VFR BLD AUTO: 24.7 %
HGB BLD-MCNC: 8.3 G/DL
HGB BLD-MCNC: 8.4 G/DL
HGB BLD-MCNC: 8.8 G/DL
INR PPP: 2.3
LYMPHOCYTES # BLD AUTO: 0.8 K/UL
LYMPHOCYTES # BLD AUTO: 0.9 K/UL
LYMPHOCYTES # BLD AUTO: 1.2 K/UL
LYMPHOCYTES NFR BLD: 13.7 %
LYMPHOCYTES NFR BLD: 16.8 %
LYMPHOCYTES NFR BLD: 19.3 %
MAGNESIUM SERPL-MCNC: 1.8 MG/DL
MCH RBC QN AUTO: 31.9 PG
MCH RBC QN AUTO: 32.3 PG
MCH RBC QN AUTO: 33.2 PG
MCHC RBC AUTO-ENTMCNC: 34.3 %
MCHC RBC AUTO-ENTMCNC: 34.4 %
MCHC RBC AUTO-ENTMCNC: 35.6 %
MCV RBC AUTO: 93 FL
MCV RBC AUTO: 93 FL
MCV RBC AUTO: 94 FL
MONOCYTES # BLD AUTO: 0.5 K/UL
MONOCYTES # BLD AUTO: 0.6 K/UL
MONOCYTES # BLD AUTO: 0.6 K/UL
MONOCYTES NFR BLD: 10.1 %
MONOCYTES NFR BLD: 9.1 %
MONOCYTES NFR BLD: 9.5 %
NEUTROPHILS # BLD AUTO: 3.5 K/UL
NEUTROPHILS # BLD AUTO: 3.8 K/UL
NEUTROPHILS # BLD AUTO: 4 K/UL
NEUTROPHILS NFR BLD: 61.5 %
NEUTROPHILS NFR BLD: 65 %
NEUTROPHILS NFR BLD: 69.5 %
OVALOCYTES BLD QL SMEAR: ABNORMAL
PHOSPHATE SERPL-MCNC: 2.5 MG/DL
PLATELET # BLD AUTO: 39 K/UL
PLATELET # BLD AUTO: 40 K/UL
PLATELET # BLD AUTO: 48 K/UL
PLATELET BLD QL SMEAR: ABNORMAL
PLATELET BLD QL SMEAR: ABNORMAL
PMV BLD AUTO: 8.6 FL
PMV BLD AUTO: 9.2 FL
PMV BLD AUTO: 9.2 FL
POCT GLUCOSE: 137 MG/DL (ref 70–110)
POCT GLUCOSE: 142 MG/DL (ref 70–110)
POIKILOCYTOSIS BLD QL SMEAR: ABNORMAL
POLYCHROMASIA BLD QL SMEAR: ABNORMAL
POLYCHROMASIA BLD QL SMEAR: ABNORMAL
POTASSIUM SERPL-SCNC: 3.3 MMOL/L
PROT SERPL-MCNC: 5.3 G/DL
PROTHROMBIN TIME: 23.5 SEC
RBC # BLD AUTO: 2.57 M/UL
RBC # BLD AUTO: 2.63 M/UL
RBC # BLD AUTO: 2.65 M/UL
SODIUM SERPL-SCNC: 136 MMOL/L
TROPONIN I SERPL DL<=0.01 NG/ML-MCNC: 0.02 NG/ML
VANCOMYCIN TROUGH SERPL-MCNC: 19.2 UG/ML
WBC # BLD AUTO: 5.43 K/UL
WBC # BLD AUTO: 5.82 K/UL
WBC # BLD AUTO: 6.12 K/UL

## 2017-06-11 PROCEDURE — 99233 SBSQ HOSP IP/OBS HIGH 50: CPT | Mod: GC,NTX,, | Performed by: INTERNAL MEDICINE

## 2017-06-11 PROCEDURE — 25000003 PHARM REV CODE 250: Mod: NTX | Performed by: STUDENT IN AN ORGANIZED HEALTH CARE EDUCATION/TRAINING PROGRAM

## 2017-06-11 PROCEDURE — 63600175 PHARM REV CODE 636 W HCPCS: Mod: NTX | Performed by: GENERAL ACUTE CARE HOSPITAL

## 2017-06-11 PROCEDURE — 80202 ASSAY OF VANCOMYCIN: CPT | Mod: NTX

## 2017-06-11 PROCEDURE — 25000003 PHARM REV CODE 250: Mod: NTX | Performed by: INTERNAL MEDICINE

## 2017-06-11 PROCEDURE — 20600001 HC STEP DOWN PRIVATE ROOM: Mod: NTX

## 2017-06-11 PROCEDURE — 25000003 PHARM REV CODE 250: Mod: NTX | Performed by: NURSE PRACTITIONER

## 2017-06-11 PROCEDURE — 85610 PROTHROMBIN TIME: CPT | Mod: NTX

## 2017-06-11 PROCEDURE — 84100 ASSAY OF PHOSPHORUS: CPT | Mod: NTX

## 2017-06-11 PROCEDURE — 85025 COMPLETE CBC W/AUTO DIFF WBC: CPT | Mod: 91,NTX

## 2017-06-11 PROCEDURE — 84484 ASSAY OF TROPONIN QUANT: CPT | Mod: NTX

## 2017-06-11 PROCEDURE — 25000003 PHARM REV CODE 250: Mod: NTX | Performed by: CLINICAL NURSE SPECIALIST

## 2017-06-11 PROCEDURE — C9113 INJ PANTOPRAZOLE SODIUM, VIA: HCPCS | Mod: NTX | Performed by: NURSE PRACTITIONER

## 2017-06-11 PROCEDURE — 63600175 PHARM REV CODE 636 W HCPCS: Mod: NTX | Performed by: INTERNAL MEDICINE

## 2017-06-11 PROCEDURE — P9047 ALBUMIN (HUMAN), 25%, 50ML: HCPCS | Mod: NTX | Performed by: STUDENT IN AN ORGANIZED HEALTH CARE EDUCATION/TRAINING PROGRAM

## 2017-06-11 PROCEDURE — 63600175 PHARM REV CODE 636 W HCPCS: Mod: NTX | Performed by: STUDENT IN AN ORGANIZED HEALTH CARE EDUCATION/TRAINING PROGRAM

## 2017-06-11 PROCEDURE — 80053 COMPREHEN METABOLIC PANEL: CPT | Mod: NTX

## 2017-06-11 PROCEDURE — 36415 COLL VENOUS BLD VENIPUNCTURE: CPT | Mod: NTX

## 2017-06-11 PROCEDURE — 63600175 PHARM REV CODE 636 W HCPCS: Mod: NTX | Performed by: CLINICAL NURSE SPECIALIST

## 2017-06-11 PROCEDURE — 83735 ASSAY OF MAGNESIUM: CPT | Mod: NTX

## 2017-06-11 PROCEDURE — 63600175 PHARM REV CODE 636 W HCPCS: Mod: NTX | Performed by: NURSE PRACTITIONER

## 2017-06-11 PROCEDURE — 99233 SBSQ HOSP IP/OBS HIGH 50: CPT | Mod: NTX,,, | Performed by: CLINICAL NURSE SPECIALIST

## 2017-06-11 RX ORDER — SODIUM,POTASSIUM PHOSPHATES 280-250MG
1 POWDER IN PACKET (EA) ORAL
Status: DISCONTINUED | OUTPATIENT
Start: 2017-06-11 | End: 2017-06-13 | Stop reason: HOSPADM

## 2017-06-11 RX ORDER — MAGNESIUM SULFATE HEPTAHYDRATE 40 MG/ML
2 INJECTION, SOLUTION INTRAVENOUS ONCE
Status: COMPLETED | OUTPATIENT
Start: 2017-06-11 | End: 2017-06-11

## 2017-06-11 RX ORDER — POTASSIUM CHLORIDE 14.9 MG/ML
20 INJECTION INTRAVENOUS ONCE
Status: COMPLETED | OUTPATIENT
Start: 2017-06-11 | End: 2017-06-11

## 2017-06-11 RX ORDER — CIPROFLOXACIN 2 MG/ML
400 INJECTION, SOLUTION INTRAVENOUS
Status: DISCONTINUED | OUTPATIENT
Start: 2017-06-11 | End: 2017-06-11

## 2017-06-11 RX ADMIN — MIDODRINE HYDROCHLORIDE 10 MG: 5 TABLET ORAL at 02:06

## 2017-06-11 RX ADMIN — LACTULOSE 20 G: 10 SOLUTION ORAL at 05:06

## 2017-06-11 RX ADMIN — ALBUMIN (HUMAN) 25 G: 12.5 SOLUTION INTRAVENOUS at 12:06

## 2017-06-11 RX ADMIN — PHYTONADIONE 10 MG: 10 INJECTION, EMULSION INTRAMUSCULAR; INTRAVENOUS; SUBCUTANEOUS at 10:06

## 2017-06-11 RX ADMIN — ALBUMIN (HUMAN) 25 G: 12.5 SOLUTION INTRAVENOUS at 11:06

## 2017-06-11 RX ADMIN — OCTREOTIDE ACETATE 100 MCG: 100 INJECTION, SOLUTION INTRAVENOUS; SUBCUTANEOUS at 05:06

## 2017-06-11 RX ADMIN — PIPERACILLIN AND TAZOBACTAM 4.5 G: 4; .5 INJECTION, POWDER, LYOPHILIZED, FOR SOLUTION INTRAVENOUS; PARENTERAL at 04:06

## 2017-06-11 RX ADMIN — RIFAXIMIN 550 MG: 550 TABLET ORAL at 09:06

## 2017-06-11 RX ADMIN — ALBUMIN (HUMAN) 25 G: 12.5 SOLUTION INTRAVENOUS at 05:06

## 2017-06-11 RX ADMIN — OCTREOTIDE ACETATE 100 MCG: 100 INJECTION, SOLUTION INTRAVENOUS; SUBCUTANEOUS at 10:06

## 2017-06-11 RX ADMIN — SODIUM BICARBONATE 650 MG: 650 TABLET ORAL at 09:06

## 2017-06-11 RX ADMIN — MIDODRINE HYDROCHLORIDE 10 MG: 5 TABLET ORAL at 10:06

## 2017-06-11 RX ADMIN — OXYCODONE HYDROCHLORIDE 5 MG: 5 TABLET ORAL at 05:06

## 2017-06-11 RX ADMIN — VANCOMYCIN HYDROCHLORIDE 1250 MG: 100 INJECTION, POWDER, LYOPHILIZED, FOR SOLUTION INTRAVENOUS at 07:06

## 2017-06-11 RX ADMIN — POTASSIUM & SODIUM PHOSPHATES POWDER PACK 280-160-250 MG 1 PACKET: 280-160-250 PACK at 11:06

## 2017-06-11 RX ADMIN — OCTREOTIDE ACETATE 100 MCG: 100 INJECTION, SOLUTION INTRAVENOUS; SUBCUTANEOUS at 02:06

## 2017-06-11 RX ADMIN — LACTULOSE 20 G: 10 SOLUTION ORAL at 02:06

## 2017-06-11 RX ADMIN — MIDODRINE HYDROCHLORIDE 10 MG: 5 TABLET ORAL at 05:06

## 2017-06-11 RX ADMIN — POTASSIUM & SODIUM PHOSPHATES POWDER PACK 280-160-250 MG 1 PACKET: 280-160-250 PACK at 05:06

## 2017-06-11 RX ADMIN — PANTOPRAZOLE SODIUM 40 MG: 40 INJECTION, POWDER, FOR SOLUTION INTRAVENOUS at 09:06

## 2017-06-11 RX ADMIN — CEFTRIAXONE 1 G: 1 INJECTION, SOLUTION INTRAVENOUS at 11:06

## 2017-06-11 RX ADMIN — MAGNESIUM SULFATE IN WATER 2 G: 40 INJECTION, SOLUTION INTRAVENOUS at 09:06

## 2017-06-11 RX ADMIN — POTASSIUM CHLORIDE 20 MEQ: 200 INJECTION, SOLUTION INTRAVENOUS at 05:06

## 2017-06-11 NOTE — ASSESSMENT & PLAN NOTE
Improved  -hypervolemic , hyponatremia - improved   - in setting of decompensated cirrhosis   - fluid restriction 1500 cc

## 2017-06-11 NOTE — ASSESSMENT & PLAN NOTE
- alcoholic liver disease, HCV  - MELD-Na score: 28 at 6/10/2017  6:23 PM  MELD score: 27 at 6/10/2017  6:23 PM  Calculated from:  Serum Creatinine: 1.9 mg/dL at 6/10/2017  6:23 PM  Serum Sodium: 135 mmol/L at 6/10/2017  6:23 PM  Total Bilirubin: 7.2 mg/dL at 6/10/2017  1:58 PM  INR(ratio): 1.9 at 6/10/2017  2:54 AM  Age: 57 years  - hepatology consult- recommend echo, paracentesis, continue IV albumin, continue aggressive medical therapy since patient is still a candidate for liver transplant at this time, his transplant listing status will be discussed next week and is pending return of nicotine laboratory metabolites (he reports quit date of April 2017)-  -- metabolites are negative for nicotine, positive for cotinine (16)  -paracentesis completed 6/8/17 with WBCs 110 segs 67%, not suggestive of SBP  -echo shows no decreased in EF, or decrease wall motion to account for hypotension  - continue lactulose, sodium bicarb tabs, and rifaximin   - holding aldactone and lasix

## 2017-06-11 NOTE — ASSESSMENT & PLAN NOTE
- nephrology consulted- GYPSY is 2/2 ischemic ATN from hypotension and anemia, given this cannot diagnosed hepatorenal syndrome at this time  - US retroperitoneum 6/7 shows bilateral medical renal disease  - CXR on admission: showed increase congestion   - continue albumin and octerotide   - holding lasix and aldactone  - - continue to monitor UO   -- DC nixon

## 2017-06-11 NOTE — ASSESSMENT & PLAN NOTE
Improved today since paracentesis yesterday   --crackles bilaterally, and reporting mild SOB  -- Abx therapy with Ceftriaxone > SBP prophylaxis  -- Paracentesis removed 5L yesterday>> No SBP

## 2017-06-11 NOTE — SUBJECTIVE & OBJECTIVE
Interval History/Significant Events: No acute events overnight    Review of Systems   Unable to perform ROS: Other     Objective:     Vital Signs (Most Recent):  Temp: 99.1 °F (37.3 °C) (06/11/17 0715)  Pulse: 60 (06/11/17 0930)  Resp: 18 (06/11/17 0930)  BP: 122/66 (06/11/17 0930)  SpO2: 98 % (06/11/17 0930) Vital Signs (24h Range):  Temp:  [98.8 °F (37.1 °C)-99.8 °F (37.7 °C)] 99.1 °F (37.3 °C)  Pulse:  [54-77] 60  Resp:  [0-46] 18  SpO2:  [90 %-100 %] 98 %  BP: ()/(54-80) 122/66   Weight: 75.6 kg (166 lb 10.7 oz)  Body mass index is 26.9 kg/m².      Intake/Output Summary (Last 24 hours) at 06/11/17 0952  Last data filed at 06/11/17 0900   Gross per 24 hour   Intake          2531.33 ml   Output             1860 ml   Net           671.33 ml       Physical Exam   Constitutional: Vital signs are normal. He appears lethargic. He appears cachectic. He appears toxic.   HENT:   Head: Normocephalic.   Eyes: Pupils are equal, round, and reactive to light. Right eye exhibits abnormal extraocular motion. Left eye exhibits abnormal extraocular motion.   Cardiovascular: Normal rate and normal pulses.    Pulmonary/Chest: Tachypnea noted. He has rales in the right lower field and the left lower field.   Abdominal: He exhibits distension and ascites. Bowel sounds are decreased. There is tenderness in the epigastric area.   Neurological: He has normal strength. He appears lethargic. GCS eye subscore is 4. GCS verbal subscore is 5. GCS motor subscore is 6.   Skin: Skin is warm and dry.   Nursing note and vitals reviewed.      Vents:     Lines/Drains/Airways     Central Venous Catheter Line                 Percutaneous Central Line Insertion/Assessment - triple lumen  06/07/17 0645 right internal jugular 4 days          Drain                 Urethral Catheter 06/07/17 0600 4 days         Fecal Incontinence  06/07/17 2200 3 days          Pressure Ulcer                 Pressure Ulcer 06/07/17 Left elbow Stage II 4 days               Significant Labs:    CBC/Anemia Profile:    Recent Labs  Lab 06/10/17  1704 06/10/17  2325 06/11/17  0310 06/11/17  0810   WBC 6.05 5.82 5.43 6.12   HGB 8.7* 8.3* 8.4* 8.8*   HCT 24.6* 24.1* 24.5* 24.7*   PLT 48* 39* 40*  --    MCV 93 94 93 93   RDW 16.6* 16.7* 16.5* 16.8*        Chemistries:    Recent Labs  Lab 06/10/17  0254 06/10/17  1358 06/10/17  1823 06/11/17  0310   * 135* 135* 136   K 4.0 3.7 3.5 3.3*    106 106 106   CO2 18* 15* 18* 18*   BUN 32* 31* 30* 29*   CREATININE 2.2* 2.0* 1.9* 1.7*   CALCIUM 8.2* 8.7 8.6* 8.6*   ALBUMIN 3.8 4.4  --  4.2   PROT 5.2* 5.7*  --  5.3*   BILITOT 3.0* 7.2*  --  6.1*   ALKPHOS 64 70  --  57   ALT 21 28  --  21   AST 46* 65*  --  46*   MG 2.7*  --  2.1 1.8   PHOS 3.0  --   --  2.5*       All pertinent labs within the past 24 hours have been reviewed.    Significant Imaging:  I have reviewed and interpreted all pertinent imaging results/findings within the past 24 hours.

## 2017-06-11 NOTE — SUBJECTIVE & OBJECTIVE
Interval History/Significant Events: No acute events overnight    Review of Systems   Unable to perform ROS: Other     Objective:     Vital Signs (Most Recent):  Temp: 99.3 °F (37.4 °C) (06/10/17 1515)  Pulse: 71 (06/10/17 1830)  Resp: (!) 34 (06/10/17 1830)  BP: 101/66 (06/10/17 1830)  SpO2: 97 % (06/10/17 1830) Vital Signs (24h Range):  Temp:  [98.2 °F (36.8 °C)-99.8 °F (37.7 °C)] 99.3 °F (37.4 °C)  Pulse:  [5-77] 71  Resp:  [0-51] 34  SpO2:  [90 %-100 %] 97 %  BP: ()/(51-68) 101/66   Weight: 87 kg (191 lb 12.8 oz)  Body mass index is 30.96 kg/m².      Intake/Output Summary (Last 24 hours) at 06/10/17 1915  Last data filed at 06/10/17 1800   Gross per 24 hour   Intake          2227.33 ml   Output             1325 ml   Net           902.33 ml       Physical Exam   Constitutional: Vital signs are normal. He appears lethargic. He appears cachectic. He appears toxic.   HENT:   Head: Normocephalic.   Eyes: Pupils are equal, round, and reactive to light. Right eye exhibits abnormal extraocular motion. Left eye exhibits abnormal extraocular motion.   Cardiovascular: Normal rate and normal pulses.    Pulmonary/Chest: Tachypnea noted. He has rales in the right lower field and the left lower field.   Abdominal: He exhibits distension and ascites. Bowel sounds are decreased. There is tenderness in the epigastric area.   Neurological: He has normal strength. He appears lethargic. GCS eye subscore is 4. GCS verbal subscore is 5. GCS motor subscore is 6.   Skin: Skin is warm and dry.   Nursing note and vitals reviewed.      Vents:     Lines/Drains/Airways     Central Venous Catheter Line                 Percutaneous Central Line Insertion/Assessment - triple lumen  06/07/17 0645 right internal jugular 3 days          Drain                 Urethral Catheter 06/07/17 0600 3 days         Fecal Incontinence  06/07/17 2200 2 days          Pressure Ulcer                 Pressure Ulcer 06/07/17 Left elbow Stage II 3  days              Significant Labs:    CBC/Anemia Profile:    Recent Labs  Lab 06/10/17  0254 06/10/17  0810 06/10/17  1358 06/10/17  1704   WBC 5.06 4.78 6.64 6.05   HGB 7.2* 7.1* 8.6* 8.7*   HCT 20.5* 20.8* 24.9* 24.6*   PLT 51* 45* 44*  --    MCV 94 95 94 93   RDW 16.2* 16.4* 16.2* 16.6*        Chemistries:    Recent Labs  Lab 06/09/17  0307  06/09/17  1917 06/10/17  0254 06/10/17  1358 06/10/17  1823   *  --  135* 134* 135* 135*   K 3.0*  < > 3.0* 4.0 3.7 3.5     --  105 105 106 106   CO2 17*  --  17* 18* 15* 18*   BUN 37*  --  34* 32* 31* 30*   CREATININE 2.5*  --  2.3* 2.2* 2.0* 1.9*   CALCIUM 8.5*  --  8.5* 8.2* 8.7 8.6*   ALBUMIN 4.0  --   --  3.8 4.4  --    PROT 5.2*  --   --  5.2* 5.7*  --    BILITOT 4.4*  --   --  3.0* 7.2*  --    ALKPHOS 86  --   --  64 70  --    ALT 12  --   --  21 28  --    AST 31  --   --  46* 65*  --    MG 1.9  < > 1.6 2.7*  --  2.1   PHOS 4.2  --   --  3.0  --   --    < > = values in this interval not displayed.    All pertinent labs within the past 24 hours have been reviewed.    Significant Imaging:  I have reviewed and interpreted all pertinent imaging results/findings within the past 24 hours.

## 2017-06-11 NOTE — ASSESSMENT & PLAN NOTE
- secondary to liver disease.   - plts today 40  - no  transfusion at this time  -- Vit K for elevated INR

## 2017-06-11 NOTE — NURSING TRANSFER
Nursing Transfer Note      6/11/2017     Transfer To: [id number]    Transfer via bed    Transfer with cardiac monitoring    Transported by SICU RN     Medicines sent: PRN MEDS    Chart send with patient: Yes    Notified: spouse        Upon arrival to floor: cardiac monitor applied, patient oriented to room, call bell in reach and bed in lowest position

## 2017-06-11 NOTE — PROCEDURES
"Tommy Hopper is a 57 y.o. male patient.    Temp: 99.3 °F (37.4 °C) (06/10/17 1515)  Pulse: 71 (06/10/17 1830)  Resp: (!) 34 (06/10/17 1830)  BP: 101/66 (06/10/17 1830)  SpO2: 97 % (06/10/17 1830)  Weight: 87 kg (191 lb 12.8 oz) (17 0600)  Height: 5' 6" (167.6 cm) (17 0844)       Paracentesis  Date/Time: 6/10/2017 7:03 PM  Location procedure was performed: [facility] CRITICAL CARE MEDICINE  Performed by: NAZIA LASSITER II  Authorized by: NAZIA LASSITER II   Pre-operative diagnosis: Ascites  Post-operative diagnosis: Ascites  Consent Done: Yes  Consent: Verbal consent obtained. Written consent obtained.  Consent given by: patient  Patient understanding: patient states understanding of the procedure being performed  Patient consent: the patient's understanding of the procedure matches consent given  Procedure consent: procedure consent matches procedure scheduled  Relevant documents: relevant documents present and verified  Test results: test results available and properly labeled  Site marked: the operative site was marked  Imaging studies: imaging studies available  Required items: required blood products, implants, devices, and special equipment available  Patient identity confirmed: , MRN and name  Time out: Immediately prior to procedure a "time out" was called to verify the correct patient, procedure, equipment, support staff and site/side marked as required.  Initial or subsequent exam: subsequent  Procedure purpose: diagnostic and therapeutic  Indications: abdominal discomfort secondary to ascites and suspected peritonitis  Anesthesia: local infiltration    Anesthesia:  Anesthesia: local infiltration  Local Anesthetic: lidocaine 2% without epinephrine   Anesthetic total: 7 mL  Patient sedated: no  Preparation: Patient was prepped and draped in the usual sterile fashion.  Description of findings: Pale yellow translucent ascitic fluid.    Needle gauge: 20  Ultrasound guidance: yes  Puncture " site: right lower quadrant  Fluid removed: 5000(ml)  Fluid appearance: serous  Technical procedures used: Ultrasonography.   Complications: No  Estimated blood loss (mL): 4  Implants: No  Patient tolerance: Patient tolerated the procedure well with no immediate complications  Comments: Uncomplicated abdominal paracentesis conducted under ultrasonography. Laboratory samples collected and labeled at bedside. Albumin 25g ordered for high volume paracentesis.           Krunal Brown II  6/10/2017

## 2017-06-11 NOTE — ASSESSMENT & PLAN NOTE
- crackles bilaterally, and reporting mild SOB  -continue abx therapy with vanc and zosyn  -- May improve with paracentesis

## 2017-06-11 NOTE — CONSULTS
Received consult: Rec's for supplements    Attempted to speak with pt. He would not respond to questions. Discussed w/ RN. She stressed ONS needed as pt was consuming little po.     Placed order for Boost Plus Chocolate TID

## 2017-06-11 NOTE — ASSESSMENT & PLAN NOTE
- nephrology consulted- GYPSY is 2/2 ischemic ATN from hypotension and anemia, given this cannot diagnosed hepatorenal syndrome at this time  - US retroperitoneum 6/7 shows bilateral medical renal disease  - CXR on admission: showed increase congestion   - continue albumin and octerotide   - holding lasix and aldactone  - - continue to monitor UO

## 2017-06-11 NOTE — ASSESSMENT & PLAN NOTE
- Received 1 u pRBCs with improvement in Hb    - baseline ~9-10  -  CT abdo/pevlis during this admission unremarkable  - continue to monitor CBCs

## 2017-06-11 NOTE — PROGRESS NOTES
[facility]  Critical Care Medicine  Progress Note    Patient Name: Tommy Hopper  MRN: [id number]  Admission Date: 6/7/2017  Hospital Length of Stay: 3 days  Code Status: Full Code  Attending Provider: Le Santacruz MD  Primary Care Provider: Primary Doctor No   Principal Problem: Cirrhosis of liver with ascites    Subjective:     HPI:  Mr Hopper 57 y.o with history of alcoholic cirrhosis, HCV, HCC s/p TACE x 2(10/3/16, 4/17/17) who is transferred for volume overload and hypotension. Patient had outpatient paracentesis done yesterday 3 L removed , did not receive albumin because unable to get IV access. His wife states that he has worsening lower limb swelling , abdominal distension, and SOB. She states that he is not taking he lasix and aldactone and it was held from the clinic since April, 17. She reports that Dr. Oconnor recommend to come to hospital for evaluation.   Patient roxana chest pain or fever, reports chills and feeling cold. Reports back pain. No chest pain,     Hospital/ICU Course:  Patient found to be hypotensive SBP 70-80s, so started on levophed 6/7. GYPSY on CKD, so started on albumin and octreotide 6/7 for suspected hepatorenal syndrome. Resumed bicarb tabs, lactulose, and rifaximin. Holding home lasix and aldactone. Patient evaluated by hepatology and they are recommending repeat paracentesis and echo. Echo does not show any decreased EF or wall motion abnormality to account for patient's hypotension. Bedside parcentesis completed 6/8 and fluid showing  and segs 67%, Cx pending. Patient evaluated by nephrology and etiology of GYPSY felt to be ischemic ATN due to hypotension and anemia, thus cannot diagnose hepatorenal syndrome at this time. Fena 2%, in support of intrinsic disease. US of kidneys 6/7 shows bilateral medical renal disease. No dialysis indicated at this time. Patient failed nursing bedside swallow test and unable to place NGT, so receiving lactulose enemas.  Consulted speech for swallow study- recommend dental soft liquid diet. Started on vanc and zosyn  6/7.    Interval History/Significant Events: No acute events overnight    Review of Systems   Unable to perform ROS: Other     Objective:     Vital Signs (Most Recent):  Temp: 99.3 °F (37.4 °C) (06/10/17 1515)  Pulse: 71 (06/10/17 1830)  Resp: (!) 34 (06/10/17 1830)  BP: 101/66 (06/10/17 1830)  SpO2: 97 % (06/10/17 1830) Vital Signs (24h Range):  Temp:  [98.2 °F (36.8 °C)-99.8 °F (37.7 °C)] 99.3 °F (37.4 °C)  Pulse:  [5-77] 71  Resp:  [0-51] 34  SpO2:  [90 %-100 %] 97 %  BP: ()/(51-68) 101/66   Weight: 87 kg (191 lb 12.8 oz)  Body mass index is 30.96 kg/m².      Intake/Output Summary (Last 24 hours) at 06/10/17 1915  Last data filed at 06/10/17 1800   Gross per 24 hour   Intake          2227.33 ml   Output             1325 ml   Net           902.33 ml       Physical Exam   Constitutional: Vital signs are normal. He appears lethargic. He appears cachectic. He appears toxic.   HENT:   Head: Normocephalic.   Eyes: Pupils are equal, round, and reactive to light. Right eye exhibits abnormal extraocular motion. Left eye exhibits abnormal extraocular motion.   Cardiovascular: Normal rate and normal pulses.    Pulmonary/Chest: Tachypnea noted. He has rales in the right lower field and the left lower field.   Abdominal: He exhibits distension and ascites. Bowel sounds are decreased. There is tenderness in the epigastric area.   Neurological: He has normal strength. He appears lethargic. GCS eye subscore is 4. GCS verbal subscore is 5. GCS motor subscore is 6.   Skin: Skin is warm and dry.   Nursing note and vitals reviewed.      Vents:     Lines/Drains/Airways     Central Venous Catheter Line                 Percutaneous Central Line Insertion/Assessment - triple lumen  06/07/17 0645 right internal jugular 3 days          Drain                 Urethral Catheter 06/07/17 0600 3 days         Fecal Incontinence   06/07/17 2200 2 days          Pressure Ulcer                 Pressure Ulcer 06/07/17 Left elbow Stage II 3 days              Significant Labs:    CBC/Anemia Profile:    Recent Labs  Lab 06/10/17  0254 06/10/17  0810 06/10/17  1358 06/10/17  1704   WBC 5.06 4.78 6.64 6.05   HGB 7.2* 7.1* 8.6* 8.7*   HCT 20.5* 20.8* 24.9* 24.6*   PLT 51* 45* 44*  --    MCV 94 95 94 93   RDW 16.2* 16.4* 16.2* 16.6*        Chemistries:    Recent Labs  Lab 06/09/17  0307  06/09/17  1917 06/10/17  0254 06/10/17  1358 06/10/17  1823   *  --  135* 134* 135* 135*   K 3.0*  < > 3.0* 4.0 3.7 3.5     --  105 105 106 106   CO2 17*  --  17* 18* 15* 18*   BUN 37*  --  34* 32* 31* 30*   CREATININE 2.5*  --  2.3* 2.2* 2.0* 1.9*   CALCIUM 8.5*  --  8.5* 8.2* 8.7 8.6*   ALBUMIN 4.0  --   --  3.8 4.4  --    PROT 5.2*  --   --  5.2* 5.7*  --    BILITOT 4.4*  --   --  3.0* 7.2*  --    ALKPHOS 86  --   --  64 70  --    ALT 12  --   --  21 28  --    AST 31  --   --  46* 65*  --    MG 1.9  < > 1.6 2.7*  --  2.1   PHOS 4.2  --   --  3.0  --   --    < > = values in this interval not displayed.    All pertinent labs within the past 24 hours have been reviewed.    Significant Imaging:  I have reviewed and interpreted all pertinent imaging results/findings within the past 24 hours.    Assessment/Plan:     Neuro   Chronic pain    - back pain, chronic   - holding MS contin due to renal/hepatic failure   -  Limit narcotics        Hepatic encephalopathy    Improving        Pulmonary   SOB (shortness of breath)    - crackles bilaterally, and reporting mild SOB  -continue abx therapy with vanc and zosyn  -- May improve with paracentesis          Cardiac   Shock    Improving  -baseline systlic BP in 100-110 per wife  - midodrine continues        Renal   Acute renal failure with tubular necrosis    - nephrology consulted- GYPSY is 2/2 ischemic ATN from hypotension and anemia, given this cannot diagnosed hepatorenal syndrome at this time  - US retroperitoneum  6/7 shows bilateral medical renal disease  - CXR on admission: showed increase congestion   - continue albumin and octerotide   - holding lasix and aldactone  - - continue to monitor UO           Hem/Onc   HCC (hepatocellular carcinoma)    - s/p TACE 10/2016 and 4/2017        Fluids/Electrolytes/Nutrition/GI   * Cirrhosis of liver with ascites    - alcoholic liver disease, HCV  - MELD-Na score: 28 at 6/10/2017  6:23 PM  MELD score: 27 at 6/10/2017  6:23 PM  Calculated from:  Serum Creatinine: 1.9 mg/dL at 6/10/2017  6:23 PM  Serum Sodium: 135 mmol/L at 6/10/2017  6:23 PM  Total Bilirubin: 7.2 mg/dL at 6/10/2017  1:58 PM  INR(ratio): 1.9 at 6/10/2017  2:54 AM  Age: 57 years  - hepatology consult- recommend echo, paracentesis, continue IV albumin, continue aggressive medical therapy since patient is still a candidate for liver transplant at this time, his transplant listing status will be discussed next week and is pending return of nicotine laboratory metabolites (he reports quit date of April 2017)-  -- metabolites are negative for nicotine, positive for cotinine (16)  -paracentesis completed 6/8/17 with WBCs 110 segs 67%, not suggestive of SBP  -echo shows no decreased in EF, or decrease wall motion to account for hypotension  - continue lactulose, sodium bicarb tabs, and rifaximin   - holding aldactone and lasix            Decompensated hepatic cirrhosis    See above   Repeat paracentesis today  SOB- Will keep in ICU tonight        Hyponatremia    - hypervolemic , hyponatremia - improved   - in setting of decompensated cirrhosis   - fluid restriction 1500 cc          Other   Anemia of chronic disease    - Received 1 u pRBCs with improvement in Hb    - baseline ~9-10  -  CT abdo/pevlis during this admission unremarkable  - continue to monitor CBCs        Thrombocytopenia    - secondary to liver disease.   - plts today 62  - no indication for transfusion at this time           Critical Care Medicine Daily Checklist:     A: Awake: RASS Goal/Actual Goal: RASS Goal: 0-->alert and calm  Actual: Lacey Agitation Sedation Scale (RASS): Drowsy   B: Spontaneous Breathing Trial Performed?  na   C: SAT & SBT Coordinated?  na                   D: Delirium: CAM-ICU Overall CAM-ICU: Negative   E: Early Mobility Performed? No   F: Feeding Goal:    Status:     Current Diet Order   Procedures    Diet Dental Soft     Dental Soft LIQUID Diet Level: Thin HOB to 90 degrees; feed only when awake/alert      AS: Analgesia/Sedation Given overnight now sleepy   T: Thromboembolic Prophylaxis NA   H: HOB > 300 Yes   U: Stress Ulcer Prophylaxis (if needed) Protonix   G: Glucose Control NA   B: Bowel Function Stool Occurrence: 1   I: Indwelling Catheter (Lines & Berger) Necessity Continue   D: De-escalation of Antimicrobials/Pharmacotherapies Continue    Plan for the day/ETD Paracentesis  Supportive care  Step down in am    Code Status:  Family/Goals of Care: Full Code         Critical secondary to Patient has a condition that poses threat to life and bodily function: Liver failure    Critical care time 45mins      Critical care was time spent personally by me on the following activities: development of treatment plan with patient or surrogate and bedside caregivers, discussions with consultants, evaluation of patient's response to treatment, examination of patient, ordering and performing treatments and interventions, ordering and review of laboratory studies, ordering and review of radiographic studies, pulse oximetry, re-evaluation of patient's condition. This critical care time did not overlap with that of any other provider or involve time for any procedures.     Fiona Winterbottom, APRN, CNS  Critical Care Medicine  [facility]

## 2017-06-11 NOTE — ASSESSMENT & PLAN NOTE
- hypervolemic , hyponatremia - improved   - in setting of decompensated cirrhosis   - fluid restriction 1500 cc

## 2017-06-11 NOTE — ASSESSMENT & PLAN NOTE
- Received 1 u pRBCs with improvement in Hb on 6/10  - baseline ~9-10  -  CT abdo/pevlis during this admission unremarkable  - continue to monitor CBCs

## 2017-06-11 NOTE — PLAN OF CARE
Problem: Patient Care Overview  Goal: Plan of Care Review  Outcome: Ongoing (interventions implemented as appropriate)  Pt remains confused to time and situation. No c/o pain throughout the day. Paracentesis done at bedside; 5L removed. Pt received 1 units of PRBCs this morning. Pt is resting in bed. No distress noted. Will continue to monitor.

## 2017-06-11 NOTE — PROGRESS NOTES
Hepatology  Progress note      SUBJECTIVE:     Reason for Consult: ESLD    History of Present Illness:    Patient is a 57 y.o. male w/ a history of ETOH+HCV+HCC cirrhosis who was admitted to [facility] with an GYPSY.    The patient has previously been presented to Selection Committee -- deferred due to concerns with smoking history (patient now states he is 6 weeks without smoking) and CT evidence of UIP.      That patient reports fatigue.  No fevers, chills, rigors.  No abdominal pain.       Interval Hx:  No acute events overnight.  Patient appears much more alert and awake today.         OBJECTIVE:     Vital Signs (Most Recent)  Temp: 99.1 °F (37.3 °C) (06/11/17 0715)  Pulse: (!) 56 (06/11/17 1030)  Resp: (!) 25 (06/11/17 1030)  BP: 116/69 (06/11/17 1030)  SpO2: 99 % (06/11/17 1030)    Physical Exam:  General appearance: chronically ill appearing, no acute distress  Eyes: anicteric sclerae  Lungs: bronchial breath sounds, productive cough  Heart: regular rate and rhythm, S1, S2 clearly audible,  Abdomen: soft, non-tender, distended, moderately tense; no rebound tenderness, rigidity, or guarding  Pulses: 2+ and symmetric  Skin: Skin color, texture, turgor normal.   Neurologic: No focal deficits noted      Laboratory    CBC:     Recent Labs  Lab 06/10/17  1704 06/10/17  2325 06/11/17  0310 06/11/17  0810   WBC 6.05 5.82 5.43 6.12   RBC 2.64* 2.57* 2.63* 2.65*   HGB 8.7* 8.3* 8.4* 8.8*   HCT 24.6* 24.1* 24.5* 24.7*   PLT 48* 39* 40*  --    MCV 93 94 93 93   MCH 33.0* 32.3* 31.9* 33.2*   MCHC 35.4 34.4 34.3 35.6     BMP:     Recent Labs  Lab 06/10/17  1358 06/10/17  1823 06/11/17  0310    102 106   * 135* 136   K 3.7 3.5 3.3*    106 106   CO2 15* 18* 18*   BUN 31* 30* 29*   CREATININE 2.0* 1.9* 1.7*   CALCIUM 8.7 8.6* 8.6*   MG  --  2.1 1.8     Coagulation:     Recent Labs  Lab 06/11/17  0310   INR 2.3*           ASSESSMENT/PLAN:     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 admitted with hypotension and renal dysfunction  HCC s/p  TACE  MELD 26    Para 6/10: No SBP    Recommendations:   Continue supportive care  Patient will need repeat PFTs following a paracentesis when he is stepped down to floor  Appreciate Nephrology recommendations  Await blood and urine cultures      David Resendiz   Gastroenterology Fellow PGY V  [phone]

## 2017-06-11 NOTE — ASSESSMENT & PLAN NOTE
- back pain, chronic   - holding MS contin due to renal/hepatic failure   -  Limit narcotics as patient is easily sedated

## 2017-06-11 NOTE — RESIDENT HANDOFF
Handoff     Primary Team: [facility] CRITICAL CARE MEDICINE Room Number: 6075/6075 A     Patient Name: Tommy Hopper MRN: [id number]     Date of Birth: [id number] Allergies: Adhesive     Age: 57 y.o. Admit Date: 6/7/2017     Sex: male  BMI: Body mass index is 26.9 kg/m².     Code Status: Full Code        Illness Level (current clinical status): Watcher - No    Reason for Admission: Cirrhosis of liver with ascites    HPI:  Mr Hopper 57 y.o with history of alcoholic cirrhosis, HCV, HCC s/p TACE x 2(10/3/16, 4/17/17) who is transferred for volume overload and hypotension. Patient had outpatient paracentesis done yesterday 3 L removed , did not receive albumin because unable to get IV access. His wife states that he has worsening lower limb swelling , abdominal distension, and SOB. She states that he is not taking he lasix and aldactone and it was held from the clinic since April, 17. She reports that Dr. Oconnor recommend to come to hospital for evaluation.   Patient roxana chest pain or fever, reports chills and feeling cold. Reports back pain. No chest pain,      Hospital/ICU Course:  Patient found to be hypotensive SBP 70-80s, so started on levophed 6/7. GYPSY on CKD, so started on albumin and octreotide 6/7 for suspected hepatorenal syndrome. Resumed bicarb tabs, lactulose, and rifaximin. Holding home lasix and aldactone. Patient evaluated by hepatology and they are recommending repeat paracentesis and echo. Echo does not show any decreased EF or wall motion abnormality to account for patient's hypotension. Bedside parcentesis completed 6/8 and fluid showing  and segs 67%, Cx pending. Patient evaluated by nephrology and etiology of GYPSY felt to be ischemic ATN due to hypotension and anemia, thus cannot diagnose hepatorenal syndrome at this time. Fena 2%, in support of intrinsic disease. US of kidneys 6/7 shows bilateral medical renal disease. No dialysis indicated at this time. Patient failed nursing bedside swallow test and  unable to place NGT, so receiving lactulose enemas. Consulted speech for swallow study- recommend dental soft liquid diet. Started on vanc and zosyn  6/7. Paracentesis for 5L on 6/10. Patient off pressors for over 24hrs, back to baseline neurological function, and ready for step-down today.    Tasks (specific, using if-then statements):   Electrolyte replacement  Follow up transplant evaluation plan    Contingency Plan (special circumstances anticipated and plan): Call x [phone] for hemodynamic instability or significant deterioration in condition    Estimated Discharge Date: 6/20/2017    Discharge Disposition: Home-Health Care [facility]

## 2017-06-11 NOTE — PROGRESS NOTES
[facility]  Critical Care Medicine  Progress Note    Patient Name: Tommy Hopper  MRN: [id number]  Admission Date: 6/7/2017  Hospital Length of Stay: 4 days  Code Status: Full Code  Attending Provider: Le Santacruz MD  Primary Care Provider: Primary Doctor No   Principal Problem: Cirrhosis of liver with ascites    Subjective:     HPI:  Mr Hopper 57 y.o with history of alcoholic cirrhosis, HCV, HCC s/p TACE x 2(10/3/16, 4/17/17) who is transferred for volume overload and hypotension. Patient had outpatient paracentesis done yesterday 3 L removed , did not receive albumin because unable to get IV access. His wife states that he has worsening lower limb swelling , abdominal distension, and SOB. She states that he is not taking he lasix and aldactone and it was held from the clinic since April, 17. She reports that Dr. Oconnor recommend to come to hospital for evaluation.   Patient roxana chest pain or fever, reports chills and feeling cold. Reports back pain. No chest pain,     Hospital/ICU Course:  Patient found to be hypotensive SBP 70-80s, so started on levophed 6/7. GYPSY on CKD, so started on albumin and octreotide 6/7 for suspected hepatorenal syndrome. Resumed bicarb tabs, lactulose, and rifaximin. Holding home lasix and aldactone. Patient evaluated by hepatology and they are recommending repeat paracentesis and echo. Echo does not show any decreased EF or wall motion abnormality to account for patient's hypotension. Bedside parcentesis completed 6/8 and fluid showing  and segs 67%, Cx pending. Patient evaluated by nephrology and etiology of GYPSY felt to be ischemic ATN due to hypotension and anemia, thus cannot diagnose hepatorenal syndrome at this time. Fena 2%, in support of intrinsic disease. US of kidneys 6/7 shows bilateral medical renal disease. No dialysis indicated at this time. Patient failed nursing bedside swallow test and unable to place NGT, so receiving lactulose enemas.  Consulted speech for swallow study- recommend dental soft liquid diet. Started on vanc and zosyn  6/7. Paracentesis for 5L on 6/10. Patient off pressors for over 24hrs, back to baseline neurological function, and ready for step-down today.    Interval History/Significant Events: No acute events overnight    Review of Systems   Unable to perform ROS: Other     Objective:     Vital Signs (Most Recent):  Temp: 99.1 °F (37.3 °C) (06/11/17 0715)  Pulse: 60 (06/11/17 0930)  Resp: 18 (06/11/17 0930)  BP: 122/66 (06/11/17 0930)  SpO2: 98 % (06/11/17 0930) Vital Signs (24h Range):  Temp:  [98.8 °F (37.1 °C)-99.8 °F (37.7 °C)] 99.1 °F (37.3 °C)  Pulse:  [54-77] 60  Resp:  [0-46] 18  SpO2:  [90 %-100 %] 98 %  BP: ()/(54-80) 122/66   Weight: 75.6 kg (166 lb 10.7 oz)  Body mass index is 26.9 kg/m².      Intake/Output Summary (Last 24 hours) at 06/11/17 0952  Last data filed at 06/11/17 0900   Gross per 24 hour   Intake          2531.33 ml   Output             1860 ml   Net           671.33 ml       Physical Exam   Constitutional: Vital signs are normal. He appears lethargic. He appears cachectic. He appears toxic.   HENT:   Head: Normocephalic.   Eyes: Pupils are equal, round, and reactive to light. Right eye exhibits abnormal extraocular motion. Left eye exhibits abnormal extraocular motion.   Cardiovascular: Normal rate and normal pulses.    Pulmonary/Chest: Tachypnea noted. He has rales in the right lower field and the left lower field.   Abdominal: He exhibits distension and ascites. Bowel sounds are decreased. There is tenderness in the epigastric area.   Neurological: He has normal strength. He appears lethargic. GCS eye subscore is 4. GCS verbal subscore is 5. GCS motor subscore is 6.   Skin: Skin is warm and dry.   Nursing note and vitals reviewed.      Vents:     Lines/Drains/Airways     Central Venous Catheter Line                 Percutaneous Central Line Insertion/Assessment - triple lumen  06/07/17 0645 right  internal jugular 4 days          Drain                 Urethral Catheter 06/07/17 0600 4 days         Fecal Incontinence  06/07/17 2200 3 days          Pressure Ulcer                 Pressure Ulcer 06/07/17 Left elbow Stage II 4 days              Significant Labs:    CBC/Anemia Profile:    Recent Labs  Lab 06/10/17  1704 06/10/17  2325 06/11/17  0310 06/11/17  0810   WBC 6.05 5.82 5.43 6.12   HGB 8.7* 8.3* 8.4* 8.8*   HCT 24.6* 24.1* 24.5* 24.7*   PLT 48* 39* 40*  --    MCV 93 94 93 93   RDW 16.6* 16.7* 16.5* 16.8*        Chemistries:    Recent Labs  Lab 06/10/17  0254 06/10/17  1358 06/10/17  1823 06/11/17  0310   * 135* 135* 136   K 4.0 3.7 3.5 3.3*    106 106 106   CO2 18* 15* 18* 18*   BUN 32* 31* 30* 29*   CREATININE 2.2* 2.0* 1.9* 1.7*   CALCIUM 8.2* 8.7 8.6* 8.6*   ALBUMIN 3.8 4.4  --  4.2   PROT 5.2* 5.7*  --  5.3*   BILITOT 3.0* 7.2*  --  6.1*   ALKPHOS 64 70  --  57   ALT 21 28  --  21   AST 46* 65*  --  46*   MG 2.7*  --  2.1 1.8   PHOS 3.0  --   --  2.5*       All pertinent labs within the past 24 hours have been reviewed.    Significant Imaging:  I have reviewed and interpreted all pertinent imaging results/findings within the past 24 hours.    Assessment/Plan:     Neuro   Chronic pain    - back pain, chronic   - holding MS contin due to renal/hepatic failure   -  Limit narcotics as patient is easily sedated        Hepatic encephalopathy    Improving  Back to baseline today        Pulmonary   SOB (shortness of breath)    Improved today since paracentesis yesterday   --crackles bilaterally, and reporting mild SOB  -- Abx therapy with Ceftriaxone > SBP prophylaxis  -- Paracentesis removed 5L yesterday>> No SBP          Cardiac   Shock    Resolved  -- Off pressors>24hrs  -baseline systlic BP in 100-110 per wife  - midodrine continues        Renal   Acute renal failure with tubular necrosis    - nephrology consulted- GYPSY is 2/2 ischemic ATN from hypotension and anemia, given this cannot  diagnosed hepatorenal syndrome at this time  - US retroperitoneum 6/7 shows bilateral medical renal disease  - CXR on admission: showed increase congestion   - continue albumin and octerotide   - holding lasix and aldactone  - - continue to monitor UO   -- DC nixon        Hem/Onc   HCC (hepatocellular carcinoma)    - s/p TACE 10/2016 and 4/2017        Fluids/Electrolytes/Nutrition/GI   * Cirrhosis of liver with ascites    - alcoholic liver disease, HCV  - MELD-Na score: 28 at 6/11/2017  3:10 AM  MELD score: 28 at 6/11/2017  3:10 AM  Calculated from:  Serum Creatinine: 1.7 mg/dL at 6/11/2017  3:10 AM  Serum Sodium: 136 mmol/L at 6/11/2017  3:10 AM  Total Bilirubin: 6.1 mg/dL at 6/11/2017  3:10 AM  INR(ratio): 2.3 at 6/11/2017  3:10 AM  Age: 57 years  - hepatology consult- recommend echo, paracentesis, continue IV albumin, continue aggressive medical therapy since patient is still a candidate for liver transplant at this time, his transplant listing status will be discussed this week  (June 12th...)  -- metabolites are negative for nicotine, positive for cotinine (16)  -paracentesis completed 6/10/17 not suggestive of SBP  -echo shows no decreased in EF, or decrease wall motion to account for hypotension  - continue lactulose, sodium bicarb tabs, and rifaximin   - holding aldactone and lasix            Hypokalemia    Replete prn  Give oral supplement  Trend BMP        Decompensated hepatic cirrhosis    See above            Hypoalbuminemia due to protein-calorie malnutrition    Encourage nutrition/supplements  -- Dietary consulted        Hyponatremia    Improved  -hypervolemic , hyponatremia - improved   - in setting of decompensated cirrhosis   - fluid restriction 1500 cc          Other   Anemia of chronic disease    - Received 1 u pRBCs with improvement in Hb on 6/10  - baseline ~9-10  -  CT abdo/pevlis during this admission unremarkable  - continue to monitor CBCs        Pre-transplant evaluation for liver  transplant    Will be discussed this week in committee        Thrombocytopenia    - secondary to liver disease.   - plts today 40  - no  transfusion at this time  -- Vit K for elevated INR           Critical Care Medicine Daily Checklist:    A: Awake: RASS Goal/Actual Goal: RASS Goal: 0-->alert and calm  Actual: Lacey Agitation Sedation Scale (RASS): Alert and calm   B: Spontaneous Breathing Trial Performed?     C: SAT & SBT Coordinated?  NA                     D: Delirium: CAM-ICU Overall CAM-ICU: Negative   E: Early Mobility Performed? No   F: Feeding Goal:    Status:     Current Diet Order   Procedures    Diet Dental Soft     Dental Soft LIQUID Diet Level: Thin HOB to 90 degrees; feed only when awake/alert      AS: Analgesia/Sedation  prn   T: Thromboembolic Prophylaxis NA- coagulopathic   H: HOB > 300 Yes   U: Stress Ulcer Prophylaxis (if needed) Protonix   G: Glucose Control NA   B: Bowel Function Stool Occurrence: 1   I: Indwelling Catheter (Lines & Berger) Necessity DC today   D: De-escalation of Antimicrobials/Pharmacotherapies Rocephin    Plan for the day/ETD Transfer to floor    Code Status:  Family/Goals of Care: Full Code         I spent >70 minutes reviewing patient records, examining, and counseling the patient with greater than 50% of the time spent with direct patient care and coordination.      Fiona Winterbottom, APRN, CNS  Critical Care Medicine  [facility]

## 2017-06-11 NOTE — PLAN OF CARE
Problem: Patient Care Overview  Goal: Individualization & Mutuality  Dx:Cirrhosis of liver with ascites    6/7/17: Admitted to SICU, Levo gtt started, RIJ placed, US, 400cc Albumin  6/8/17: Paracentesis at bedside. 3 L of fluid removed.   6/9/17: 1 unit PRBCs given; levo gtt cont.  6/9/17 day: levo off at 1600  6/10/17: 1 unit PRBC. paracentesis at bedside. 5L removed      Nursing:  *4x daily Accuchecks  *Q8H CBC  *Lactulose PO TID     Individualization:  *Pt likes to watch [employer]         Outcome: Ongoing (interventions implemented as appropriate)  Pt remains oriented to self and place only. Frequently reoriented. RA. O2 sat 100%. No SOB or emesis noted this shift. VSS. Abdomen remains taut and distended, tenderness noted. UO adequate. Bathed. Repositioned q 2 hours. Tolerated meds well. Critical value platelets low, MD aware. No orders received. BMS removed inadvertently by patient. No pain or trauma noted. MD aware. Will continue to monitor. See chart and doc flowsheet for details.

## 2017-06-11 NOTE — ASSESSMENT & PLAN NOTE
- alcoholic liver disease, HCV  - MELD-Na score: 28 at 6/11/2017  3:10 AM  MELD score: 28 at 6/11/2017  3:10 AM  Calculated from:  Serum Creatinine: 1.7 mg/dL at 6/11/2017  3:10 AM  Serum Sodium: 136 mmol/L at 6/11/2017  3:10 AM  Total Bilirubin: 6.1 mg/dL at 6/11/2017  3:10 AM  INR(ratio): 2.3 at 6/11/2017  3:10 AM  Age: 57 years  - hepatology consult- recommend echo, paracentesis, continue IV albumin, continue aggressive medical therapy since patient is still a candidate for liver transplant at this time, his transplant listing status will be discussed this week  (June 12th...)  -- metabolites are negative for nicotine, positive for cotinine (16)  -paracentesis completed 6/10/17 not suggestive of SBP  -echo shows no decreased in EF, or decrease wall motion to account for hypotension  - continue lactulose, sodium bicarb tabs, and rifaximin   - holding aldactone and lasix

## 2017-06-12 LAB
ALBUMIN SERPL BCP-MCNC: 4.3 G/DL
ALP SERPL-CCNC: 54 U/L
ALT SERPL W/O P-5'-P-CCNC: 17 U/L
ANION GAP SERPL CALC-SCNC: 10 MMOL/L
ANISOCYTOSIS BLD QL SMEAR: SLIGHT
AST SERPL-CCNC: 37 U/L
BACTERIA BLD CULT: NORMAL
BACTERIA BLD CULT: NORMAL
BACTERIA SPEC AEROBE CULT: NO GROWTH
BASOPHILS # BLD AUTO: 0.05 K/UL
BASOPHILS # BLD AUTO: 0.06 K/UL
BASOPHILS # BLD AUTO: 0.09 K/UL
BASOPHILS # BLD AUTO: 0.1 K/UL
BASOPHILS NFR BLD: 0.9 %
BASOPHILS NFR BLD: 1 %
BASOPHILS NFR BLD: 1.5 %
BASOPHILS NFR BLD: 1.5 %
BILIRUB SERPL-MCNC: 3.4 MG/DL
BUN SERPL-MCNC: 25 MG/DL
CALCIUM SERPL-MCNC: 8.7 MG/DL
CHLORIDE SERPL-SCNC: 109 MMOL/L
CO2 SERPL-SCNC: 19 MMOL/L
CREAT SERPL-MCNC: 1.3 MG/DL
DIFFERENTIAL METHOD: ABNORMAL
EOSINOPHIL # BLD AUTO: 0.5 K/UL
EOSINOPHIL # BLD AUTO: 0.7 K/UL
EOSINOPHIL NFR BLD: 7.8 %
EOSINOPHIL NFR BLD: 8.2 %
EOSINOPHIL NFR BLD: 8.9 %
EOSINOPHIL NFR BLD: 9.7 %
ERYTHROCYTE [DISTWIDTH] IN BLOOD BY AUTOMATED COUNT: 16.6 %
ERYTHROCYTE [DISTWIDTH] IN BLOOD BY AUTOMATED COUNT: 16.7 %
ERYTHROCYTE [DISTWIDTH] IN BLOOD BY AUTOMATED COUNT: 16.7 %
ERYTHROCYTE [DISTWIDTH] IN BLOOD BY AUTOMATED COUNT: 16.8 %
EST. GFR  (AFRICAN AMERICAN): >60 ML/MIN/1.73 M^2
EST. GFR  (NON AFRICAN AMERICAN): >60 ML/MIN/1.73 M^2
GLUCOSE SERPL-MCNC: 101 MG/DL
HCT VFR BLD AUTO: 24.3 %
HCT VFR BLD AUTO: 24.7 %
HCT VFR BLD AUTO: 25.2 %
HCT VFR BLD AUTO: 26 %
HGB BLD-MCNC: 8.4 G/DL
HGB BLD-MCNC: 8.4 G/DL
HGB BLD-MCNC: 8.8 G/DL
HGB BLD-MCNC: 8.8 G/DL
INR PPP: 1.9
LYMPHOCYTES # BLD AUTO: 1 K/UL
LYMPHOCYTES # BLD AUTO: 1.2 K/UL
LYMPHOCYTES # BLD AUTO: 1.4 K/UL
LYMPHOCYTES # BLD AUTO: 1.4 K/UL
LYMPHOCYTES NFR BLD: 17.8 %
LYMPHOCYTES NFR BLD: 18.3 %
LYMPHOCYTES NFR BLD: 23.3 %
LYMPHOCYTES NFR BLD: 23.5 %
MAGNESIUM SERPL-MCNC: 1.9 MG/DL
MCH RBC QN AUTO: 32.2 PG
MCH RBC QN AUTO: 32.2 PG
MCH RBC QN AUTO: 32.4 PG
MCH RBC QN AUTO: 32.5 PG
MCHC RBC AUTO-ENTMCNC: 33.8 %
MCHC RBC AUTO-ENTMCNC: 34 %
MCHC RBC AUTO-ENTMCNC: 34.6 %
MCHC RBC AUTO-ENTMCNC: 34.9 %
MCV RBC AUTO: 93 FL
MCV RBC AUTO: 94 FL
MCV RBC AUTO: 95 FL
MCV RBC AUTO: 95 FL
MONOCYTES # BLD AUTO: 0.6 K/UL
MONOCYTES # BLD AUTO: 0.7 K/UL
MONOCYTES NFR BLD: 10.1 %
MONOCYTES NFR BLD: 11.1 %
MONOCYTES NFR BLD: 11.1 %
MONOCYTES NFR BLD: 9.7 %
NEUTROPHILS # BLD AUTO: 3.3 K/UL
NEUTROPHILS # BLD AUTO: 3.3 K/UL
NEUTROPHILS # BLD AUTO: 3.4 K/UL
NEUTROPHILS # BLD AUTO: 4 K/UL
NEUTROPHILS NFR BLD: 57.1 %
NEUTROPHILS NFR BLD: 57.6 %
NEUTROPHILS NFR BLD: 59.8 %
NEUTROPHILS NFR BLD: 60.6 %
PHOSPHATE SERPL-MCNC: 2.2 MG/DL
PLATELET # BLD AUTO: 42 K/UL
PLATELET # BLD AUTO: 43 K/UL
PLATELET # BLD AUTO: 47 K/UL
PLATELET # BLD AUTO: 51 K/UL
PLATELET BLD QL SMEAR: ABNORMAL
PLATELET BLD QL SMEAR: ABNORMAL
PMV BLD AUTO: 10.4 FL
PMV BLD AUTO: 10.4 FL
PMV BLD AUTO: 9.1 FL
PMV BLD AUTO: 9.4 FL
POCT GLUCOSE: 101 MG/DL (ref 70–110)
POCT GLUCOSE: 125 MG/DL (ref 70–110)
POCT GLUCOSE: 133 MG/DL (ref 70–110)
POCT GLUCOSE: 137 MG/DL (ref 70–110)
POCT GLUCOSE: 137 MG/DL (ref 70–110)
POTASSIUM SERPL-SCNC: 3.1 MMOL/L
PROT SERPL-MCNC: 5.2 G/DL
PROTHROMBIN TIME: 19.5 SEC
RBC # BLD AUTO: 2.59 M/UL
RBC # BLD AUTO: 2.61 M/UL
RBC # BLD AUTO: 2.71 M/UL
RBC # BLD AUTO: 2.73 M/UL
SODIUM SERPL-SCNC: 138 MMOL/L
WBC # BLD AUTO: 5.51 K/UL
WBC # BLD AUTO: 5.74 K/UL
WBC # BLD AUTO: 6 K/UL
WBC # BLD AUTO: 6.69 K/UL

## 2017-06-12 PROCEDURE — 99233 SBSQ HOSP IP/OBS HIGH 50: CPT | Mod: NTX,,, | Performed by: HOSPITALIST

## 2017-06-12 PROCEDURE — 97110 THERAPEUTIC EXERCISES: CPT | Mod: NTX

## 2017-06-12 PROCEDURE — 84100 ASSAY OF PHOSPHORUS: CPT | Mod: NTX

## 2017-06-12 PROCEDURE — 63600175 PHARM REV CODE 636 W HCPCS: Mod: NTX | Performed by: STUDENT IN AN ORGANIZED HEALTH CARE EDUCATION/TRAINING PROGRAM

## 2017-06-12 PROCEDURE — 25000003 PHARM REV CODE 250: Mod: NTX | Performed by: HOSPITALIST

## 2017-06-12 PROCEDURE — 85025 COMPLETE CBC W/AUTO DIFF WBC: CPT | Mod: 91,NTX

## 2017-06-12 PROCEDURE — 63600175 PHARM REV CODE 636 W HCPCS: Mod: NTX | Performed by: GENERAL ACUTE CARE HOSPITAL

## 2017-06-12 PROCEDURE — P9047 ALBUMIN (HUMAN), 25%, 50ML: HCPCS | Mod: NTX | Performed by: STUDENT IN AN ORGANIZED HEALTH CARE EDUCATION/TRAINING PROGRAM

## 2017-06-12 PROCEDURE — 25000003 PHARM REV CODE 250: Mod: NTX | Performed by: CLINICAL NURSE SPECIALIST

## 2017-06-12 PROCEDURE — 63600175 PHARM REV CODE 636 W HCPCS: Mod: NTX | Performed by: CLINICAL NURSE SPECIALIST

## 2017-06-12 PROCEDURE — 25000003 PHARM REV CODE 250: Mod: NTX | Performed by: STUDENT IN AN ORGANIZED HEALTH CARE EDUCATION/TRAINING PROGRAM

## 2017-06-12 PROCEDURE — 36415 COLL VENOUS BLD VENIPUNCTURE: CPT | Mod: NTX

## 2017-06-12 PROCEDURE — 80053 COMPREHEN METABOLIC PANEL: CPT | Mod: NTX

## 2017-06-12 PROCEDURE — 83735 ASSAY OF MAGNESIUM: CPT | Mod: NTX

## 2017-06-12 PROCEDURE — 85610 PROTHROMBIN TIME: CPT | Mod: NTX

## 2017-06-12 PROCEDURE — 63600175 PHARM REV CODE 636 W HCPCS: Mod: NTX | Performed by: NURSE PRACTITIONER

## 2017-06-12 PROCEDURE — 99233 SBSQ HOSP IP/OBS HIGH 50: CPT | Mod: GC,NTX,, | Performed by: INTERNAL MEDICINE

## 2017-06-12 PROCEDURE — 25000003 PHARM REV CODE 250: Mod: NTX | Performed by: INTERNAL MEDICINE

## 2017-06-12 PROCEDURE — C9113 INJ PANTOPRAZOLE SODIUM, VIA: HCPCS | Mod: NTX | Performed by: NURSE PRACTITIONER

## 2017-06-12 PROCEDURE — 97530 THERAPEUTIC ACTIVITIES: CPT | Mod: NTX

## 2017-06-12 PROCEDURE — 20600001 HC STEP DOWN PRIVATE ROOM: Mod: NTX

## 2017-06-12 RX ORDER — BENZONATATE 100 MG/1
100 CAPSULE ORAL 3 TIMES DAILY
Status: DISCONTINUED | OUTPATIENT
Start: 2017-06-12 | End: 2017-06-13 | Stop reason: HOSPADM

## 2017-06-12 RX ORDER — POTASSIUM CHLORIDE 20 MEQ/1
60 TABLET, EXTENDED RELEASE ORAL ONCE
Status: COMPLETED | OUTPATIENT
Start: 2017-06-12 | End: 2017-06-12

## 2017-06-12 RX ORDER — OXYCODONE HYDROCHLORIDE 5 MG/1
30 TABLET ORAL EVERY 6 HOURS PRN
Status: DISCONTINUED | OUTPATIENT
Start: 2017-06-12 | End: 2017-06-13 | Stop reason: HOSPADM

## 2017-06-12 RX ORDER — PANTOPRAZOLE SODIUM 40 MG/1
40 TABLET, DELAYED RELEASE ORAL DAILY
Status: DISCONTINUED | OUTPATIENT
Start: 2017-06-13 | End: 2017-06-13 | Stop reason: HOSPADM

## 2017-06-12 RX ORDER — POTASSIUM CHLORIDE 7.45 MG/ML
10 INJECTION INTRAVENOUS
Status: COMPLETED | OUTPATIENT
Start: 2017-06-12 | End: 2017-06-12

## 2017-06-12 RX ADMIN — MIDODRINE HYDROCHLORIDE 10 MG: 5 TABLET ORAL at 06:06

## 2017-06-12 RX ADMIN — ALBUMIN (HUMAN) 25 G: 12.5 SOLUTION INTRAVENOUS at 12:06

## 2017-06-12 RX ADMIN — POTASSIUM & SODIUM PHOSPHATES POWDER PACK 280-160-250 MG 1 PACKET: 280-160-250 PACK at 10:06

## 2017-06-12 RX ADMIN — ALBUMIN (HUMAN) 25 G: 12.5 SOLUTION INTRAVENOUS at 06:06

## 2017-06-12 RX ADMIN — PANTOPRAZOLE SODIUM 40 MG: 40 INJECTION, POWDER, FOR SOLUTION INTRAVENOUS at 09:06

## 2017-06-12 RX ADMIN — POTASSIUM & SODIUM PHOSPHATES POWDER PACK 280-160-250 MG 1 PACKET: 280-160-250 PACK at 06:06

## 2017-06-12 RX ADMIN — OCTREOTIDE ACETATE 100 MCG: 100 INJECTION, SOLUTION INTRAVENOUS; SUBCUTANEOUS at 06:06

## 2017-06-12 RX ADMIN — OXYCODONE HYDROCHLORIDE 30 MG: 5 TABLET ORAL at 03:06

## 2017-06-12 RX ADMIN — RIFAXIMIN 550 MG: 550 TABLET ORAL at 09:06

## 2017-06-12 RX ADMIN — MIDODRINE HYDROCHLORIDE 10 MG: 5 TABLET ORAL at 02:06

## 2017-06-12 RX ADMIN — OXYCODONE HYDROCHLORIDE 5 MG: 5 TABLET ORAL at 04:06

## 2017-06-12 RX ADMIN — POTASSIUM & SODIUM PHOSPHATES POWDER PACK 280-160-250 MG 1 PACKET: 280-160-250 PACK at 09:06

## 2017-06-12 RX ADMIN — POTASSIUM CHLORIDE 10 MEQ: 10 INJECTION, SOLUTION INTRAVENOUS at 10:06

## 2017-06-12 RX ADMIN — LACTULOSE 20 G: 10 SOLUTION ORAL at 06:06

## 2017-06-12 RX ADMIN — SODIUM BICARBONATE 650 MG: 650 TABLET ORAL at 09:06

## 2017-06-12 RX ADMIN — POTASSIUM & SODIUM PHOSPHATES POWDER PACK 280-160-250 MG 1 PACKET: 280-160-250 PACK at 12:06

## 2017-06-12 RX ADMIN — ALBUMIN (HUMAN) 25 G: 12.5 SOLUTION INTRAVENOUS at 01:06

## 2017-06-12 RX ADMIN — POTASSIUM CHLORIDE 60 MEQ: 1500 TABLET, EXTENDED RELEASE ORAL at 09:06

## 2017-06-12 RX ADMIN — PHYTONADIONE 10 MG: 10 INJECTION, EMULSION INTRAMUSCULAR; INTRAVENOUS; SUBCUTANEOUS at 10:06

## 2017-06-12 RX ADMIN — OCTREOTIDE ACETATE 100 MCG: 100 INJECTION, SOLUTION INTRAVENOUS; SUBCUTANEOUS at 02:06

## 2017-06-12 RX ADMIN — LACTULOSE 20 G: 10 SOLUTION ORAL at 02:06

## 2017-06-12 RX ADMIN — ALBUMIN (HUMAN) 25 G: 12.5 SOLUTION INTRAVENOUS at 05:06

## 2017-06-12 RX ADMIN — OXYCODONE HYDROCHLORIDE 5 MG: 5 TABLET ORAL at 11:06

## 2017-06-12 RX ADMIN — OCTREOTIDE ACETATE 100 MCG: 100 INJECTION, SOLUTION INTRAVENOUS; SUBCUTANEOUS at 10:06

## 2017-06-12 RX ADMIN — POTASSIUM CHLORIDE 10 MEQ: 10 INJECTION, SOLUTION INTRAVENOUS at 01:06

## 2017-06-12 RX ADMIN — BENZONATATE 100 MG: 100 CAPSULE ORAL at 11:06

## 2017-06-12 RX ADMIN — MIDODRINE HYDROCHLORIDE 10 MG: 5 TABLET ORAL at 10:06

## 2017-06-12 RX ADMIN — POTASSIUM CHLORIDE 10 MEQ: 10 INJECTION, SOLUTION INTRAVENOUS at 11:06

## 2017-06-12 RX ADMIN — SODIUM BICARBONATE 650 MG: 650 TABLET ORAL at 10:06

## 2017-06-12 RX ADMIN — BENZONATATE 100 MG: 100 CAPSULE ORAL at 10:06

## 2017-06-12 RX ADMIN — POTASSIUM CHLORIDE 10 MEQ: 10 INJECTION, SOLUTION INTRAVENOUS at 03:06

## 2017-06-12 RX ADMIN — LACTULOSE 20 G: 10 SOLUTION ORAL at 09:06

## 2017-06-12 RX ADMIN — OXYCODONE HYDROCHLORIDE 30 MG: 5 TABLET ORAL at 10:06

## 2017-06-12 RX ADMIN — CEFTRIAXONE 1 G: 1 INJECTION, SOLUTION INTRAVENOUS at 11:06

## 2017-06-12 NOTE — PLAN OF CARE
06/12/17 1319   Discharge Reassessment   Assessment Type Discharge Planning Reassessment   Can the patient answer the patient profile reliably? Yes, cognitively intact   How does the patient rate their overall health at the present time? Poor   Describe the patient's ability to walk at the present time. Walks with the help of equipment  (therapy recommending skilled however patient refuses and wants to return home with home health)   How often would a person be available to care for the patient? Whenever needed   Number of comorbid conditions (as recorded on the chart) Three   During the past month, has the patient often been bothered by feeling down, depressed or hopeless? No   During the past month, has the patient often been bothered by little interest or pleasure in doing things? No   Discharge plan remains the same: Yes   Provided patient/caregiver education on the expected discharge date and the discharge plan Yes   Discharge Plan A Home with family;Home Health   Change in patient condition or support system No     Call placed to patient's spouse Evelia [phone] to discuss MELISSA for tomorrow.  Patient's spouse to provide transportation home tomorrow around 11am.  CM to continue to follow.

## 2017-06-12 NOTE — PLAN OF CARE
Problem: Physical Therapy Goal  Goal: Physical Therapy Goal  Goals to be met by: 17     Patient will increase functional independence with mobility by performin. Supine to sit with Contact Guard Assistance  2. Sit to supine with Contact Guard Assistance  3. Sit to stand transfer with Contact Guard Assistance  4. Bed to chair transfer with Contact Guard Assistance using Rolling Walker  5. Gait  x 100 feet with Contact Guard Assistance using Rolling Walker.   6. Lower extremity exercise program x15 reps, with supervision     Outcome: Ongoing (interventions implemented as appropriate)  Goals remain appropriate

## 2017-06-12 NOTE — PLAN OF CARE
Problem: Patient Care Overview  Goal: Plan of Care Review  Outcome: Ongoing (interventions implemented as appropriate)  Pt hypotensive and bradycardic. Pt transferred from ICU this afternoon. Pt has no complaints. Bed alarm on. Will continue to monitor.

## 2017-06-12 NOTE — PROGRESS NOTES
Pharmacist Intervention IV to PO Note    Tommy Hopper is a 57 y.o. male being treated with IV medication pantoprazole    Patient Data:    Vital Signs (Most Recent):  Temp: 98.8 °F (37.1 °C) (06/12/17 0838)  Pulse: (!) 55 (06/12/17 0838)  Resp: 19 (06/12/17 0838)  BP: 108/62 (06/12/17 0838)  SpO2: 97 % (06/12/17 0838)   Vital Signs (72h Range):  Temp:  [97.6 °F (36.4 °C)-99.8 °F (37.7 °C)]   Pulse:  [5-77]   Resp:  [0-51]   BP: ()/(50-80)   SpO2:  [90 %-100 %]      CBC:    Recent Labs     Lab 06/11/17  0810 06/12/17  0006 06/12/17  0400 06/12/17  0900   WBC 6.12 6.69 5.51 5.74   RBC 2.65* 2.71* 2.59* 2.61*   HGB 8.8* 8.8* 8.4* 8.4*   HCT 24.7* 25.2* 24.3* 24.7*   PLT 48* 51* 47* --    MCV 93 93 94 95   MCH 33.2* 32.5* 32.4* 32.2*   MCHC 35.6 34.9 34.6 34.0     CMP:     Recent Labs     Lab 06/10/17  1358 06/10/17  1823 06/11/17  0310 06/12/17  0400    102 106 101   CALCIUM 8.7 8.6* 8.6* 8.7   ALBUMIN 4.4 --  4.2 4.3   PROT 5.7* --  5.3* 5.2*   * 135* 136 138   K 3.7 3.5 3.3* 3.1*   CO2 15* 18* 18* 19*    106 106 109   BUN 31* 30* 29* 25*   CREATININE 2.0* 1.9* 1.7* 1.3   ALKPHOS 70 --  57 54*   ALT 28 --  21 17   AST 65* --  46* 37   BILITOT 7.2* --  6.1* 3.4*       Dietary Orders:  Diet Orders            Diet Dental Soft: Dental soft starting at 06/11 1452            Based on the following criteria, this patient qualifies for intravenous to oral conversion:  [x] The patients gastrointestinal tract is functioning (tolerating medications via oral or enteral route for 24 hours and tolerating food or enteral feeds for 24 hours).  [x] The patient is hemodynamically stable for 24 hours (heart rate <100 beats per minute, systolic blood pressure >99 mm Hg, and respiratory rate <20 breaths per minute).  [x] The patient shows clinical improvement (afebrile for at least 24 hours and white blood cell count downtrending or normalized). Additionally, the patient must be non-neutropenic (absolute  neutrophil count >500 cells/mm3).  [x] For antimicrobials, the patient has received IV therapy for at least 24 hours.    IV medication pantoprazole 40 mg daily will be changed to oral medication pantoprazole 40 mg daily    Pharmacist's Name: Deonna Cochran  Pharmacist's Extension: [phone]

## 2017-06-12 NOTE — PROGRESS NOTES
Hepatology  Progress note      SUBJECTIVE:     Reason for Consult: ESLD    History of Present Illness:    Patient is a 57 y.o. male w/ a history of ETOH+HCV+HCC cirrhosis who was admitted to [facility] with an GYPSY.    The patient has previously been presented to Selection Committee -- deferred due to concerns with smoking history (patient now states he is 6 weeks without smoking) and CT evidence of UIP.      That patient reports fatigue.  No fevers, chills, rigors.  No abdominal pain.       Interval Hx:  No acute events overnight.  Patient appears more alert and awake today.     OBJECTIVE:     Vital Signs (Most Recent)  Temp: 97.7 °F (36.5 °C) (06/12/17 1628)  Pulse: (!) 56 (06/12/17 1628)  Resp: 18 (06/12/17 1628)  BP: 102/62 (06/12/17 1628)  SpO2: 100 % (06/12/17 1628)    Physical Exam:  General appearance: chronically ill appearing, no acute distress  Eyes: anicteric sclerae  Lungs: bronchial breath sounds, productive cough  Heart: regular rate and rhythm, S1, S2 clearly audible,  Abdomen: soft, non-tender, distended, moderately tense; no rebound tenderness, rigidity, or guarding  Pulses: 2+ and symmetric  Skin: Skin color, texture, turgor normal.   Neurologic: oriented to person, place, situation       Laboratory    CBC:     Recent Labs  Lab 06/12/17  0006 06/12/17  0400 06/12/17  0900   WBC 6.69 5.51 5.74   RBC 2.71* 2.59* 2.61*   HGB 8.8* 8.4* 8.4*   HCT 25.2* 24.3* 24.7*   PLT 51* 47* 43*   MCV 93 94 95   MCH 32.5* 32.4* 32.2*   MCHC 34.9 34.6 34.0     BMP:     Recent Labs  Lab 06/10/17  1823 06/11/17  0310 06/12/17  0400    106 101   * 136 138   K 3.5 3.3* 3.1*    106 109   CO2 18* 18* 19*   BUN 30* 29* 25*   CREATININE 1.9* 1.7* 1.3   CALCIUM 8.6* 8.6* 8.7   MG 2.1 1.8 1.9     Coagulation:     Recent Labs  Lab 06/12/17  0400   INR 1.9*           ASSESSMENT/PLAN:     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 admitted with hypotension and renal dysfunction  HCC s/p TACE    MELD-Na score: 21 at 6/12/2017  4:00 AM  MELD  score: 21 at 6/12/2017  4:00 AM  Calculated from:  Serum Creatinine: 1.3 mg/dL at 6/12/2017  4:00 AM  Serum Sodium: 138 mmol/L (Rounded to 137) at 6/12/2017  4:00 AM  Total Bilirubin: 3.4 mg/dL at 6/12/2017  4:00 AM  INR(ratio): 1.9 at 6/12/2017  4:00 AM  Age: 57 years    Para 6/10: No SBP    Recommendations:   - Continue supportive care  - Patient will need repeat PFTs following a paracentesis when he is stepped down to floor  - Appreciate Nephrology recommendations  - patients case will be discussed Tuesday at liver transplant selection committee     Bro Ruiz   Gastroenterology Fellow PGY V

## 2017-06-12 NOTE — PLAN OF CARE
Problem: Patient Care Overview  Goal: Plan of Care Review  Outcome: Ongoing (interventions implemented as appropriate)  Pt remained free from injury over night. Able to call for assistance with bedpan. Ambulated with PCT to restroom. BMX2 over night. Pt refused answering admitting assessment questions stating that his wife speaks to the nurses, not him. Pt has complaints of pain, but during conversations appears to fall asleep easy. Refused 2100 medications stating he was too tired to take them. Even after education on importance of medications pt closed eyes and repeated refusal.

## 2017-06-12 NOTE — PLAN OF CARE
Problem: Patient Care Overview  Goal: Plan of Care Review  Outcome: Ongoing (interventions implemented as appropriate)  No acute events.  AAOx2 resting comfortably in bed, calm and in no distress.  AVSS.  Remained free of falls.  Moderately compliant with calling for assistance with toileting.  Bed alarm set.   Ambulated with RN to restroom. BM 2x during shift.  Patien complaints of pain moderately relieved by prn pain medication.  One episode in mid-afternoon resulted in a call to MD and one-time administration of 30mg oxy.  Patient stated relief following.  Assisted patient in repositioning and applying heat packs to back.  No skin breakdown noted on bedside.  Standard precautions maintained and patient remains afebrile. Siderails up x2,  Call light in reach.  Will continue to monitor.

## 2017-06-12 NOTE — PT/OT/SLP PROGRESS
"Physical Therapy  Treatment    Tommy Hopper   MRN: [id number]   Admitting Diagnosis: Cirrhosis of liver with ascites    PT Received On: 06/12/17  PT Start Time: 1108     PT Stop Time: 1134    PT Total Time (min): 26 min       Billable Minutes:26    Therapeutic Activity 10 and Therapeutic Exercise 16      Treatment Type: Treatment  PT/PTA: PTA     PTA Visit Number: 1       General Precautions: Standard, fall  Orthopedic Precautions: N/A   Braces: N/A    Do you have any cultural, spiritual, Moravian conflicts, given your current situation?: none stated    Subjective:  Communicated with nsg prior to session.  Cleared for therapy, nsg unsure if pt will be up to it, just spent awhile in the bathroom , Pt agreeable to therapy, however decline trf/gait 2* to fatigue "not good my back in killing me, just got some medicine"    Pain/Comfort  Pain Rating 1: 9/10  Location - Orientation 1: generalized  Location 1: back  Pain Addressed 1: Pre-medicate for activity, Reposition, Distraction, Nurse notified (nsg aware meds just given)  Pain Rating Post-Intervention 1: 9/10    Objective:   Patient found with: central line, telemetry, bed alarm    Functional Mobility:  Bed Mobility:   Scooting/Bridging: Maximum Assistance, With assist of 2 (with draw sheet to HOB)  Supine to Sit: Contact Guard Assistance/SBA (HOB elev with rail)  Sit to Supine: Contact Guard Assistance/SBA    Transfers:  Sit <> Stand Assistance:  (pt declined 2* to fatigue)    Gait:   Gait Distance: declined 2* to fatigue, just with nsg for an hr or so in bathroom per nsg    Balance:   Static Sit: EOB SBA/S total time ~ 5 minutes  Dynamic Sit: SBA performed LAQ and spoke on phone        Therapeutic Activities and Exercises:  Supine/seated therex x10 reps AP,GS,QS,SAQ,HS,abd/add,LAQ     AM-PAC 6 CLICK MOBILITY  How much help from another person does this patient currently need?   1 = Unable, Total/Dependent Assistance  2 = A lot, Maximum/Moderate Assistance  3 = A " little, Minimum/Contact Guard/Supervision  4 = None, Modified [place]/Independent    Turning over in bed (including adjusting bedclothes, sheets and blankets)?: 2  Sitting down on and standing up from a chair with arms (e.g., wheelchair, bedside commode, etc.): 3  Moving from lying on back to sitting on the side of the bed?: 3  Moving to and from a bed to a chair (including a wheelchair)?: 3  Need to walk in hospital room?: 3  Climbing 3-5 steps with a railing?: 1  Total Score: 15    AM-PAC Raw Score CMS G-Code Modifier Level of Impairment Assistance   6 % Total / Unable   7 - 9 CM 80 - 100% Maximal Assist   10 - 14 CL 60 - 80% Moderate Assist   15 - 19 CK 40 - 60% Moderate Assist   20 - 22 CJ 20 - 40% Minimal Assist   23 CI 1-20% SBA / CGA   24 CH 0% Independent/ Mod I     Patient left supine with all lines intact, call button in reach, bed alarm on, nsg notified, PCT present and belongings in reach.    Assessment:  Tommy Hopper is a 57 y.o. male with a medical diagnosis of Cirrhosis of liver with ascites Pt tolerated well, pt would continue to benefit from skilled PT services to improve overall functional mobility, strength and endurance.      Rehab identified problem list/impairments: Rehab identified problem list/impairments: weakness, impaired endurance, impaired self care skills, impaired functional mobilty, gait instability, impaired balance, decreased lower extremity function, pain, impaired cardiopulmonary response to activity    Rehab potential is good.    Activity tolerance: Fair    Discharge recommendations: Discharge Facility/Level Of Care Needs: nursing facility, skilled     Barriers to discharge: Barriers to Discharge: None    Equipment recommendations: Equipment Needed After Discharge:  (TBD)     GOALS:    Physical Therapy Goals        Problem: Physical Therapy Goal    Goal Priority Disciplines Outcome Goal Variances Interventions   Physical Therapy Goal     PT/OT, PT Ongoing  (interventions implemented as appropriate)     Description:  Goals to be met by: 17     Patient will increase functional independence with mobility by performin. Supine to sit with Contact Guard Assistance  2. Sit to supine with Contact Guard Assistance  3. Sit to stand transfer with Contact Guard Assistance  4. Bed to chair transfer with Contact Guard Assistance using Rolling Walker  5. Gait  x 100 feet with Contact Guard Assistance using Rolling Walker.   6. Lower extremity exercise program x15 reps, with supervision                      PLAN:    Patient to be seen 5 x/week  to address the above listed problems via gait training, therapeutic activities, therapeutic exercises  Plan of Care expires: 17  Plan of Care reviewed with: patient         Natalia Romo, PTA  2017

## 2017-06-13 VITALS
DIASTOLIC BLOOD PRESSURE: 60 MMHG | HEIGHT: 66 IN | TEMPERATURE: 98 F | OXYGEN SATURATION: 98 % | SYSTOLIC BLOOD PRESSURE: 100 MMHG | WEIGHT: 166.69 LBS | HEART RATE: 58 BPM | RESPIRATION RATE: 16 BRPM | BODY MASS INDEX: 26.79 KG/M2

## 2017-06-13 LAB
ALBUMIN SERPL BCP-MCNC: 4.4 G/DL
ALP SERPL-CCNC: 52 U/L
ALT SERPL W/O P-5'-P-CCNC: 14 U/L
ANION GAP SERPL CALC-SCNC: 10 MMOL/L
AST SERPL-CCNC: 40 U/L
BACTERIA FLD AEROBE CULT: NO GROWTH
BASOPHILS # BLD AUTO: 0.07 K/UL
BASOPHILS # BLD AUTO: 0.09 K/UL
BASOPHILS NFR BLD: 1.7 %
BASOPHILS NFR BLD: 1.9 %
BILIRUB SERPL-MCNC: 2.8 MG/DL
BUN SERPL-MCNC: 21 MG/DL
CALCIUM SERPL-MCNC: 8.3 MG/DL
CHLORIDE SERPL-SCNC: 112 MMOL/L
CO2 SERPL-SCNC: 14 MMOL/L
CREAT SERPL-MCNC: 1.1 MG/DL
DIFFERENTIAL METHOD: ABNORMAL
DIFFERENTIAL METHOD: ABNORMAL
EOSINOPHIL # BLD AUTO: 0.4 K/UL
EOSINOPHIL # BLD AUTO: 0.4 K/UL
EOSINOPHIL NFR BLD: 8.7 %
EOSINOPHIL NFR BLD: 9.3 %
ERYTHROCYTE [DISTWIDTH] IN BLOOD BY AUTOMATED COUNT: 16.9 %
ERYTHROCYTE [DISTWIDTH] IN BLOOD BY AUTOMATED COUNT: 16.9 %
EST. GFR  (AFRICAN AMERICAN): >60 ML/MIN/1.73 M^2
EST. GFR  (NON AFRICAN AMERICAN): >60 ML/MIN/1.73 M^2
GLUCOSE SERPL-MCNC: 114 MG/DL
GRAM STN SPEC: NORMAL
GRAM STN SPEC: NORMAL
HCT VFR BLD AUTO: 22.7 %
HCT VFR BLD AUTO: 24.1 %
HGB BLD-MCNC: 7.7 G/DL
HGB BLD-MCNC: 8 G/DL
INR PPP: 2
LYMPHOCYTES # BLD AUTO: 1.2 K/UL
LYMPHOCYTES # BLD AUTO: 1.2 K/UL
LYMPHOCYTES NFR BLD: 24.5 %
LYMPHOCYTES NFR BLD: 27.6 %
MAGNESIUM SERPL-MCNC: 1.7 MG/DL
MCH RBC QN AUTO: 31.6 PG
MCH RBC QN AUTO: 32.2 PG
MCHC RBC AUTO-ENTMCNC: 33.2 %
MCHC RBC AUTO-ENTMCNC: 33.9 %
MCV RBC AUTO: 95 FL
MCV RBC AUTO: 95 FL
MONOCYTES # BLD AUTO: 0.3 K/UL
MONOCYTES # BLD AUTO: 0.5 K/UL
MONOCYTES NFR BLD: 11 %
MONOCYTES NFR BLD: 7.5 %
NEUTROPHILS # BLD AUTO: 2.3 K/UL
NEUTROPHILS # BLD AUTO: 2.5 K/UL
NEUTROPHILS NFR BLD: 53.3 %
NEUTROPHILS NFR BLD: 54.5 %
PHOSPHATE SERPL-MCNC: 2.3 MG/DL
PLATELET # BLD AUTO: 41 K/UL
PLATELET # BLD AUTO: 45 K/UL
PMV BLD AUTO: 9.3 FL
PMV BLD AUTO: ABNORMAL FL
POCT GLUCOSE: 134 MG/DL (ref 70–110)
POTASSIUM SERPL-SCNC: 4.6 MMOL/L
PROT SERPL-MCNC: 5.6 G/DL
PROTHROMBIN TIME: 20.1 SEC
RBC # BLD AUTO: 2.39 M/UL
RBC # BLD AUTO: 2.53 M/UL
SODIUM SERPL-SCNC: 136 MMOL/L
WBC # BLD AUTO: 4.24 K/UL
WBC # BLD AUTO: 4.73 K/UL

## 2017-06-13 PROCEDURE — 63600175 PHARM REV CODE 636 W HCPCS: Mod: NTX | Performed by: GENERAL ACUTE CARE HOSPITAL

## 2017-06-13 PROCEDURE — 63600175 PHARM REV CODE 636 W HCPCS: Mod: NTX | Performed by: HOSPITALIST

## 2017-06-13 PROCEDURE — 99233 SBSQ HOSP IP/OBS HIGH 50: CPT | Mod: NTX,,, | Performed by: HOSPITALIST

## 2017-06-13 PROCEDURE — 97530 THERAPEUTIC ACTIVITIES: CPT | Mod: NTX

## 2017-06-13 PROCEDURE — 85025 COMPLETE CBC W/AUTO DIFF WBC: CPT | Mod: 91,NTX

## 2017-06-13 PROCEDURE — 25000003 PHARM REV CODE 250: Mod: NTX | Performed by: CLINICAL NURSE SPECIALIST

## 2017-06-13 PROCEDURE — 83735 ASSAY OF MAGNESIUM: CPT | Mod: NTX

## 2017-06-13 PROCEDURE — 85610 PROTHROMBIN TIME: CPT | Mod: NTX

## 2017-06-13 PROCEDURE — P9047 ALBUMIN (HUMAN), 25%, 50ML: HCPCS | Mod: NTX | Performed by: STUDENT IN AN ORGANIZED HEALTH CARE EDUCATION/TRAINING PROGRAM

## 2017-06-13 PROCEDURE — 25000003 PHARM REV CODE 250: Mod: NTX | Performed by: STUDENT IN AN ORGANIZED HEALTH CARE EDUCATION/TRAINING PROGRAM

## 2017-06-13 PROCEDURE — 63600175 PHARM REV CODE 636 W HCPCS: Mod: NTX | Performed by: STUDENT IN AN ORGANIZED HEALTH CARE EDUCATION/TRAINING PROGRAM

## 2017-06-13 PROCEDURE — 63600175 PHARM REV CODE 636 W HCPCS: Mod: NTX | Performed by: CLINICAL NURSE SPECIALIST

## 2017-06-13 PROCEDURE — 36415 COLL VENOUS BLD VENIPUNCTURE: CPT | Mod: NTX

## 2017-06-13 PROCEDURE — 80053 COMPREHEN METABOLIC PANEL: CPT | Mod: NTX

## 2017-06-13 PROCEDURE — 25000003 PHARM REV CODE 250: Mod: NTX | Performed by: INTERNAL MEDICINE

## 2017-06-13 PROCEDURE — 99233 SBSQ HOSP IP/OBS HIGH 50: CPT | Mod: GC,NTX,, | Performed by: INTERNAL MEDICINE

## 2017-06-13 PROCEDURE — 80323 ALKALOIDS NOS: CPT | Mod: NTX

## 2017-06-13 PROCEDURE — 80321 ALCOHOLS BIOMARKERS 1OR 2: CPT | Mod: NTX

## 2017-06-13 PROCEDURE — 25000003 PHARM REV CODE 250: Mod: NTX | Performed by: HOSPITALIST

## 2017-06-13 PROCEDURE — 84100 ASSAY OF PHOSPHORUS: CPT | Mod: NTX

## 2017-06-13 RX ORDER — MIDODRINE HYDROCHLORIDE 10 MG/1
10 TABLET ORAL 3 TIMES DAILY
Qty: 90 TABLET | Refills: 3 | Status: SHIPPED | OUTPATIENT
Start: 2017-06-13 | End: 2018-06-13

## 2017-06-13 RX ORDER — FUROSEMIDE 10 MG/ML
40 INJECTION INTRAMUSCULAR; INTRAVENOUS 3 TIMES DAILY
Status: DISCONTINUED | OUTPATIENT
Start: 2017-06-13 | End: 2017-06-13 | Stop reason: HOSPADM

## 2017-06-13 RX ORDER — SPIRONOLACTONE 100 MG/1
100 TABLET, FILM COATED ORAL DAILY
Qty: 30 TABLET | Refills: 3 | Status: ON HOLD | OUTPATIENT
Start: 2017-06-13 | End: 2017-07-15 | Stop reason: HOSPADM

## 2017-06-13 RX ORDER — SPIRONOLACTONE 100 MG/1
100 TABLET, FILM COATED ORAL DAILY
Status: DISCONTINUED | OUTPATIENT
Start: 2017-06-13 | End: 2017-06-13 | Stop reason: HOSPADM

## 2017-06-13 RX ADMIN — FUROSEMIDE 40 MG: 10 INJECTION, SOLUTION INTRAVENOUS at 05:06

## 2017-06-13 RX ADMIN — BENZONATATE 100 MG: 100 CAPSULE ORAL at 05:06

## 2017-06-13 RX ADMIN — SODIUM BICARBONATE 650 MG: 650 TABLET ORAL at 09:06

## 2017-06-13 RX ADMIN — RIFAXIMIN 550 MG: 550 TABLET ORAL at 09:06

## 2017-06-13 RX ADMIN — ALBUMIN (HUMAN) 25 G: 12.5 SOLUTION INTRAVENOUS at 05:06

## 2017-06-13 RX ADMIN — POTASSIUM & SODIUM PHOSPHATES POWDER PACK 280-160-250 MG 1 PACKET: 280-160-250 PACK at 09:06

## 2017-06-13 RX ADMIN — OCTREOTIDE ACETATE 100 MCG: 100 INJECTION, SOLUTION INTRAVENOUS; SUBCUTANEOUS at 05:06

## 2017-06-13 RX ADMIN — LACTULOSE 20 G: 10 SOLUTION ORAL at 05:06

## 2017-06-13 RX ADMIN — PHYTONADIONE 10 MG: 10 INJECTION, EMULSION INTRAMUSCULAR; INTRAVENOUS; SUBCUTANEOUS at 09:06

## 2017-06-13 RX ADMIN — MIDODRINE HYDROCHLORIDE 10 MG: 5 TABLET ORAL at 05:06

## 2017-06-13 RX ADMIN — ALBUMIN (HUMAN) 25 G: 12.5 SOLUTION INTRAVENOUS at 12:06

## 2017-06-13 RX ADMIN — PANTOPRAZOLE SODIUM 40 MG: 40 TABLET, DELAYED RELEASE ORAL at 09:06

## 2017-06-13 RX ADMIN — SPIRONOLACTONE 100 MG: 100 TABLET, FILM COATED ORAL at 09:06

## 2017-06-13 NOTE — PT/OT/SLP DISCHARGE
Physical Therapy Discharge Summary    Tommy Hopper  MRN: [id number]   Cirrhosis of liver with ascites   Patient Discharged from acute Physical Therapy on 17  Please refer to prior PT noted date on 17 for functional status.     Assessment:   Patient was discharge unexpectedly.  Information required to complete and accurate discharge summary is unknown.  Refer to therapy initial evaluation and last progress note for initial and most recent functional status and goal achievement.  Recommendations made may be found in medical record.  GOALS:    Physical Therapy Goals        Problem: Physical Therapy Goal    Goal Priority Disciplines Outcome Goal Variances Interventions   Physical Therapy Goal     PT/OT, PT Ongoing (interventions implemented as appropriate)     Description:  Goals to be met by: 17     Patient will increase functional independence with mobility by performin. Supine to sit with Contact Guard Assistance  2. Sit to supine with Contact Guard Assistance  3. Sit to stand transfer with Contact Guard Assistance  4. Bed to chair transfer with Contact Guard Assistance using Rolling Walker  5. Gait  x 100 feet with Contact Guard Assistance using Rolling Walker.   6. Lower extremity exercise program x15 reps, with supervision                    Reasons for Discontinuation of Therapy Services  Transfer to alternate level of care.      Plan:  Patient Discharged to: Home with Home Health Service - Therapy recommending SNF based on functional outcomes while in hospital.    Minda Hernández, PT DPT  2017

## 2017-06-13 NOTE — PT/OT/SLP PROGRESS
"Occupational Therapy  Treatment/Discharge    Tommy Hopper   MRN: [id number]   Admitting Diagnosis: Cirrhosis of liver with ascites    OT Date of Treatment: 06/13/17   OT Start Time: 0841  OT Stop Time: 0852 (return 0915-0924)  OT Total Time (min): 11 min total time: 20 minutes    Billable Minutes:  Therapeutic Activity 20    General Precautions: Standard, fall, hearing impaired (dental soft)  Orthopedic Precautions: N/A  Braces: N/A    Do you have any cultural, spiritual, Spiritism conflicts, given your current situation?: none reported    Subjective:  Communicated with RN prior to session.  Pt reported "I will be active once I'm home"  Pain/Comfort  Pain Rating 1: 0/10  Pain Rating Post-Intervention 1: 0/10    Objective:  Patient found with: bed alarm, central line, telemetry     Functional Mobility:  Bed Mobility:  Rolling/Turning to Left: Moderate assistance  Rolling/Turning Right: Moderate assistance  Scooting/Bridging: Minimum Assistance  Supine to Sit:  (refused)      Activities of Daily Living:  Feeding Level of Assistance: Minimum assistance, Set-up Assistance    Additional:  -Pt alert and able to verbalize orientation to person only upon arrival  -Provided with verbal daily orientation and updated communication board  -Re-edu on OT role in care; edu on importance of activity and being OOB for endurance building and for safety with home return  -Pt with decline for up to chair for breakfast and requested OT to return following meal; OT to return following and pt remains with decline for OOB  -Encouraged for up right position in bed and assisted with bed mobility for best position for medication administration from RN  -Communication board updated; no family present for education     AM-PAC 6 CLICK ADL   How much help from another person does this patient currently need?   1 = Unable, Total/Dependent Assistance  2 = A lot, Maximum/Moderate Assistance  3 = A little, Minimum/Contact Guard/Supervision  4 = None, " "Modified [place]/Independent    Putting on and taking off regular lower body clothing? : 2  Bathing (including washing, rinsing, drying)?: 2  Toileting, which includes using toilet, bedpan, or urinal? : 2  Putting on and taking off regular upper body clothing?: 2  Taking care of personal grooming such as brushing teeth?: 2  Eating meals?: 2  Total Score: 12     AM-PAC Raw Score CMS "G-Code Modifier Level of Impairment Assistance   6 % Total / Unable   7 - 8 CM 80 - 100% Maximal Assist   9-13 CL 60 - 80% Moderate Assist   14 - 19 CK 40 - 60% Moderate Assist   20 - 22 CJ 20 - 40% Minimal Assist   23 CI 1-20% SBA / CGA   24 CH 0% Independent/ Mod I       Patient left HOB elevated with all lines intact, call button in reach, bed alarm on and RN present    ASSESSMENT:  Tommy Hopper is a 57 y.o. male with a medical diagnosis of Cirrhosis of liver with ascites and presents with chronic pain and overall deconditioning and debility. He is self limiting for OOB activity. Pt demo cognitive deficits with attention (sustained/divided),  problem solving, impaired perception, sequencing, safety awareness, impulsivity, disorientation and overall decline in PLOF with cognitive task. Pt displays decline in occupational performance with functional task with decrease in ability to carry out appropriate interactions, sequencing of ADLs/self care and performance of prior tasks. He would benefit from OT services at SNF level following acute stay.    Rehab identified problem list/impairments: Rehab identified problem list/impairments: weakness, impaired endurance, impaired sensation, impaired self care skills, impaired functional mobilty, gait instability, impaired balance, impaired cognition, decreased upper extremity function, decreased lower extremity function, decreased coordination, decreased safety awareness, pain, impaired cardiopulmonary response to activity    Rehab potential is fair.    Activity tolerance: " Fair    Discharge recommendations: Discharge Facility/Level Of Care Needs: nursing facility, skilled     Barriers to discharge: Barriers to Discharge: None    Equipment recommendations: none       Plan:  Patient d/c home at this time with home health.   Plan of Care reviewed with: patient      DESTINEE Lewis  06/13/2017

## 2017-06-13 NOTE — NURSING
Central line to RIJ removed for D/C home. Sutures removed. Pt lying flat. Pressure held to site x15min. Occlusive dsg applied. Pt instructed to stay flat for additional 15 min. D/C instructions reviewed w/ pt & wife. Both verbalized understanding. Belongings gathered. Paper rx provided. Wife at bedside to transport pt home. WC requested to garage.

## 2017-06-13 NOTE — PLAN OF CARE
Problem: Occupational Therapy Goal  Goal: Occupational Therapy Goal  Outcome: Outcome(s) achieved Date Met: 06/13/17  Pt d/c from acute- home with home health. Therapy recommendation SNF. DESTINEE Lewis 6/13/2017

## 2017-06-13 NOTE — PLAN OF CARE
[facility]    HOME HEALTH ORDERS  FACE TO FACE ENCOUNTER    Patient Name: Tommy Hopper  YOB: 1960    PCP: Primary Doctor No   PCP Address: None  PCP Phone Number: None  PCP Fax: None    Encounter Date: 06/13/2017    Admit to Home Health    Diagnoses:  Active Hospital Problems    Diagnosis  POA    *Cirrhosis of liver with ascites [K74.60]  Yes    Hypotension [I95.9]  Unknown    SOB (shortness of breath) [R06.02]  Yes    Shock [R57.9]  Yes    Acute renal failure with tubular necrosis [N17.0]  Yes    Chronic pain [G89.29]  Yes    Hypokalemia [E87.6]  Yes    Hepatic encephalopathy [K72.90]  Yes    Anemia of chronic disease [D63.8]  Yes    Decompensated hepatic cirrhosis [K72.90]  Yes    Hypoalbuminemia due to protein-calorie malnutrition [E46]  Yes    Pre-transplant evaluation for liver transplant [Z01.818]  Not Applicable    HCC (hepatocellular carcinoma) [C22.0]  Yes    Thrombocytopenia [D69.6]  Yes    Hyponatremia [E87.1]  Yes      Resolved Hospital Problems    Diagnosis Date Resolved POA    GYPSY (acute kidney injury) [N17.9] 06/11/2017 Yes    Subcutaneous emphysema [T79.7XXA] 06/11/2017 Yes       Future Appointments  Date Time Provider Department Center   7/26/2017 9:40 AM [facility] CT6 MOBILE LIMIT 450 LBS [facility] CT SCAN Kaleb Swanson   8/4/2017 11:00 AM Keely Luna MD [facility] NEPHRO Kaleb Swanson           I have seen and examined this patient face to face today. My clinical findings that support the need for the home health skilled services and home bound status are the following:  Weakness/numbness causing balance and gait disturbance due to Liver Disease making it taxing to leave home.    Allergies:  Review of patient's allergies indicates:   Allergen Reactions    Adhesive Blisters       Diet: regular diet    Activities: activity as tolerated    Nursing:   SN to complete comprehensive assessment including routine vital signs. Instruct on disease process and s/s of  complications to report to MD. Review/verify medication list sent home with the patient at time of discharge  and instruct patient/caregiver as needed. Frequency may be adjusted depending on start of care date.    Notify MD if SBP > 160 or < 90; DBP > 90 or < 50; HR > 120 or < 50; Temp > 101; Other:         CONSULTS:    Physical Therapy to evaluate and treat. Evaluate for home safety and equipment needs; Establish/upgrade home exercise program. Perform / instruct on therapeutic exercises, gait training, transfer training, and Range of Motion.  Occupational Therapy to evaluate and treat. Evaluate home environment for safety and equipment needs. Perform/Instruct on transfers, ADL training, ROM, and therapeutic exercises.   to evaluate for community resources/long-range planning.  Aide to provide assistance with personal care, ADLs, and vital signs.      Medications: Review discharge medications with patient and family and provide education.      Current Discharge Medication List      START taking these medications    Details   midodrine (PROAMATINE) 10 MG tablet Take 1 tablet (10 mg total) by mouth 3 (three) times daily.  Qty: 90 tablet, Refills: 3         CONTINUE these medications which have CHANGED    Details   spironolactone (ALDACTONE) 100 MG tablet Take 1 tablet (100 mg total) by mouth once daily.  Qty: 30 tablet, Refills: 3         CONTINUE these medications which have NOT CHANGED    Details   calcium-vitamin D 250-100 mg-unit per tablet Take 2 tablets by mouth 2 (two) times daily.      CHANTIX STARTING MONTH BOX 0.5 mg (11)- 1 mg (42) tablet as directed Orally 30 days- taking PRN (medication maked BP low)  Refills: 0      ciprofloxacin HCl (CIPRO) 500 MG tablet Take 1 tablet (500 mg total) by mouth once daily.  Qty: 30 tablet, Refills: 6    Associated Diagnoses: Spontaneous bacterial peritonitis      diazepam (VALIUM) 10 MG Tab Take 10 mg by mouth 2 (two) times daily.  Refills: 0      furosemide  (LASIX) 40 MG tablet Take 1 tablet (40 mg total) by mouth once daily.  Qty: 30 tablet, Refills: 11    Associated Diagnoses: Cirrhosis of liver with ascites, unspecified hepatic cirrhosis type      !! lactulose (CHRONULAC) 10 gram/15 mL solution       !! lactulose (CHRONULAC) 20 gram/30 mL Soln Take 30 mLs (20 g total) by mouth every 6 (six) hours as needed (titrate to have 2-3 bowle movements per day).  Qty: 3000 mL, Refills: 11    Associated Diagnoses: Other cirrhosis of liver      morphine (MS CONTIN) 30 MG 12 hr tablet Take 30 mg by mouth every 12 (twelve) hours.  Refills: 0      neomycin-polymyxin-dexamethasone (DEXACINE) 3.5 mg/g-10,000 unit/g-0.1 % Oint every 6 (six) hours.      ondansetron (ZOFRAN) 4 MG tablet Take 1 tablet (4 mg total) by mouth every 6 (six) hours as needed for Nausea.  Qty: 30 tablet, Refills: 5    Associated Diagnoses: Nausea      oxycodone (ROXICODONE) 30 MG Tab Take 30 mg by mouth every 8 (eight) hours.  Refills: 0      pantoprazole (PROTONIX) 40 MG tablet Take 1 tablet (40 mg total) by mouth once daily.  Qty: 30 tablet, Refills: 11    Associated Diagnoses: Gastroesophageal reflux disease with esophagitis      rifAXIMin (XIFAXAN) 550 mg Tab Take 1 tablet (550 mg total) by mouth 2 (two) times daily.  Qty: 60 tablet, Refills: 11    Associated Diagnoses: Encephalopathy      sodium bicarbonate 650 MG tablet Take 650 mg by mouth 2 (two) times daily.       !! - Potential duplicate medications found. Please discuss with provider.          I certify that this patient is confined to his home and needs intermittent skilled nursing care, physical therapy and occupational therapy.

## 2017-06-13 NOTE — PROGRESS NOTES
Hepatology  Progress note      SUBJECTIVE:     Reason for Consult: ESLD    History of Present Illness:    Patient is a 57 y.o. male w/ a history of ETOH+HCV+HCC cirrhosis who was admitted to [facility] with an GYPSY.    The patient has previously been presented to Selection Committee -- deferred due to concerns with smoking history (patient now states he is 6 weeks without smoking) and CT evidence of UIP.      That patient reports fatigue.  No fevers, chills, rigors.  No abdominal pain.       Interval Hx:  No acute events overnight.  Patient appears more alert and awake today.     OBJECTIVE:     Vital Signs (Most Recent)  Temp: 97.6 °F (36.4 °C) (06/13/17 0734)  Pulse: (!) 58 (06/13/17 0734)  Resp: 16 (06/13/17 0734)  BP: 100/60 (06/13/17 0734)  SpO2: 98 % (06/13/17 0734)    Physical Exam:  General appearance: chronically ill appearing, no acute distress  Eyes: anicteric sclerae  Lungs: bronchial breath sounds, productive cough  Heart: regular rate and rhythm, S1, S2 clearly audible,  Abdomen: soft, non-tender, distended, moderately tense; no rebound tenderness, rigidity, or guarding  Pulses: 2+ and symmetric  Skin: Skin color, texture, turgor normal.   Neurologic: oriented to person, place, situation       Laboratory    CBC:     Recent Labs  Lab 06/12/17  0900 06/12/17  1817 06/13/17  0002 06/13/17  0540   WBC 5.74 6.00 4.73 4.51   RBC 2.61* 2.73* 2.39* 2.55*   HGB 8.4* 8.8* 7.7* 8.3*   HCT 24.7* 26.0* 22.7* 24.8*   PLT 43* 42* 45*  --    MCV 95 95 95 97   MCH 32.2* 32.2* 32.2* 32.5*   MCHC 34.0 33.8 33.9 33.5     BMP:     Recent Labs  Lab 06/11/17  0310 06/12/17  0400 06/13/17  0540    101 114*    138 136   K 3.3* 3.1* 4.6    109 112*   CO2 18* 19* 14*   BUN 29* 25* 21*   CREATININE 1.7* 1.3 1.1   CALCIUM 8.6* 8.7 8.3*   MG 1.8 1.9 1.7     Coagulation:     Recent Labs  Lab 06/13/17  0540   INR 2.0*           ASSESSMENT/PLAN:     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 admitted with hypotension and renal dysfunction  HCC s/p  TACE    MELD-Na score: 20 at 6/13/2017  5:40 AM  MELD score: 19 at 6/13/2017  5:40 AM  Calculated from:  Serum Creatinine: 1.1 mg/dL at 6/13/2017  5:40 AM  Serum Sodium: 136 mmol/L at 6/13/2017  5:40 AM  Total Bilirubin: 2.8 mg/dL at 6/13/2017  5:40 AM  INR(ratio): 2.0 at 6/13/2017  5:40 AM  Age: 57 years    Para 6/10: No SBP    Recommendations:   - Continue supportive care  - Patient will need repeat PFTs   - Appreciate Nephrology recommendations  - patients case will be discussed in the near future at liver transplant selection committee     Bro Ruiz   Gastroenterology Fellow PGY V

## 2017-06-13 NOTE — PLAN OF CARE
06/13/17 1252   Final Note   Assessment Type Final Discharge Note   Discharge Disposition Home-Health   Discharge planning education complete? Yes   What phone number can be called within the next 1-3 days to see how you are doing after discharge? [phone]   Hospital Follow Up  Appt(s) scheduled? Yes   Discharge plans and expectations educations in teach back method with documentation complete? Yes   Offered [facility] -- Bedside Delivery? n/a   Discharge/Hospital Encounter Summary to ([facility]) PCP n/a   Referral to / orders for Home Health Complete? Yes   Any social issues identified prior to discharge? No   Did you assess the readiness or willingness of the family or caregiver to support self management of care? Yes   Right Care Referral Info   Post Acute Recommendation Home-care   Referral Type Home Health    Facility Name [facility]     Future Appointments  Date Time Provider Department Center   7/26/2017 9:40 AM [facility] CT6 MOBILE LIMIT 450 LBS [facility] CT SCAN Kaleb Swanson   8/4/2017 11:00 AM Keely Luna MD [facility] NEPHRO Kaleb Swanson

## 2017-06-13 NOTE — NURSING
D/C instructions provided to pt's spouse, along with medication and times for next administration. Central line removed by RN, pressure applied and held for 20 minutes, pt flat in bed. Will d/c with spouse via wheelchair.

## 2017-06-13 NOTE — PLAN OF CARE
Problem: Physical Therapy Goal  Goal: Physical Therapy Goal  Goals to be met by: 17     Patient will increase functional independence with mobility by performin. Supine to sit with Contact Guard Assistance  2. Sit to supine with Contact Guard Assistance  3. Sit to stand transfer with Contact Guard Assistance  4. Bed to chair transfer with Contact Guard Assistance using Rolling Walker  5. Gait  x 100 feet with Contact Guard Assistance using Rolling Walker.   6. Lower extremity exercise program x15 reps, with supervision     Outcome: Unable to achieve outcome(s) by discharge  Patient d/c'ed Home with HH - therapy recommendation was SNF based on functional outcomes.

## 2017-06-14 ENCOUNTER — PATIENT OUTREACH (OUTPATIENT)
Dept: ADMINISTRATIVE | Facility: CLINIC | Age: 57
End: 2017-06-14
Payer: MEDICARE

## 2017-06-15 LAB
ANISOCYTOSIS BLD QL SMEAR: SLIGHT
BACTERIA SPEC ANAEROBE CULT: NORMAL
BASOPHILS # BLD AUTO: 0.1 K/UL
BASOPHILS NFR BLD: 2.2 %
BURR CELLS BLD QL SMEAR: ABNORMAL
COTININE SERPL-MCNC: 4.1 NG/ML
DIFFERENTIAL METHOD: ABNORMAL
EOSINOPHIL # BLD AUTO: 0.4 K/UL
EOSINOPHIL NFR BLD: 8.6 %
ERYTHROCYTE [DISTWIDTH] IN BLOOD BY AUTOMATED COUNT: 17.2 %
HCT VFR BLD AUTO: 24.8 %
HGB BLD-MCNC: 8.3 G/DL
HYPOCHROMIA BLD QL SMEAR: ABNORMAL
LYMPHOCYTES # BLD AUTO: 1.2 K/UL
LYMPHOCYTES NFR BLD: 27.3 %
MCH RBC QN AUTO: 32.5 PG
MCHC RBC AUTO-ENTMCNC: 33.5 %
MCV RBC AUTO: 97 FL
MONOCYTES # BLD AUTO: 0.5 K/UL
MONOCYTES NFR BLD: 10.2 %
NEUTROPHILS # BLD AUTO: 2.3 K/UL
NEUTROPHILS NFR BLD: 51.7 %
NICOTINE SERPL-MCNC: <3 NG/ML
PLATELET # BLD AUTO: 19 K/UL
PLATELET BLD QL SMEAR: ABNORMAL
PMV BLD AUTO: ABNORMAL FL
POIKILOCYTOSIS BLD QL SMEAR: SLIGHT
RBC # BLD AUTO: 2.55 M/UL
WBC # BLD AUTO: 4.51 K/UL

## 2017-06-15 RX ORDER — SODIUM BICARBONATE 650 MG/1
650 TABLET ORAL 2 TIMES DAILY
Status: CANCELLED | COMMUNITY
Start: 2017-06-15 | End: 2017-06-22

## 2017-06-15 NOTE — PATIENT INSTRUCTIONS
Discharge Instructions for Paracentesis  Paracentesis is a procedure to remove extra fluid from your belly (abdomen). This fluid buildup in the abdomen is called ascites. The procedure may have been done to take a sample of the fluid. Or, it may have been done to drain the extra fluid from your abdomen and help make you more comfortable.     Ascites is buildup of excess fluid in the abdomen.   Home care  · If you have pain after the procedure, your healthcare provider can prescribe or recommend pain medicines. Take these exactly as directed. If you stopped taking other medicines before the procedure, ask your provider when you can start them again.  · Take it easy for 24 hours after the procedure. Avoid physical activity until your provider says its OK.  · You will have a small bandage over the puncture site. Stitches (sutures), surgical staples, adhesive tapes, adhesive strips, or surgical glue may be used to close the incision. They also help stop bleeding and speed healing. You may take the bandage off in 24 hours.  · Check the puncture site for the signs of infection listed below.  Follow-up care  Make a follow-up appointment with your healthcare provider as directed. During your follow-up visit, your provider will check your healing. Let your provider know how you are feeling. You can also discuss the cause of your ascites and if you need any further treatment.  When to seek medical advice  Call your healthcare provider if you have any of the following after the procedure:  · A fever of 100.4°F (38.0°C) or higher  · Trouble breathing  · Pain that doesn't go away even after taking pain medicine  · Belly pain not caused by having the skin punctured  · Bleeding from the puncture site  · More than a small amount of fluid leaking from the puncture site  · Swollen belly  · Signs of infection at the puncture site. These include increased pain, redness, or swelling, warmth, or bad-smelling drainage.  · Blood in your  urine  · Feeling dizzy or lightheaded, or fainting   Date Last Reviewed: 7/1/2016  © 2132-5844 [employer]. [address] [place] [address], [place] [address]. All rights reserved. This information is not intended as a substitute for professional medical care. Always follow your healthcare professional's instructions.        Discharge Instructions for Cirrhosis of the Liver  You have been diagnosed with cirrhosis of the liver. This is a long-term (chronic) problem. It occurs when liver tissue is destroyed and replaced by scar tissue. Causes of cirrhosis include:  · Infection such as viral hepatitis  · Chronic alcoholism  · The bodys immune system attacks healthy cells (autoimmune disorders)  · Obesity  · Medicine side effects  · Genetic diseases  Sometimes the exact cause is unknown. You may not have any symptoms at first. Or your symptoms may be mild. But they usually get worse. Cirrhosis is likely to occur if you have a history of long-term alcohol abuse. Cirrhosis cant be cured. But it can be treated.   Home care  Alcohol  · People with liver disease should not drink alcohol. If you stop drinking, you may feel better and live longer.  · If you are a chronic alcohol user, you will have withdrawal symptoms. Talk with your healthcare provider for more information.    · If alcohol is a problem, ask your provider about medicine that can help you quit drinking.    · Find a local Alcoholics Anonymous support group online at www.aa.org.  Diet  · Ask your provider what kind of diet you should follow. You may be asked to limit or not eat certain foods. Do not limit your protein intake.  · Weigh yourself daily and keep a weight log. If you have a sudden change in weight, call your provider.  · Cut back on salt:  ¨ Limit canned, dried, packaged, and fast foods.  ¨ Dont add salt to your food at the table.  ¨ Season foods with herbs instead of salt when you cook.  Medicines, supplements, and vaccines  · Take your  medicines exactly as directed.  · Talk to your provider before taking vitamins, over-the-counter medicines, or herbal supplements. Many herbal supplements may be poisonous (toxic) to the liver.  · Avoid aspirin and other blood-thinning medicines.  · Discuss vitamin supplements and deficiencies with your provider.  · Ask your provider about getting vaccinations for viruses that can cause liver diseases.  Follow-up care  Follow up with your healthcare provider, or as advised. You will likely have the following tests:  · Lab tests  · Blood tests for liver cancer  · Ultrasound of your liver every 6 months  · Endoscopy to check for swollen veins (varices) in your digestive tract  When to call your provider  Call your healthcare provider right away if you have any of the following:  · Fever of 100.4°F (38.0°C) or higher  · Extreme tiredness (fatigue), weakness, or lack of appetite  · Vomiting (with or without blood)  · Yellowing of your skin or eyes (jaundice)  · Itching  · Swelling in your belly or legs  · Black or tarry stools  · Skin that bruises easily  · Confusion or trouble thinking clearly   Date Last Reviewed: 8/1/2016  © 6229-4071 [employer] [address] [place] [address]. All rights reserved. This information is not intended as a substitute for professional medical care. Always follow your healthcare professional's instructions.

## 2017-06-19 ENCOUNTER — TELEPHONE (OUTPATIENT)
Dept: TRANSPLANT | Facility: CLINIC | Age: 57
End: 2017-06-19

## 2017-06-19 DIAGNOSIS — K70.31 ALCOHOLIC CIRRHOSIS OF LIVER WITH ASCITES: ICD-10-CM

## 2017-06-19 DIAGNOSIS — Z01.818 PRE-TRANSPLANT EVALUATION FOR LIVER TRANSPLANT: Primary | ICD-10-CM

## 2017-06-19 DIAGNOSIS — K74.60 CIRRHOSIS OF LIVER WITH ASCITES, UNSPECIFIED HEPATIC CIRRHOSIS TYPE: ICD-10-CM

## 2017-06-19 DIAGNOSIS — R18.8 CIRRHOSIS OF LIVER WITH ASCITES, UNSPECIFIED HEPATIC CIRRHOSIS TYPE: ICD-10-CM

## 2017-06-19 DIAGNOSIS — C22.0 HCC (HEPATOCELLULAR CARCINOMA): ICD-10-CM

## 2017-06-19 LAB
ALT SERPL-CCNC: 21 U/L (ref 7–52)
AST SERPL-CCNC: 48 U/L (ref 13–39)
BACTERIA SPEC ANAEROBE CULT: NORMAL
EXT ALBUMIN: 4.3 (ref 3.5–4.7)
EXT ALKALINE PHOSPHATASE: 70 (ref 34–104)
EXT BASOPHIL%: 1.3 (ref 0–1)
EXT BILIRUBIN TOTAL: 2.8 (ref 0.3–1)
EXT BUN: 29 (ref 7–25)
EXT CALCIUM: ABNORMAL (ref 10.3–?)
EXT CHLORIDE: 106 (ref 98–107)
EXT CO2: 19 (ref 22–32)
EXT CREATININE: 1.7 MG/DL (ref 0.7–1.3)
EXT EOSINOPHIL%: 5.5 (ref 0–6)
EXT GFR MDRD NON AF AMER: 44
EXT GLUCOSE: 123 (ref 70–105)
EXT HEMATOCRIT: 26.7 (ref 40–52)
EXT HEMOGLOBIN: 9.1 (ref 13.7–17.5)
EXT INR: 1.94 (ref 2–3)
EXT LYMPH%: 26.3 (ref 20–50)
EXT MONOCYTES%: 9.3 (ref 0–14)
EXT PLATELETS: 109 (ref 140–450)
EXT POTASSIUM: 4 (ref 3.5–5.1)
EXT PT: 22.2 (ref 11.6–14.3)
EXT SODIUM: 135 MMOL/L (ref 136–145)
EXT WBC: 6.4 (ref 4–11)
MCH RBC QN AUTO: 33.2 PG (ref 26–34)
MCHC RBC AUTO-ENTMCNC: 34.1 G/DL (ref 31–36)
MCV RBC AUTO: 97 FL (ref 80–98)
PHOSPHATIDYLETHANOL (PETH): NEGATIVE NG/ML

## 2017-06-20 ENCOUNTER — CONFERENCE (OUTPATIENT)
Dept: TRANSPLANT | Facility: CLINIC | Age: 57
End: 2017-06-20
Payer: MEDICARE

## 2017-06-20 ENCOUNTER — TELEPHONE (OUTPATIENT)
Dept: TRANSPLANT | Facility: CLINIC | Age: 57
End: 2017-06-20

## 2017-06-20 RX ORDER — SODIUM BICARBONATE 650 MG/1
650 TABLET ORAL 3 TIMES DAILY
Qty: 90 TABLET | Refills: 3 | Status: SHIPPED | OUTPATIENT
Start: 2017-06-20 | End: 2017-07-20

## 2017-06-20 RX ORDER — SODIUM BICARBONATE 650 MG/1
650 TABLET ORAL 3 TIMES DAILY
COMMUNITY
Start: 2017-06-20 | End: 2017-06-20 | Stop reason: SDUPTHER

## 2017-06-20 NOTE — HOSPITAL COURSE
Neuro   Chronic pain     - back pain, chronic   - holding MS contin due to renal/hepatic failure   -  Limit narcotics as patient is easily sedated       Hepatic encephalopathy     Improving  Back to baseline today       Pulmonary   SOB (shortness of breath)     Improved today since paracentesis yesterday   --crackles bilaterally, and reporting mild SOB  -- Abx therapy with Ceftriaxone > SBP prophylaxis  -- Paracentesis removed 5L yesterday>> No SBP          Cardiac   Shock, circulatory     Resolved  -- Off pressors>24hrs  -baseline systlic BP in 100-110 per wife  - midodrine continues       Renal   Acute renal failure with tubular necrosis     - nephrology consulted- GYPSY is 2/2 ischemic ATN from hypotension and anemia, given this cannot diagnosed hepatorenal syndrome at this time  - US retroperitoneum 6/7 shows bilateral medical renal disease  - CXR on admission: showed increase congestion   - continue albumin and octerotide   - holding lasix and aldactone  - - continue to monitor UO   -- DC nixon       Hem/Onc   HCC (hepatocellular carcinoma)     - s/p TACE 10/2016 and 4/2017       Fluids/Electrolytes/Nutrition/GI   * Cirrhosis of liver with ascites     - alcoholic liver disease, HCV  - MELD-Na score: 28 at 6/11/2017  3:10 AM  MELD score: 28 at 6/11/2017  3:10 AM  Calculated from:  Serum Creatinine: 1.7 mg/dL at 6/11/2017  3:10 AM  Serum Sodium: 136 mmol/L at 6/11/2017  3:10 AM  Total Bilirubin: 6.1 mg/dL at 6/11/2017  3:10 AM  INR(ratio): 2.3 at 6/11/2017  3:10 AM  Age: 57 years  - hepatology consult- recommend echo, paracentesis, continue IV albumin, continue aggressive medical therapy since patient is still a candidate for liver transplant at this time, his transplant listing status will be discussed this week  (June 12th...)  -- metabolites are negative for nicotine, positive for cotinine (16)  -paracentesis completed 6/10/17 not suggestive of SBP  -echo shows no decreased in EF, or decrease wall motion to  account for hypotension  - continue lactulose, sodium bicarb tabs, and rifaximin   - holding aldactone and lasix           Hypokalemia     Replete prn  Give oral supplement  Trend BMP       Decompensated hepatic cirrhosis     See above           Hypoalbuminemia due to protein-calorie malnutrition     Encourage nutrition/supplements  -- Dietary consulted       Hyponatremia     Improved  -hypervolemic , hyponatremia - improved   - in setting of decompensated cirrhosis   - fluid restriction 1500 cc          Other   Anemia of chronic disease     - Received 1 u pRBCs with improvement in Hb on 6/10  - baseline ~9-10  -  CT abdo/pevlis during this admission unremarkable  - continue to monitor CBCs       Pre-transplant evaluation for liver transplant     Will be discussed this week in committee, will likely be rejected       Thrombocytopenia     - secondary to liver disease.   - plts today 40  - no  transfusion at this time  -- Vit K for elevated INR

## 2017-06-20 NOTE — PROGRESS NOTES
[facility] Hospital Medicine  Progress Note    Patient Name: Tommy Hopper  MRN: [id number]  Patient Class: IP- Inpatient   Admission Date: 6/7/2017  Length of Stay: 6 days  Attending Physician: No att. providers found  Primary Care Provider: Primary Doctor No    [facility] Medicine Team: [facility]OSP MED A Dung Castro MD    Subjective:     Principal Problem:Cirrhosis of liver with ascites    HPI:  No notes on file    Hospital Course:  No notes on file    Interval History: patient is doing better today; d/c home    Review of Systems   Constitutional: Negative for activity change and appetite change.   HENT: Negative for drooling and facial swelling.    Eyes: Negative for discharge and itching.   Respiratory: Negative for cough, shortness of breath and wheezing.    Cardiovascular: Negative for chest pain and palpitations.   Gastrointestinal: Negative for abdominal pain and nausea.   Genitourinary: Negative for difficulty urinating and dysuria.   Skin: Negative for color change and pallor.   Neurological: Negative for dizziness and numbness.   Psychiatric/Behavioral: Negative for behavioral problems and confusion.     Objective:     Vital Signs (Most Recent):  Temp: 97.6 °F (36.4 °C) (06/13/17 0734)  Pulse: (!) 58 (06/13/17 0734)  Resp: 16 (06/13/17 0734)  BP: 100/60 (06/13/17 0734)  SpO2: 98 % (06/13/17 0734) Vital Signs (24h Range):        Weight: 75.6 kg (166 lb 10.7 oz)  Body mass index is 26.9 kg/m².  No intake or output data in the 24 hours ending 06/20/17 0917   Physical Exam   Constitutional: He is oriented to person, place, and time. He appears well-developed and well-nourished.   HENT:   Head: Normocephalic and atraumatic.   Eyes: Conjunctivae are normal. Pupils are equal, round, and reactive to light.   Neck: Normal range of motion. No thyromegaly present.   Cardiovascular: Normal rate, regular rhythm and normal heart sounds.    Pulmonary/Chest: Effort normal and breath sounds normal.    Abdominal: Soft. He exhibits no distension. There is no tenderness.   Neurological: He is alert and oriented to person, place, and time. Coordination normal.   Skin: No rash noted. No erythema.       Significant Labs:   CBC: No results for input(s): WBC, HGB, HCT, PLT in the last 48 hours.  CMP:     Recent Labs  Lab 06/19/17  0923   AST 48*   ALT 21     Coagulation: No results for input(s): INR, APTT in the last 48 hours.    Invalid input(s): PT    Significant Imaging: I have reviewed all pertinent imaging results/findings within the past 24 hours.    Assessment/Plan:      Neuro   Chronic pain     - back pain, chronic   - holding MS contin due to renal/hepatic failure   -  Limit narcotics as patient is easily sedated       Hepatic encephalopathy     Improving  Back to baseline today       Pulmonary   SOB (shortness of breath)     Improved today since paracentesis yesterday   --crackles bilaterally, and reporting mild SOB  -- Abx therapy with Ceftriaxone > SBP prophylaxis  -- Paracentesis removed 5L yesterday>> No SBP          Cardiac   Shock, circulatory     Resolved  -- Off pressors>24hrs  -baseline systlic BP in 100-110 per wife  - midodrine continues       Renal   Acute renal failure with tubular necrosis     - nephrology consulted- GYPSY is 2/2 ischemic ATN from hypotension and anemia, given this cannot diagnosed hepatorenal syndrome at this time  - US retroperitoneum 6/7 shows bilateral medical renal disease  - CXR on admission: showed increase congestion   - continue albumin and octerotide   - holding lasix and aldactone  - - continue to monitor UO   -- DC nixon       Hem/Onc   HCC (hepatocellular carcinoma)     - s/p TACE 10/2016 and 4/2017       Fluids/Electrolytes/Nutrition/GI   * Cirrhosis of liver with ascites     - alcoholic liver disease, HCV  - MELD-Na score: 28 at 6/11/2017  3:10 AM  MELD score: 28 at 6/11/2017  3:10 AM  Calculated from:  Serum Creatinine: 1.7 mg/dL at 6/11/2017  3:10 AM  Serum  Sodium: 136 mmol/L at 6/11/2017  3:10 AM  Total Bilirubin: 6.1 mg/dL at 6/11/2017  3:10 AM  INR(ratio): 2.3 at 6/11/2017  3:10 AM  Age: 57 years  - hepatology consult- recommend echo, paracentesis, continue IV albumin, continue aggressive medical therapy since patient is still a candidate for liver transplant at this time, his transplant listing status will be discussed this week  (June 12th...)  -- metabolites are negative for nicotine, positive for cotinine (16)  -paracentesis completed 6/10/17 not suggestive of SBP  -echo shows no decreased in EF, or decrease wall motion to account for hypotension  - continue lactulose, sodium bicarb tabs, and rifaximin   - holding aldactone and lasix           Hypokalemia     Replete prn  Give oral supplement  Trend BMP       Decompensated hepatic cirrhosis     See above           Hypoalbuminemia due to protein-calorie malnutrition     Encourage nutrition/supplements  -- Dietary consulted       Hyponatremia     Improved  -hypervolemic , hyponatremia - improved   - in setting of decompensated cirrhosis   - fluid restriction 1500 cc          Other   Anemia of chronic disease     - Received 1 u pRBCs with improvement in Hb on 6/10  - baseline ~9-10  -  CT abdo/pevlis during this admission unremarkable  - continue to monitor CBCs       Pre-transplant evaluation for liver transplant     Will be discussed this week in committee, will likely be rejected       Thrombocytopenia     - secondary to liver disease.   - plts today 40  - no  transfusion at this time  -- Vit K for elevated INR                  VTE Risk Mitigation     None          Dung Castro MD  Department of Hospital Medicine   [facility]

## 2017-06-20 NOTE — DISCHARGE SUMMARY
[facility] Hospital Medicine  Discharge Summary      Patient Name: Tommy Hopper  MRN: [id number]  Admission Date: 6/7/2017  Hospital Length of Stay: 6 days  Discharge Date and Time: 6/13/17  Attending Physician: Isabella att. providers found   Discharging Provider: Dung Castro MD  Primary Care Provider: Primary Doctor [facility] Medicine Team: [facility]OS[facility] MED A Dung Castro MD    HPI:   Mr Hopper 57 y.o with history of alcoholic cirrhosis, HCV, HCC s/p TACE x 2(10/3/16, 4/17/17) who is transferred for volume overload and hypotension. Patient had outpatient paracentesis done yesterday 3 L removed , did not receive albumin because unable to get IV access. His wife states that he has worsening lower limb swelling , abdominal distension, and SOB. She states that he is not taking he lasix and aldactone and it was held from the clinic since April, 17. She reports that Dr. Oconnor recommend to come to hospital for evaluation.   Patient roxana chest pain or fever, reports chills and feeling cold. Reports back pain. No chest pain,      Hospital/ICU Course:  Patient found to be hypotensive SBP 70-80s, started on levophed. GYPSY on CKD started on albumin and octreotide for hepatorenal syndrome, and lasix . Resume lactulose and rifaximin.     * No surgery found *      Indwelling Lines/Drains at time of discharge:   Lines/Drains/Airways          No matching active lines, drains, or airways        Hospital Course:   Neuro   Chronic pain     - back pain, chronic   - holding MS contin due to renal/hepatic failure   -  Limit narcotics as patient is easily sedated       Hepatic encephalopathy     Improving  Back to baseline today       Pulmonary   SOB (shortness of breath)     Improved today since paracentesis yesterday   --crackles bilaterally, and reporting mild SOB  -- Abx therapy with Ceftriaxone > SBP prophylaxis  -- Paracentesis removed 5L yesterday>> No SBP          Cardiac   Shock, circulatory     Resolved  --  Off pressors>24hrs  -baseline systlic BP in 100-110 per wife  - midodrine continues       Renal   Acute renal failure with tubular necrosis     - nephrology consulted- GYPSY is 2/2 ischemic ATN from hypotension and anemia, given this cannot diagnosed hepatorenal syndrome at this time  - US retroperitoneum 6/7 shows bilateral medical renal disease  - CXR on admission: showed increase congestion   - continue albumin and octerotide   - holding lasix and aldactone  - - continue to monitor UO   -- DC nixon       Hem/Onc   HCC (hepatocellular carcinoma)     - s/p TACE 10/2016 and 4/2017       Fluids/Electrolytes/Nutrition/GI   * Cirrhosis of liver with ascites     - alcoholic liver disease, HCV  - MELD-Na score: 28 at 6/11/2017  3:10 AM  MELD score: 28 at 6/11/2017  3:10 AM  Calculated from:  Serum Creatinine: 1.7 mg/dL at 6/11/2017  3:10 AM  Serum Sodium: 136 mmol/L at 6/11/2017  3:10 AM  Total Bilirubin: 6.1 mg/dL at 6/11/2017  3:10 AM  INR(ratio): 2.3 at 6/11/2017  3:10 AM  Age: 57 years  - hepatology consult- recommend echo, paracentesis, continue IV albumin, continue aggressive medical therapy since patient is still a candidate for liver transplant at this time, his transplant listing status will be discussed this week  (June 12th...)  -- metabolites are negative for nicotine, positive for cotinine (16)  -paracentesis completed 6/10/17 not suggestive of SBP  -echo shows no decreased in EF, or decrease wall motion to account for hypotension  - continue lactulose, sodium bicarb tabs, and rifaximin   - holding aldactone and lasix           Hypokalemia     Replete prn  Give oral supplement  Trend BMP       Decompensated hepatic cirrhosis     See above           Hypoalbuminemia due to protein-calorie malnutrition     Encourage nutrition/supplements  -- Dietary consulted       Hyponatremia     Improved  -hypervolemic , hyponatremia - improved   - in setting of decompensated cirrhosis   - fluid restriction 1500 cc           Other   Anemia of chronic disease     - Received 1 u pRBCs with improvement in Hb on 6/10  - baseline ~9-10  -  CT abdo/pevlis during this admission unremarkable  - continue to monitor CBCs       Pre-transplant evaluation for liver transplant     Will be discussed this week in committee, will likely be rejected       Thrombocytopenia     - secondary to liver disease.   - plts today 40  - no  transfusion at this time  -- Vit K for elevated INR                 Consults:   Consults         Status Ordering Provider     Inpatient consult to Hepatology  Once     Provider:  (Not yet assigned)    Completed FLOYD CASTRO     Inpatient consult to Nephrology  Once     Provider:  (Not yet assigned)    Completed KLAUS CALLAHAN     IP consult to dietary  Once     Provider:  (Not yet assigned)    Completed WINTERBOTTOM, FIONA              Pending Diagnostic Studies:     Procedure Component Value Units Date/Time    CBC auto differential [[id number]] Collected:  06/09/17 1607    Order Status:  Sent Lab Status:  In process Updated:  06/09/17 1608    Specimen:  Blood from Blood         Final Active Diagnoses:    Diagnosis Date Noted POA    PRINCIPAL PROBLEM:  Cirrhosis of liver with ascites [K74.60] 05/03/2016 Yes    Hypotension [I95.9]  Unknown    SOB (shortness of breath) [R06.02] 06/09/2017 Yes    Shock [R57.9]  Yes    Acute renal failure with tubular necrosis [N17.0] 06/07/2017 Yes    Chronic pain [G89.29] 06/07/2017 Yes    Hypokalemia [E87.6] 04/25/2017 Yes    Hepatic encephalopathy [K72.90] 03/15/2017 Yes    Anemia of chronic disease [D63.8] 01/18/2017 Yes    Decompensated hepatic cirrhosis [K72.90] 01/18/2017 Yes    Hypoalbuminemia due to protein-calorie malnutrition [E46] 01/18/2017 Yes    Pre-transplant evaluation for liver transplant [Z01.818] 11/28/2016 Not Applicable    HCC (hepatocellular carcinoma) [C22.0] 10/03/2016 Yes    Thrombocytopenia [D69.6] 05/03/2016 Yes    Hyponatremia [E87.1]  05/03/2016 Yes      Problems Resolved During this Admission:    Diagnosis Date Noted Date Resolved POA    GYPSY (acute kidney injury) [N17.9] 06/10/2017 06/11/2017 Yes    Subcutaneous emphysema [T79.7XXA] 06/07/2017 06/11/2017 Yes      No new Assessment & Plan notes have been filed under this hospital service since the last note was generated.  Service: Hospital Medicine      Discharged Condition: good    Disposition: Home or Self Care    Follow Up:  Follow-up Information     Sharyn Eaton NP.    Specialty:  Transplant  Why:  Clinic to notify patient of follow up  Contact information:  [address]  [facility] [address]  [phone]             [facility].    Specialties:  DME Provider, Home Health Services  Why:  Home Health/ Resume Care  Contact information:  [address] [facility] [facility] [address]  [phone]                 Patient Instructions:     Diet general       Medications:  Reconciled Home Medications:   Discharge Medication List as of 6/13/2017 11:03 AM      START taking these medications    Details   midodrine (PROAMATINE) 10 MG tablet Take 1 tablet (10 mg total) by mouth 3 (three) times daily., Starting Tue 6/13/2017, Until Wed 6/13/2018, Print         CONTINUE these medications which have CHANGED    Details   spironolactone (ALDACTONE) 100 MG tablet Take 1 tablet (100 mg total) by mouth once daily., Starting Tue 6/13/2017, Until Wed 6/13/2018, Print         CONTINUE these medications which have NOT CHANGED    Details   calcium-vitamin D 250-100 mg-unit per tablet Take 2 tablets by mouth 2 (two) times daily., Until Discontinued, Historical Med      CHANTIX STARTING MONTH BOX 0.5 mg (11)- 1 mg (42) tablet as directed Orally 30 days- taking PRN (medication maked BP low), Historical Med      ciprofloxacin HCl (CIPRO) 500 MG tablet Take 1 tablet (500 mg total) by mouth once daily., Starting 11/11/2016, Until Discontinued, Normal      diazepam (VALIUM) 10 MG Tab Take 10 mg by  mouth 2 (two) times daily., Starting 2/26/2016, Until Discontinued, Historical Med      furosemide (LASIX) 40 MG tablet Take 1 tablet (40 mg total) by mouth once daily., Starting 4/27/2017, Until Fri 4/27/18, Normal      !! lactulose (CHRONULAC) 20 gram/30 mL Soln Take 30 mLs (20 g total) by mouth every 6 (six) hours as needed (titrate to have 2-3 bowle movements per day)., Starting 11/28/2016, Until Discontinued, Normal      morphine (MS CONTIN) 30 MG 12 hr tablet Take 30 mg by mouth every 12 (twelve) hours., Starting 11/21/2016, Until Discontinued, Historical Med      neomycin-polymyxin-dexamethasone (DEXACINE) 3.5 mg/g-10,000 unit/g-0.1 % Oint every 6 (six) hours., Until Discontinued, Historical Med      ondansetron (ZOFRAN) 4 MG tablet Take 1 tablet (4 mg total) by mouth every 6 (six) hours as needed for Nausea., Starting Fri 5/26/2017, Print      oxycodone (ROXICODONE) 30 MG Tab Take 30 mg by mouth every 8 (eight) hours., Starting 2/25/2016, Until Discontinued, Historical Med      pantoprazole (PROTONIX) 40 MG tablet Take 1 tablet (40 mg total) by mouth once daily., Starting 2/27/2017, Until Discontinued, Normal      rifAXIMin (XIFAXAN) 550 mg Tab Take 1 tablet (550 mg total) by mouth 2 (two) times daily., Starting 4/27/2017, Until Discontinued, Normal      sodium bicarbonate 650 MG tablet Take 650 mg by mouth 2 (two) times daily., Starting Fri 5/26/2017, Until Fri 6/2/2017, Historical Med      !! lactulose (CHRONULAC) 10 gram/15 mL solution Starting 11/28/2016, Until Discontinued, Historical Med       !! - Potential duplicate medications found. Please discuss with provider.        Time spent on the discharge of patient: 36 minutes    Dung Castro MD  Department of Hospital Medicine  [facility]

## 2017-06-20 NOTE — SUBJECTIVE & OBJECTIVE
Interval History: patient is doing better today; d/c home    Review of Systems   Constitutional: Negative for activity change and appetite change.   HENT: Negative for drooling and facial swelling.    Eyes: Negative for discharge and itching.   Respiratory: Negative for cough, shortness of breath and wheezing.    Cardiovascular: Negative for chest pain and palpitations.   Gastrointestinal: Negative for abdominal pain and nausea.   Genitourinary: Negative for difficulty urinating and dysuria.   Skin: Negative for color change and pallor.   Neurological: Negative for dizziness and numbness.   Psychiatric/Behavioral: Negative for behavioral problems and confusion.     Objective:     Vital Signs (Most Recent):  Temp: 97.6 °F (36.4 °C) (06/13/17 0734)  Pulse: (!) 58 (06/13/17 0734)  Resp: 16 (06/13/17 0734)  BP: 100/60 (06/13/17 0734)  SpO2: 98 % (06/13/17 0734) Vital Signs (24h Range):        Weight: 75.6 kg (166 lb 10.7 oz)  Body mass index is 26.9 kg/m².  No intake or output data in the 24 hours ending 06/20/17 0917   Physical Exam   Constitutional: He is oriented to person, place, and time. He appears well-developed and well-nourished.   HENT:   Head: Normocephalic and atraumatic.   Eyes: Conjunctivae are normal. Pupils are equal, round, and reactive to light.   Neck: Normal range of motion. No thyromegaly present.   Cardiovascular: Normal rate, regular rhythm and normal heart sounds.    Pulmonary/Chest: Effort normal and breath sounds normal.   Abdominal: Soft. He exhibits no distension. There is no tenderness.   Neurological: He is alert and oriented to person, place, and time. Coordination normal.   Skin: No rash noted. No erythema.       Significant Labs:   CBC: No results for input(s): WBC, HGB, HCT, PLT in the last 48 hours.  CMP:     Recent Labs  Lab 06/19/17 0923   AST 48*   ALT 21     Coagulation: No results for input(s): INR, APTT in the last 48 hours.    Invalid input(s): PT    Significant Imaging: I have  reviewed all pertinent imaging results/findings within the past 24 hours.

## 2017-06-20 NOTE — TELEPHONE ENCOUNTER
----- Message from Chaitanya Valenzuela sent at 6/20/2017  8:34 AM CDT -----  Contact: Mrs Hopper  Would like for pt to get PARA scheduled on Friday 06/23 please call

## 2017-06-20 NOTE — TELEPHONE ENCOUNTER
Transplant committee conference.    Patient discussed as a candidate in relation to compliance and patient functional status. Recent hospitalizations have brought these concerns into questions.  Committee members verbalized concerns and discussed observed behaviors. Decision by committee to decline patient based on lack of compliance with medical advice and decreased physical functional status.

## 2017-06-20 NOTE — PHYSICIAN QUERY
PT Name: Tommy Hopper  MR #: [id number]     Physician Query Form - Diagnosis Clarification      CDS/: Gretchen Maharaj               Contact information:    This form is a permanent document in the medical record.     Query Date: June 20, 2017    By submitting this query, we are merely seeking further clarification of documentation.  Please utilize your independent clinical judgment when addressing the question(s) below.     The medical record contains the following:      Findings Supporting Clinical Information Location in Medical Record   Sepsis Ddx includes sepsis - broaden abx.    Septic w/u with empiric antimicrobial therapy    Post-op diagnosis  Sepsis Progress note 06/08/17    Consult 06/07/17    Central line procedure report     Please clarify if the Sepsis diagnosis has been:    [  ] Ruled In  [  ] Ruled In, Now Resolved  [  ] Resolved Prior to My Assessment  [ X ] Ruled Out  [  ] Clinically insignificant  [  ] Clinically undetermined  [  ] Other/Clarification of findings (please specify)_______________________________

## 2017-06-20 NOTE — HPI
Mr Ruchi 57 y.o with history of alcoholic cirrhosis, HCV, HCC s/p TACE x 2(10/3/16, 4/17/17) who is transferred for volume overload and hypotension. Patient had outpatient paracentesis done yesterday 3 L removed , did not receive albumin because unable to get IV access. His wife states that he has worsening lower limb swelling , abdominal distension, and SOB. She states that he is not taking he lasix and aldactone and it was held from the clinic since April, 17. She reports that Dr. Oconnor recommend to come to hospital for evaluation.   Patient roxana chest pain or fever, reports chills and feeling cold. Reports back pain. No chest pain,      Hospital/ICU Course:  Patient found to be hypotensive SBP 70-80s, started on levophed. GYPSY on CKD started on albumin and octreotide for hepatorenal syndrome, and lasix . Resume lactulose and rifaximin.

## 2017-06-20 NOTE — PROGRESS NOTES
[facility] Hospital Medicine  Progress Note    Patient Name: Tommy Hopper  MRN: [id number]  Patient Class: IP- Inpatient   Admission Date: 6/7/2017  Length of Stay: 6 days  Attending Physician: No att. providers found  Primary Care Provider: Primary Doctor No    [facility] Medicine Team: [facility] [place], [place] HOSP MED A Dung Castro MD    Subjective:     Principal Problem:Cirrhosis of liver with ascites    HPI:  No notes on file    Hospital Course:  No notes on file    Interval History: patient is doing better today; needs IR tap    Review of Systems   Constitutional: Negative for activity change and appetite change.   HENT: Negative for drooling and facial swelling.    Eyes: Negative for discharge and itching.   Respiratory: Negative for cough, shortness of breath and wheezing.    Cardiovascular: Negative for chest pain and palpitations.   Gastrointestinal: Negative for abdominal pain and nausea.   Genitourinary: Negative for difficulty urinating and dysuria.   Skin: Negative for color change and pallor.   Neurological: Negative for dizziness and numbness.   Psychiatric/Behavioral: Negative for behavioral problems and confusion.     Objective:     Vital Signs (Most Recent):  Temp: 97.6 °F (36.4 °C) (06/13/17 0734)  Pulse: (!) 58 (06/13/17 0734)  Resp: 16 (06/13/17 0734)  BP: 100/60 (06/13/17 0734)  SpO2: 98 % (06/13/17 0734) Vital Signs (24h Range):        Weight: 75.6 kg (166 lb 10.7 oz)  Body mass index is 26.9 kg/m².  No intake or output data in the 24 hours ending 06/20/17 0913   Physical Exam   Constitutional: He is oriented to person, place, and time. He appears well-developed and well-nourished.   HENT:   Head: Normocephalic and atraumatic.   Eyes: Conjunctivae are normal. Pupils are equal, round, and reactive to light.   Neck: Normal range of motion. No thyromegaly present.   Cardiovascular: Normal rate, regular rhythm and normal heart sounds.    Pulmonary/Chest: Effort normal and breath sounds normal.    Abdominal: Soft. He exhibits no distension. There is no tenderness.   Neurological: He is alert and oriented to person, place, and time. Coordination normal.   Skin: No rash noted. No erythema.       Significant Labs:   CBC: No results for input(s): WBC, HGB, HCT, PLT in the last 48 hours.  CMP:   Recent Labs  Lab 06/19/17  0923   AST 48*   ALT 21     Coagulation: No results for input(s): INR, APTT in the last 48 hours.    Invalid input(s): PT    Significant Imaging: I have reviewed all pertinent imaging results/findings within the past 24 hours.    Assessment/Plan:      Neuro   Chronic pain     - back pain, chronic   - holding MS contin due to renal/hepatic failure   -  Limit narcotics as patient is easily sedated       Hepatic encephalopathy     Improving  Back to baseline today       Pulmonary   SOB (shortness of breath)     Improved today since paracentesis yesterday   --crackles bilaterally, and reporting mild SOB  -- Abx therapy with Ceftriaxone > SBP prophylaxis  -- Paracentesis removed 5L yesterday>> No SBP          Cardiac   Shock, circulatory     Resolved  -- Off pressors>24hrs  -baseline systlic BP in 100-110 per wife  - midodrine continues       Renal   Acute renal failure with tubular necrosis     - nephrology consulted- GYPSY is 2/2 ischemic ATN from hypotension and anemia, given this cannot diagnosed hepatorenal syndrome at this time  - US retroperitoneum 6/7 shows bilateral medical renal disease  - CXR on admission: showed increase congestion   - continue albumin and octerotide   - holding lasix and aldactone  - - continue to monitor UO   -- DC nixon       Hem/Onc   HCC (hepatocellular carcinoma)     - s/p TACE 10/2016 and 4/2017       Fluids/Electrolytes/Nutrition/GI   * Cirrhosis of liver with ascites     - alcoholic liver disease, HCV  - MELD-Na score: 28 at 6/11/2017  3:10 AM  MELD score: 28 at 6/11/2017  3:10 AM  Calculated from:  Serum Creatinine: 1.7 mg/dL at 6/11/2017  3:10 AM  Serum Sodium:  136 mmol/L at 6/11/2017  3:10 AM  Total Bilirubin: 6.1 mg/dL at 6/11/2017  3:10 AM  INR(ratio): 2.3 at 6/11/2017  3:10 AM  Age: 57 years  - hepatology consult- recommend echo, paracentesis, continue IV albumin, continue aggressive medical therapy since patient is still a candidate for liver transplant at this time, his transplant listing status will be discussed this week  (June 12th...)  -- metabolites are negative for nicotine, positive for cotinine (16)  -paracentesis completed 6/10/17 not suggestive of SBP  -echo shows no decreased in EF, or decrease wall motion to account for hypotension  - continue lactulose, sodium bicarb tabs, and rifaximin   - holding aldactone and lasix           Hypokalemia     Replete prn  Give oral supplement  Trend BMP       Decompensated hepatic cirrhosis     See above           Hypoalbuminemia due to protein-calorie malnutrition     Encourage nutrition/supplements  -- Dietary consulted       Hyponatremia     Improved  -hypervolemic , hyponatremia - improved   - in setting of decompensated cirrhosis   - fluid restriction 1500 cc          Other   Anemia of chronic disease     - Received 1 u pRBCs with improvement in Hb on 6/10  - baseline ~9-10  -  CT abdo/pevlis during this admission unremarkable  - continue to monitor CBCs       Pre-transplant evaluation for liver transplant     Will be discussed this week in committee, will likely be rejected       Thrombocytopenia     - secondary to liver disease.   - plts today 40  - no  transfusion at this time  -- Vit K for elevated INR           VTE Risk Mitigation     None          Dung Castro MD  Department of Hospital Medicine   [facility]

## 2017-06-20 NOTE — SUBJECTIVE & OBJECTIVE
Interval History: patient is doing better today; needs IR tap    Review of Systems   Constitutional: Negative for activity change and appetite change.   HENT: Negative for drooling and facial swelling.    Eyes: Negative for discharge and itching.   Respiratory: Negative for cough, shortness of breath and wheezing.    Cardiovascular: Negative for chest pain and palpitations.   Gastrointestinal: Negative for abdominal pain and nausea.   Genitourinary: Negative for difficulty urinating and dysuria.   Skin: Negative for color change and pallor.   Neurological: Negative for dizziness and numbness.   Psychiatric/Behavioral: Negative for behavioral problems and confusion.     Objective:     Vital Signs (Most Recent):  Temp: 97.6 °F (36.4 °C) (06/13/17 0734)  Pulse: (!) 58 (06/13/17 0734)  Resp: 16 (06/13/17 0734)  BP: 100/60 (06/13/17 0734)  SpO2: 98 % (06/13/17 0734) Vital Signs (24h Range):        Weight: 75.6 kg (166 lb 10.7 oz)  Body mass index is 26.9 kg/m².  No intake or output data in the 24 hours ending 06/20/17 0913   Physical Exam   Constitutional: He is oriented to person, place, and time. He appears well-developed and well-nourished.   HENT:   Head: Normocephalic and atraumatic.   Eyes: Conjunctivae are normal. Pupils are equal, round, and reactive to light.   Neck: Normal range of motion. No thyromegaly present.   Cardiovascular: Normal rate, regular rhythm and normal heart sounds.    Pulmonary/Chest: Effort normal and breath sounds normal.   Abdominal: Soft. He exhibits no distension. There is no tenderness.   Neurological: He is alert and oriented to person, place, and time. Coordination normal.   Skin: No rash noted. No erythema.       Significant Labs:   CBC: No results for input(s): WBC, HGB, HCT, PLT in the last 48 hours.  CMP:   Recent Labs  Lab 06/19/17 0923   AST 48*   ALT 21     Coagulation: No results for input(s): INR, APTT in the last 48 hours.    Invalid input(s): PT    Significant Imaging: I  have reviewed all pertinent imaging results/findings within the past 24 hours.

## 2017-06-20 NOTE — TELEPHONE ENCOUNTER
Called Ms Hopper back on 20Jun17 at 0900. Spoke with her about a paracentesis needed for patient. Patient was instructed to call a few days prior I reminded her that patient has appt tomorrow, and would she like a para tomorrow. She stated that they aren't coming down tomorrow due to the weather. Told her we werent able to get her seen in clinic on Friday. Evelia said she thinks he could wait till Monday for a para if we could get a clinic appt for hospital f/u. Informed her i would try to get everything on one day, and will let her know when i get it all completed.

## 2017-06-22 ENCOUNTER — TELEPHONE (OUTPATIENT)
Dept: TRANSPLANT | Facility: CLINIC | Age: 57
End: 2017-06-22

## 2017-06-23 ENCOUNTER — TELEPHONE (OUTPATIENT)
Dept: INTERVENTIONAL RADIOLOGY/VASCULAR | Facility: HOSPITAL | Age: 57
End: 2017-06-23

## 2017-06-23 DIAGNOSIS — K70.31 ALCOHOLIC CIRRHOSIS OF LIVER WITH ASCITES: Primary | ICD-10-CM

## 2017-06-26 ENCOUNTER — HOSPITAL ENCOUNTER (OUTPATIENT)
Dept: INTERVENTIONAL RADIOLOGY/VASCULAR | Facility: HOSPITAL | Age: 57
Discharge: HOME | End: 2017-06-26
Attending: NURSE PRACTITIONER
Payer: MEDICARE

## 2017-06-26 ENCOUNTER — TELEPHONE (OUTPATIENT)
Dept: HEPATOLOGY | Facility: CLINIC | Age: 57
End: 2017-06-26

## 2017-06-26 ENCOUNTER — OFFICE VISIT (OUTPATIENT)
Dept: TRANSPLANT | Facility: CLINIC | Age: 57
End: 2017-06-26
Payer: MEDICARE

## 2017-06-26 ENCOUNTER — TELEPHONE (OUTPATIENT)
Dept: TRANSPLANT | Facility: CLINIC | Age: 57
End: 2017-06-26

## 2017-06-26 VITALS
RESPIRATION RATE: 16 BRPM | WEIGHT: 158.75 LBS | BODY MASS INDEX: 23.51 KG/M2 | HEIGHT: 69 IN | OXYGEN SATURATION: 97 % | DIASTOLIC BLOOD PRESSURE: 47 MMHG | HEART RATE: 61 BPM | TEMPERATURE: 98 F | SYSTOLIC BLOOD PRESSURE: 83 MMHG

## 2017-06-26 VITALS
DIASTOLIC BLOOD PRESSURE: 52 MMHG | HEART RATE: 63 BPM | RESPIRATION RATE: 16 BRPM | SYSTOLIC BLOOD PRESSURE: 95 MMHG | OXYGEN SATURATION: 96 %

## 2017-06-26 DIAGNOSIS — N18.9 CKD (CHRONIC KIDNEY DISEASE), UNSPECIFIED STAGE: ICD-10-CM

## 2017-06-26 DIAGNOSIS — K74.60 DECOMPENSATED HEPATIC CIRRHOSIS: ICD-10-CM

## 2017-06-26 DIAGNOSIS — K72.90 DECOMPENSATED HEPATIC CIRRHOSIS: ICD-10-CM

## 2017-06-26 DIAGNOSIS — K70.31 ALCOHOLIC CIRRHOSIS OF LIVER WITH ASCITES: ICD-10-CM

## 2017-06-26 DIAGNOSIS — R18.8 CIRRHOSIS OF LIVER WITH ASCITES, UNSPECIFIED HEPATIC CIRRHOSIS TYPE: Primary | ICD-10-CM

## 2017-06-26 DIAGNOSIS — C22.0 HCC (HEPATOCELLULAR CARCINOMA): ICD-10-CM

## 2017-06-26 DIAGNOSIS — K74.60 CIRRHOSIS OF LIVER WITH ASCITES, UNSPECIFIED HEPATIC CIRRHOSIS TYPE: Primary | ICD-10-CM

## 2017-06-26 DIAGNOSIS — K70.31 ASCITES DUE TO ALCOHOLIC CIRRHOSIS: ICD-10-CM

## 2017-06-26 PROBLEM — E87.6 HYPOKALEMIA: Status: RESOLVED | Noted: 2017-04-25 | Resolved: 2017-06-26

## 2017-06-26 PROBLEM — N17.0 ACUTE RENAL FAILURE WITH TUBULAR NECROSIS: Status: RESOLVED | Noted: 2017-06-07 | Resolved: 2017-06-26

## 2017-06-26 PROBLEM — R06.02 SOB (SHORTNESS OF BREATH): Status: RESOLVED | Noted: 2017-06-09 | Resolved: 2017-06-26

## 2017-06-26 PROCEDURE — 99214 OFFICE O/P EST MOD 30 MIN: CPT | Mod: S$GLB,,, | Performed by: NURSE PRACTITIONER

## 2017-06-26 PROCEDURE — 99999 PR PBB SHADOW E&M-EST. PATIENT-LVL IV: CPT | Mod: PBBFAC,,, | Performed by: NURSE PRACTITIONER

## 2017-06-26 PROCEDURE — 49083 ABD PARACENTESIS W/IMAGING: CPT | Mod: ,,, | Performed by: NURSE PRACTITIONER
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
## 2017-06-26 NOTE — TELEPHONE ENCOUNTER
Per NP patient need to see Dr. puente in 4 weeks patient scheduled 7/26 in tranplant side to see Dr. Puente. No available appt to see Dr. Puente in hepatology side in 4 weeks. Please keep patient on 7/26/17 for now.     Thank you

## 2017-06-26 NOTE — TELEPHONE ENCOUNTER
Called and spoke with patient and patient's wife Evelia relayed message from Dr Oconnor regarding lab results.    Labs are about the same as previous, except kidney function has gone down a little.  The albumin he received today after paracentesis should help improve the function.  Please have patient reduce diuretics: Lasix down from 40 mg daily to 20 mg daily and spironolactone down from 100 mg daily to 50 mg daily.  Do this for a week, and will see if kidney function improves next time he is here for paracentesis. Please make changes in the doses in his med list. Thanks     Patient and wife verbalizes understanding, labs scheduled for 7/3/17 for external orders. Instructed on Lasix and Spironolactone medication dosage decrease. Patient and wife able to repeat instructions on medication dosage change.  Changes made to medication doses in med list per Dr Oconnor.

## 2017-06-26 NOTE — LETTER
June 26, 2017        Farshad Carney  [address] [address]  Phone: [phone]  Fax: [phone]             [facility]  [address]  [facility] [address]  Phone: [phone]   Patient: Tommy Hopper   MR Number: [id number]   YOB: 1960   Date of Visit: 6/26/2017       Dear Dr. Farshad Carney    Thank you for referring Tommy Hopper to me for evaluation. Attached you will find relevant portions of my assessment and plan of care.    If you have questions, please do not hesitate to call me. I look forward to following Tommy Hopper along with you.    Sincerely,    Sharyn Eaton, TRACIE    Enclosure    If you would like to receive this communication electronically, please contact [email or web address] or [phone] to request [employer] access.    [employer] is a tool which provides read-only access to select patient information with whom you have a relationship. Its easy to use and provides real time access to review your patients record including encounter summaries, notes, results, and demographic information.    If you feel you have received this communication in error or would no longer like to receive these types of communications, please e-mail [email or web address]

## 2017-06-26 NOTE — PROCEDURES
Radiology Post-Procedure Note    Pre Op Diagnosis: Ascites  Post Op Diagnosis: Same    Procedure: Ultrasound Guided Paracentesis    Procedure performed by: Jose Jones NP/Fernando Dorado MD    Written Informed Consent Obtained: Yes  Specimen Removed: YES clear yellow fluid  Estimated Blood Loss: Minimal    Findings:   Successful paracentesis.  Albumin administered PRN per protocol.    Patient tolerated procedure well.    CHERRIE Diaz, BEATAP  Interventional Radiology  [phone] [employer]

## 2017-06-26 NOTE — PROGRESS NOTES
Spoke with JERARDO Eaton NP. She stated to stop paracentesis due to blood pressure and not able to get IV. Patient may need line in future. Patient updated.
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
## 2017-06-26 NOTE — TELEPHONE ENCOUNTER
Returned patients wife's, Evelia, call on 26Jun17 at 1110. Informed her that we cannot withhold a patients treatment plan or diagonisis from a patient intentionally. Evelia expressed understanding and is just concerned and distraught. I informed her I would contact SW and see if they could reach out with any support group info. She expressed understanding.   No other issues at this time.        ----- Message from Mariama Hernandez sent at 6/26/2017  9:36 AM CDT -----  Contact: wife  Wants to speak with Sarah she found out about him being turned down for liver txp... Does not want Sharyn to tell him today at appt she is afraid he will give up on everything please call her asap @ # [phone].

## 2017-06-26 NOTE — TELEPHONE ENCOUNTER
----- Message from Sarah Perry sent at 6/26/2017  3:36 PM CDT -----  Patient has been declined for liver transplant and is now being followed in hepatology clinic.  Please review and schedule accordingly.    Thanks  Sarah  ----- Message -----  From: Sharyn aEton NP  Sent: 6/26/2017   2:21 PM  To: [facility] Pre-Liver Transplant Clinical    Bmp in 1 week, ok to use Elvin santos

## 2017-06-26 NOTE — DISCHARGE INSTRUCTIONS
For scheduling: Call Carlee at [phone]    For questions or concerns call: JORY MON-FRI 8 AM- 5PM [phone]. Radiology resident on call [phone].    For immediate concerns that are not emergent, you may call our radiology clinic at: [phone]

## 2017-06-26 NOTE — PROGRESS NOTES
Paracentesis finished at this time. Pt tolerated well. 3000cc removed from right abdomen. Dressing clean, dry and intact. Patient given discharge instructions and verbalized understanding of at home care. Patient discharged to lobby with wheelchair. Family member by side.

## 2017-06-26 NOTE — PROGRESS NOTES
Caprice Eaton, TRACIE to inquire about trouble with IV, and not able to administer albumin. Awaiting call back.

## 2017-07-03 ENCOUNTER — TELEPHONE (OUTPATIENT)
Dept: HEPATOLOGY | Facility: CLINIC | Age: 57
End: 2017-07-03

## 2017-07-03 NOTE — TELEPHONE ENCOUNTER
Received patient lab results from [facility]. Copy of lab results given to Provider and copy sent to be scanned into patient's chart.

## 2017-07-03 NOTE — TELEPHONE ENCOUNTER
Attempted to call patient to relay message from Provider regarding having labs done. Spoke with patient's wife Mrs Hopper, says her  just came from having labs done at [facility] and they will fax the results to the clinic.

## 2017-07-03 NOTE — TELEPHONE ENCOUNTER
----- Message from Mariama Hernandez sent at 7/3/2017 11:58 AM CDT -----  Contact: RN  Calling to let us know they are still receiving requests for Dr Carney but he is no longer at their clinic he is at [facility] she just wanted us to know

## 2017-07-03 NOTE — TELEPHONE ENCOUNTER
----- Message from Sarah Perry sent at 6/26/2017  3:36 PM CDT -----  Patient has been declined for liver transplant and is now being followed in hepatology clinic.  Please review and schedule accordingly.    Thanks  Sarah  ----- Message -----  From: Sharyn Eaton NP  Sent: 6/26/2017   2:21 PM  To: [facility] Pre-Liver Transplant Clinical    Bmp in 1 week, ok to use Elvin santos

## 2017-07-03 NOTE — TELEPHONE ENCOUNTER
Called [facility] and spoke with Chayo [phone]. Requested patient's lab results, fax number given. Chayo says when patient results are complete she will fax copy to clinic.

## 2017-07-05 ENCOUNTER — TELEPHONE (OUTPATIENT)
Dept: HEPATOLOGY | Facility: CLINIC | Age: 57
End: 2017-07-05

## 2017-07-05 ENCOUNTER — HOSPITAL ENCOUNTER (INPATIENT)
Facility: HOSPITAL | Age: 57
LOS: 10 days | Discharge: HOSPICE | DRG: 535 | End: 2017-07-15
Attending: EMERGENCY MEDICINE | Admitting: HOSPITALIST
Payer: MEDICARE

## 2017-07-05 DIAGNOSIS — E87.5 ACUTE HYPERKALEMIA: ICD-10-CM

## 2017-07-05 DIAGNOSIS — N17.9 AKI (ACUTE KIDNEY INJURY): ICD-10-CM

## 2017-07-05 DIAGNOSIS — S32.810A: ICD-10-CM

## 2017-07-05 DIAGNOSIS — S72.001A HIP FRACTURE, RIGHT, CLOSED, INITIAL ENCOUNTER: Primary | ICD-10-CM

## 2017-07-05 DIAGNOSIS — K70.31 ALCOHOLIC CIRRHOSIS OF LIVER WITH ASCITES: ICD-10-CM

## 2017-07-05 DIAGNOSIS — E43 SEVERE MALNUTRITION: ICD-10-CM

## 2017-07-05 DIAGNOSIS — S32.810A CLOSED PELVIC RING FRACTURE, INITIAL ENCOUNTER: ICD-10-CM

## 2017-07-05 DIAGNOSIS — I95.9 HYPOTENSION: ICD-10-CM

## 2017-07-05 DIAGNOSIS — I95.9 HYPOTENSION, UNSPECIFIED HYPOTENSION TYPE: ICD-10-CM

## 2017-07-05 PROBLEM — S72.009A HIP FRACTURE: Status: ACTIVE | Noted: 2017-07-05

## 2017-07-05 LAB
ABO + RH BLD: NORMAL
ALBUMIN SERPL BCP-MCNC: 2.9 G/DL
ALP SERPL-CCNC: 131 U/L
ALT SERPL W/O P-5'-P-CCNC: 24 U/L
AMMONIA PLAS-SCNC: 76 UMOL/L
ANION GAP SERPL CALC-SCNC: 8 MMOL/L
ANISOCYTOSIS BLD QL SMEAR: SLIGHT
AST SERPL-CCNC: 57 U/L
BASOPHILS # BLD AUTO: 0.01 K/UL
BASOPHILS NFR BLD: 0.2 %
BILIRUB SERPL-MCNC: 6.1 MG/DL
BLD GP AB SCN CELLS X3 SERPL QL: NORMAL
BUN SERPL-MCNC: 30 MG/DL (ref 6–30)
BUN SERPL-MCNC: 34 MG/DL
BURR CELLS BLD QL SMEAR: ABNORMAL
CALCIUM SERPL-MCNC: 7.9 MG/DL
CHLORIDE SERPL-SCNC: 100 MMOL/L
CHLORIDE SERPL-SCNC: 98 MMOL/L (ref 95–110)
CO2 SERPL-SCNC: 24 MMOL/L
CREAT SERPL-MCNC: 1.9 MG/DL
CREAT SERPL-MCNC: 1.9 MG/DL (ref 0.5–1.4)
DIFFERENTIAL METHOD: ABNORMAL
EOSINOPHIL # BLD AUTO: 0 K/UL
EOSINOPHIL NFR BLD: 0.5 %
ERYTHROCYTE [DISTWIDTH] IN BLOOD BY AUTOMATED COUNT: 20.1 %
EST. GFR  (AFRICAN AMERICAN): 43.9 ML/MIN/1.73 M^2
EST. GFR  (NON AFRICAN AMERICAN): 38 ML/MIN/1.73 M^2
GLUCOSE SERPL-MCNC: 106 MG/DL (ref 70–110)
GLUCOSE SERPL-MCNC: 112 MG/DL
HCT VFR BLD AUTO: 16.3 %
HCT VFR BLD CALC: 20 %PCV (ref 36–54)
HGB BLD-MCNC: 5.7 G/DL
HYPOCHROMIA BLD QL SMEAR: ABNORMAL
INR PPP: 1.8
LACTATE SERPL-SCNC: 1.5 MMOL/L
LYMPHOCYTES # BLD AUTO: 0.9 K/UL
LYMPHOCYTES NFR BLD: 22.7 %
MCH RBC QN AUTO: 33.7 PG
MCHC RBC AUTO-ENTMCNC: 35 %
MCV RBC AUTO: 96 FL
MONOCYTES # BLD AUTO: 0.6 K/UL
MONOCYTES NFR BLD: 14.7 %
NEUTROPHILS # BLD AUTO: 2.5 K/UL
NEUTROPHILS NFR BLD: 61.2 %
OVALOCYTES BLD QL SMEAR: ABNORMAL
PLATELET # BLD AUTO: 58 K/UL
PLATELET BLD QL SMEAR: ABNORMAL
PMV BLD AUTO: 9.6 FL
POC IONIZED CALCIUM: 1.03 MMOL/L (ref 1.06–1.42)
POC TCO2 (MEASURED): 22 MMOL/L (ref 23–29)
POIKILOCYTOSIS BLD QL SMEAR: SLIGHT
POTASSIUM BLD-SCNC: 4.4 MMOL/L (ref 3.5–5.1)
POTASSIUM SERPL-SCNC: 4.5 MMOL/L
PROT SERPL-MCNC: 5 G/DL
PROTHROMBIN TIME: 17.9 SEC
RBC # BLD AUTO: 1.69 M/UL
SAMPLE: ABNORMAL
SODIUM BLD-SCNC: 134 MMOL/L (ref 136–145)
SODIUM SERPL-SCNC: 132 MMOL/L
WBC # BLD AUTO: 4.01 K/UL

## 2017-07-05 PROCEDURE — 82140 ASSAY OF AMMONIA: CPT

## 2017-07-05 PROCEDURE — 12000002 HC ACUTE/MED SURGE SEMI-PRIVATE ROOM

## 2017-07-05 PROCEDURE — 86922 COMPATIBILITY TEST ANTIGLOB: CPT

## 2017-07-05 PROCEDURE — 85025 COMPLETE CBC W/AUTO DIFF WBC: CPT

## 2017-07-05 PROCEDURE — 85610 PROTHROMBIN TIME: CPT

## 2017-07-05 PROCEDURE — 96374 THER/PROPH/DIAG INJ IV PUSH: CPT

## 2017-07-05 PROCEDURE — 86900 BLOOD TYPING SEROLOGIC ABO: CPT

## 2017-07-05 PROCEDURE — 87040 BLOOD CULTURE FOR BACTERIA: CPT

## 2017-07-05 PROCEDURE — 63600175 PHARM REV CODE 636 W HCPCS: Performed by: PHYSICIAN ASSISTANT

## 2017-07-05 PROCEDURE — 96361 HYDRATE IV INFUSION ADD-ON: CPT

## 2017-07-05 PROCEDURE — 94761 N-INVAS EAR/PLS OXIMETRY MLT: CPT

## 2017-07-05 PROCEDURE — 99285 EMERGENCY DEPT VISIT HI MDM: CPT | Mod: 25

## 2017-07-05 PROCEDURE — 81003 URINALYSIS AUTO W/O SCOPE: CPT

## 2017-07-05 PROCEDURE — 93010 ELECTROCARDIOGRAM REPORT: CPT | Mod: ,,, | Performed by: INTERNAL MEDICINE

## 2017-07-05 PROCEDURE — 99291 CRITICAL CARE FIRST HOUR: CPT | Mod: ,,, | Performed by: EMERGENCY MEDICINE

## 2017-07-05 PROCEDURE — 81001 URINALYSIS AUTO W/SCOPE: CPT

## 2017-07-05 PROCEDURE — 80053 COMPREHEN METABOLIC PANEL: CPT

## 2017-07-05 PROCEDURE — 83605 ASSAY OF LACTIC ACID: CPT

## 2017-07-05 PROCEDURE — P9021 RED BLOOD CELLS UNIT: HCPCS

## 2017-07-05 PROCEDURE — 86850 RBC ANTIBODY SCREEN: CPT

## 2017-07-05 PROCEDURE — 25000003 PHARM REV CODE 250: Performed by: PHYSICIAN ASSISTANT

## 2017-07-05 PROCEDURE — 36430 TRANSFUSION BLD/BLD COMPNT: CPT

## 2017-07-05 RX ORDER — HYDROMORPHONE HYDROCHLORIDE 1 MG/ML
0.5 INJECTION, SOLUTION INTRAMUSCULAR; INTRAVENOUS; SUBCUTANEOUS
Status: COMPLETED | OUTPATIENT
Start: 2017-07-05 | End: 2017-07-05

## 2017-07-05 RX ORDER — HYDROCODONE BITARTRATE AND ACETAMINOPHEN 500; 5 MG/1; MG/1
TABLET ORAL
Status: DISCONTINUED | OUTPATIENT
Start: 2017-07-05 | End: 2017-07-15 | Stop reason: HOSPADM

## 2017-07-05 RX ORDER — ONDANSETRON 2 MG/ML
4 INJECTION INTRAMUSCULAR; INTRAVENOUS EVERY 6 HOURS PRN
Status: DISCONTINUED | OUTPATIENT
Start: 2017-07-06 | End: 2017-07-06

## 2017-07-05 RX ORDER — SODIUM CHLORIDE 9 MG/ML
INJECTION, SOLUTION INTRAVENOUS CONTINUOUS
Status: DISCONTINUED | OUTPATIENT
Start: 2017-07-06 | End: 2017-07-06

## 2017-07-05 RX ORDER — MORPHINE SULFATE 2 MG/ML
4 INJECTION, SOLUTION INTRAMUSCULAR; INTRAVENOUS EVERY 4 HOURS PRN
Status: DISCONTINUED | OUTPATIENT
Start: 2017-07-06 | End: 2017-07-06

## 2017-07-05 RX ADMIN — HYDROMORPHONE HYDROCHLORIDE 0.5 MG: 1 INJECTION, SOLUTION INTRAMUSCULAR; INTRAVENOUS; SUBCUTANEOUS at 10:07

## 2017-07-05 RX ADMIN — SODIUM CHLORIDE 1000 ML: 0.9 INJECTION, SOLUTION INTRAVENOUS at 09:07

## 2017-07-05 NOTE — TELEPHONE ENCOUNTER
Attempted to contact patient, spoke with patient's wife Mrs Hopper.  Mrs Hopper says patient had a fall and they were putting him in the ambulance. Relayed message from provider to hold patient's diuretics for 48 hours, then restart and have labs done in one week. Labs scheduled for 7/14/17. Appointment letter mailed out to patient. Informed wife I will send TRACIE Chopra a message to let her know.  Mrs Hopper verbalizes understanding.

## 2017-07-06 PROBLEM — S32.810A CLOSED PELVIC RING FRACTURE: Status: ACTIVE | Noted: 2017-07-05

## 2017-07-06 LAB
ALBUMIN SERPL BCP-MCNC: 2.9 G/DL
ALBUMIN: 3.4
ALP ISOS SERPL LEV INH-CCNC: 145 U/L
ALP SERPL-CCNC: 130 U/L
ALT SERPL W/O P-5'-P-CCNC: 22 U/L
ALT SERPL-CCNC: 27 U/L
ANION GAP SERPL CALC-SCNC: 11 MMOL/L
AST SERPL-CCNC: 59 U/L
AST SERPL-CCNC: 72 U/L
BASOPHILS # BLD AUTO: 0.03 K/UL
BASOPHILS NFR BLD: 0.5 %
BILIRUB SERPL-MCNC: 12.1 MG/DL
BILIRUB UR QL STRIP: NEGATIVE
BILIRUBIN, TOTAL: 3.4
BLD PROD TYP BPU: NORMAL
BLD PROD TYP BPU: NORMAL
BLOOD UNIT EXPIRATION DATE: NORMAL
BLOOD UNIT EXPIRATION DATE: NORMAL
BLOOD UNIT TYPE CODE: 5100
BLOOD UNIT TYPE CODE: 5100
BLOOD UNIT TYPE: NORMAL
BLOOD UNIT TYPE: NORMAL
BUN BLD-MCNC: 37 MG/DL (ref 4–21)
BUN SERPL-MCNC: 33 MG/DL
CALCIUM SERPL-MCNC: 8.1 MG/DL
CALCIUM SERPL-MCNC: 8.1 MG/DL
CARBON DIOXIDE, CO2: 24
CHLORIDE SERPL-SCNC: 104 MMOL/L
CHLORIDE: 95
CLARITY UR REFRACT.AUTO: CLEAR
CO2 SERPL-SCNC: 21 MMOL/L
CODING SYSTEM: NORMAL
CODING SYSTEM: NORMAL
COLOR UR AUTO: YELLOW
CREAT SERPL-MCNC: 1.5 MG/DL
CREAT SERPL-MCNC: 1.9 MG/DL
DIFFERENTIAL METHOD: ABNORMAL
DISPENSE STATUS: NORMAL
DISPENSE STATUS: NORMAL
EOSINOPHIL # BLD AUTO: 0.1 K/UL
EOSINOPHIL NFR BLD: 1.2 %
ERYTHROCYTE [DISTWIDTH] IN BLOOD BY AUTOMATED COUNT: 22.8 %
EST. GFR  (AFRICAN AMERICAN): 58.5 ML/MIN/1.73 M^2
EST. GFR  (NON AFRICAN AMERICAN): 50.6 ML/MIN/1.73 M^2
GFR MDRD AF AMER: 47
GFR MDRD NON AF AMER: 39
GLUCOSE SERPL-MCNC: 111 MG/DL
GLUCOSE UR QL STRIP: NEGATIVE
GLUCOSE: 112
HCT VFR BLD AUTO: 23.5 %
HGB BLD-MCNC: 8.3 G/DL
HGB UR QL STRIP: NEGATIVE
INR PPP: 1.6
KETONES UR QL STRIP: NEGATIVE
LEUKOCYTE ESTERASE UR QL STRIP: ABNORMAL
LYMPHOCYTES # BLD AUTO: 1.2 K/UL
LYMPHOCYTES NFR BLD: 21.2 %
MCH RBC QN AUTO: 32.3 PG
MCHC RBC AUTO-ENTMCNC: 35.3 %
MCV RBC AUTO: 91 FL
MICROSCOPIC COMMENT: ABNORMAL
MONOCYTES # BLD AUTO: 0.8 K/UL
MONOCYTES NFR BLD: 13.1 %
NEUTROPHILS # BLD AUTO: 3.6 K/UL
NEUTROPHILS NFR BLD: 63.7 %
NITRITE UR QL STRIP: NEGATIVE
PH UR STRIP: 7 [PH] (ref 5–8)
PLATELET # BLD AUTO: 64 K/UL
PMV BLD AUTO: 9 FL
POTASSIUM SERPL-SCNC: 4.3 MMOL/L
POTASSIUM: 4.3
PROT SERPL-MCNC: 5.1 G/DL
PROT UR QL STRIP: NEGATIVE
PROTHROMBIN TIME: 18.9 S
RBC # BLD AUTO: 2.57 M/UL
RBC #/AREA URNS AUTO: 0 /HPF (ref 0–4)
SODIUM SERPL-SCNC: 136 MMOL/L
SODIUM: 130
SP GR UR STRIP: 1.01 (ref 1–1.03)
TRANS ERYTHROCYTES VOL PATIENT: NORMAL ML
TRANS ERYTHROCYTES VOL PATIENT: NORMAL ML
URN SPEC COLLECT METH UR: ABNORMAL
UROBILINOGEN UR STRIP-ACNC: NEGATIVE EU/DL
WBC # BLD AUTO: 5.72 K/UL
WBC #/AREA URNS AUTO: 13 /HPF (ref 0–5)

## 2017-07-06 PROCEDURE — 80053 COMPREHEN METABOLIC PANEL: CPT

## 2017-07-06 PROCEDURE — 63600175 PHARM REV CODE 636 W HCPCS: Performed by: HOSPITALIST

## 2017-07-06 PROCEDURE — 85025 COMPLETE CBC W/AUTO DIFF WBC: CPT

## 2017-07-06 PROCEDURE — 97530 THERAPEUTIC ACTIVITIES: CPT

## 2017-07-06 PROCEDURE — 63600175 PHARM REV CODE 636 W HCPCS: Performed by: PHYSICIAN ASSISTANT

## 2017-07-06 PROCEDURE — 87086 URINE CULTURE/COLONY COUNT: CPT

## 2017-07-06 PROCEDURE — P9021 RED BLOOD CELLS UNIT: HCPCS

## 2017-07-06 PROCEDURE — 97161 PT EVAL LOW COMPLEX 20 MIN: CPT

## 2017-07-06 PROCEDURE — 99223 1ST HOSP IP/OBS HIGH 75: CPT | Mod: GC,,, | Performed by: ORTHOPAEDIC SURGERY

## 2017-07-06 PROCEDURE — 20600001 HC STEP DOWN PRIVATE ROOM

## 2017-07-06 PROCEDURE — 86580 TB INTRADERMAL TEST: CPT | Performed by: HOSPITALIST

## 2017-07-06 PROCEDURE — 36430 TRANSFUSION BLD/BLD COMPNT: CPT

## 2017-07-06 PROCEDURE — 99223 1ST HOSP IP/OBS HIGH 75: CPT | Mod: ,,, | Performed by: HOSPITALIST

## 2017-07-06 PROCEDURE — 25000003 PHARM REV CODE 250: Performed by: PHYSICIAN ASSISTANT

## 2017-07-06 PROCEDURE — 25000003 PHARM REV CODE 250: Performed by: HOSPITALIST

## 2017-07-06 RX ORDER — GLUCAGON 1 MG
1 KIT INJECTION
Status: DISCONTINUED | OUTPATIENT
Start: 2017-07-06 | End: 2017-07-15 | Stop reason: HOSPADM

## 2017-07-06 RX ORDER — IBUPROFEN 200 MG
16 TABLET ORAL
Status: DISCONTINUED | OUTPATIENT
Start: 2017-07-06 | End: 2017-07-15 | Stop reason: HOSPADM

## 2017-07-06 RX ORDER — IBUPROFEN 200 MG
24 TABLET ORAL
Status: DISCONTINUED | OUTPATIENT
Start: 2017-07-06 | End: 2017-07-15 | Stop reason: HOSPADM

## 2017-07-06 RX ORDER — OXYCODONE HYDROCHLORIDE 5 MG/1
30 TABLET ORAL EVERY 4 HOURS PRN
Status: DISCONTINUED | OUTPATIENT
Start: 2017-07-06 | End: 2017-07-15 | Stop reason: HOSPADM

## 2017-07-06 RX ORDER — HYDROMORPHONE HYDROCHLORIDE 1 MG/ML
1 INJECTION, SOLUTION INTRAMUSCULAR; INTRAVENOUS; SUBCUTANEOUS EVERY 4 HOURS PRN
Status: DISCONTINUED | OUTPATIENT
Start: 2017-07-06 | End: 2017-07-08

## 2017-07-06 RX ORDER — PANTOPRAZOLE SODIUM 40 MG/1
40 TABLET, DELAYED RELEASE ORAL DAILY
Status: DISCONTINUED | OUTPATIENT
Start: 2017-07-06 | End: 2017-07-15 | Stop reason: HOSPADM

## 2017-07-06 RX ORDER — ONDANSETRON 2 MG/ML
4 INJECTION INTRAMUSCULAR; INTRAVENOUS EVERY 12 HOURS PRN
Status: DISCONTINUED | OUTPATIENT
Start: 2017-07-06 | End: 2017-07-15 | Stop reason: HOSPADM

## 2017-07-06 RX ORDER — SPIRONOLACTONE 50 MG/1
50 TABLET, FILM COATED ORAL DAILY
Status: DISCONTINUED | OUTPATIENT
Start: 2017-07-06 | End: 2017-07-12

## 2017-07-06 RX ORDER — MIDODRINE HYDROCHLORIDE 5 MG/1
10 TABLET ORAL 3 TIMES DAILY
Status: DISCONTINUED | OUTPATIENT
Start: 2017-07-06 | End: 2017-07-07

## 2017-07-06 RX ORDER — DIAZEPAM 5 MG/1
10 TABLET ORAL 2 TIMES DAILY PRN
Status: DISCONTINUED | OUTPATIENT
Start: 2017-07-06 | End: 2017-07-15 | Stop reason: HOSPADM

## 2017-07-06 RX ORDER — FUROSEMIDE 20 MG/1
20 TABLET ORAL DAILY
Status: DISCONTINUED | OUTPATIENT
Start: 2017-07-06 | End: 2017-07-12

## 2017-07-06 RX ORDER — LACTULOSE 10 G/15ML
30 SOLUTION ORAL 3 TIMES DAILY
Status: DISCONTINUED | OUTPATIENT
Start: 2017-07-06 | End: 2017-07-15 | Stop reason: HOSPADM

## 2017-07-06 RX ADMIN — HYDROMORPHONE HYDROCHLORIDE 1 MG: 1 INJECTION, SOLUTION INTRAMUSCULAR; INTRAVENOUS; SUBCUTANEOUS at 05:07

## 2017-07-06 RX ADMIN — RIFAXIMIN 550 MG: 550 TABLET ORAL at 10:07

## 2017-07-06 RX ADMIN — Medication 5 UNITS: at 01:07

## 2017-07-06 RX ADMIN — LACTULOSE 30 G: 20 SOLUTION ORAL at 06:07

## 2017-07-06 RX ADMIN — MIDODRINE HYDROCHLORIDE 10 MG: 5 TABLET ORAL at 09:07

## 2017-07-06 RX ADMIN — HYDROMORPHONE HYDROCHLORIDE 1 MG: 1 INJECTION, SOLUTION INTRAMUSCULAR; INTRAVENOUS; SUBCUTANEOUS at 10:07

## 2017-07-06 RX ADMIN — MIDODRINE HYDROCHLORIDE 10 MG: 5 TABLET ORAL at 06:07

## 2017-07-06 RX ADMIN — LACTULOSE 30 G: 20 SOLUTION ORAL at 09:07

## 2017-07-06 RX ADMIN — SODIUM CHLORIDE: 0.9 INJECTION, SOLUTION INTRAVENOUS at 02:07

## 2017-07-06 RX ADMIN — SPIRONOLACTONE 50 MG: 50 TABLET, FILM COATED ORAL at 10:07

## 2017-07-06 RX ADMIN — MORPHINE SULFATE 4 MG: 2 INJECTION, SOLUTION INTRAMUSCULAR; INTRAVENOUS at 02:07

## 2017-07-06 RX ADMIN — HYDROMORPHONE HYDROCHLORIDE 1 MG: 1 INJECTION, SOLUTION INTRAMUSCULAR; INTRAVENOUS; SUBCUTANEOUS at 06:07

## 2017-07-06 RX ADMIN — PANTOPRAZOLE SODIUM 40 MG: 40 TABLET, DELAYED RELEASE ORAL at 10:07

## 2017-07-06 RX ADMIN — LACTULOSE 30 G: 20 SOLUTION ORAL at 02:07

## 2017-07-06 RX ADMIN — HYDROMORPHONE HYDROCHLORIDE 1 MG: 1 INJECTION, SOLUTION INTRAMUSCULAR; INTRAVENOUS; SUBCUTANEOUS at 09:07

## 2017-07-06 RX ADMIN — FUROSEMIDE 20 MG: 20 TABLET ORAL at 10:07

## 2017-07-06 RX ADMIN — MIDODRINE HYDROCHLORIDE 10 MG: 5 TABLET ORAL at 02:07

## 2017-07-06 RX ADMIN — RIFAXIMIN 550 MG: 550 TABLET ORAL at 09:07

## 2017-07-06 NOTE — ED TRIAGE NOTES
Tommy Hopper, a 57 y.o. male presents to the ED for hypotension of 83/45 with a 1L normal saline. Pt was suppose to be a direct admit for pelvis fracture from a fall earlier today. Pt started having slurred speech en route to this hospital      Chief Complaint   Patient presents with    Hypotension     Sent from [facility] for hip fx - supposed to be direct admit but rerouted to ED after being unable to fix hypotensive status. Recieved 1000 ml NS. Been hypotensive since being picked up and in trendelenburg the whole ride    Aphasia     Started en route     Review of patient's allergies indicates:   Allergen Reactions    Adhesive Blisters     Past Medical History:   Diagnosis Date    Cancer liver    Cirrhosis     Encounter for blood transfusion

## 2017-07-06 NOTE — ASSESSMENT & PLAN NOTE
Patient with R LC1 pelvic ring injury.  Will discuss imaging with staff and update patient's WB status in AM.  Definitive management plan pending.

## 2017-07-06 NOTE — ASSESSMENT & PLAN NOTE
Likely nonoperative, as his decompensated cirrhosis portends perioperative mortality far exceeding potential benefitl.Orthopedic surgery to re-evaluate today and determine WB status for PT.

## 2017-07-06 NOTE — PLAN OF CARE
Problem: Physical Therapy Goal  Goal: Physical Therapy Goal  PT goals until 7/12/17    1. Pt supine to sit with minimal assist-not met  2. Pt sit to supine with minimal assist-not met  3. Pt sit to stand with RW with WBAT R LE with CGA-not met  4. Pt to perform gait 30ft with RW with WBAT R LE with minimal assist.-not met  5. Pt to go up/down curb step with RW with WBAT R LE with minimal assist.-not met  6. Pt to transfer bed to/from bedside chair with WBAT R LE with minimal assist.-not met  7. Pt to perform B LE exs in sitting x 15 reps with handout.-not met    Outcome: Ongoing (interventions implemented as appropriate)  Pt's goals set and pt will benefit from skilled PT services to work towards improved functional mobility including: bed mobility, transfers, up/down step, and gait.   Lilia Castellano, PT  7/6/2017

## 2017-07-06 NOTE — HPI
57M presents as transfer from [facility] ED where he presented for pain and inability to ambulate after a fall one day previously.  History very limited due to patient's garbled speech and lack of documentation from outside ED as they were taken off somewhere by another physician.  He reports losing his balance at home and falling on his right side.  He endorses severe persistent pain which has not been helped by any pain medication he was given so far.  While in the ambulance on his way here from [facility] his speech was noted to be more slurred and he was hypotensive so the ambulance diverted to the ED here.
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
## 2017-07-06 NOTE — HPI
57 YOM with h/o alcoholic cirrhosis, HCC, CKD (baseline Cr 1.1-1.3) presents as transfer to [facility] ER with hypotension and anemia.  Ortho consulted for    Patient reports mechanical fall onto R hip while getting up from couch today with subsequent R hip pain.  Denies head injury, LOC, pain or injury to other location, numbness, tingling.

## 2017-07-06 NOTE — ASSESSMENT & PLAN NOTE
Continue rifaximin bid; add lactulose 25mg q8.  If patient unable to afford rifaximin can substitute zinc sulfate tablet for urate-lowering therapy

## 2017-07-06 NOTE — ED NOTES
Patient identifiers verified and correct for Tommy Hopper.    LOC: The patient is awake, alert and aware of environment with an appropriate affect, the patient is disoriented to time and speaking with a slight slurring   APPEARANCE: Patient resting comfortably and in no acute distress, patient is clean and well groomed, patient's clothing is properly fastened.  SKIN: The skin is warm and dry, color consistent with ethnicity, patient has normal skin turgor and moist mucus membranes, skin intact, no breakdown noted.  MUSCULOSKELETAL: Patient moving all extremities spontaneously, no obvious swelling or deformities noted.  RESPIRATORY: Airway is open and patent, respirations are spontaneous, patient has a normal effort and rate, no accessory muscle use noted, bilateral breath sounds slightly diminished.  CARDIAC: Patient has a normal rate and regular rhythm, no periphreal edema noted, capillary refill < 3 seconds.  ABDOMEN: Soft and non tender to palpation, no distention noted, normoactive bowel sounds present in all four quadrants.  NEUROLOGIC: Pupils equal bilaterally, eyes open spontaneously, behavior appropriate to situation, follows commands, facial expression symmetrical, bilateral hand grasp equal and even, purposeful motor response noted, normal sensation in all extremities when touched with a finger.

## 2017-07-06 NOTE — PROGRESS NOTES
Pt admitted to [facility]. Pt arrived to floor from ED via stretcher per pt escort. VSS and NADN. No complaints at this time. Pt oriented to room and unit. Tele initiated; monitor room notified. Bed in lowest locked position, side rails up x2, call bell in reach. Will continue to monitor patient
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
## 2017-07-06 NOTE — PLAN OF CARE
Problem: Patient Care Overview  Goal: Plan of Care Review  Outcome: Ongoing (interventions implemented as appropriate)  POC reviewed with patient who verbalized understanding. AAOx4. Pt O2 sats stable on RA. Left IJ intact and patent. Pt voiding via urinal with adequate output. Pt had BM today. Pt tolerating diet well. Pt worked well with therapy today.Neuro checks Q4 and stable. Pt on tele running 60s-70s.  Pain being controlled with PRN pain medication. Pt remained free from falls throughout the shift. VSS. Will continue to monitor.

## 2017-07-06 NOTE — PLAN OF CARE
Met with pt. And discussed d/c planning. P/T recommending SNF. Pt. does not want SNF. He wants to go home with spouse with home/outpatient P/T. Pt. reports he is current with [facility].  BECK Hernandez RN/CM    Payor: HU[employer] MANAGED MEDICARE / Plan: HU[employer] MEDICARE HMO / Product Type: Capitation /      Primary Doctor No       [facility] [place] [address]  [address]  [place] [address]  Phone: [phone] Fax: [phone]      Extended Emergency Contact Information  Primary Emergency Contact: Fani Hopper  Address: [id number] [place] [address]           [place] [address] [facility]  Home Phone: [phone]  Mobile Phone: [phone]  Relation: Spouse        07/06/17 1059   Discharge Assessment   Assessment Type Discharge Planning Assessment   Confirmed/corrected address and phone number on facesheet? Yes   Assessment information obtained from? Patient   Expected Length of Stay (days) 6   Communicated expected length of stay with patient/caregiver yes   Prior to hospitilization cognitive status: Alert/Oriented   Prior to hospitalization functional status: Assistive Equipment;Partially Dependent   Current cognitive status: Alert/Oriented   Current Functional Status: Needs Assistance;Partially Dependent   Arrived From home or self-care   Lives With spouse   Able to Return to Prior Arrangements yes   Is patient able to care for self after discharge? Yes  (with assistance from spouse)   How many people do you have in your home that can help with your care after discharge? 1   Who are your caregiver(s) and their phone number(s)? spouse  [phone]   Patient's perception of discharge disposition home or selfcare;home health  (Pt. does not want SNF. Wants to go home with home P/T.)   Readmission Within The Last 30 Days no previous admission in last 30 days   Patient currently being followed by outpatient case management? No   Patient currently receives home health services?  Yes   Patient currently receives private duty nursing? Yes   Would the patient/caregiver prefer to continue receiving services from the current provider? Yes   Equipment Currently Used at Home walker, rolling;wheelchair   Do you have any problems affording any of your prescribed medications? No   Is the patient taking medications as prescribed? yes   Do you have any financial concerns preventing you from receiving the healthcare you need? No   Does the patient have transportation to healthcare appointments? Yes   Transportation Available family or friend will provide   On Dialysis? No   Does the patient receive services at the Coumadin Clinic? No   Are there any open cases? No   Discharge Plan A Home Health;Home   Discharge Plan B Home;Home with family   Patient/Family In Agreement With Plan yes

## 2017-07-07 PROBLEM — D62 ACUTE BLOOD LOSS ANEMIA: Status: ACTIVE | Noted: 2017-07-07

## 2017-07-07 LAB
ALBUMIN SERPL BCP-MCNC: 2.7 G/DL
ALP SERPL-CCNC: 118 U/L
ALT SERPL W/O P-5'-P-CCNC: 22 U/L
ANION GAP SERPL CALC-SCNC: 8 MMOL/L
AST SERPL-CCNC: 56 U/L
BACTERIA UR CULT: NORMAL
BASOPHILS # BLD AUTO: 0.03 K/UL
BASOPHILS NFR BLD: 0.6 %
BILIRUB SERPL-MCNC: 8 MG/DL
BUN SERPL-MCNC: 27 MG/DL
CALCIUM SERPL-MCNC: 7.7 MG/DL
CHLORIDE SERPL-SCNC: 99 MMOL/L
CO2 SERPL-SCNC: 22 MMOL/L
CREAT SERPL-MCNC: 1.2 MG/DL
DIFFERENTIAL METHOD: ABNORMAL
EOSINOPHIL # BLD AUTO: 0.2 K/UL
EOSINOPHIL NFR BLD: 3.3 %
ERYTHROCYTE [DISTWIDTH] IN BLOOD BY AUTOMATED COUNT: 22.5 %
EST. GFR  (AFRICAN AMERICAN): >60 ML/MIN/1.73 M^2
EST. GFR  (NON AFRICAN AMERICAN): >60 ML/MIN/1.73 M^2
GLUCOSE SERPL-MCNC: 103 MG/DL
HCT VFR BLD AUTO: 22.7 %
HGB BLD-MCNC: 8 G/DL
LYMPHOCYTES # BLD AUTO: 1.4 K/UL
LYMPHOCYTES NFR BLD: 26.7 %
MCH RBC QN AUTO: 32.5 PG
MCHC RBC AUTO-ENTMCNC: 35.2 %
MCV RBC AUTO: 92 FL
MONOCYTES # BLD AUTO: 0.6 K/UL
MONOCYTES NFR BLD: 10.8 %
NEUTROPHILS # BLD AUTO: 3 K/UL
NEUTROPHILS NFR BLD: 58.2 %
PLATELET # BLD AUTO: 58 K/UL
PMV BLD AUTO: 9.9 FL
POTASSIUM SERPL-SCNC: 3.7 MMOL/L
PROT SERPL-MCNC: 4.9 G/DL
RBC # BLD AUTO: 2.46 M/UL
SODIUM SERPL-SCNC: 129 MMOL/L
WBC # BLD AUTO: 5.1 K/UL

## 2017-07-07 PROCEDURE — 63600175 PHARM REV CODE 636 W HCPCS: Performed by: HOSPITALIST

## 2017-07-07 PROCEDURE — 25000003 PHARM REV CODE 250: Performed by: HOSPITALIST

## 2017-07-07 PROCEDURE — 97530 THERAPEUTIC ACTIVITIES: CPT

## 2017-07-07 PROCEDURE — 85025 COMPLETE CBC W/AUTO DIFF WBC: CPT

## 2017-07-07 PROCEDURE — 99233 SBSQ HOSP IP/OBS HIGH 50: CPT | Mod: ,,, | Performed by: HOSPITALIST

## 2017-07-07 PROCEDURE — 20600001 HC STEP DOWN PRIVATE ROOM

## 2017-07-07 PROCEDURE — 97110 THERAPEUTIC EXERCISES: CPT

## 2017-07-07 PROCEDURE — 80053 COMPREHEN METABOLIC PANEL: CPT

## 2017-07-07 PROCEDURE — 97166 OT EVAL MOD COMPLEX 45 MIN: CPT

## 2017-07-07 PROCEDURE — 97116 GAIT TRAINING THERAPY: CPT

## 2017-07-07 RX ORDER — MIDODRINE HYDROCHLORIDE 5 MG/1
15 TABLET ORAL 3 TIMES DAILY
Status: DISCONTINUED | OUTPATIENT
Start: 2017-07-07 | End: 2017-07-15 | Stop reason: HOSPADM

## 2017-07-07 RX ADMIN — SPIRONOLACTONE 50 MG: 50 TABLET, FILM COATED ORAL at 09:07

## 2017-07-07 RX ADMIN — OXYCODONE HYDROCHLORIDE 30 MG: 5 TABLET ORAL at 05:07

## 2017-07-07 RX ADMIN — HYDROMORPHONE HYDROCHLORIDE 1 MG: 1 INJECTION, SOLUTION INTRAMUSCULAR; INTRAVENOUS; SUBCUTANEOUS at 06:07

## 2017-07-07 RX ADMIN — RIFAXIMIN 550 MG: 550 TABLET ORAL at 09:07

## 2017-07-07 RX ADMIN — HYDROMORPHONE HYDROCHLORIDE 1 MG: 1 INJECTION, SOLUTION INTRAMUSCULAR; INTRAVENOUS; SUBCUTANEOUS at 02:07

## 2017-07-07 RX ADMIN — HYDROMORPHONE HYDROCHLORIDE 1 MG: 1 INJECTION, SOLUTION INTRAMUSCULAR; INTRAVENOUS; SUBCUTANEOUS at 11:07

## 2017-07-07 RX ADMIN — OXYCODONE HYDROCHLORIDE 30 MG: 5 TABLET ORAL at 09:07

## 2017-07-07 RX ADMIN — FUROSEMIDE 20 MG: 20 TABLET ORAL at 09:07

## 2017-07-07 RX ADMIN — MIDODRINE HYDROCHLORIDE 15 MG: 5 TABLET ORAL at 09:07

## 2017-07-07 RX ADMIN — OXYCODONE HYDROCHLORIDE 30 MG: 5 TABLET ORAL at 04:07

## 2017-07-07 RX ADMIN — OXYCODONE HYDROCHLORIDE 30 MG: 5 TABLET ORAL at 12:07

## 2017-07-07 RX ADMIN — PANTOPRAZOLE SODIUM 40 MG: 40 TABLET, DELAYED RELEASE ORAL at 09:07

## 2017-07-07 RX ADMIN — MIDODRINE HYDROCHLORIDE 10 MG: 5 TABLET ORAL at 03:07

## 2017-07-07 RX ADMIN — MIDODRINE HYDROCHLORIDE 10 MG: 5 TABLET ORAL at 05:07

## 2017-07-07 NOTE — PT/OT/SLP PROGRESS
Physical Therapy  Treatment    Tommy Hopper   MRN: [id number]   Admitting Diagnosis: Closed pelvic ring fracture    PT Received On: 07/07/17  PT Start Time: 1132     PT Stop Time: 1205    PT Total Time (min): 33 min       Billable Minutes:  Gait [id number] and Therapeutic Exercise 8    Treatment Type: Treatment  PT/PTA: PTA     PTA Visit Number: 1     cotx with OT  General Precautions: Standard, fall  Orthopedic Precautions: RLE weight bearing as tolerated   Braces: N/A      Subjective:  Communicated with RN prior to session.  Pt stated that he was willing to participate in PT session.    Pain/Comfort  Pain Rating 1:  (did not rate )  Pain Rating Post-Intervention 1:  (did not rate)    Objective:   Patient found with: telemetry, bed alarm (No lines attached)    Functional Mobility:  Bed Mobility:   Rolling/Turning to Left: Contact guard assistance  Scooting/Bridging: Contact Guard Assistance  Supine to Sit: Contact Guard Assistance    Transfers:  Sit <> Stand Assistance: Minimum/mod Assistance (Min A, pt presented with a posterior lean)  Sit <> Stand Assistive Device: Rolling Walker    Gait:   Gait Distance: 13ft Min A,RLE WBAT, x1 for chair follow. Pt presented a flexed posture with verbal cues for maintaining upright posture and proper step sequencing for taking bigger steps. Pt with decreased WB through R LE 2* to pain. Pt required 3 standing rest breaks  Assistance 1: totalA( Minimum assistance x 1 and SBA of another for chair follow)  Gait Assistive Device: Rolling walker  Gait Pattern: 2-point gait  Gait Deviation(s): decreased tomi, increased time in double stance, decreased velocity of limb motion, decreased step length, decreased stride length, decreased weight-shifting ability, forward lean      Therapeutic Activities and Exercises:  Pt performed BLE 1x10 reps each of ankle pumps, LAQ, hip flex/add/abd  Pt educated on walking more only with staffing assistance and as tolerated.  Donned 2nd gown  White  board updated     AM-PAC 6 CLICK MOBILITY  How much help from another person does this patient currently need?   1 = Unable, Total/Dependent Assistance  2 = A lot, Maximum/Moderate Assistance  3 = A little, Minimum/Contact Guard/Supervision  4 = None, Modified [place]/Independent    Turning over in bed (including adjusting bedclothes, sheets and blankets)?: 2  Sitting down on and standing up from a chair with arms (e.g., wheelchair, bedside commode, etc.): 2  Moving from lying on back to sitting on the side of the bed?: 2  Moving to and from a bed to a chair (including a wheelchair)?: 2  Need to walk in hospital room?: 2  Climbing 3-5 steps with a railing?: 2  Total Score: 12    AM-PAC Raw Score CMS G-Code Modifier Level of Impairment Assistance   6 % Total / Unable   7 - 9 CM 80 - 100% Maximal Assist   10 - 14 CL 60 - 80% Moderate Assist   15 - 19 CK 40 - 60% Moderate Assist   20 - 22 CJ 20 - 40% Minimal Assist   23 CI 1-20% SBA / CGA   24 CH 0% Independent/ Mod I     Patient left up in chair with call button in reach.    Assessment:  Tomym Hopper is a 57 y.o. male with a medical diagnosis of Closed pelvic ring fracture and presents with combined deficits as listed below. Pt return demonstrated fatigue/pain post gait. Pt needing Min verbal cues for maintaining upright posture and for stepping sequencing. Pt will continue to progress and benefit from PT POC.     Rehab identified problem list/impairments: Rehab identified problem list/impairments: weakness, impaired endurance, impaired sensation, gait instability, impaired functional mobilty, impaired self care skills, impaired balance, decreased lower extremity function, edema, pain, visual deficits, orthopedic precautions, decreased safety awareness    Rehab potential is good.    Activity tolerance: Good    Discharge recommendations: Discharge Facility/Level Of Care Needs: nursing facility, skilled     Barriers to discharge: Barriers to Discharge:  Inaccessible home environment, Decreased caregiver support    Equipment recommendations: Equipment Needed After Discharge: walker, rolling     GOALS:    Physical Therapy Goals        Problem: Physical Therapy Goal    Goal Priority Disciplines Outcome Goal Variances Interventions   Physical Therapy Goal     PT/OT, PT Ongoing (interventions implemented as appropriate)     Description:  PT goals until 7/12/17    1. Pt supine to sit with minimal assist-not met  2. Pt sit to supine with minimal assist-not met  3. Pt sit to stand with RW with WBAT R LE with CGA-not met  4. Pt to perform gait 30ft with RW with WBAT R LE with minimal assist.-not met  5. Pt to go up/down curb step with RW with WBAT R LE with minimal assist.-not met  6. Pt to transfer bed to/from bedside chair with WBAT R LE with minimal assist.-not met  7. Pt to perform B LE exs in sitting x 15 reps with handout.-not met                      PLAN:    Patient to be seen 5 x/week  to address the above listed problems via gait training, therapeutic exercises, therapeutic activities  Plan of Care expires: 08/06/17  Plan of Care reviewed with: patient, spouse         Zack Chavez, SPTA  07/07/2017  I certify that I was present in the room directing the student in service delivery and guiding them using my skilled judgment. As the co-signing therapist I have reviewed the students documentation and am responsible for the treatment, assessment, and plan.  Quirino Singh PTA 7/7/2017

## 2017-07-07 NOTE — PLAN OF CARE
Problem: Physical Therapy Goal  Goal: Physical Therapy Goal  PT goals until 7/12/17    1. Pt supine to sit with minimal assist- met  2. Pt sit to supine with minimal assist-not met  3. Pt sit to stand with RW with WBAT R LE with CGA-not met  4. Pt to perform gait 30ft with RW with WBAT R LE with minimal assist.-not met  5. Pt to go up/down curb step with RW with WBAT R LE with minimal assist.-not met  6. Pt to transfer bed to/from bedside chair with WBAT R LE with minimal assist.-not met  7. Pt to perform B LE exs in sitting x 15 reps with handout.-not met     Pt progressing towards goals. continue with PT POC.Goals remain appropriate.       Quirino Singh PTA  7/7/2017

## 2017-07-07 NOTE — PLAN OF CARE
SW met with pt to discuss d/c planning, his wife and daughter at his bedside. Recommendations of SNF placement was presented and they are all in agreement for SNF placement. List of SNF in network with [employer] provided. SW will follow up with choices Monday.

## 2017-07-07 NOTE — PLAN OF CARE
Pt. Discussed  with [facility] team. Pt. Is debilitated and could benefit from NH skilled. Pt. refusing SNF. I attempted to contact Evelia pt's wife @  [phone] to discuss SNF options. No answer. Will attempt to contact her again. BECK Hernandez RN/YUE    1046- Called additional number available for spouse in chart.  [phone]. No answer. Message left to return my call.     110-Spoke with pt's daughter Tee . Discussed pt's current condition and benefit of pt. going to skilled upon discharge. Her and her mother will be at the hospital this afternoon and discuss with pt. and provide choices for NH SNF. Dr. Castro notified of above.

## 2017-07-07 NOTE — PLAN OF CARE
Problem: Patient Care Overview  Goal: Plan of Care Review  Outcome: Ongoing (interventions implemented as appropriate)  Pt disoriented to time. Pt tolerating regular/low sodium liquid diet with no complaints of discomfort or nausea. Pain managed with PRN pain meds. Pt on tele, NSR, all other VSS on RA. Pt ambulates in bed with assist x2. Urinal at bedside. Bed alarm set. Call light in reach and bed in lowest position. Pt remains free of falls and injury. No acute events this shift. Will continue to monitor.

## 2017-07-07 NOTE — PLAN OF CARE
Problem: Patient Care Overview  Goal: Plan of Care Review  Outcome: Ongoing (interventions implemented as appropriate)  POC reviewed with patient who verbalized understanding. AAOX3. Disoriented to time. Left IJ intact and patent. Pt tolerating diet well. Pt had one bm today. Pt on tele running NSR with HR in 60s-70s. Neuro checks Q4 and stable. Pt voiding via  Urinal with adequate output. Pt worked well with therapy today and sat in chair. Pain being controlled with PRN pain medication. Pt remained free from falls throughout the shift. VSS. Will continue to monitor.

## 2017-07-08 LAB
ALBUMIN SERPL BCP-MCNC: 2.8 G/DL
ALP SERPL-CCNC: 133 U/L
ALT SERPL W/O P-5'-P-CCNC: 26 U/L
ANION GAP SERPL CALC-SCNC: 7 MMOL/L
AST SERPL-CCNC: 67 U/L
BASOPHILS # BLD AUTO: 0.06 K/UL
BASOPHILS NFR BLD: 1 %
BILIRUB SERPL-MCNC: 5.7 MG/DL
BUN SERPL-MCNC: 23 MG/DL
CALCIUM SERPL-MCNC: 7.7 MG/DL
CHLORIDE SERPL-SCNC: 101 MMOL/L
CO2 SERPL-SCNC: 22 MMOL/L
CREAT SERPL-MCNC: 1.1 MG/DL
DIFFERENTIAL METHOD: ABNORMAL
EOSINOPHIL # BLD AUTO: 0.2 K/UL
EOSINOPHIL NFR BLD: 3.7 %
ERYTHROCYTE [DISTWIDTH] IN BLOOD BY AUTOMATED COUNT: 21.9 %
EST. GFR  (AFRICAN AMERICAN): >60 ML/MIN/1.73 M^2
EST. GFR  (NON AFRICAN AMERICAN): >60 ML/MIN/1.73 M^2
GLUCOSE SERPL-MCNC: 99 MG/DL
HCT VFR BLD AUTO: 24.1 %
HGB BLD-MCNC: 8.4 G/DL
INR PPP: 1.7
LYMPHOCYTES # BLD AUTO: 1.8 K/UL
LYMPHOCYTES NFR BLD: 31.3 %
MAGNESIUM SERPL-MCNC: 0.9 MG/DL
MCH RBC QN AUTO: 32.2 PG
MCHC RBC AUTO-ENTMCNC: 34.9 %
MCV RBC AUTO: 92 FL
MONOCYTES # BLD AUTO: 0.5 K/UL
MONOCYTES NFR BLD: 9 %
NEUTROPHILS # BLD AUTO: 3.2 K/UL
NEUTROPHILS NFR BLD: 54.8 %
PHOSPHATE SERPL-MCNC: 3.4 MG/DL
PLATELET # BLD AUTO: 69 K/UL
PMV BLD AUTO: 9.1 FL
POTASSIUM SERPL-SCNC: 3.9 MMOL/L
PROT SERPL-MCNC: 5.2 G/DL
PROTHROMBIN TIME: 17.2 SEC
RBC # BLD AUTO: 2.61 M/UL
SODIUM SERPL-SCNC: 130 MMOL/L
TB INDURATION 48 - 72 HR READ: 0 MM
WBC # BLD AUTO: 5.87 K/UL

## 2017-07-08 PROCEDURE — 25000003 PHARM REV CODE 250: Performed by: HOSPITALIST

## 2017-07-08 PROCEDURE — 80053 COMPREHEN METABOLIC PANEL: CPT

## 2017-07-08 PROCEDURE — 36415 COLL VENOUS BLD VENIPUNCTURE: CPT

## 2017-07-08 PROCEDURE — 85610 PROTHROMBIN TIME: CPT

## 2017-07-08 PROCEDURE — 63600175 PHARM REV CODE 636 W HCPCS: Performed by: HOSPITALIST

## 2017-07-08 PROCEDURE — 84100 ASSAY OF PHOSPHORUS: CPT

## 2017-07-08 PROCEDURE — 83735 ASSAY OF MAGNESIUM: CPT

## 2017-07-08 PROCEDURE — 25000003 PHARM REV CODE 250: Performed by: PHYSICIAN ASSISTANT

## 2017-07-08 PROCEDURE — 85025 COMPLETE CBC W/AUTO DIFF WBC: CPT

## 2017-07-08 PROCEDURE — 20600001 HC STEP DOWN PRIVATE ROOM

## 2017-07-08 PROCEDURE — 99233 SBSQ HOSP IP/OBS HIGH 50: CPT | Mod: ,,, | Performed by: HOSPITALIST

## 2017-07-08 RX ORDER — MAGNESIUM SULFATE HEPTAHYDRATE 40 MG/ML
2 INJECTION, SOLUTION INTRAVENOUS ONCE
Status: COMPLETED | OUTPATIENT
Start: 2017-07-08 | End: 2017-07-08

## 2017-07-08 RX ORDER — HYDROMORPHONE HYDROCHLORIDE 2 MG/ML
2 INJECTION, SOLUTION INTRAMUSCULAR; INTRAVENOUS; SUBCUTANEOUS EVERY 4 HOURS PRN
Status: DISCONTINUED | OUTPATIENT
Start: 2017-07-08 | End: 2017-07-15 | Stop reason: HOSPADM

## 2017-07-08 RX ORDER — HYDROMORPHONE HYDROCHLORIDE 1 MG/ML
1 INJECTION, SOLUTION INTRAMUSCULAR; INTRAVENOUS; SUBCUTANEOUS ONCE
Status: COMPLETED | OUTPATIENT
Start: 2017-07-08 | End: 2017-07-08

## 2017-07-08 RX ORDER — LANOLIN ALCOHOL/MO/W.PET/CERES
800 CREAM (GRAM) TOPICAL EVERY 4 HOURS
Status: DISCONTINUED | OUTPATIENT
Start: 2017-07-08 | End: 2017-07-08

## 2017-07-08 RX ADMIN — MIDODRINE HYDROCHLORIDE 15 MG: 5 TABLET ORAL at 09:07

## 2017-07-08 RX ADMIN — SPIRONOLACTONE 50 MG: 50 TABLET, FILM COATED ORAL at 08:07

## 2017-07-08 RX ADMIN — LACTULOSE 30 G: 20 SOLUTION ORAL at 09:07

## 2017-07-08 RX ADMIN — MIDODRINE HYDROCHLORIDE 15 MG: 5 TABLET ORAL at 05:07

## 2017-07-08 RX ADMIN — OXYCODONE HYDROCHLORIDE 30 MG: 5 TABLET ORAL at 05:07

## 2017-07-08 RX ADMIN — HYDROMORPHONE HYDROCHLORIDE 2 MG: 2 INJECTION INTRAMUSCULAR; INTRAVENOUS; SUBCUTANEOUS at 06:07

## 2017-07-08 RX ADMIN — PANTOPRAZOLE SODIUM 40 MG: 40 TABLET, DELAYED RELEASE ORAL at 08:07

## 2017-07-08 RX ADMIN — FUROSEMIDE 20 MG: 20 TABLET ORAL at 08:07

## 2017-07-08 RX ADMIN — OXYCODONE HYDROCHLORIDE 30 MG: 5 TABLET ORAL at 03:07

## 2017-07-08 RX ADMIN — MIDODRINE HYDROCHLORIDE 15 MG: 5 TABLET ORAL at 03:07

## 2017-07-08 RX ADMIN — MAGNESIUM OXIDE TAB 400 MG (241.3 MG ELEMENTAL MG) 800 MG: 400 (241.3 MG) TAB at 08:07

## 2017-07-08 RX ADMIN — HYDROMORPHONE HYDROCHLORIDE 1 MG: 1 INJECTION, SOLUTION INTRAMUSCULAR; INTRAVENOUS; SUBCUTANEOUS at 09:07

## 2017-07-08 RX ADMIN — HYDROMORPHONE HYDROCHLORIDE 2 MG: 2 INJECTION INTRAMUSCULAR; INTRAVENOUS; SUBCUTANEOUS at 11:07

## 2017-07-08 RX ADMIN — LACTULOSE 30 G: 20 SOLUTION ORAL at 03:07

## 2017-07-08 RX ADMIN — OXYCODONE HYDROCHLORIDE 30 MG: 5 TABLET ORAL at 09:07

## 2017-07-08 RX ADMIN — HYDROMORPHONE HYDROCHLORIDE 1 MG: 1 INJECTION, SOLUTION INTRAMUSCULAR; INTRAVENOUS; SUBCUTANEOUS at 12:07

## 2017-07-08 RX ADMIN — RIFAXIMIN 550 MG: 550 TABLET ORAL at 08:07

## 2017-07-08 RX ADMIN — RIFAXIMIN 550 MG: 550 TABLET ORAL at 09:07

## 2017-07-08 RX ADMIN — MAGNESIUM SULFATE IN WATER 2 G: 40 INJECTION, SOLUTION INTRAVENOUS at 12:07

## 2017-07-08 RX ADMIN — MAGNESIUM OXIDE TAB 400 MG (241.3 MG ELEMENTAL MG) 800 MG: 400 (241.3 MG) TAB at 11:07

## 2017-07-08 NOTE — PLAN OF CARE
Problem: Fall Risk (Adult)  Intervention: Reduce Risk/Promote Restraint Free Environment   07/08/17 1554   Safety Interventions   Safety Precautions emergency equipment at bedside   Safety Interventions   Environmental Safety Modification assistive device/personal items within reach;clutter free environment maintained;room organization consistent     Intervention: Patient Rounds   07/08/17 1536   Safety Interventions   Patient Rounds bed in low position;bed wheels locked;call light in reach;clutter free environment maintained;ID band on     Intervention: Safety Promotion/Fall Prevention   07/08/17 1554   Safety Interventions   Safety Promotion/Fall Prevention assistive device/personal item within reach;commode/urinal/bedpan at bedside;Fall Risk reviewed with patient/family;Fall Risk signage in place;high risk medications identified;medications reviewed;muscle strengthening facilitated;nonskid shoes/socks when out of bed;side rails raised x 3     Intervention: Safety Precautions   07/08/17 1554   Safety Interventions   Safety Precautions emergency equipment at bedside       Goal: Identify Related Risk Factors and Signs and Symptoms  Related risk factors and signs and symptoms are identified upon initiation of Human Response Clinical Practice Guideline (CPG)    07/08/17 1554   Fall Risk   Related Risk Factors (Fall Risk) confusion/agitation;depression/anxiety;inadequate lighting;objects hard to reach;sensory deficits;environment unfamiliar;bladder function altered     Goal: Absence of Falls  Patient will demonstrate the desired outcomes by discharge/transition of care.   Outcome: Ongoing (interventions implemented as appropriate)   07/08/17 1554   Fall Risk (Adult)   Absence of Falls making progress toward outcome       Problem: Patient Care Overview  Goal: Plan of Care Review  Outcome: Ongoing (interventions implemented as appropriate)   07/08/17 1554   Coping/Psychosocial   Plan Of Care Reviewed With Patient;  Safety: call light in reach, patient oriented to room & instructed how to notify nurse if assistance is needed, current questions/concerns addressed, bed in lowest position with wheels locked & side rails up X 3. Pt and family were educated regarding fall precaution and taking appropriate action. Activity: is up with 2 assist  Neurological: Oriented x4, very lethargic and weak.  Neuro check q4hrs. Respiratory: On RA, o2 sat WNL.  Rt tx prn Cardiac: BP hypotensive, pt asymptomatic. HR stable. Afebrile this shift. Intake/Output: BM today, but no problem with urination. Intake adequate. Take meds whole. Pain: controlled with prn medication Skin: bruised and jaundice. Plan: Patient refused morning lactulose from the night shift nurse. The patient and the family was educated on importance of taking the lactulose. Plan of care reviewed with the patient and the wife. All questions and concerns were addressed. Poss.d/c tomorrow.          Problem: Infection, Risk/Actual (Adult)  Intervention: Manage Suspected/Actual Infection   07/08/17 1554   Safety Interventions   Isolation Precautions environmental surveillance   Infection Management aseptic technique maintained     Intervention: Prevent Infection/Maximize Resistance   07/08/17 1554   Respiratory Interventions   Airway/Ventilation Management airway patency maintained   Hygiene Care   Bathing/Skin Care incontinence care   Nutrition Interventions   Oral Nutrition Promotion safe use of adaptive equipment encouraged;rest periods promoted   Pain/Comfort/Sleep Interventions   Sleep/Rest Enhancement consistent schedule promoted;family presence promoted;noise level reduced;regular sleep/rest pattern promoted;relaxation techniques promoted;therapeutic touch utilized       Goal: Identify Related Risk Factors and Signs and Symptoms  Related risk factors and signs and symptoms are identified upon initiation of Human Response Clinical Practice Guideline (CPG)   Outcome: Ongoing  (interventions implemented as appropriate)   07/08/17 1554   Infection, Risk/Actual   Related Risk Factors (Infection, Risk/Actual) chronic illness/condition;prolonged hospitalization;poor personal hygiene;medication effects;sleep disturbance       Problem: Pressure Ulcer Risk (Airc Scale) (Adult,Obstetrics,Pediatric)  Intervention: Promote/Optimize Nutrition   07/08/17 1554   Nutrition Interventions   Oral Nutrition Promotion safe use of adaptive equipment encouraged;rest periods promoted     Intervention: Maintain Head of Bed Elevation Less Than 30 Degrees as Tolerated   07/08/17 1554   Positioning   Head of Bed (HOB) HOB at 30-45 degrees     Intervention: Prevent/Minimize Sheer/Friction Injuries   07/08/17 1554   Positioning   Positioning/Transfer Devices wedge   Skin Interventions   Pressure Reduction Devices heel offloading device utilized   Pressure Reduction Techniques frequent weight shift encouraged;heels elevated off bed       Goal: Identify Related Risk Factors and Signs and Symptoms  Related risk factors and signs and symptoms are identified upon initiation of Human Response Clinical Practice Guideline (CPG)    07/08/17 1554   Pressure Ulcer Risk (Aric Scale)   Related Risk Factors (Pressure Ulcer Risk (Aric Scale)) critical care admission;fluid intake inadequate;hospitalization prolonged;infection     Goal: Skin Integrity  Patient will demonstrate the desired outcomes by discharge/transition of care.   Outcome: Ongoing (interventions implemented as appropriate)   07/08/17 1554   Pressure Ulcer Risk (Aric Scale) (Adult,Obstetrics,Pediatric)   Skin Integrity making progress toward outcome

## 2017-07-08 NOTE — PLAN OF CARE
07/08/2017      Tommy Hopper  [address] [address] [facility] [address]          Hospital Medicine Dept.  [facility] [address] [place] [address]  [phone] after hours  [phone] fax Tommy Hopper has been hospitalized at the [facility] since 7/5/2017.  Please excuse his wife Evelia Hopper from jury duty on august 15th as she will need to be providing care for her  on a daily basis for the next two months.     Please contact me if you have any questions.                  __________________________  Dung Castro MD  07/08/2017

## 2017-07-08 NOTE — PLAN OF CARE
Problem: Patient Care Overview  Goal: Plan of Care Review  Outcome: Ongoing (interventions implemented as appropriate)  Pt disoriented to time. Pt tolerating regular/low sodium/1200mL restrict diet with no complaints of discomfort or nausea. Pain managed with PRN pain meds. Pt on tele, NSR, all other VSS on RA. Pt ambulates in bed with assist x1. Urinal at bedside. Wife at bedside. Call light in reach and bed in lowest position. Pt remains free of falls and injury. No acute events this shift. Will continue to monitor.

## 2017-07-08 NOTE — PLAN OF CARE
Problem: Occupational Therapy Goal  Goal: Occupational Therapy Goal  Goals to be met by: 7/21/17     Patient will increase functional independence with ADLs by performing:    Feeding with Set-up Assistance.  UE Dressing with Set-up Assistance.  Grooming while EOB with Stand-by Assistance.  Sitting at edge of bed x5 minutes with Supervision while performing functional tasks   Stand pivot transfers with Contact Guard Assistance.  Upper extremity exercise program x10-12 reps per handout and visual demonstration, with assistance as needed.    Outcome: Ongoing (interventions implemented as appropriate)  Initial eval completed   POC implemented, goals set

## 2017-07-08 NOTE — SUBJECTIVE & OBJECTIVE
Interval History: patient states he in pain, but working with PT/OT; has agreed to go to SNF, likely monday    Review of Systems   Constitutional: Negative for activity change and appetite change.   HENT: Negative for drooling and facial swelling.    Eyes: Negative for discharge and itching.   Respiratory: Negative for cough, shortness of breath and wheezing.    Cardiovascular: Negative for chest pain and palpitations.   Gastrointestinal: Negative for abdominal pain and nausea.   Genitourinary: Negative for difficulty urinating and dysuria.   Skin: Negative for color change and pallor.   Neurological: Positive for weakness. Negative for dizziness and numbness.   Psychiatric/Behavioral: Negative for behavioral problems and confusion.     Objective:     Vital Signs (Most Recent):  Temp: 98.4 °F (36.9 °C) (07/07/17 2007)  Pulse: 79 (07/07/17 2007)  Resp: 18 (07/07/17 2007)  BP: (!) 94/58 (07/07/17 2007)  SpO2: 95 % (07/07/17 2007) Vital Signs (24h Range):  Temp:  [97.8 °F (36.6 °C)-99 °F (37.2 °C)] 98.4 °F (36.9 °C)  Pulse:  [63-79] 79  Resp:  [16-18] 18  SpO2:  [93 %-95 %] 95 %  BP: ()/(57-63) 94/58     Weight: 74.8 kg (165 lb)  Body mass index is 24.37 kg/m².    Intake/Output Summary (Last 24 hours) at 07/07/17 2041  Last data filed at 07/07/17 1700   Gross per 24 hour   Intake             1200 ml   Output             1300 ml   Net             -100 ml      Physical Exam   Constitutional: He is oriented to person, place, and time. He appears well-developed and well-nourished.   HENT:   Head: Normocephalic and atraumatic.   Eyes: Conjunctivae are normal. Pupils are equal, round, and reactive to light.   Neck: Normal range of motion. No thyromegaly present.   Cardiovascular: Normal rate, regular rhythm and normal heart sounds.    Pulmonary/Chest: Effort normal and breath sounds normal.   Abdominal: Soft. He exhibits no distension. There is no tenderness.   Neurological: He is alert and oriented to person, place,  and time. Coordination normal.   Skin: No rash noted. No erythema.       Significant Labs:   CBC:   Recent Labs  Lab 07/05/17 2148 07/05/17 2150 07/06/17 0828 07/07/17  0500   WBC 4.01  --  5.72 5.10   HGB 5.7*  --  8.3* 8.0*   HCT 16.3* 20* 23.5* 22.7*   PLT 58*  --  64* 58*     CMP:   Recent Labs  Lab 07/05/17 2148 07/06/17 0828 07/07/17  0500   * 136 129*   K 4.5 4.3 3.7    104 99   CO2 24 21* 22*   * 111* 103   BUN 34* 33* 27*   CREATININE 1.9* 1.5* 1.2   CALCIUM 7.9* 8.1* 7.7*   PROT 5.0* 5.1* 4.9*   ALBUMIN 2.9* 2.9* 2.7*   BILITOT 6.1* 12.1* 8.0*   ALKPHOS 131 130 118   AST 57* 59* 56*   ALT 24 22 22   ANIONGAP 8 11 8   EGFRNONAA 38.0* 50.6* >60.0     Coagulation:   Recent Labs  Lab 07/05/17 2148   INR 1.8*       Significant Imaging: I have reviewed all pertinent imaging results/findings within the past 24 hours.

## 2017-07-08 NOTE — SUBJECTIVE & OBJECTIVE
Interval History: patient has agreed to snf; still having pain; narcotics adjusted; wife at the bedside;     Review of Systems   Constitutional: Negative for activity change and appetite change.   HENT: Negative for drooling and facial swelling.    Eyes: Negative for discharge and itching.   Respiratory: Negative for cough, shortness of breath and wheezing.    Cardiovascular: Negative for chest pain and palpitations.   Gastrointestinal: Negative for abdominal pain and nausea.   Genitourinary: Negative for difficulty urinating and dysuria.   Skin: Negative for color change and pallor.   Neurological: Positive for weakness. Negative for dizziness and numbness.   Psychiatric/Behavioral: Negative for behavioral problems and confusion.     Objective:     Vital Signs (Most Recent):  Temp: 98.4 °F (36.9 °C) (07/08/17 1553)  Pulse: 73 (07/08/17 1553)  Resp: 16 (07/08/17 1553)  BP: (!) 93/53 (07/08/17 1553)  SpO2: 95 % (07/08/17 1553) Vital Signs (24h Range):  Temp:  [97.6 °F (36.4 °C)-98.9 °F (37.2 °C)] 98.4 °F (36.9 °C)  Pulse:  [65-80] 73  Resp:  [16-18] 16  SpO2:  [92 %-97 %] 95 %  BP: ()/(53-65) 93/53     Weight: 74.8 kg (165 lb)  Body mass index is 24.37 kg/m².    Intake/Output Summary (Last 24 hours) at 07/08/17 1838  Last data filed at 07/08/17 1800   Gross per 24 hour   Intake              520 ml   Output             1375 ml   Net             -855 ml      Physical Exam   Constitutional: He is oriented to person, place, and time. He appears well-developed and well-nourished.   HENT:   Head: Normocephalic and atraumatic.   Eyes: Conjunctivae are normal. Pupils are equal, round, and reactive to light.   Neck: Normal range of motion. No thyromegaly present.   Cardiovascular: Normal rate, regular rhythm and normal heart sounds.    Pulmonary/Chest: Effort normal and breath sounds normal.   Abdominal: Soft. He exhibits no distension. There is no tenderness.   Neurological: He is alert and oriented to person, place,  and time. Coordination normal.   Skin: No rash noted. No erythema.       Significant Labs:   CBC:     Recent Labs  Lab 07/07/17  0500 07/08/17  0505   WBC 5.10 5.87   HGB 8.0* 8.4*   HCT 22.7* 24.1*   PLT 58* 69*     CMP:     Recent Labs  Lab 07/07/17  0500 07/08/17  0505   * 130*   K 3.7 3.9   CL 99 101   CO2 22* 22*    99   BUN 27* 23*   CREATININE 1.2 1.1   CALCIUM 7.7* 7.7*   PROT 4.9* 5.2*   ALBUMIN 2.7* 2.8*   BILITOT 8.0* 5.7*   ALKPHOS 118 133   AST 56* 67*   ALT 22 26   ANIONGAP 8 7*   EGFRNONAA >60.0 >60.0     Coagulation:     Recent Labs  Lab 07/08/17  0505   INR 1.7*       Significant Imaging: I have reviewed all pertinent imaging results/findings within the past 24 hours.
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
## 2017-07-08 NOTE — PROGRESS NOTES
[facility] Hospital Medicine  Progress Note    Patient Name: Tommy Hopper  MRN: [id number]  Patient Class: IP- Inpatient   Admission Date: 7/5/2017  Length of Stay: 3 days  Attending Physician: Dung Castro MD  Primary Care Provider: [facility] Medicine Team: [facility] Dung Castro MD    Subjective:     Principal Problem:Closed pelvic ring fracture    HPI:  57M presents as transfer from [facility] ED where he presented for pain and inability to ambulate after a fall one day previously.  History very limited due to patient's garbled speech and lack of documentation from outside ED as they were taken off somewhere by another physician.  He reports losing his balance at home and falling on his right side.  He endorses severe persistent pain which has not been helped by any pain medication he was given so far.  While in the ambulance on his way here from [facility] his speech was noted to be more slurred and he was hypotensive so the ambulance diverted to the ED here.    Hospital Course:  No notes on file    Interval History: patient has agreed to snf; still having pain; narcotics adjusted; wife at the bedside;     Review of Systems   Constitutional: Negative for activity change and appetite change.   HENT: Negative for drooling and facial swelling.    Eyes: Negative for discharge and itching.   Respiratory: Negative for cough, shortness of breath and wheezing.    Cardiovascular: Negative for chest pain and palpitations.   Gastrointestinal: Negative for abdominal pain and nausea.   Genitourinary: Negative for difficulty urinating and dysuria.   Skin: Negative for color change and pallor.   Neurological: Positive for weakness. Negative for dizziness and numbness.   Psychiatric/Behavioral: Negative for behavioral problems and confusion.     Objective:     Vital Signs (Most Recent):  Temp: 98.4 °F (36.9 °C) (07/08/17 1553)  Pulse: 73 (07/08/17 1553)  Resp: 16 (07/08/17  1553)  BP: (!) 93/53 (07/08/17 1553)  SpO2: 95 % (07/08/17 1553) Vital Signs (24h Range):  Temp:  [97.6 °F (36.4 °C)-98.9 °F (37.2 °C)] 98.4 °F (36.9 °C)  Pulse:  [65-80] 73  Resp:  [16-18] 16  SpO2:  [92 %-97 %] 95 %  BP: ()/(53-65) 93/53     Weight: 74.8 kg (165 lb)  Body mass index is 24.37 kg/m².    Intake/Output Summary (Last 24 hours) at 07/08/17 1838  Last data filed at 07/08/17 1800   Gross per 24 hour   Intake              520 ml   Output             1375 ml   Net             -855 ml      Physical Exam   Constitutional: He is oriented to person, place, and time. He appears well-developed and well-nourished.   HENT:   Head: Normocephalic and atraumatic.   Eyes: Conjunctivae are normal. Pupils are equal, round, and reactive to light.   Neck: Normal range of motion. No thyromegaly present.   Cardiovascular: Normal rate, regular rhythm and normal heart sounds.    Pulmonary/Chest: Effort normal and breath sounds normal.   Abdominal: Soft. He exhibits no distension. There is no tenderness.   Neurological: He is alert and oriented to person, place, and time. Coordination normal.   Skin: No rash noted. No erythema.       Significant Labs:   CBC:     Recent Labs  Lab 07/07/17  0500 07/08/17  0505   WBC 5.10 5.87   HGB 8.0* 8.4*   HCT 22.7* 24.1*   PLT 58* 69*     CMP:     Recent Labs  Lab 07/07/17  0500 07/08/17  0505   * 130*   K 3.7 3.9   CL 99 101   CO2 22* 22*    99   BUN 27* 23*   CREATININE 1.2 1.1   CALCIUM 7.7* 7.7*   PROT 4.9* 5.2*   ALBUMIN 2.7* 2.8*   BILITOT 8.0* 5.7*   ALKPHOS 118 133   AST 56* 67*   ALT 22 26   ANIONGAP 8 7*   EGFRNONAA >60.0 >60.0     Coagulation:     Recent Labs  Lab 07/08/17  0505   INR 1.7*       Significant Imaging: I have reviewed all pertinent imaging results/findings within the past 24 hours.    Assessment/Plan:      GYPSY (acute kidney injury)    proabably related to recent overdiuresis; monitor volume status closely.          Hypotension    Appears to be  chronic.  Continue midorine; hold IVF unless absolutely necessary.          Hepatic encephalopathy    Continue rifaximin bid; add lactulose 25mg q8.  If patient unable to afford rifaximin can substitute zinc sulfate tablet for urate-lowering therapy          Alcoholic cirrhosis of liver with ascites    Decompensated.  On decreased dose of lasix/aldactone given recent GYPSY.            HCC (hepatocellular carcinoma)    Determined to not be good candidate for treatment.  Continue conservative management.          * Closed pelvic ring fracture    Likely nonoperative, as his decompensated cirrhosis portends perioperative mortality far exceeding potential benefitl.Orthopedic surgery to re-evaluate today and determine WB status for PT.              Discharge Disposition- SNF, tuesday    VTE Risk Mitigation         Ordered     Place sequential compression device  Until discontinued      07/06/17 0409     Place OLYA hose  Until discontinued      07/06/17 0409     High Risk of VTE  Once      07/06/17 0409     Place OLYA hose  Until discontinued      07/05/17 2330     Place sequential compression device  Until discontinued      07/05/17 2330          Dung Castro MD  Department of Hospital Medicine   [facility]

## 2017-07-08 NOTE — PROGRESS NOTES
[facility] Hospital Medicine  Progress Note    Patient Name: Tommy Hopper  MRN: [id number]  Patient Class: IP- Inpatient   Admission Date: 7/5/2017  Length of Stay: 2 days  Attending Physician: Dung Castro MD  Primary Care Provider: [facility] Medicine Team: [facility] Dung Castro MD    Subjective:     Principal Problem:Closed pelvic ring fracture    HPI:  57M presents as transfer from [facility] ED where he presented for pain and inability to ambulate after a fall one day previously.  History very limited due to patient's garbled speech and lack of documentation from outside ED as they were taken off somewhere by another physician.  He reports losing his balance at home and falling on his right side.  He endorses severe persistent pain which has not been helped by any pain medication he was given so far.  While in the ambulance on his way here from [facility] his speech was noted to be more slurred and he was hypotensive so the ambulance diverted to the ED here.    Hospital Course:  No notes on file    Interval History: patient states he in pain, but working with PT/OT; has agreed to go to SNF, likely monday    Review of Systems   Constitutional: Negative for activity change and appetite change.   HENT: Negative for drooling and facial swelling.    Eyes: Negative for discharge and itching.   Respiratory: Negative for cough, shortness of breath and wheezing.    Cardiovascular: Negative for chest pain and palpitations.   Gastrointestinal: Negative for abdominal pain and nausea.   Genitourinary: Negative for difficulty urinating and dysuria.   Skin: Negative for color change and pallor.   Neurological: Positive for weakness. Negative for dizziness and numbness.   Psychiatric/Behavioral: Negative for behavioral problems and confusion.     Objective:     Vital Signs (Most Recent):  Temp: 98.4 °F (36.9 °C) (07/07/17 2007)  Pulse: 79 (07/07/17 2007)  Resp: 18  (07/07/17 2007)  BP: (!) 94/58 (07/07/17 2007)  SpO2: 95 % (07/07/17 2007) Vital Signs (24h Range):  Temp:  [97.8 °F (36.6 °C)-99 °F (37.2 °C)] 98.4 °F (36.9 °C)  Pulse:  [63-79] 79  Resp:  [16-18] 18  SpO2:  [93 %-95 %] 95 %  BP: ()/(57-63) 94/58     Weight: 74.8 kg (165 lb)  Body mass index is 24.37 kg/m².    Intake/Output Summary (Last 24 hours) at 07/07/17 2041  Last data filed at 07/07/17 1700   Gross per 24 hour   Intake             1200 ml   Output             1300 ml   Net             -100 ml      Physical Exam   Constitutional: He is oriented to person, place, and time. He appears well-developed and well-nourished.   HENT:   Head: Normocephalic and atraumatic.   Eyes: Conjunctivae are normal. Pupils are equal, round, and reactive to light.   Neck: Normal range of motion. No thyromegaly present.   Cardiovascular: Normal rate, regular rhythm and normal heart sounds.    Pulmonary/Chest: Effort normal and breath sounds normal.   Abdominal: Soft. He exhibits no distension. There is no tenderness.   Neurological: He is alert and oriented to person, place, and time. Coordination normal.   Skin: No rash noted. No erythema.       Significant Labs:   CBC:   Recent Labs  Lab 07/05/17 2148 07/05/17  2150 07/06/17  0828 07/07/17  0500   WBC 4.01  --  5.72 5.10   HGB 5.7*  --  8.3* 8.0*   HCT 16.3* 20* 23.5* 22.7*   PLT 58*  --  64* 58*     CMP:   Recent Labs  Lab 07/05/17 2148 07/06/17  0828 07/07/17  0500   * 136 129*   K 4.5 4.3 3.7    104 99   CO2 24 21* 22*   * 111* 103   BUN 34* 33* 27*   CREATININE 1.9* 1.5* 1.2   CALCIUM 7.9* 8.1* 7.7*   PROT 5.0* 5.1* 4.9*   ALBUMIN 2.9* 2.9* 2.7*   BILITOT 6.1* 12.1* 8.0*   ALKPHOS 131 130 118   AST 57* 59* 56*   ALT 24 22 22   ANIONGAP 8 11 8   EGFRNONAA 38.0* 50.6* >60.0     Coagulation:   Recent Labs  Lab 07/05/17  2148   INR 1.8*       Significant Imaging: I have reviewed all pertinent imaging results/findings within the past 24  hours.    Assessment/Plan:      GYPSY (acute kidney injury)     proabably related to recent overdiuresis; monitor volume status closely.   - improved        Hypotension     Appears to be chronic. Increase midodrine to 15 mg PO TID          Hepatic encephalopathy     Continue rifaximin bid; add lactulose 25mg q8.  If patient unable to afford rifaximin can substitute zinc sulfate tablet for urate-lowering therapy          Alcoholic cirrhosis of liver with ascites     Decompensated.  On decreased dose of lasix/aldactone given recent GYPSY.            HCC (hepatocellular carcinoma)     Determined to not be good candidate for treatment.  Continue conservative management.          * Closed pelvic ring fracture     Likely nonoperative, as his decompensated cirrhosis portends perioperative mortality far exceeding potential benefitl.  - patient has agreed for SNF, likely Monday; PPD and CXR            # Acute blood loss anemia  # Thrombocytopenia  - improved after 2 units of PRBC  - monitor     # Hypoalbuminemia due to moderate protein vero malnutrition   - PAB, boost    Discharge Disposition- SNF on monday          VTE Risk Mitigation         Ordered     Place sequential compression device  Until discontinued      07/06/17 0409     Place OLYA hose  Until discontinued      07/06/17 0409     High Risk of VTE  Once      07/06/17 0409     Place OLYA hose  Until discontinued      07/05/17 2330     Place sequential compression device  Until discontinued      07/05/17 2330          Dung Castro MD  Department of Hospital Medicine   [facility]

## 2017-07-09 LAB
ALBUMIN SERPL BCP-MCNC: 2.9 G/DL
ALP SERPL-CCNC: 139 U/L
ALT SERPL W/O P-5'-P-CCNC: 26 U/L
ANION GAP SERPL CALC-SCNC: 10 MMOL/L
AST SERPL-CCNC: 76 U/L
BASOPHILS # BLD AUTO: 0.03 K/UL
BASOPHILS NFR BLD: 0.4 %
BILIRUB SERPL-MCNC: 6.2 MG/DL
BUN SERPL-MCNC: 22 MG/DL
CALCIUM SERPL-MCNC: 7.9 MG/DL
CHLORIDE SERPL-SCNC: 100 MMOL/L
CO2 SERPL-SCNC: 19 MMOL/L
CREAT SERPL-MCNC: 1.1 MG/DL
DIFFERENTIAL METHOD: ABNORMAL
EOSINOPHIL # BLD AUTO: 0.3 K/UL
EOSINOPHIL NFR BLD: 3.6 %
ERYTHROCYTE [DISTWIDTH] IN BLOOD BY AUTOMATED COUNT: 21.7 %
EST. GFR  (AFRICAN AMERICAN): >60 ML/MIN/1.73 M^2
EST. GFR  (NON AFRICAN AMERICAN): >60 ML/MIN/1.73 M^2
GLUCOSE SERPL-MCNC: 136 MG/DL
HCT VFR BLD AUTO: 23.6 %
HGB BLD-MCNC: 8.3 G/DL
INR PPP: 1.5
LYMPHOCYTES # BLD AUTO: 1.5 K/UL
LYMPHOCYTES NFR BLD: 21.5 %
MAGNESIUM SERPL-MCNC: 1.5 MG/DL
MCH RBC QN AUTO: 32.7 PG
MCHC RBC AUTO-ENTMCNC: 35.2 %
MCV RBC AUTO: 93 FL
MONOCYTES # BLD AUTO: 0.7 K/UL
MONOCYTES NFR BLD: 9.9 %
NEUTROPHILS # BLD AUTO: 4.4 K/UL
NEUTROPHILS NFR BLD: 64.3 %
PHOSPHATE SERPL-MCNC: 3.1 MG/DL
PLATELET # BLD AUTO: 76 K/UL
PMV BLD AUTO: 8.9 FL
POTASSIUM SERPL-SCNC: 4 MMOL/L
PROT SERPL-MCNC: 5.5 G/DL
PROTHROMBIN TIME: 15.2 SEC
RBC # BLD AUTO: 2.54 M/UL
SODIUM SERPL-SCNC: 129 MMOL/L
WBC # BLD AUTO: 6.85 K/UL

## 2017-07-09 PROCEDURE — 97116 GAIT TRAINING THERAPY: CPT

## 2017-07-09 PROCEDURE — 25000003 PHARM REV CODE 250: Performed by: HOSPITALIST

## 2017-07-09 PROCEDURE — 63600175 PHARM REV CODE 636 W HCPCS: Performed by: HOSPITALIST

## 2017-07-09 PROCEDURE — 80053 COMPREHEN METABOLIC PANEL: CPT

## 2017-07-09 PROCEDURE — 85610 PROTHROMBIN TIME: CPT

## 2017-07-09 PROCEDURE — 85025 COMPLETE CBC W/AUTO DIFF WBC: CPT

## 2017-07-09 PROCEDURE — 36415 COLL VENOUS BLD VENIPUNCTURE: CPT

## 2017-07-09 PROCEDURE — 20600001 HC STEP DOWN PRIVATE ROOM

## 2017-07-09 PROCEDURE — 84100 ASSAY OF PHOSPHORUS: CPT

## 2017-07-09 PROCEDURE — 99233 SBSQ HOSP IP/OBS HIGH 50: CPT | Mod: ,,, | Performed by: HOSPITALIST

## 2017-07-09 PROCEDURE — 83735 ASSAY OF MAGNESIUM: CPT

## 2017-07-09 PROCEDURE — 97530 THERAPEUTIC ACTIVITIES: CPT

## 2017-07-09 RX ADMIN — HYDROMORPHONE HYDROCHLORIDE 2 MG: 2 INJECTION INTRAMUSCULAR; INTRAVENOUS; SUBCUTANEOUS at 01:07

## 2017-07-09 RX ADMIN — RIFAXIMIN 550 MG: 550 TABLET ORAL at 08:07

## 2017-07-09 RX ADMIN — MIDODRINE HYDROCHLORIDE 15 MG: 5 TABLET ORAL at 09:07

## 2017-07-09 RX ADMIN — OXYCODONE HYDROCHLORIDE 30 MG: 5 TABLET ORAL at 09:07

## 2017-07-09 RX ADMIN — LACTULOSE 30 G: 20 SOLUTION ORAL at 05:07

## 2017-07-09 RX ADMIN — RIFAXIMIN 550 MG: 550 TABLET ORAL at 09:07

## 2017-07-09 RX ADMIN — HYDROMORPHONE HYDROCHLORIDE 2 MG: 2 INJECTION INTRAMUSCULAR; INTRAVENOUS; SUBCUTANEOUS at 08:07

## 2017-07-09 RX ADMIN — MIDODRINE HYDROCHLORIDE 15 MG: 5 TABLET ORAL at 05:07

## 2017-07-09 RX ADMIN — HYDROMORPHONE HYDROCHLORIDE 2 MG: 2 INJECTION INTRAMUSCULAR; INTRAVENOUS; SUBCUTANEOUS at 06:07

## 2017-07-09 RX ADMIN — FUROSEMIDE 20 MG: 20 TABLET ORAL at 08:07

## 2017-07-09 RX ADMIN — PANTOPRAZOLE SODIUM 40 MG: 40 TABLET, DELAYED RELEASE ORAL at 08:07

## 2017-07-09 RX ADMIN — HYDROMORPHONE HYDROCHLORIDE 2 MG: 2 INJECTION INTRAMUSCULAR; INTRAVENOUS; SUBCUTANEOUS at 11:07

## 2017-07-09 RX ADMIN — HYDROMORPHONE HYDROCHLORIDE 2 MG: 2 INJECTION INTRAMUSCULAR; INTRAVENOUS; SUBCUTANEOUS at 04:07

## 2017-07-09 RX ADMIN — SPIRONOLACTONE 50 MG: 50 TABLET, FILM COATED ORAL at 08:07

## 2017-07-09 RX ADMIN — OXYCODONE HYDROCHLORIDE 30 MG: 5 TABLET ORAL at 02:07

## 2017-07-09 RX ADMIN — MIDODRINE HYDROCHLORIDE 15 MG: 5 TABLET ORAL at 01:07

## 2017-07-09 RX ADMIN — LACTULOSE 30 G: 20 SOLUTION ORAL at 09:07

## 2017-07-09 RX ADMIN — LACTULOSE 30 G: 20 SOLUTION ORAL at 01:07

## 2017-07-09 NOTE — PLAN OF CARE
Problem: Fall Risk (Adult)  Intervention: Safety Promotion/Fall Prevention   07/09/17 0432   Safety Interventions   Safety Promotion/Fall Prevention assistive device/personal item within reach;bed alarm set;Fall Risk reviewed with patient/family;Fall Risk signage in place;nonskid shoes/socks when out of bed;side rails raised x 2

## 2017-07-09 NOTE — PROGRESS NOTES
Spoke with Dr. Castro regarding oxycodone 30mg is not working well, but the IV dilaudid is working since it does not last long, he c/o pain before he would be due for the next dose. Requested for changing oxycodone or placing another pain medication additional to oxycodone. Also, requested to keep the TLC till tomorrow since patient will be d/c'd tomorrow and he is hard to stick to get peripheral IV site. Dr. Castro is okay with keeping the central line.

## 2017-07-09 NOTE — PLAN OF CARE
Problem: Physical Therapy Goal  Goal: Physical Therapy Goal  PT goals until 7/12/17    1. Pt supine to sit with minimal assist-not met  2. Pt sit to supine with minimal assist-not met  3. Pt sit to stand with RW with WBAT R LE with CGA-not met  4. Pt to perform gait 30ft with RW with WBAT R LE with minimal assist.-not met  5. Pt to go up/down curb step with RW with WBAT R LE with minimal assist.-not met  6. Pt to transfer bed to/from bedside chair with WBAT R LE with minimal assist.-not met  7. Pt to perform B LE exs in sitting x 15 reps with handout.-not met     Goals remain appropriate. Continue with plan of care.

## 2017-07-09 NOTE — PT/OT/SLP PROGRESS
Physical Therapy  Treatment    Tommy Hopper   MRN: [id number]   Admitting Diagnosis: Closed pelvic ring fracture    PT Received On: 07/09/17  PT Start Time: 0940     PT Stop Time: 1008    PT Total Time (min): 28 min       Billable Minutes:  Gait [id number] and Therapeutic Activity 14    Treatment Type: Treatment  PT/PTA: PTA     PTA Visit Number: 2       General Precautions: Standard, fall  Orthopedic Precautions: RLE weight bearing as tolerated   Braces: N/A         Subjective:  Communicated with Nydia duong prior to session.  Pt agreeable to physical therapy today. Pt agreeable to gait outside of room with mod encouragement.     Pain/Comfort  Pain Rating 1:  (Pt reported pain but did not rate. )  Location 1: back  Pain Addressed 1: Distraction, Reposition, Pre-medicate for activity, Cessation of Activity    Objective:   Patient found with: telemetry, bed alarm (supine in bed with HOB elevated. )    Functional Mobility:  Bed Mobility:   Rolling/Turning to Left: Stand by assistance  Supine to Sit: Stand by Assistance    Transfers:  Sit <> Stand Assistance: Minimum Assistance (pt with post lean)  Sit <> Stand Assistive Device: Rolling Walker  Toilet Transfer Technique: Other (see comments) (ambulatory)  Toilet Transfer Assistance: Minimum Assistance (for RW management)  Toilet Transfer Assistive Device: Rolling Walker   · Patient unable to make a BM or urinate.   · Pt with stand using the grab bar, SBA and able to wash his hands with a flexed posture and arms on the sink and RW.    Gait:   Gait Distance: 15 feet in room, 5 feet in room and 18 feet in hallway with chair follow. Pt with vcs for posture and for taking steps. Pt fearful of walking outside of room.  Assistance 1: Minimum assistance, Contact Guard Assistance  Gait Assistive Device: Rolling walker  Gait Pattern: 3-point gait  Gait Deviation(s): decreased tomi, increased time in double stance, decreased velocity of limb motion, decreased step length,  decreased stride length, increased stride width, decreased weight-shifting ability, forward lean    Balance:   Static Sit: FAIR+: Able to take MINIMAL challenges from all directions  Dynamic Sit: FAIR+: Maintains balance through MINIMAL excursions of active trunk motion  Static Stand: FAIR+: Takes MINIMAL challenges from all directions  Dynamic stand: FAIR: Needs CONTACT GUARD during gait     Therapeutic Activities and Exercises:  Pt educated to call for someone when transferring. Pt verbalized understanding, but forgetful and needs reinforcement.      AM-PAC 6 CLICK MOBILITY  How much help from another person does this patient currently need?   1 = Unable, Total/Dependent Assistance  2 = A lot, Maximum/Moderate Assistance  3 = A little, Minimum/Contact Guard/Supervision  4 = None, Modified [place]/Independent    Turning over in bed (including adjusting bedclothes, sheets and blankets)?: 2  Sitting down on and standing up from a chair with arms (e.g., wheelchair, bedside commode, etc.): 2  Moving from lying on back to sitting on the side of the bed?: 2  Moving to and from a bed to a chair (including a wheelchair)?: 2  Need to walk in hospital room?: 2  Climbing 3-5 steps with a railing?: 2  Total Score: 12    AM-PAC Raw Score CMS G-Code Modifier Level of Impairment Assistance   6 % Total / Unable   7 - 9 CM 80 - 100% Maximal Assist   10 - 14 CL 60 - 80% Moderate Assist   15 - 19 CK 40 - 60% Moderate Assist   20 - 22 CJ 20 - 40% Minimal Assist   23 CI 1-20% SBA / CGA   24 CH 0% Independent/ Mod I     Patient left up in chair with all lines intact, call button in reach and nsg and PCT notified.  Tray table left in front of patient to prevent him from standing without assistance as well.     Assessment:  Tommy Hopper is a 57 y.o. male with a medical diagnosis of Closed pelvic ring fracture and presents with the  Rehab identified problem list below. The patient was very resistant to ambulating in the  "hallways "I can't do it. I don't want to fall." But he was agreeable to short term goals ie. Make it to those doors, just a few more steps." Pt with SOB during gait, but able to normalize respiration with rest breaks as needed. The patient gray tx well and would continue to benefit from skilled PT to address the deficits in his plan of care.     Rehab identified problem list/impairments: Rehab identified problem list/impairments: weakness, impaired endurance, impaired self care skills, impaired functional mobilty, gait instability, impaired balance, impaired cognition, decreased lower extremity function, decreased safety awareness, pain, visual deficits, edema, impaired cardiopulmonary response to activity, orthopedic precautions, impaired fine motor    Rehab potential is good.    Activity tolerance: Good    Discharge recommendations: Discharge Facility/Level Of Care Needs: nursing facility, skilled     Barriers to discharge: Barriers to Discharge: Decreased caregiver support, Inaccessible home environment    Equipment recommendations: Equipment Needed After Discharge: walker, rolling     GOALS:    Physical Therapy Goals        Problem: Physical Therapy Goal    Goal Priority Disciplines Outcome Goal Variances Interventions   Physical Therapy Goal     PT/OT, PT Ongoing (interventions implemented as appropriate)     Description:  PT goals until 7/12/17    1. Pt supine to sit with minimal assist-not met  2. Pt sit to supine with minimal assist-not met  3. Pt sit to stand with RW with WBAT R LE with CGA-not met  4. Pt to perform gait 30ft with RW with WBAT R LE with minimal assist.-not met  5. Pt to go up/down curb step with RW with WBAT R LE with minimal assist.-not met  6. Pt to transfer bed to/from bedside chair with WBAT R LE with minimal assist.-not met  7. Pt to perform B LE exs in sitting x 15 reps with handout.-not met                      PLAN:    Patient to be seen 5 x/week  to address the above listed problems " via gait training, therapeutic activities, therapeutic exercises  Plan of Care expires: 08/06/17  Plan of Care reviewed with: patient         Mara Camilo, PTA  07/09/2017

## 2017-07-09 NOTE — PLAN OF CARE
Problem: Patient Care Overview  Goal: Plan of Care Review  Outcome: Ongoing (interventions implemented as appropriate)  Pt aaox4, VSS. C/o constant pain to lower back. PRN med admin. Up with 1 assist, uses urinal. No acute changes over night. WCTM.

## 2017-07-09 NOTE — PLAN OF CARE
Problem: Patient Care Overview  Goal: Plan of Care Review  Outcome: Ongoing (interventions implemented as appropriate)  Problem: Fall Risk (Adult)  Intervention: Reduce Risk/Promote Restraint Free Environment   07/08/17 1554   Safety Interventions   Safety Precautions emergency equipment at bedside   Safety Interventions   Environmental Safety Modification assistive device/personal items within reach;clutter free environment maintained;room organization consistent     Intervention: Patient Rounds   07/08/17 1536   Safety Interventions   Patient Rounds bed in low position;bed wheels locked;call light in reach;clutter free environment maintained;ID band on     Intervention: Safety Promotion/Fall Prevention   07/08/17 1554   Safety Interventions   Safety Promotion/Fall Prevention assistive device/personal item within reach;commode/urinal/bedpan at bedside;Fall Risk reviewed with patient/family;Fall Risk signage in place;high risk medications identified;medications reviewed;muscle strengthening facilitated;nonskid shoes/socks when out of bed;side rails raised x 3     Intervention: Safety Precautions   07/08/17 1554   Safety Interventions   Safety Precautions emergency equipment at bedside       Goal: Identify Related Risk Factors and Signs and Symptoms  Related risk factors and signs and symptoms are identified upon initiation of Human Response Clinical Practice Guideline (CPG)    07/08/17 1554   Fall Risk   Related Risk Factors (Fall Risk) confusion/agitation;depression/anxiety;inadequate lighting;objects hard to reach;sensory deficits;environment unfamiliar;bladder function altered     Goal: Absence of Falls  Patient will demonstrate the desired outcomes by discharge/transition of care.   Outcome: Ongoing (interventions implemented as appropriate)   07/08/17 1554   Fall Risk (Adult)   Absence of Falls making progress toward outcome       Problem: Patient Care Overview  Goal: Plan of Care Review  Outcome: Ongoing  (interventions implemented as appropriate)   07/08/17 1554   Coping/Psychosocial   Plan Of Care Reviewed With Patient; Safety: call light in reach, patient oriented to room & instructed how to notify nurse if assistance is needed, current questions/concerns addressed, bed in lowest position with wheels locked & side rails up X 3. Pt and family were educated regarding fall precaution and taking appropriate action. Activity: is up with 2 assist  Neurological: Oriented x4, very lethargic and weak.  Neuro check q4hrs. Respiratory: On RA, o2 sat WNL.  Rt tx prn Cardiac: BP hypotensive, pt asymptomatic. HR stable. Afebrile this shift. Intake/Output: no bm today, patient took schedule lactulose dose with encouragement and education. No problem urinating. Intake adequate. Take meds whole. Pain: controlled with prn medication Skin: bruised and jaundice. Plan: . The patient and the family was educated on importance of taking the lactulose. Plan of care reviewed with the patient. All questions and concerns were addressed. Poss.d/c on Monday.          Problem: Infection, Risk/Actual (Adult)  Intervention: Manage Suspected/Actual Infection   07/08/17 1554   Safety Interventions   Isolation Precautions environmental surveillance   Infection Management aseptic technique maintained     Intervention: Prevent Infection/Maximize Resistance   07/08/17 1554   Respiratory Interventions   Airway/Ventilation Management airway patency maintained   Hygiene Care   Bathing/Skin Care incontinence care   Nutrition Interventions   Oral Nutrition Promotion safe use of adaptive equipment encouraged;rest periods promoted   Pain/Comfort/Sleep Interventions   Sleep/Rest Enhancement consistent schedule promoted;family presence promoted;noise level reduced;regular sleep/rest pattern promoted;relaxation techniques promoted;therapeutic touch utilized       Goal: Identify Related Risk Factors and Signs and Symptoms  Related risk factors and signs and  symptoms are identified upon initiation of Human Response Clinical Practice Guideline (CPG)   Outcome: Ongoing (interventions implemented as appropriate)   07/08/17 1554   Infection, Risk/Actual   Related Risk Factors (Infection, Risk/Actual) chronic illness/condition;prolonged hospitalization;poor personal hygiene;medication effects;sleep disturbance       Problem: Pressure Ulcer Risk (Aric Scale) (Adult,Obstetrics,Pediatric)  Intervention: Promote/Optimize Nutrition   07/08/17 1554   Nutrition Interventions   Oral Nutrition Promotion safe use of adaptive equipment encouraged;rest periods promoted     Intervention: Maintain Head of Bed Elevation Less Than 30 Degrees as Tolerated   07/08/17 1554   Positioning   Head of Bed (HOB) HOB at 30-45 degrees     Intervention: Prevent/Minimize Sheer/Friction Injuries   07/08/17 1554   Positioning   Positioning/Transfer Devices wedge   Skin Interventions   Pressure Reduction Devices heel offloading device utilized   Pressure Reduction Techniques frequent weight shift encouraged;heels elevated off bed       Goal: Identify Related Risk Factors and Signs and Symptoms  Related risk factors and signs and symptoms are identified upon initiation of Human Response Clinical Practice Guideline (CPG)    07/08/17 1554   Pressure Ulcer Risk (Aric Scale)   Related Risk Factors (Pressure Ulcer Risk (Aric Scale)) critical care admission;fluid intake inadequate;hospitalization prolonged;infection     Goal: Skin Integrity  Patient will demonstrate the desired outcomes by discharge/transition of care.   Outcome: Ongoing (interventions implemented as appropriate)   07/08/17 1554   Pressure Ulcer Risk (Aric Scale) (Adult,Obstetrics,Pediatric)   Skin Integrity making progress toward outcome

## 2017-07-10 LAB
ALBUMIN SERPL BCP-MCNC: 2.7 G/DL
ALP SERPL-CCNC: 132 U/L
ALT SERPL W/O P-5'-P-CCNC: 25 U/L
ANION GAP SERPL CALC-SCNC: 8 MMOL/L
AST SERPL-CCNC: 75 U/L
BACTERIA BLD CULT: NORMAL
BASOPHILS # BLD AUTO: 0.04 K/UL
BASOPHILS NFR BLD: 0.7 %
BILIRUB SERPL-MCNC: 6.2 MG/DL
BUN SERPL-MCNC: 21 MG/DL
CALCIUM SERPL-MCNC: 7.9 MG/DL
CHLORIDE SERPL-SCNC: 99 MMOL/L
CO2 SERPL-SCNC: 20 MMOL/L
CREAT SERPL-MCNC: 1.1 MG/DL
DIFFERENTIAL METHOD: ABNORMAL
EOSINOPHIL # BLD AUTO: 0.3 K/UL
EOSINOPHIL NFR BLD: 4.6 %
ERYTHROCYTE [DISTWIDTH] IN BLOOD BY AUTOMATED COUNT: 21.5 %
EST. GFR  (AFRICAN AMERICAN): >60 ML/MIN/1.73 M^2
EST. GFR  (NON AFRICAN AMERICAN): >60 ML/MIN/1.73 M^2
GLUCOSE SERPL-MCNC: 98 MG/DL
HCT VFR BLD AUTO: 20.9 %
HGB BLD-MCNC: 7.3 G/DL
INR PPP: 1.7
LYMPHOCYTES # BLD AUTO: 1.4 K/UL
LYMPHOCYTES NFR BLD: 23.3 %
MAGNESIUM SERPL-MCNC: 1.2 MG/DL
MCH RBC QN AUTO: 32.7 PG
MCHC RBC AUTO-ENTMCNC: 34.9 %
MCV RBC AUTO: 94 FL
MONOCYTES # BLD AUTO: 0.7 K/UL
MONOCYTES NFR BLD: 11 %
NEUTROPHILS # BLD AUTO: 3.6 K/UL
NEUTROPHILS NFR BLD: 59.9 %
PHOSPHATE SERPL-MCNC: 2.9 MG/DL
PLATELET # BLD AUTO: 61 K/UL
PMV BLD AUTO: 9.6 FL
POTASSIUM SERPL-SCNC: 4.1 MMOL/L
PROT SERPL-MCNC: 5.1 G/DL
PROTHROMBIN TIME: 16.9 SEC
RBC # BLD AUTO: 2.23 M/UL
SODIUM SERPL-SCNC: 127 MMOL/L
WBC # BLD AUTO: 5.92 K/UL

## 2017-07-10 PROCEDURE — 97116 GAIT TRAINING THERAPY: CPT

## 2017-07-10 PROCEDURE — 25000003 PHARM REV CODE 250: Performed by: HOSPITALIST

## 2017-07-10 PROCEDURE — 84100 ASSAY OF PHOSPHORUS: CPT

## 2017-07-10 PROCEDURE — 80053 COMPREHEN METABOLIC PANEL: CPT

## 2017-07-10 PROCEDURE — 85025 COMPLETE CBC W/AUTO DIFF WBC: CPT

## 2017-07-10 PROCEDURE — 85610 PROTHROMBIN TIME: CPT

## 2017-07-10 PROCEDURE — 83735 ASSAY OF MAGNESIUM: CPT

## 2017-07-10 PROCEDURE — 99233 SBSQ HOSP IP/OBS HIGH 50: CPT | Mod: ,,, | Performed by: HOSPITALIST

## 2017-07-10 PROCEDURE — 20600001 HC STEP DOWN PRIVATE ROOM

## 2017-07-10 PROCEDURE — 36415 COLL VENOUS BLD VENIPUNCTURE: CPT

## 2017-07-10 PROCEDURE — 97110 THERAPEUTIC EXERCISES: CPT

## 2017-07-10 PROCEDURE — 97530 THERAPEUTIC ACTIVITIES: CPT

## 2017-07-10 PROCEDURE — 63600175 PHARM REV CODE 636 W HCPCS: Performed by: HOSPITALIST

## 2017-07-10 RX ORDER — CELECOXIB 100 MG/1
200 CAPSULE ORAL 2 TIMES DAILY
Status: DISCONTINUED | OUTPATIENT
Start: 2017-07-10 | End: 2017-07-12

## 2017-07-10 RX ORDER — HYDROMORPHONE HYDROCHLORIDE 1 MG/ML
1 INJECTION, SOLUTION INTRAMUSCULAR; INTRAVENOUS; SUBCUTANEOUS ONCE
Status: COMPLETED | OUTPATIENT
Start: 2017-07-10 | End: 2017-07-10

## 2017-07-10 RX ORDER — PREGABALIN 25 MG/1
75 CAPSULE ORAL 2 TIMES DAILY
Status: DISCONTINUED | OUTPATIENT
Start: 2017-07-10 | End: 2017-07-15 | Stop reason: HOSPADM

## 2017-07-10 RX ADMIN — SPIRONOLACTONE 50 MG: 50 TABLET, FILM COATED ORAL at 08:07

## 2017-07-10 RX ADMIN — HYDROMORPHONE HYDROCHLORIDE 2 MG: 2 INJECTION INTRAMUSCULAR; INTRAVENOUS; SUBCUTANEOUS at 06:07

## 2017-07-10 RX ADMIN — LACTULOSE 30 G: 20 SOLUTION ORAL at 01:07

## 2017-07-10 RX ADMIN — MIDODRINE HYDROCHLORIDE 15 MG: 5 TABLET ORAL at 05:07

## 2017-07-10 RX ADMIN — PREGABALIN 75 MG: 25 CAPSULE ORAL at 09:07

## 2017-07-10 RX ADMIN — OXYCODONE HYDROCHLORIDE 30 MG: 5 TABLET ORAL at 10:07

## 2017-07-10 RX ADMIN — PANTOPRAZOLE SODIUM 40 MG: 40 TABLET, DELAYED RELEASE ORAL at 08:07

## 2017-07-10 RX ADMIN — MIDODRINE HYDROCHLORIDE 15 MG: 5 TABLET ORAL at 01:07

## 2017-07-10 RX ADMIN — MIDODRINE HYDROCHLORIDE 15 MG: 5 TABLET ORAL at 09:07

## 2017-07-10 RX ADMIN — RIFAXIMIN 550 MG: 550 TABLET ORAL at 08:07

## 2017-07-10 RX ADMIN — PREGABALIN 75 MG: 25 CAPSULE ORAL at 11:07

## 2017-07-10 RX ADMIN — CELECOXIB 200 MG: 100 CAPSULE ORAL at 09:07

## 2017-07-10 RX ADMIN — HYDROMORPHONE HYDROCHLORIDE 2 MG: 2 INJECTION INTRAMUSCULAR; INTRAVENOUS; SUBCUTANEOUS at 05:07

## 2017-07-10 RX ADMIN — LACTULOSE 30 G: 20 SOLUTION ORAL at 05:07

## 2017-07-10 RX ADMIN — CELECOXIB 200 MG: 100 CAPSULE ORAL at 11:07

## 2017-07-10 RX ADMIN — RIFAXIMIN 550 MG: 550 TABLET ORAL at 09:07

## 2017-07-10 RX ADMIN — OXYCODONE HYDROCHLORIDE 30 MG: 5 TABLET ORAL at 01:07

## 2017-07-10 RX ADMIN — LACTULOSE 30 G: 20 SOLUTION ORAL at 09:07

## 2017-07-10 RX ADMIN — HYDROMORPHONE HYDROCHLORIDE 1 MG: 1 INJECTION, SOLUTION INTRAMUSCULAR; INTRAVENOUS; SUBCUTANEOUS at 11:07

## 2017-07-10 RX ADMIN — FUROSEMIDE 20 MG: 20 TABLET ORAL at 08:07

## 2017-07-10 NOTE — SUBJECTIVE & OBJECTIVE
Interval History: patient has agreed to snf; pain is more controlled    Review of Systems   Constitutional: Negative for activity change and appetite change.   HENT: Negative for drooling and facial swelling.    Eyes: Negative for discharge and itching.   Respiratory: Negative for cough, shortness of breath and wheezing.    Cardiovascular: Negative for chest pain and palpitations.   Gastrointestinal: Negative for abdominal pain and nausea.   Genitourinary: Negative for difficulty urinating and dysuria.   Skin: Negative for color change and pallor.   Neurological: Positive for weakness. Negative for dizziness and numbness.   Psychiatric/Behavioral: Negative for behavioral problems and confusion.     Objective:     Vital Signs (Most Recent):  Temp: 98.2 °F (36.8 °C) (07/10/17 1146)  Pulse: 69 (07/10/17 1100)  Resp: 18 (07/10/17 1146)  BP: (!) 94/56 (07/10/17 1146)  SpO2: 97 % (07/10/17 1146) Vital Signs (24h Range):  Temp:  [98.2 °F (36.8 °C)-98.6 °F (37 °C)] 98.2 °F (36.8 °C)  Pulse:  [69-81] 69  Resp:  [16-18] 18  SpO2:  [93 %-97 %] 97 %  BP: ()/(52-62) 94/56     Weight: 79.2 kg (174 lb 8 oz)  Body mass index is 25.77 kg/m².    Intake/Output Summary (Last 24 hours) at 07/10/17 1400  Last data filed at 07/10/17 1356   Gross per 24 hour   Intake              380 ml   Output              775 ml   Net             -395 ml      Physical Exam   Constitutional: He is oriented to person, place, and time. He appears well-developed and well-nourished.   HENT:   Head: Normocephalic and atraumatic.   Eyes: Conjunctivae are normal. Pupils are equal, round, and reactive to light.   Neck: Normal range of motion. No thyromegaly present.   Cardiovascular: Normal rate, regular rhythm and normal heart sounds.    Pulmonary/Chest: Effort normal and breath sounds normal.   Abdominal: Soft. He exhibits no distension. There is no tenderness.   Neurological: He is alert and oriented to person, place, and time. Coordination normal.    Skin: No rash noted. No erythema.       Significant Labs:   CBC:     Recent Labs  Lab 07/09/17  0650 07/10/17  0537   WBC 6.85 5.92   HGB 8.3* 7.3*   HCT 23.6* 20.9*   PLT 76* 61*     CMP:     Recent Labs  Lab 07/09/17  0650 07/10/17  0537   * 127*   K 4.0 4.1    99   CO2 19* 20*   * 98   BUN 22* 21*   CREATININE 1.1 1.1   CALCIUM 7.9* 7.9*   PROT 5.5* 5.1*   ALBUMIN 2.9* 2.7*   BILITOT 6.2* 6.2*   ALKPHOS 139* 132   AST 76* 75*   ALT 26 25   ANIONGAP 10 8   EGFRNONAA >60.0 >60.0     Coagulation:     Recent Labs  Lab 07/10/17  0537   INR 1.7*       Significant Imaging: I have reviewed all pertinent imaging results/findings within the past 24 hours.

## 2017-07-10 NOTE — PLAN OF CARE
Problem: Fall Risk (Adult)  Intervention: Safety Promotion/Fall Prevention   07/10/17 0309   Safety Interventions   Safety Promotion/Fall Prevention assistive device/personal item within reach;bed alarm set;commode/urinal/bedpan at bedside;Fall Risk reviewed with patient/family;Fall Risk signage in place;high risk medications identified;medications reviewed;nonskid shoes/socks when out of bed;supervised activity;side rails raised x 2         Problem: Patient Care Overview  Goal: Plan of Care Review  Outcome: Ongoing (interventions implemented as appropriate)  Pt aaox3, disorientated to situation at times. VSS, no acute change over night. PRN pain meds admin for constant back pain. Pt up with 1 assist, uses urinal. Reinforced instruction on use of call bell. Call bell within reach, bed alarm on. Personal item in reach, free from falls/injuries. Pt expected to be discharged today.WCTM.

## 2017-07-10 NOTE — PT/OT/SLP PROGRESS
Physical Therapy  Treatment    Tommy Hopper   MRN: [id number]   Admitting Diagnosis: Closed pelvic ring fracture    PT Received On: 07/10/17  PT Start Time: 0914     PT Stop Time: 0954    PT Total Time (min): 40 min       Billable Minutes:  Gait [id number], Therapeutic Activity 20, Therapeutic Exercise 10 and Total Time 40    Treatment Type: Treatment  PT/PTA: PT     PTA Visit Number: 0       General Precautions: Standard, fall  Orthopedic Precautions: RLE weight bearing as tolerated   Braces: N/A    Subjective:  Communicated with nursing prior to session.  Pt cleared for PT session. Pt agreeable to session.    Pain/Comfort  Pain Rating 1: 9/10  Location - Side 1: Bilateral  Location - Orientation 1: lower  Location 1: back  Pain Addressed 1: Pre-medicate for activity, Reposition    Objective:   Patient found supine in bed with: central line, telemetry, bed alarm    Functional Mobility:  Bed Mobility:   Supine to Sit: Moderate Assistance for trunk, on bed w/ HOB elevated and w/ side rail    Transfers: cueing for hand placement w/ all transfers  Sit <> Stand Assistance: Contact Guard Assistance to/from EOB and bedside chair  Sit <> Stand Assistive Device: Rolling Walker  Toilet Transfer Technique: Stand Pivot to/from bedside commode  Toilet Transfer Assistance: Contact Guard Assistance  Toilet Transfer Assistive Device: Rolling Walker    Gait:   Gait Distance: 2 trials (20ft and 24ft) w/ RW and CGA for safety, FFP due to pain, limited by back and hip pain, inc'd time required  Assistance 1: Contact Guard Assistance  Gait Assistive Device: Rolling walker  Gait Pattern: 3-point gait  Gait Deviation(s): decreased tomi, increased time in double stance, decreased velocity of limb motion, decreased step length, decreased stride length, decreased swing-to-stance ratio, decreased toe-to-floor clearance, decreased weight-shifting ability    Balance:   Static Sit: NORMAL: No deviations seen in posture held  statically  Dynamic Sit: GOOD: Maintains balance through MODERATE excursions of active trunk movement  Static Stand: FAIR+: Takes MINIMAL challenges from all directions  Dynamic stand: FAIR: Needs CONTACT GUARD during gait     Therapeutic Activities and Exercises:  Pt completed toileting requiring CGA for safety while pt standing and performing pericare after BM. Pt standing ~5min w/ RW and CGA. Pt then stood at sink to wash hands w/ RW and CGA.  Seated BLE therex 1x10 reps (HF, LAQ, AP) w/ cueing for correct form. Rest breaks required due to fatigue.     AM-PAC 6 CLICK MOBILITY  How much help from another person does this patient currently need?   1 = Unable, Total/Dependent Assistance  2 = A lot, Maximum/Moderate Assistance  3 = A little, Minimum/Contact Guard/Supervision  4 = None, Modified [place]/Independent    Turning over in bed (including adjusting bedclothes, sheets and blankets)?: 3  Sitting down on and standing up from a chair with arms (e.g., wheelchair, bedside commode, etc.): 3  Moving from lying on back to sitting on the side of the bed?: 2  Moving to and from a bed to a chair (including a wheelchair)?: 3  Need to walk in hospital room?: 3  Climbing 3-5 steps with a railing?: 2  Total Score: 16    AM-PAC Raw Score CMS G-Code Modifier Level of Impairment Assistance   6 % Total / Unable   7 - 9 CM 80 - 100% Maximal Assist   10 - 14 CL 60 - 80% Moderate Assist   15 - 19 CK 40 - 60% Moderate Assist   20 - 22 CJ 20 - 40% Minimal Assist   23 CI 1-20% SBA / CGA   24 CH 0% Independent/ Mod I     Patient left up in chair with all lines intact and call button in reach.    Assessment:  Tommy Hopper is a 57 y.o. male with a medical diagnosis of Closed pelvic ring fracture and presents with the below mentioned deficits requiring CGA for transfers, ambulation, and standing balance w/ RW. Pt required inc'd assistance for bed mobility likely due to abdominal distension. Pt is progressing well but  remains limited by decreased overall strength and endurance. He will continue to benefit from skilled PT services.    Rehab identified problem list/impairments: Rehab identified problem list/impairments: weakness, impaired endurance, impaired functional mobilty, gait instability, impaired balance, pain    Rehab potential is good.    Activity tolerance: Good    Discharge recommendations: Discharge Facility/Level Of Care Needs: nursing facility, skilled     Barriers to discharge: Barriers to Discharge: Inaccessible home environment, Decreased caregiver support    Equipment recommendations: Equipment Needed After Discharge: walker, rolling     GOALS:    Physical Therapy Goals        Problem: Physical Therapy Goal    Goal Priority Disciplines Outcome Goal Variances Interventions   Physical Therapy Goal     PT/OT, PT Ongoing (interventions implemented as appropriate)     Description:  PT goals until 7/12/17    1. Pt supine to sit with minimal assist-not met  2. Pt sit to supine with minimal assist-not met  3. Pt sit to stand with RW with WBAT R LE with CGA-not met  4. Pt to perform gait 30ft with RW with WBAT R LE with minimal assist.-not met  5. Pt to go up/down curb step with RW with WBAT R LE with minimal assist.-not met  6. Pt to transfer bed to/from bedside chair with WBAT R LE with minimal assist.-not met  7. Pt to perform B LE exs in sitting x 15 reps with handout.-not met                      PLAN:    Patient to be seen 5 x/week  to address the above listed problems via gait training, therapeutic activities, therapeutic exercises  Plan of Care expires: 08/06/17  Plan of Care reviewed with: patient         Mayi Gay, PT  07/10/2017

## 2017-07-10 NOTE — PLAN OF CARE
CAN called [phone] to complete LOCET, completed with Zoe , Next CAN faxed completed PASRR to [facility] [phone]

## 2017-07-10 NOTE — PROGRESS NOTES
[facility] Hospital Medicine  Progress Note    Patient Name: Tommy Hopper  MRN: [id number]  Patient Class: IP- Inpatient   Admission Date: 7/5/2017  Length of Stay: 5 days  Attending Physician: Dung Castro MD  Primary Care Provider: Primary Doctor [facility] Medicine Team: [facility] [place] [facility]OSP MED A Dung Castro MD    Subjective:     Principal Problem:Closed pelvic ring fracture    HPI:  57M presents as transfer from [facility] ED where he presented for pain and inability to ambulate after a fall one day previously.  History very limited due to patient's garbled speech and lack of documentation from outside ED as they were taken off somewhere by another physician.  He reports losing his balance at home and falling on his right side.  He endorses severe persistent pain which has not been helped by any pain medication he was given so far.  While in the ambulance on his way here from [facility] his speech was noted to be more slurred and he was hypotensive so the ambulance diverted to the ED here.    Hospital Course:  No notes on file    Interval History: patient has agreed to snf; pain is more controlled    Review of Systems   Constitutional: Negative for activity change and appetite change.   HENT: Negative for drooling and facial swelling.    Eyes: Negative for discharge and itching.   Respiratory: Negative for cough, shortness of breath and wheezing.    Cardiovascular: Negative for chest pain and palpitations.   Gastrointestinal: Negative for abdominal pain and nausea.   Genitourinary: Negative for difficulty urinating and dysuria.   Skin: Negative for color change and pallor.   Neurological: Positive for weakness. Negative for dizziness and numbness.   Psychiatric/Behavioral: Negative for behavioral problems and confusion.     Objective:     Vital Signs (Most Recent):  Temp: 98.2 °F (36.8 °C) (07/10/17 1146)  Pulse: 69 (07/10/17 1100)  Resp: 18 (07/10/17 1146)  BP: (!) 94/56 (07/10/17  1146)  SpO2: 97 % (07/10/17 1146) Vital Signs (24h Range):  Temp:  [98.2 °F (36.8 °C)-98.6 °F (37 °C)] 98.2 °F (36.8 °C)  Pulse:  [69-81] 69  Resp:  [16-18] 18  SpO2:  [93 %-97 %] 97 %  BP: ()/(52-62) 94/56     Weight: 79.2 kg (174 lb 8 oz)  Body mass index is 25.77 kg/m².    Intake/Output Summary (Last 24 hours) at 07/10/17 1400  Last data filed at 07/10/17 1356   Gross per 24 hour   Intake              380 ml   Output              775 ml   Net             -395 ml      Physical Exam   Constitutional: He is oriented to person, place, and time. He appears well-developed and well-nourished.   HENT:   Head: Normocephalic and atraumatic.   Eyes: Conjunctivae are normal. Pupils are equal, round, and reactive to light.   Neck: Normal range of motion. No thyromegaly present.   Cardiovascular: Normal rate, regular rhythm and normal heart sounds.    Pulmonary/Chest: Effort normal and breath sounds normal.   Abdominal: Soft. He exhibits no distension. There is no tenderness.   Neurological: He is alert and oriented to person, place, and time. Coordination normal.   Skin: No rash noted. No erythema.       Significant Labs:   CBC:     Recent Labs  Lab 07/09/17  0650 07/10/17  0537   WBC 6.85 5.92   HGB 8.3* 7.3*   HCT 23.6* 20.9*   PLT 76* 61*     CMP:     Recent Labs  Lab 07/09/17  0650 07/10/17  0537   * 127*   K 4.0 4.1    99   CO2 19* 20*   * 98   BUN 22* 21*   CREATININE 1.1 1.1   CALCIUM 7.9* 7.9*   PROT 5.5* 5.1*   ALBUMIN 2.9* 2.7*   BILITOT 6.2* 6.2*   ALKPHOS 139* 132   AST 76* 75*   ALT 26 25   ANIONGAP 10 8   EGFRNONAA >60.0 >60.0     Coagulation:     Recent Labs  Lab 07/10/17  0537   INR 1.7*       Significant Imaging: I have reviewed all pertinent imaging results/findings within the past 24 hours.    Assessment/Plan:      GYPSY (acute kidney injury)    proabably related to recent overdiuresis; monitor volume status closely.          Hypotension    Appears to be chronic.  Continue midorine;  hold IVF unless absolutely necessary.          Hepatic encephalopathy    Continue rifaximin bid; add lactulose 25mg q8.  If patient unable to afford rifaximin can substitute zinc sulfate tablet for urate-lowering therapy          Alcoholic cirrhosis of liver with ascites    Decompensated.  On decreased dose of lasix/aldactone given recent GYPSY.            HCC (hepatocellular carcinoma)    Determined to not be good candidate for treatment.  Continue conservative management.          * Closed pelvic ring fracture    Likely nonoperative, as his decompensated cirrhosis portends perioperative mortality far exceeding potential benefitl.Orthopedic surgery to re-evaluate today and determine WB status for PT.            VTE Risk Mitigation         Ordered     Place sequential compression device  Until discontinued      07/06/17 0409     Place OLYA hose  Until discontinued      07/06/17 0409     High Risk of VTE  Once      07/06/17 0409     Place OYLA hose  Until discontinued      07/05/17 2330     Place sequential compression device  Until discontinued      07/05/17 2330          Dung Castro MD  Department of Hospital Medicine   [facility]

## 2017-07-10 NOTE — PLAN OF CARE
07/10/17 1158   Discharge Reassessment   Assessment Type Discharge Planning Reassessment   Can the patient answer the patient profile reliably? Yes, cognitively intact   How does the patient rate their overall health at the present time? Poor   Describe the patient's ability to walk at the present time. Walks with the help of equipment   How often would a person be available to care for the patient? Occasionally   Number of comorbid conditions (as recorded on the chart) Two   During the past month, has the patient often been bothered by feeling down, depressed or hopeless? No   During the past month, has the patient often been bothered by little interest or pleasure in doing things? No   Discharge plan remains the same: No   Provided patient/caregiver education on the expected discharge date and the discharge plan Yes   Discharge Plan A Skilled Nursing Facility   Discharge Plan B Home Health;Home with family   Change in patient condition or support system No     Awaiting accepting skilled facility for placement.

## 2017-07-10 NOTE — SUBJECTIVE & OBJECTIVE
Interval History: patient has agreed to snf; patient is complaining of hip pain after PT/OT today;    Review of Systems   Constitutional: Negative for activity change and appetite change.   HENT: Negative for drooling and facial swelling.    Eyes: Negative for discharge and itching.   Respiratory: Negative for cough, shortness of breath and wheezing.    Cardiovascular: Negative for chest pain and palpitations.   Gastrointestinal: Negative for abdominal pain and nausea.   Genitourinary: Negative for difficulty urinating and dysuria.   Skin: Negative for color change and pallor.   Neurological: Positive for weakness. Negative for dizziness and numbness.   Psychiatric/Behavioral: Negative for behavioral problems and confusion.     Objective:     Vital Signs (Most Recent):  Temp: 98.2 °F (36.8 °C) (07/10/17 1146)  Pulse: 69 (07/10/17 1100)  Resp: 18 (07/10/17 1146)  BP: (!) 94/56 (07/10/17 1146)  SpO2: 97 % (07/10/17 1146) Vital Signs (24h Range):  Temp:  [98.2 °F (36.8 °C)-98.6 °F (37 °C)] 98.2 °F (36.8 °C)  Pulse:  [69-81] 69  Resp:  [16-18] 18  SpO2:  [93 %-97 %] 97 %  BP: ()/(52-62) 94/56     Weight: 79.2 kg (174 lb 8 oz)  Body mass index is 25.77 kg/m².    Intake/Output Summary (Last 24 hours) at 07/10/17 1404  Last data filed at 07/10/17 1356   Gross per 24 hour   Intake              380 ml   Output              775 ml   Net             -395 ml      Physical Exam   Constitutional: He is oriented to person, place, and time. He appears well-developed and well-nourished.   HENT:   Head: Normocephalic and atraumatic.   Eyes: Conjunctivae are normal. Pupils are equal, round, and reactive to light.   Neck: Normal range of motion. No thyromegaly present.   Cardiovascular: Normal rate, regular rhythm and normal heart sounds.    Pulmonary/Chest: Effort normal and breath sounds normal.   Abdominal: Soft. He exhibits no distension. There is no tenderness.   Neurological: He is alert and oriented to person, place, and  time. Coordination normal.   Skin: No rash noted. No erythema.       Significant Labs:   CBC:     Recent Labs  Lab 07/09/17  0650 07/10/17  0537   WBC 6.85 5.92   HGB 8.3* 7.3*   HCT 23.6* 20.9*   PLT 76* 61*     CMP:     Recent Labs  Lab 07/09/17  0650 07/10/17  0537   * 127*   K 4.0 4.1    99   CO2 19* 20*   * 98   BUN 22* 21*   CREATININE 1.1 1.1   CALCIUM 7.9* 7.9*   PROT 5.5* 5.1*   ALBUMIN 2.9* 2.7*   BILITOT 6.2* 6.2*   ALKPHOS 139* 132   AST 76* 75*   ALT 26 25   ANIONGAP 10 8   EGFRNONAA >60.0 >60.0     Coagulation:     Recent Labs  Lab 07/10/17  0537   INR 1.7*       Significant Imaging: I have reviewed all pertinent imaging results/findings within the past 24 hours.

## 2017-07-10 NOTE — PROGRESS NOTES
[facility] Hospital Medicine  Progress Note    Patient Name: Tommy Hopper  MRN: [id number]  Patient Class: IP- Inpatient   Admission Date: 7/5/2017  Length of Stay: 5 days  Attending Physician: Dung Castro MD  Primary Care Provider: Primary Doctor [facility] Medicine Team: [facility]OSP MED A Dung Castro MD    Subjective:     Principal Problem:Closed pelvic ring fracture    HPI:  57M presents as transfer from [facility] ED where he presented for pain and inability to ambulate after a fall one day previously.  History very limited due to patient's garbled speech and lack of documentation from outside ED as they were taken off somewhere by another physician.  He reports losing his balance at home and falling on his right side.  He endorses severe persistent pain which has not been helped by any pain medication he was given so far.  While in the ambulance on his way here from [facility] his speech was noted to be more slurred and he was hypotensive so the ambulance diverted to the ED here.    Hospital Course:  No notes on file    Interval History: patient has agreed to snf; patient is complaining of hip pain after PT/OT today;    Review of Systems   Constitutional: Negative for activity change and appetite change.   HENT: Negative for drooling and facial swelling.    Eyes: Negative for discharge and itching.   Respiratory: Negative for cough, shortness of breath and wheezing.    Cardiovascular: Negative for chest pain and palpitations.   Gastrointestinal: Negative for abdominal pain and nausea.   Genitourinary: Negative for difficulty urinating and dysuria.   Skin: Negative for color change and pallor.   Neurological: Positive for weakness. Negative for dizziness and numbness.   Psychiatric/Behavioral: Negative for behavioral problems and confusion.     Objective:     Vital Signs (Most Recent):  Temp: 98.2 °F (36.8 °C) (07/10/17 1146)  Pulse: 69 (07/10/17 1100)  Resp: 18 (07/10/17  1146)  BP: (!) 94/56 (07/10/17 1146)  SpO2: 97 % (07/10/17 1146) Vital Signs (24h Range):  Temp:  [98.2 °F (36.8 °C)-98.6 °F (37 °C)] 98.2 °F (36.8 °C)  Pulse:  [69-81] 69  Resp:  [16-18] 18  SpO2:  [93 %-97 %] 97 %  BP: ()/(52-62) 94/56     Weight: 79.2 kg (174 lb 8 oz)  Body mass index is 25.77 kg/m².    Intake/Output Summary (Last 24 hours) at 07/10/17 1404  Last data filed at 07/10/17 1356   Gross per 24 hour   Intake              380 ml   Output              775 ml   Net             -395 ml      Physical Exam   Constitutional: He is oriented to person, place, and time. He appears well-developed and well-nourished.   HENT:   Head: Normocephalic and atraumatic.   Eyes: Conjunctivae are normal. Pupils are equal, round, and reactive to light.   Neck: Normal range of motion. No thyromegaly present.   Cardiovascular: Normal rate, regular rhythm and normal heart sounds.    Pulmonary/Chest: Effort normal and breath sounds normal.   Abdominal: Soft. He exhibits no distension. There is no tenderness.   Neurological: He is alert and oriented to person, place, and time. Coordination normal.   Skin: No rash noted. No erythema.       Significant Labs:   CBC:     Recent Labs  Lab 07/09/17  0650 07/10/17  0537   WBC 6.85 5.92   HGB 8.3* 7.3*   HCT 23.6* 20.9*   PLT 76* 61*     CMP:     Recent Labs  Lab 07/09/17  0650 07/10/17  0537   * 127*   K 4.0 4.1    99   CO2 19* 20*   * 98   BUN 22* 21*   CREATININE 1.1 1.1   CALCIUM 7.9* 7.9*   PROT 5.5* 5.1*   ALBUMIN 2.9* 2.7*   BILITOT 6.2* 6.2*   ALKPHOS 139* 132   AST 76* 75*   ALT 26 25   ANIONGAP 10 8   EGFRNONAA >60.0 >60.0     Coagulation:     Recent Labs  Lab 07/10/17  0537   INR 1.7*       Significant Imaging: I have reviewed all pertinent imaging results/findings within the past 24 hours.    Assessment/Plan:      GYPSY (acute kidney injury)    proabably related to recent overdiuresis; monitor volume status closely.          Hypotension    Appears to be  chronic.  Continue midorine; hold IVF unless absolutely necessary.          Hepatic encephalopathy    Continue rifaximin bid; add lactulose 25mg q8.  If patient unable to afford rifaximin can substitute zinc sulfate tablet for urate-lowering therapy          Alcoholic cirrhosis of liver with ascites    Decompensated.  On decreased dose of lasix/aldactone given recent GYPSY.            HCC (hepatocellular carcinoma)    Determined to not be good candidate for treatment.  Continue conservative management.          * Closed pelvic ring fracture    Likely nonoperative, as his decompensated cirrhosis portends perioperative mortality far exceeding potential benefitl.Orthopedic surgery to re-evaluate today and determine WB status for PT.              Discharge Disposition- likely Wednesday to a SNF/NH    VTE Risk Mitigation         Ordered     Place sequential compression device  Until discontinued      07/06/17 0409     Place OLYA hose  Until discontinued      07/06/17 0409     High Risk of VTE  Once      07/06/17 0409     Place OLYA hose  Until discontinued      07/05/17 2330     Place sequential compression device  Until discontinued      07/05/17 2330          Dung Castro MD  Department of Hospital Medicine   [facility]

## 2017-07-10 NOTE — PLAN OF CARE
Problem: Physical Therapy Goal  Goal: Physical Therapy Goal  PT goals until 7/12/17    1. Pt supine to sit with minimal assist-not met  2. Pt sit to supine with minimal assist-not met  3. Pt sit to stand with RW with WBAT R LE with CGA-not met  4. Pt to perform gait 30ft with RW with WBAT R LE with minimal assist.-not met  5. Pt to go up/down curb step with RW with WBAT R LE with minimal assist.-not met  6. Pt to transfer bed to/from bedside chair with WBAT R LE with minimal assist.-not met  7. Pt to perform B LE exs in sitting x 15 reps with handout.-not met     LTGs remain appropriate. Pt will continue PT POC.    Mayi Gay, PT  7/10/2017

## 2017-07-10 NOTE — PLAN OF CARE
Problem: Patient Care Overview  Goal: Plan of Care Review  Outcome: Ongoing (interventions implemented as appropriate)  POC reviewed with pt, understanding verbalized. Pt awaiting transfer to SNF. Order for IJ to be removed written today, IJ not removed due to poor venous access, referral to PICC team. Pt ambulated to restroom x 1 assist. Wife visiting pt this evening.

## 2017-07-10 NOTE — NURSING
IV attempt x 2 failed attempt, failed attempt x 2 per charge nurse. Dr. Castro notified, orders obtained to consult PICC team.

## 2017-07-11 LAB
ALBUMIN SERPL BCP-MCNC: 2.8 G/DL
ALP SERPL-CCNC: 140 U/L
ALT SERPL W/O P-5'-P-CCNC: 27 U/L
ANION GAP SERPL CALC-SCNC: 10 MMOL/L
AST SERPL-CCNC: 84 U/L
BASOPHILS # BLD AUTO: 0.04 K/UL
BASOPHILS NFR BLD: 0.6 %
BILIRUB SERPL-MCNC: 6.4 MG/DL
BUN SERPL-MCNC: 23 MG/DL
CALCIUM SERPL-MCNC: 8.1 MG/DL
CHLORIDE SERPL-SCNC: 97 MMOL/L
CO2 SERPL-SCNC: 18 MMOL/L
CREAT SERPL-MCNC: 1.4 MG/DL
DIFFERENTIAL METHOD: ABNORMAL
EOSINOPHIL # BLD AUTO: 0.4 K/UL
EOSINOPHIL NFR BLD: 5.4 %
ERYTHROCYTE [DISTWIDTH] IN BLOOD BY AUTOMATED COUNT: 21.6 %
EST. GFR  (AFRICAN AMERICAN): >60 ML/MIN/1.73 M^2
EST. GFR  (NON AFRICAN AMERICAN): 55 ML/MIN/1.73 M^2
GLUCOSE SERPL-MCNC: 98 MG/DL
HCT VFR BLD AUTO: 21.7 %
HGB BLD-MCNC: 7.6 G/DL
INR PPP: 1.5
LYMPHOCYTES # BLD AUTO: 1.6 K/UL
LYMPHOCYTES NFR BLD: 21.5 %
MAGNESIUM SERPL-MCNC: 1.4 MG/DL
MCH RBC QN AUTO: 32.8 PG
MCHC RBC AUTO-ENTMCNC: 35 %
MCV RBC AUTO: 94 FL
MONOCYTES # BLD AUTO: 0.9 K/UL
MONOCYTES NFR BLD: 11.9 %
NEUTROPHILS # BLD AUTO: 4.3 K/UL
NEUTROPHILS NFR BLD: 60.2 %
PHOSPHATE SERPL-MCNC: 3 MG/DL
PLATELET # BLD AUTO: 75 K/UL
PMV BLD AUTO: 9.6 FL
POTASSIUM SERPL-SCNC: 4.4 MMOL/L
PROT SERPL-MCNC: 5.3 G/DL
PROTHROMBIN TIME: 15.6 SEC
RBC # BLD AUTO: 2.32 M/UL
SODIUM SERPL-SCNC: 125 MMOL/L
WBC # BLD AUTO: 7.21 K/UL

## 2017-07-11 PROCEDURE — 84100 ASSAY OF PHOSPHORUS: CPT

## 2017-07-11 PROCEDURE — 63600175 PHARM REV CODE 636 W HCPCS: Performed by: HOSPITALIST

## 2017-07-11 PROCEDURE — P9047 ALBUMIN (HUMAN), 25%, 50ML: HCPCS | Performed by: HOSPITALIST

## 2017-07-11 PROCEDURE — 80053 COMPREHEN METABOLIC PANEL: CPT

## 2017-07-11 PROCEDURE — 25000003 PHARM REV CODE 250: Performed by: HOSPITALIST

## 2017-07-11 PROCEDURE — 36415 COLL VENOUS BLD VENIPUNCTURE: CPT

## 2017-07-11 PROCEDURE — 20600001 HC STEP DOWN PRIVATE ROOM

## 2017-07-11 PROCEDURE — 83735 ASSAY OF MAGNESIUM: CPT

## 2017-07-11 PROCEDURE — 85610 PROTHROMBIN TIME: CPT

## 2017-07-11 PROCEDURE — 97116 GAIT TRAINING THERAPY: CPT

## 2017-07-11 PROCEDURE — 99233 SBSQ HOSP IP/OBS HIGH 50: CPT | Mod: ,,, | Performed by: HOSPITALIST

## 2017-07-11 PROCEDURE — 85025 COMPLETE CBC W/AUTO DIFF WBC: CPT

## 2017-07-11 RX ORDER — MAGNESIUM SULFATE HEPTAHYDRATE 40 MG/ML
2 INJECTION, SOLUTION INTRAVENOUS ONCE
Status: COMPLETED | OUTPATIENT
Start: 2017-07-11 | End: 2017-07-11

## 2017-07-11 RX ORDER — ALBUMIN HUMAN 250 G/1000ML
25 SOLUTION INTRAVENOUS EVERY 8 HOURS
Status: DISCONTINUED | OUTPATIENT
Start: 2017-07-11 | End: 2017-07-15 | Stop reason: HOSPADM

## 2017-07-11 RX ADMIN — MIDODRINE HYDROCHLORIDE 15 MG: 5 TABLET ORAL at 09:07

## 2017-07-11 RX ADMIN — PREGABALIN 75 MG: 25 CAPSULE ORAL at 08:07

## 2017-07-11 RX ADMIN — FUROSEMIDE 20 MG: 20 TABLET ORAL at 08:07

## 2017-07-11 RX ADMIN — HYDROMORPHONE HYDROCHLORIDE 2 MG: 2 INJECTION INTRAMUSCULAR; INTRAVENOUS; SUBCUTANEOUS at 11:07

## 2017-07-11 RX ADMIN — RIFAXIMIN 550 MG: 550 TABLET ORAL at 08:07

## 2017-07-11 RX ADMIN — HYDROMORPHONE HYDROCHLORIDE 2 MG: 2 INJECTION INTRAMUSCULAR; INTRAVENOUS; SUBCUTANEOUS at 09:07

## 2017-07-11 RX ADMIN — ALBUMIN (HUMAN) 25 G: 12.5 SOLUTION INTRAVENOUS at 01:07

## 2017-07-11 RX ADMIN — HYDROMORPHONE HYDROCHLORIDE 2 MG: 2 INJECTION INTRAMUSCULAR; INTRAVENOUS; SUBCUTANEOUS at 05:07

## 2017-07-11 RX ADMIN — HYDROMORPHONE HYDROCHLORIDE 2 MG: 2 INJECTION INTRAMUSCULAR; INTRAVENOUS; SUBCUTANEOUS at 01:07

## 2017-07-11 RX ADMIN — MIDODRINE HYDROCHLORIDE 15 MG: 5 TABLET ORAL at 01:07

## 2017-07-11 RX ADMIN — PANTOPRAZOLE SODIUM 40 MG: 40 TABLET, DELAYED RELEASE ORAL at 08:07

## 2017-07-11 RX ADMIN — SPIRONOLACTONE 50 MG: 50 TABLET, FILM COATED ORAL at 08:07

## 2017-07-11 RX ADMIN — HYDROMORPHONE HYDROCHLORIDE 2 MG: 2 INJECTION INTRAMUSCULAR; INTRAVENOUS; SUBCUTANEOUS at 12:07

## 2017-07-11 RX ADMIN — CELECOXIB 200 MG: 100 CAPSULE ORAL at 08:07

## 2017-07-11 RX ADMIN — LACTULOSE 30 G: 20 SOLUTION ORAL at 05:07

## 2017-07-11 RX ADMIN — ONDANSETRON 4 MG: 2 INJECTION INTRAMUSCULAR; INTRAVENOUS at 08:07

## 2017-07-11 RX ADMIN — OXYCODONE HYDROCHLORIDE 30 MG: 5 TABLET ORAL at 08:07

## 2017-07-11 RX ADMIN — MAGNESIUM SULFATE IN WATER 2 G: 40 INJECTION, SOLUTION INTRAVENOUS at 12:07

## 2017-07-11 RX ADMIN — MIDODRINE HYDROCHLORIDE 15 MG: 5 TABLET ORAL at 05:07

## 2017-07-11 RX ADMIN — ALBUMIN (HUMAN) 25 G: 12.5 SOLUTION INTRAVENOUS at 09:07

## 2017-07-11 NOTE — PLAN OF CARE
Problem: Patient Care Overview  Goal: Plan of Care Review  Outcome: Ongoing (interventions implemented as appropriate)  POC reviewed with pt and wife, understanding verbalized. Continues with lower back pain, medicated x 2.

## 2017-07-11 NOTE — PLAN OF CARE
Analy Beckford with [facility] interested in accepting pt for admission [phone], however they want to do a phone interview with pt's wife prior to accepting.     SW awaiting return call from Analy Beckford with up to date information

## 2017-07-11 NOTE — PLAN OF CARE
Problem: Fall Risk (Adult)  Intervention: Patient Rounds   07/11/17 0435   Safety Interventions   Patient Rounds bed in low position;bed wheels locked;call light in reach;clutter free environment maintained;placement of personal items at bedside;ID band on;visualized patient      07/11/17 0435   Safety Interventions   Patient Rounds bed in low position;bed wheels locked;call light in reach;clutter free environment maintained;placement of personal items at bedside;ID band on;visualized patient;toileting offered         Problem: Patient Care Overview  Goal: Plan of Care Review  Outcome: Ongoing (interventions implemented as appropriate)  VSS, POC reviewed with pt and wife. Complaints of pain to back and hip. Pt up with 1 assist. Possible d/c to SNF on Wednesday. Wife remain at bedside. No acute changes over night. Bed alarm on, WCTM.    07/11/17 0435   Coping/Psychosocial   Plan Of Care Reviewed With patient       Problem: Pain, Acute (Adult)  Intervention: Monitor/Manage Analgesia   07/11/17 0435   Safety Interventions   Medication Review/Management medications reviewed;high risk medications identified   Manage Acute Burn Pain   Bowel Intervention adequate fluid intake promoted

## 2017-07-11 NOTE — PT/OT/SLP PROGRESS
"Physical Therapy  Treatment    Tommy Hopper   MRN: [id number]   Admitting Diagnosis: Closed pelvic ring fracture    PT Received On: 07/11/17  PT Start Time: 1021     PT Stop Time: 1049    PT Total Time (min): 28 min       Billable Minutes:  Gait [id number]    Treatment Type: Treatment  PT/PTA: PT     PTA Visit Number: 0       General Precautions: Standard, fall  Orthopedic Precautions: RLE weight bearing as tolerated   Braces: N/A         Subjective:  Communicated with nursingDebbie prior to session.  "I am ready"         Objective:   Patient found with: peripheral IV, telemetry    Functional Mobility:  Bed Mobility:   Rolling/Turning to Left: Stand by assistance  Scooting/Bridging: Contact Guard Assistance  Supine to Sit: Minimum Assistance  Sit to Supine:  (left up in chair with wife present)    Transfers:  Sit <> Stand Assistance: Contact Guard Assistance  Sit <> Stand Assistive Device: Rolling Walker    Gait:   Gait Distance: 22' + 35' + 70' with RW stooped posture. Chair follow for safety  Assistance 1: Contact Guard Assistance  Gait Assistive Device: Rolling walker  Gait Pattern: 3-point gait  Gait Deviation(s): decreased tomi, increased time in double stance, decreased toe-to-floor clearance, decreased weight-shifting ability, decreased velocity of limb motion, decreased step length, decreased stride length        Balance:   Static Sit: GOOD-: Takes MODERATE challenges from all directions but inconsistently  Dynamic Sit: GOOD-: Maintains balance through MODERATE excursions of active trunk movement,     Static Stand: FAIR+: Takes MINIMAL challenges from all directions  Dynamic stand: FAIR: Needs CONTACT GUARD during gait     Therapeutic Activities and Exercises:  Gait training with RW with chair follow for safety. (Prior distance of 20') CGA with cues for posture and stability. Pt with vary narrow base of support with very close steps (near tandam walking). Pt reports this decreases pain in pelvis. Pt and " wife report multiple falls prior to fall that resulted in pelvic fx. Educated pt that narrow base of support less stable. Pt able to widen stance with cues to 2-3 inch. Pt able to tolerate increased gait distance.    AM-PAC 6 CLICK MOBILITY  How much help from another person does this patient currently need?   1 = Unable, Total/Dependent Assistance  2 = A lot, Maximum/Moderate Assistance  3 = A little, Minimum/Contact Guard/Supervision  4 = None, Modified Reagan/Independent    Turning over in bed (including adjusting bedclothes, sheets and blankets)?: 3  Sitting down on and standing up from a chair with arms (e.g., wheelchair, bedside commode, etc.): 3  Moving from lying on back to sitting on the side of the bed?: 3  Moving to and from a bed to a chair (including a wheelchair)?: 3  Need to walk in hospital room?: 3  Climbing 3-5 steps with a railing?: 2  Total Score: 17    AM-PAC Raw Score CMS G-Code Modifier Level of Impairment Assistance   6 % Total / Unable   7 - 9 CM 80 - 100% Maximal Assist   10 - 14 CL 60 - 80% Moderate Assist   15 - 19 CK 40 - 60% Moderate Assist   20 - 22 CJ 20 - 40% Minimal Assist   23 CI 1-20% SBA / CGA   24 CH 0% Independent/ Mod I     Patient left up in chair with all lines intact, call button in reach and wife present.    Assessment:  Tommy Hopper is a 57 y.o. male with a medical diagnosis of Closed pelvic ring fracture and presents with impaired mobility, decreased balance, decreased activity tolerance, pain with mobility. Pt is showing improvement toward goals but will benefit from skilled therapy at SNF to address mobility impairments listed above to return safely home    Rehab identified problem list/impairments: Rehab identified problem list/impairments: weakness, pain, gait instability, decreased lower extremity function, impaired endurance, impaired balance, impaired self care skills, impaired functional mobilty, decreased coordination, decreased safety  awareness    Rehab potential is excellent.    Activity tolerance: Good    Discharge recommendations: Discharge Facility/Level Of Care Needs: nursing facility, skilled     Barriers to discharge: Barriers to Discharge: Inaccessible home environment, Decreased caregiver support    Equipment recommendations: Equipment Needed After Discharge: walker, rolling     GOALS:    Physical Therapy Goals        Problem: Physical Therapy Goal    Goal Priority Disciplines Outcome Goal Variances Interventions   Physical Therapy Goal     PT/OT, PT Ongoing (interventions implemented as appropriate)     Description:  PT goals until 7/12/17    1. Pt supine to sit with minimal assist-not met  2. Pt sit to supine with minimal assist-not met  3. Pt sit to stand with RW with WBAT R LE with CGA-not met  4. Pt to perform gait 30ft with RW with WBAT R LE with minimal assist.-not met  5. Pt to go up/down curb step with RW with WBAT R LE with minimal assist.-not met  6. Pt to transfer bed to/from bedside chair with WBAT R LE with minimal assist.-not met  7. Pt to perform B LE exs in sitting x 15 reps with handout.-not met                      PLAN:    Patient to be seen 5 x/week  to address the above listed problems via gait training, therapeutic activities, therapeutic exercises, neuromuscular re-education  Plan of Care expires: 08/06/17  Plan of Care reviewed with: patient, spouse         Carmen Mercado, PT  07/11/2017

## 2017-07-12 LAB
ALBUMIN SERPL BCP-MCNC: 3.1 G/DL
ALLENS TEST: ABNORMAL
ALP SERPL-CCNC: 146 U/L
ALT SERPL W/O P-5'-P-CCNC: 28 U/L
ANION GAP SERPL CALC-SCNC: 10 MMOL/L
ANION GAP SERPL CALC-SCNC: 13 MMOL/L
ANION GAP SERPL CALC-SCNC: 9 MMOL/L
AST SERPL-CCNC: 82 U/L
BASOPHILS # BLD AUTO: 0.04 K/UL
BASOPHILS NFR BLD: 0.7 %
BILIRUB SERPL-MCNC: 6.3 MG/DL
BUN SERPL-MCNC: 28 MG/DL
BUN SERPL-MCNC: 29 MG/DL
BUN SERPL-MCNC: 29 MG/DL
CALCIUM SERPL-MCNC: 8.4 MG/DL
CALCIUM SERPL-MCNC: 8.9 MG/DL
CALCIUM SERPL-MCNC: 9.1 MG/DL
CHLORIDE SERPL-SCNC: 95 MMOL/L
CHLORIDE SERPL-SCNC: 97 MMOL/L
CHLORIDE SERPL-SCNC: 98 MMOL/L
CO2 SERPL-SCNC: 16 MMOL/L
CO2 SERPL-SCNC: 18 MMOL/L
CO2 SERPL-SCNC: 20 MMOL/L
CREAT SERPL-MCNC: 1.6 MG/DL
CREAT SERPL-MCNC: 1.7 MG/DL
CREAT SERPL-MCNC: 1.7 MG/DL
DELSYS: ABNORMAL
DIFFERENTIAL METHOD: ABNORMAL
EOSINOPHIL # BLD AUTO: 0.3 K/UL
EOSINOPHIL NFR BLD: 5.5 %
ERYTHROCYTE [DISTWIDTH] IN BLOOD BY AUTOMATED COUNT: 21.5 %
EST. GFR  (AFRICAN AMERICAN): 50.3 ML/MIN/1.73 M^2
EST. GFR  (AFRICAN AMERICAN): 50.3 ML/MIN/1.73 M^2
EST. GFR  (AFRICAN AMERICAN): 54.1 ML/MIN/1.73 M^2
EST. GFR  (NON AFRICAN AMERICAN): 43.5 ML/MIN/1.73 M^2
EST. GFR  (NON AFRICAN AMERICAN): 43.5 ML/MIN/1.73 M^2
EST. GFR  (NON AFRICAN AMERICAN): 46.8 ML/MIN/1.73 M^2
FUNGUS SPEC CULT: NORMAL
GLUCOSE SERPL-MCNC: 100 MG/DL
GLUCOSE SERPL-MCNC: 116 MG/DL
GLUCOSE SERPL-MCNC: 95 MG/DL
HCO3 UR-SCNC: 19.1 MMOL/L (ref 24–28)
HCT VFR BLD AUTO: 19.6 %
HGB BLD-MCNC: 7 G/DL
INR PPP: 1.5
LACTATE SERPL-SCNC: 1.7 MMOL/L
LYMPHOCYTES # BLD AUTO: 1.2 K/UL
LYMPHOCYTES NFR BLD: 21 %
MAGNESIUM SERPL-MCNC: 1.8 MG/DL
MCH RBC QN AUTO: 33.7 PG
MCHC RBC AUTO-ENTMCNC: 35.7 %
MCV RBC AUTO: 94 FL
MODE: ABNORMAL
MONOCYTES # BLD AUTO: 0.5 K/UL
MONOCYTES NFR BLD: 8.6 %
NEUTROPHILS # BLD AUTO: 3.5 K/UL
NEUTROPHILS NFR BLD: 63.8 %
PCO2 BLDA: 29.4 MMHG (ref 35–45)
PH SMN: 7.42 [PH] (ref 7.35–7.45)
PHOSPHATE SERPL-MCNC: 3 MG/DL
PLATELET # BLD AUTO: 55 K/UL
PMV BLD AUTO: 10 FL
PO2 BLDA: 54 MMHG (ref 80–100)
POC BE: -5 MMOL/L
POC SATURATED O2: 89 % (ref 95–100)
POC TCO2: 20 MMOL/L (ref 23–27)
POTASSIUM SERPL-SCNC: 4.7 MMOL/L
POTASSIUM SERPL-SCNC: 4.8 MMOL/L
POTASSIUM SERPL-SCNC: 4.8 MMOL/L
PROCALCITONIN SERPL IA-MCNC: 0.58 NG/ML
PROT SERPL-MCNC: 5.4 G/DL
PROTHROMBIN TIME: 15.4 SEC
RBC # BLD AUTO: 2.08 M/UL
SAMPLE: ABNORMAL
SITE: ABNORMAL
SODIUM SERPL-SCNC: 124 MMOL/L
SODIUM SERPL-SCNC: 125 MMOL/L
SODIUM SERPL-SCNC: 127 MMOL/L
SP02: 94
WBC # BLD AUTO: 5.47 K/UL

## 2017-07-12 PROCEDURE — 99233 SBSQ HOSP IP/OBS HIGH 50: CPT | Mod: ,,, | Performed by: HOSPITALIST

## 2017-07-12 PROCEDURE — 36415 COLL VENOUS BLD VENIPUNCTURE: CPT

## 2017-07-12 PROCEDURE — P9047 ALBUMIN (HUMAN), 25%, 50ML: HCPCS | Performed by: HOSPITALIST

## 2017-07-12 PROCEDURE — 25000003 PHARM REV CODE 250: Performed by: HOSPITALIST

## 2017-07-12 PROCEDURE — 83605 ASSAY OF LACTIC ACID: CPT

## 2017-07-12 PROCEDURE — 82803 BLOOD GASES ANY COMBINATION: CPT

## 2017-07-12 PROCEDURE — 85025 COMPLETE CBC W/AUTO DIFF WBC: CPT

## 2017-07-12 PROCEDURE — 84100 ASSAY OF PHOSPHORUS: CPT

## 2017-07-12 PROCEDURE — 80048 BASIC METABOLIC PNL TOTAL CA: CPT

## 2017-07-12 PROCEDURE — 85610 PROTHROMBIN TIME: CPT

## 2017-07-12 PROCEDURE — 84145 PROCALCITONIN (PCT): CPT

## 2017-07-12 PROCEDURE — 83735 ASSAY OF MAGNESIUM: CPT

## 2017-07-12 PROCEDURE — 87040 BLOOD CULTURE FOR BACTERIA: CPT

## 2017-07-12 PROCEDURE — 80053 COMPREHEN METABOLIC PANEL: CPT

## 2017-07-12 PROCEDURE — 20600001 HC STEP DOWN PRIVATE ROOM

## 2017-07-12 PROCEDURE — 36600 WITHDRAWAL OF ARTERIAL BLOOD: CPT

## 2017-07-12 PROCEDURE — 63600175 PHARM REV CODE 636 W HCPCS: Performed by: HOSPITALIST

## 2017-07-12 PROCEDURE — 99900035 HC TECH TIME PER 15 MIN (STAT)

## 2017-07-12 RX ORDER — LACTULOSE 10 G/15ML
200 SOLUTION ORAL; RECTAL ONCE
Status: COMPLETED | OUTPATIENT
Start: 2017-07-12 | End: 2017-07-12

## 2017-07-12 RX ORDER — LACTULOSE 10 G/15ML
200 SOLUTION ORAL; RECTAL 3 TIMES DAILY
Status: DISCONTINUED | OUTPATIENT
Start: 2017-07-12 | End: 2017-07-12

## 2017-07-12 RX ORDER — OCTREOTIDE ACETATE 100 UG/ML
50 INJECTION, SOLUTION INTRAVENOUS; SUBCUTANEOUS EVERY 8 HOURS
Status: DISCONTINUED | OUTPATIENT
Start: 2017-07-12 | End: 2017-07-15 | Stop reason: HOSPADM

## 2017-07-12 RX ADMIN — ALBUMIN (HUMAN) 25 G: 12.5 SOLUTION INTRAVENOUS at 02:07

## 2017-07-12 RX ADMIN — LACTULOSE 30 G: 20 SOLUTION ORAL at 02:07

## 2017-07-12 RX ADMIN — OCTREOTIDE ACETATE 50 MCG: 100 INJECTION, SOLUTION INTRAVENOUS; SUBCUTANEOUS at 08:07

## 2017-07-12 RX ADMIN — OCTREOTIDE ACETATE 50 MCG: 100 INJECTION, SOLUTION INTRAVENOUS; SUBCUTANEOUS at 03:07

## 2017-07-12 RX ADMIN — CELECOXIB 200 MG: 100 CAPSULE ORAL at 09:07

## 2017-07-12 RX ADMIN — MIDODRINE HYDROCHLORIDE 15 MG: 5 TABLET ORAL at 02:07

## 2017-07-12 RX ADMIN — MIDODRINE HYDROCHLORIDE 15 MG: 5 TABLET ORAL at 05:07

## 2017-07-12 RX ADMIN — PANTOPRAZOLE SODIUM 40 MG: 40 TABLET, DELAYED RELEASE ORAL at 09:07

## 2017-07-12 RX ADMIN — PREGABALIN 75 MG: 25 CAPSULE ORAL at 09:07

## 2017-07-12 RX ADMIN — LACTULOSE 30 G: 20 SOLUTION ORAL at 08:07

## 2017-07-12 RX ADMIN — RIFAXIMIN 550 MG: 550 TABLET ORAL at 09:07

## 2017-07-12 RX ADMIN — LACTULOSE 200 G: 10 SOLUTION ORAL at 10:07

## 2017-07-12 RX ADMIN — SPIRONOLACTONE 50 MG: 50 TABLET, FILM COATED ORAL at 09:07

## 2017-07-12 RX ADMIN — ALBUMIN (HUMAN) 25 G: 12.5 SOLUTION INTRAVENOUS at 08:07

## 2017-07-12 RX ADMIN — MIDODRINE HYDROCHLORIDE 15 MG: 5 TABLET ORAL at 08:07

## 2017-07-12 RX ADMIN — RIFAXIMIN 550 MG: 550 TABLET ORAL at 08:07

## 2017-07-12 RX ADMIN — ALBUMIN (HUMAN) 25 G: 12.5 SOLUTION INTRAVENOUS at 05:07

## 2017-07-12 RX ADMIN — FUROSEMIDE 20 MG: 20 TABLET ORAL at 09:07

## 2017-07-12 RX ADMIN — LACTULOSE 30 G: 20 SOLUTION ORAL at 05:07

## 2017-07-12 NOTE — PROGRESS NOTES
[facility] Hospital Medicine  Progress Note    Patient Name: Tommy Hopper  MRN: [id number]  Patient Class: IP- Inpatient   Admission Date: 7/5/2017  Length of Stay: 7 days  Attending Physician: Zack Crawford MD  Primary Care Provider: Primary Doctor [facility] Medicine Team: [facility] MED A Zack Crawford MD    Subjective:     Principal Problem:Closed pelvic ring fracture    HPI:  57M presents as transfer from [facility] ED where he presented for pain and inability to ambulate after a fall one day previously.  History very limited due to patient's garbled speech and lack of documentation from outside ED as they were taken off somewhere by another physician.  He reports losing his balance at home and falling on his right side.  He endorses severe persistent pain which has not been helped by any pain medication he was given so far.  While in the ambulance on his way here from [facility] his speech was noted to be more slurred and he was hypotensive so the ambulance diverted to the ED here.    Hospital Course:  No notes on file    Interval History: Patient seen and examined at bedside, no acute complains, tolerating PT/OT.     Review of Systems   Constitutional: Negative for activity change and appetite change.   HENT: Negative for drooling and facial swelling.    Eyes: Negative for discharge and itching.   Respiratory: Negative for cough, shortness of breath and wheezing.    Cardiovascular: Negative for chest pain and palpitations.   Gastrointestinal: Negative for abdominal pain and nausea.   Genitourinary: Negative for difficulty urinating and dysuria.   Skin: Negative for color change and pallor.   Neurological: Positive for weakness. Negative for dizziness and numbness.   Psychiatric/Behavioral: Negative for behavioral problems and confusion.     Objective:     Vital Signs (Most Recent):  Temp: 97.5 °F (36.4 °C) (07/11/17 2330)  Pulse: 77 (07/11/17 2330)  Resp: 18 (07/11/17  2330)  BP: (!) 97/53 (07/11/17 2330)  SpO2: (!) 94 % (07/11/17 2330) Vital Signs (24h Range):  Temp:  [97.5 °F (36.4 °C)-97.8 °F (36.6 °C)] 97.5 °F (36.4 °C)  Pulse:  [62-97] 77  Resp:  [17-18] 18  SpO2:  [94 %-96 %] 94 %  BP: ()/(53-61) 97/53     Weight: 79.2 kg (174 lb 8 oz)  Body mass index is 25.77 kg/m².    Intake/Output Summary (Last 24 hours) at 07/12/17 0058  Last data filed at 07/11/17 1321   Gross per 24 hour   Intake              170 ml   Output                0 ml   Net              170 ml      Physical Exam   Constitutional: He is oriented to person, place, and time. He appears well-developed and well-nourished.   HENT:   Head: Normocephalic and atraumatic.   Eyes: Conjunctivae are normal. Pupils are equal, round, and reactive to light.   Neck: Normal range of motion. No thyromegaly present.   Cardiovascular: Normal rate, regular rhythm and normal heart sounds.    Pulmonary/Chest: Effort normal and breath sounds normal.   Abdominal: Soft. He exhibits no distension. There is no tenderness.   Neurological: He is alert and oriented to person, place, and time. Coordination normal.   Skin: No rash noted. No erythema.       Significant Labs:   CBC:   Recent Labs  Lab 07/10/17  0537 07/11/17 0428   WBC 5.92 7.21   HGB 7.3* 7.6*   HCT 20.9* 21.7*   PLT 61* 75*     CMP:   Recent Labs  Lab 07/10/17  0537 07/11/17  0427   * 125*   K 4.1 4.4   CL 99 97   CO2 20* 18*   GLU 98 98   BUN 21* 23*   CREATININE 1.1 1.4   CALCIUM 7.9* 8.1*   PROT 5.1* 5.3*   ALBUMIN 2.7* 2.8*   BILITOT 6.2* 6.4*   ALKPHOS 132 140*   AST 75* 84*   ALT 25 27   ANIONGAP 8 10   EGFRNONAA >60.0 55.0*       Significant Imaging: I have reviewed all pertinent imaging results/findings within the past 24 hours.    Assessment/Plan:      GYPSY (acute kidney injury)    proabably related to recent overdiuresis; monitor volume status closely.          Hypotension    Appears to be chronic.  Continue midorine; hold IVF unless absolutely  necessary.          Hepatic encephalopathy    Continue rifaximin bid; add lactulose 25mg q8.  If patient unable to afford rifaximin can substitute zinc sulfate tablet for urate-lowering therapy          Alcoholic cirrhosis of liver with ascites    Decompensated.  On decreased dose of lasix/aldactone given recent GYPSY.            HCC (hepatocellular carcinoma)    Determined to not be good candidate for treatment.  Continue conservative management.          * Closed pelvic ring fracture    Likely nonoperative, as his decompensated cirrhosis portends perioperative mortality far exceeding potential benefitl.Orthopedic surgery to re-evaluate today and determine WB status for PT.            VTE Risk Mitigation         Ordered     Place sequential compression device  Until discontinued      07/06/17 0409     Place OLYA hose  Until discontinued      07/06/17 0409     High Risk of VTE  Once      07/06/17 0409     Place OLYA hose  Until discontinued      07/05/17 2330     Place sequential compression device  Until discontinued      07/05/17 2330          Zack Crawford MD  Department of Hospital Medicine   [facility]

## 2017-07-12 NOTE — SUBJECTIVE & OBJECTIVE
Interval History: Patient seen and examined at bedside, no acute complains, tolerating PT/OT.     Review of Systems   Constitutional: Negative for activity change and appetite change.   HENT: Negative for drooling and facial swelling.    Eyes: Negative for discharge and itching.   Respiratory: Negative for cough, shortness of breath and wheezing.    Cardiovascular: Negative for chest pain and palpitations.   Gastrointestinal: Negative for abdominal pain and nausea.   Genitourinary: Negative for difficulty urinating and dysuria.   Skin: Negative for color change and pallor.   Neurological: Positive for weakness. Negative for dizziness and numbness.   Psychiatric/Behavioral: Negative for behavioral problems and confusion.     Objective:     Vital Signs (Most Recent):  Temp: 97.5 °F (36.4 °C) (07/11/17 2330)  Pulse: 77 (07/11/17 2330)  Resp: 18 (07/11/17 2330)  BP: (!) 97/53 (07/11/17 2330)  SpO2: (!) 94 % (07/11/17 2330) Vital Signs (24h Range):  Temp:  [97.5 °F (36.4 °C)-97.8 °F (36.6 °C)] 97.5 °F (36.4 °C)  Pulse:  [62-97] 77  Resp:  [17-18] 18  SpO2:  [94 %-96 %] 94 %  BP: ()/(53-61) 97/53     Weight: 79.2 kg (174 lb 8 oz)  Body mass index is 25.77 kg/m².    Intake/Output Summary (Last 24 hours) at 07/12/17 0058  Last data filed at 07/11/17 1321   Gross per 24 hour   Intake              170 ml   Output                0 ml   Net              170 ml      Physical Exam   Constitutional: He is oriented to person, place, and time. He appears well-developed and well-nourished.   HENT:   Head: Normocephalic and atraumatic.   Eyes: Conjunctivae are normal. Pupils are equal, round, and reactive to light.   Neck: Normal range of motion. No thyromegaly present.   Cardiovascular: Normal rate, regular rhythm and normal heart sounds.    Pulmonary/Chest: Effort normal and breath sounds normal.   Abdominal: Soft. He exhibits no distension. There is no tenderness.   Neurological: He is alert and oriented to person, place, and  time. Coordination normal.   Skin: No rash noted. No erythema.       Significant Labs:   CBC:   Recent Labs  Lab 07/10/17  0537 07/11/17  0428   WBC 5.92 7.21   HGB 7.3* 7.6*   HCT 20.9* 21.7*   PLT 61* 75*     CMP:   Recent Labs  Lab 07/10/17  0537 07/11/17 0427   * 125*   K 4.1 4.4   CL 99 97   CO2 20* 18*   GLU 98 98   BUN 21* 23*   CREATININE 1.1 1.4   CALCIUM 7.9* 8.1*   PROT 5.1* 5.3*   ALBUMIN 2.7* 2.8*   BILITOT 6.2* 6.4*   ALKPHOS 132 140*   AST 75* 84*   ALT 25 27   ANIONGAP 8 10   EGFRNONAA >60.0 55.0*       Significant Imaging: I have reviewed all pertinent imaging results/findings within the past 24 hours.

## 2017-07-12 NOTE — NURSING
Rectal temp 94.8. MD notified. Multiple blankets on pt; Bear hugger to be ordered and applied. WCTM.

## 2017-07-12 NOTE — NURSING
Pt back in room from CT scan. Transported with monitor and 2 RNs, VS remained stable through transport. See flowsheet for most current VS.

## 2017-07-12 NOTE — PLAN OF CARE
SW received call from Analy Beckford with [facility] [phone], stating that they have accepted pt for admission, but need 142, Passr, ppd and chest x-ray. SW sent all requested documentation via [facility].     SNF orders needed     Transfer to SNF pending [employer] authorization

## 2017-07-12 NOTE — PROGRESS NOTES
[facility] Hospital Medicine  Progress Note    Patient Name: Tommy Hopper  MRN: [id number]  Patient Class: IP- Inpatient   Admission Date: 7/5/2017  Length of Stay: 7 days  Attending Physician: Zack Crawford MD  Primary Care Provider: Primary Doctor [facility] Medicine Team: [facility] MED A Zack Crawford MD    Subjective:     Principal Problem:Closed pelvic ring fracture    HPI:  57M presents as transfer from [facility] ED where he presented for pain and inability to ambulate after a fall one day previously.  History very limited due to patient's garbled speech and lack of documentation from outside ED as they were taken off somewhere by another physician.  He reports losing his balance at home and falling on his right side.  He endorses severe persistent pain which has not been helped by any pain medication he was given so far.  While in the ambulance on his way here from [facility] his speech was noted to be more slurred and he was hypotensive so the ambulance diverted to the ED here.    Hospital Course:  No notes on file    No new subjective & objective note has been filed under this hospital service since the last note was generated.    Assessment/Plan:      GYPSY (acute kidney injury)    proabably related to recent overdiuresis; monitor volume status closely.          Hypotension    Appears to be chronic.  Continue midorine; hold IVF unless absolutely necessary.          Hepatic encephalopathy    Continue rifaximin bid; add lactulose 25mg q8.  If patient unable to afford rifaximin can substitute zinc sulfate tablet for urate-lowering therapy          Alcoholic cirrhosis of liver with ascites    Decompensated.  On decreased dose of lasix/aldactone given recent GYPSY.            HCC (hepatocellular carcinoma)    Determined to not be good candidate for treatment.  Continue conservative management.          * Closed pelvic ring fracture    Likely nonoperative, as his decompensated  cirrhosis portends perioperative mortality far exceeding potential benefitl.Orthopedic surgery to re-evaluate today and determine WB status for PT.            VTE Risk Mitigation         Ordered     Place sequential compression device  Until discontinued      07/06/17 0409     Place OLYA hose  Until discontinued      07/06/17 0409     High Risk of VTE  Once      07/06/17 0409     Place OLYA hose  Until discontinued      07/05/17 2330     Place sequential compression device  Until discontinued      07/05/17 2330          Zack Crawford MD  Department of Hospital Medicine   [facility]

## 2017-07-12 NOTE — PLAN OF CARE
Problem: Patient Care Overview  Goal: Plan of Care Review  Pt remains AAOx3, vital signs have been stable. Pt complains of lower back pain, prn pain medication given upon request as ordered.  Call light and personal belongings within reach. Pt using bedside commode with assistance x1. Plan of care reviewed with pt and spouse, all questions and concerns were addressed. Will continue to monitor.

## 2017-07-12 NOTE — NURSING
House supervisor and critical care at bedside. Pt stable at this time; remains lethargic. Primary MD already had ordered blood cultures, chest xray, paracentesis, CT head. No new orders at this time. Called CT to expedite time. RN will transport pt to CT ~1530.

## 2017-07-12 NOTE — NURSING
Pt unable to follow simple commands to swallow pills. MD notified, lactulose enema to be ordered.

## 2017-07-12 NOTE — PLAN OF CARE
Problem: Patient Care Overview  Goal: Plan of Care Review  Outcome: Ongoing (interventions implemented as appropriate)  Pt aaox2-3 this morning with lethargy increasing through shift to the point of pt only responding to repeated voice or pain only at times. Lactulose enema administered with large BM but no improvement of mental status/lethary. Rapid response called today around 1310 due to hypothermia, bradycardia, and unknown etiology of lethargy. Bear hugger currently in place, chest xray done, blood cultures, CBC and ABGs done, CT head done, paracentesis to be done 7/13 per IR. Neuro checks q4 maintained. Bed in lowest position, wheels locked, call light within reach, wife to remain at bedside. WCTM.

## 2017-07-12 NOTE — NURSING
Pt's VS at baseline (see flowsheet.) Bear hugger on. Pt extremely lethargic, responding to pain. Wife and daughter at bedside. MD aware.

## 2017-07-12 NOTE — SIGNIFICANT EVENT
Critical Care Outreach (CORE)  Critical Care Medicine  Consult Note      CORE Metrics:     Admit Date: 2017  LOS: 7  Code Status: Full Code   CC: AMS   Date of Consult: 2017  : 1960  Age: 57 y.o.  Weight:   Wt Readings from Last 1 Encounters:   17 84 kg (185 lb 3 oz)     Race:   Sex: male  Bed: [address] [place] [address] A:   MRN: [id number]  RRT Indication(s): AMS  Time CORE Call Received: 15:09  Time CORE at Bedside: 15:11  Was the patient discharged from an ICU this admission? No   Was the patient discharged from a PACU within last 24 hours? No  Did the patient receive conscious sedation/general anesthesia within last 24 hours? No  Was the patient in the ED within the past 24 hours? No  Was the patient started on NIPPV within the past 24 hours? No  Did this progress into an ARC or CPA: No  Attending Physician: Zack Crawford MD  Primary Service: [facility] A  Illness Category: Neurologic  Consult Requested By: Zack Crawford MD   Disposition: Stay on floor pending work up.     RRT activated for transient sinus bradycardia noted on telemetry. Telemetry reviewed and asymptomatic sinus bradycardia confirmed (now resolved). Pt has been progressively altered throughout the day. Primary team has initiated a work up including lactulose enema, cultures, CTH, and paracentesis.     No specific intervention requested by staff or nursing at bedside. Pt hemodynamically stable and HR now in the 60s. Initial work up appears appropriate given the situation. Will sign off. No care rendered.     ADOLFO Harrington PA-C  Critical Care Medicine  [phone]

## 2017-07-13 PROBLEM — E43 SEVERE MALNUTRITION: Status: ACTIVE | Noted: 2017-07-13

## 2017-07-13 LAB
ABO GROUP BLD: NORMAL
ALBUMIN FLD-MCNC: 0.8 G/DL
ALBUMIN SERPL BCP-MCNC: 3.8 G/DL
ALP SERPL-CCNC: 134 U/L
ALT SERPL W/O P-5'-P-CCNC: 32 U/L
ANION GAP SERPL CALC-SCNC: 11 MMOL/L
ANION GAP SERPL CALC-SCNC: 11 MMOL/L
ANION GAP SERPL CALC-SCNC: 15 MMOL/L
ANISOCYTOSIS BLD QL SMEAR: SLIGHT
APPEARANCE FLD: CLEAR
AST SERPL-CCNC: 81 U/L
BASOPHILS # BLD AUTO: 0.07 K/UL
BASOPHILS NFR BLD: 1.3 %
BILIRUB SERPL-MCNC: 7.8 MG/DL
BILIRUB UR QL STRIP: NEGATIVE
BLD GP AB SCN CELLS X3 SERPL QL: NORMAL
BODY FLD TYPE: NORMAL
BODY FLUID SOURCE, LDH: NORMAL
BUN SERPL-MCNC: 32 MG/DL
BUN SERPL-MCNC: 32 MG/DL
BUN SERPL-MCNC: 35 MG/DL
BURR CELLS BLD QL SMEAR: ABNORMAL
CALCIUM SERPL-MCNC: 8.8 MG/DL
CALCIUM SERPL-MCNC: 9 MG/DL
CALCIUM SERPL-MCNC: 9.1 MG/DL
CHLORIDE SERPL-SCNC: 100 MMOL/L
CHLORIDE SERPL-SCNC: 99 MMOL/L
CHLORIDE SERPL-SCNC: 99 MMOL/L
CLARITY UR REFRACT.AUTO: CLEAR
CO2 SERPL-SCNC: 15 MMOL/L
CO2 SERPL-SCNC: 18 MMOL/L
CO2 SERPL-SCNC: 19 MMOL/L
COLOR FLD: YELLOW
COLOR UR AUTO: YELLOW
CREAT SERPL-MCNC: 2 MG/DL
CREAT SERPL-MCNC: 2 MG/DL
CREAT SERPL-MCNC: 2.2 MG/DL
CREAT UR-MCNC: 43 MG/DL
DIFFERENTIAL METHOD: ABNORMAL
EOSINOPHIL # BLD AUTO: 0.4 K/UL
EOSINOPHIL NFR BLD: 6.4 %
EOSINOPHIL URNS QL WRIGHT STN: NORMAL
ERYTHROCYTE [DISTWIDTH] IN BLOOD BY AUTOMATED COUNT: 22.4 %
EST. GFR  (AFRICAN AMERICAN): 36.8 ML/MIN/1.73 M^2
EST. GFR  (AFRICAN AMERICAN): 41.3 ML/MIN/1.73 M^2
EST. GFR  (AFRICAN AMERICAN): 41.3 ML/MIN/1.73 M^2
EST. GFR  (NON AFRICAN AMERICAN): 31.8 ML/MIN/1.73 M^2
EST. GFR  (NON AFRICAN AMERICAN): 35.7 ML/MIN/1.73 M^2
EST. GFR  (NON AFRICAN AMERICAN): 35.7 ML/MIN/1.73 M^2
GLUCOSE SERPL-MCNC: 106 MG/DL
GLUCOSE SERPL-MCNC: 120 MG/DL
GLUCOSE SERPL-MCNC: 153 MG/DL
GLUCOSE UR QL STRIP: NEGATIVE
GRAM STN SPEC: NORMAL
GRAM STN SPEC: NORMAL
HCT VFR BLD AUTO: 20.1 %
HGB BLD-MCNC: 7.1 G/DL
HGB UR QL STRIP: ABNORMAL
HYALINE CASTS UR QL AUTO: 1 /LPF
HYPOCHROMIA BLD QL SMEAR: ABNORMAL
INR PPP: 1.6
KETONES UR QL STRIP: NEGATIVE
LDH FLD L TO P-CCNC: 71 U/L
LEUKOCYTE ESTERASE UR QL STRIP: NEGATIVE
LYMPHOCYTES # BLD AUTO: 1.3 K/UL
LYMPHOCYTES NFR BLD: 24.3 %
LYMPHOCYTES NFR FLD MANUAL: 75 %
MCH RBC QN AUTO: 33.2 PG
MCHC RBC AUTO-ENTMCNC: 35.3 %
MCV RBC AUTO: 94 FL
MESOTHL CELL NFR FLD MANUAL: 2 %
MICROSCOPIC COMMENT: ABNORMAL
MONOCYTES # BLD AUTO: 0.5 K/UL
MONOCYTES NFR BLD: 8.2 %
MONOS+MACROS NFR FLD MANUAL: 10 %
NEUTROPHILS # BLD AUTO: 3.2 K/UL
NEUTROPHILS NFR BLD: 59.8 %
NEUTROPHILS NFR FLD MANUAL: 13 %
NITRITE UR QL STRIP: NEGATIVE
OSMOLALITY SERPL: 280 MOSM/KG
OSMOLALITY UR: 201 MOSM/KG
OVALOCYTES BLD QL SMEAR: ABNORMAL
PH UR STRIP: 6 [PH] (ref 5–8)
PLATELET # BLD AUTO: 71 K/UL
PLATELET BLD QL SMEAR: ABNORMAL
PMV BLD AUTO: 10.5 FL
POIKILOCYTOSIS BLD QL SMEAR: SLIGHT
POLYCHROMASIA BLD QL SMEAR: ABNORMAL
POTASSIUM SERPL-SCNC: 4.6 MMOL/L
POTASSIUM SERPL-SCNC: 4.7 MMOL/L
POTASSIUM SERPL-SCNC: 5.8 MMOL/L
PROT FLD-MCNC: 1.1 G/DL
PROT SERPL-MCNC: 5.9 G/DL
PROT UR QL STRIP: NEGATIVE
PROTHROMBIN TIME: 16.2 SEC
RBC # BLD AUTO: 2.14 M/UL
RBC #/AREA URNS AUTO: 7 /HPF (ref 0–4)
RH BLD: NORMAL
SODIUM SERPL-SCNC: 129 MMOL/L
SODIUM UR-SCNC: 25 MMOL/L
SP GR UR STRIP: 1 (ref 1–1.03)
SPECIMEN SOURCE: NORMAL
SPECIMEN SOURCE: NORMAL
URN SPEC COLLECT METH UR: ABNORMAL
UROBILINOGEN UR STRIP-ACNC: NEGATIVE EU/DL
WBC # BLD AUTO: 5.51 K/UL
WBC # FLD: 31 /CU MM
WBC #/AREA URNS AUTO: 5 /HPF (ref 0–5)

## 2017-07-13 PROCEDURE — 63600175 PHARM REV CODE 636 W HCPCS: Performed by: HOSPITALIST

## 2017-07-13 PROCEDURE — 25000003 PHARM REV CODE 250: Performed by: HOSPITALIST

## 2017-07-13 PROCEDURE — 83935 ASSAY OF URINE OSMOLALITY: CPT

## 2017-07-13 PROCEDURE — 84300 ASSAY OF URINE SODIUM: CPT

## 2017-07-13 PROCEDURE — 89051 BODY FLUID CELL COUNT: CPT

## 2017-07-13 PROCEDURE — 87075 CULTR BACTERIA EXCEPT BLOOD: CPT

## 2017-07-13 PROCEDURE — 87205 SMEAR GRAM STAIN: CPT | Mod: 59

## 2017-07-13 PROCEDURE — 99233 SBSQ HOSP IP/OBS HIGH 50: CPT | Mod: ,,, | Performed by: HOSPITALIST

## 2017-07-13 PROCEDURE — 84157 ASSAY OF PROTEIN OTHER: CPT

## 2017-07-13 PROCEDURE — 36415 COLL VENOUS BLD VENIPUNCTURE: CPT

## 2017-07-13 PROCEDURE — P9047 ALBUMIN (HUMAN), 25%, 50ML: HCPCS | Performed by: HOSPITALIST

## 2017-07-13 PROCEDURE — 20600001 HC STEP DOWN PRIVATE ROOM

## 2017-07-13 PROCEDURE — 97530 THERAPEUTIC ACTIVITIES: CPT

## 2017-07-13 PROCEDURE — 97110 THERAPEUTIC EXERCISES: CPT

## 2017-07-13 PROCEDURE — 81001 URINALYSIS AUTO W/SCOPE: CPT

## 2017-07-13 PROCEDURE — 82570 ASSAY OF URINE CREATININE: CPT

## 2017-07-13 PROCEDURE — 25000003 PHARM REV CODE 250: Performed by: INTERNAL MEDICINE

## 2017-07-13 PROCEDURE — 80048 BASIC METABOLIC PNL TOTAL CA: CPT | Mod: 91

## 2017-07-13 PROCEDURE — 99223 1ST HOSP IP/OBS HIGH 75: CPT | Mod: ,,, | Performed by: INTERNAL MEDICINE

## 2017-07-13 PROCEDURE — 85610 PROTHROMBIN TIME: CPT

## 2017-07-13 PROCEDURE — 83615 LACTATE (LD) (LDH) ENZYME: CPT

## 2017-07-13 PROCEDURE — 86900 BLOOD TYPING SEROLOGIC ABO: CPT

## 2017-07-13 PROCEDURE — 80048 BASIC METABOLIC PNL TOTAL CA: CPT

## 2017-07-13 PROCEDURE — 80053 COMPREHEN METABOLIC PANEL: CPT

## 2017-07-13 PROCEDURE — 86901 BLOOD TYPING SEROLOGIC RH(D): CPT

## 2017-07-13 PROCEDURE — 86850 RBC ANTIBODY SCREEN: CPT

## 2017-07-13 PROCEDURE — 85025 COMPLETE CBC W/AUTO DIFF WBC: CPT

## 2017-07-13 PROCEDURE — 87205 SMEAR GRAM STAIN: CPT

## 2017-07-13 PROCEDURE — 82042 OTHER SOURCE ALBUMIN QUAN EA: CPT

## 2017-07-13 PROCEDURE — 87070 CULTURE OTHR SPECIMN AEROBIC: CPT

## 2017-07-13 PROCEDURE — 83930 ASSAY OF BLOOD OSMOLALITY: CPT

## 2017-07-13 PROCEDURE — 0W9G3ZZ DRAINAGE OF PERITONEAL CAVITY, PERCUTANEOUS APPROACH: ICD-10-PCS | Performed by: RADIOLOGY

## 2017-07-13 RX ORDER — ALBUTEROL SULFATE 0.83 MG/ML
10 SOLUTION RESPIRATORY (INHALATION) ONCE
Status: DISCONTINUED | OUTPATIENT
Start: 2017-07-13 | End: 2017-07-13

## 2017-07-13 RX ORDER — SODIUM BICARBONATE 650 MG/1
1300 TABLET ORAL 3 TIMES DAILY
Status: DISCONTINUED | OUTPATIENT
Start: 2017-07-13 | End: 2017-07-15 | Stop reason: HOSPADM

## 2017-07-13 RX ORDER — SODIUM CHLORIDE 9 MG/ML
INJECTION, SOLUTION INTRAVENOUS CONTINUOUS
Status: ACTIVE | OUTPATIENT
Start: 2017-07-13 | End: 2017-07-13

## 2017-07-13 RX ORDER — ALBUMIN HUMAN 250 G/1000ML
25 SOLUTION INTRAVENOUS
Status: DISCONTINUED | OUTPATIENT
Start: 2017-07-13 | End: 2017-07-13

## 2017-07-13 RX ADMIN — OCTREOTIDE ACETATE 50 MCG: 100 INJECTION, SOLUTION INTRAVENOUS; SUBCUTANEOUS at 05:07

## 2017-07-13 RX ADMIN — ALBUMIN (HUMAN) 25 G: 12.5 SOLUTION INTRAVENOUS at 05:07

## 2017-07-13 RX ADMIN — ALBUMIN (HUMAN) 25 G: 12.5 SOLUTION INTRAVENOUS at 02:07

## 2017-07-13 RX ADMIN — SODIUM BICARBONATE 1300 MG: 650 TABLET ORAL at 05:07

## 2017-07-13 RX ADMIN — LACTULOSE 30 G: 20 SOLUTION ORAL at 05:07

## 2017-07-13 RX ADMIN — SODIUM POLYSTYRENE SULFONATE 30 G: 15 SUSPENSION ORAL; RECTAL at 05:07

## 2017-07-13 RX ADMIN — PREGABALIN 75 MG: 25 CAPSULE ORAL at 09:07

## 2017-07-13 RX ADMIN — RIFAXIMIN 550 MG: 550 TABLET ORAL at 09:07

## 2017-07-13 RX ADMIN — MIDODRINE HYDROCHLORIDE 15 MG: 5 TABLET ORAL at 05:07

## 2017-07-13 RX ADMIN — ALBUMIN (HUMAN) 25 G: 25 SOLUTION INTRAVENOUS at 03:07

## 2017-07-13 RX ADMIN — PANTOPRAZOLE SODIUM 40 MG: 40 TABLET, DELAYED RELEASE ORAL at 09:07

## 2017-07-13 RX ADMIN — SODIUM CHLORIDE: 0.9 INJECTION, SOLUTION INTRAVENOUS at 07:07

## 2017-07-13 RX ADMIN — SODIUM POLYSTYRENE SULFONATE 30 G: 15 SUSPENSION ORAL; RECTAL at 12:07

## 2017-07-13 NOTE — ASSESSMENT & PLAN NOTE
Worsened overnight, likely combination of worsened renal function, decreased UOP and hyponatremia (which limits the kidney's ability to excrete potassium), at this point we're hopefull normal saline administered as outlined in the plan for GYPSY will be helpful, would recommend checking potassium after fluids completed to reassess

## 2017-07-13 NOTE — PROGRESS NOTES
Paracentesis complete. 7400 mLs peritoneal fluid drained. Pt tolerated well. Dressing to clean, dry, and intact. Albumin 25% given 200 mLs. Specimens sent per lab order. Report called to primary nurse.

## 2017-07-13 NOTE — ASSESSMENT & PLAN NOTE
Improved, CT head negative  Cont lactulose and rifaximin,titrate dose to 3-4 bowel movements a day

## 2017-07-13 NOTE — NURSING
Tele alarming asystole, pt at u/s. Called u/s and asked them to check on pt, stated he was awake and talking with them. LA lead off, to be replaced.

## 2017-07-13 NOTE — HPI
"56 yo WM w/ long standing alcoholic liver disease and now cirrhosis, from what I can gather he has been rejected for transplant.  He was admitted with abdominal distenstion and edema.  The nephrology service is consulted for a worsening creatinine.  When we walk in the room wife is at bedside and she is very upset stating "I don't want to see him like this," patient himself is mildly confused/encephalopathic, follows basic commands, but not answering questions well and likely not able to make his own medical decisions at this point, wife reportedly is the POA and she is very clear that she does not want patient to be on a machine if it comes to that and if his prognosis is very poor would like to take him home with hospice.    From a renal standpoint, his Cr is at 2.2, noted to be 1.7 yesterday, potassium 5.8, was 4.8 yesterday, sodium is 129, patient also received a large volume paracentesis earlier today. Bicarb is 15 and AG is about 15.  He seems to have put out probably no more than 200ccs of urine during the day today.  Urine sodium on urine studies checked prior to today's paracentesis noted to have been < 20.  "

## 2017-07-13 NOTE — PLAN OF CARE
Problem: Physical Therapy Goal  Goal: Physical Therapy Goal  PT goals until 7/12/17    1. Pt supine to sit with minimal assist- met   Updated: with SBA  2. Pt sit to supine with minimal assist-not met  3. Pt sit to stand with RW with WBAT R LE with CGA-met   Updated: with SBA  4. Pt to perform gait 30ft with RW with WBAT R LE with minimal assist.-not met  5. Pt to go up/down curb step with RW with WBAT R LE with minimal assist.-not met  6. Pt to transfer bed to/from bedside chair with WBAT R LE with minimal assist.-not met  7. Pt to perform B LE exs in sitting x 15 reps with handout.-not met     Outcome: Ongoing (interventions implemented as appropriate)  Goals assessed and updated to reflect progress. Goals appropriate to improve functional mobility.    David Bridges III, DPT  7/13/2017

## 2017-07-13 NOTE — SUBJECTIVE & OBJECTIVE
Interval History: Patient very lethargic at bedside today, only responsive to painful stimuli.     Review of Systems   Constitutional: Negative for activity change and appetite change.   HENT: Negative for drooling and facial swelling.    Eyes: Negative for discharge and itching.   Respiratory: Negative for cough, shortness of breath and wheezing.    Cardiovascular: Negative for chest pain and palpitations.   Gastrointestinal: Negative for abdominal pain and nausea.   Genitourinary: Negative for difficulty urinating and dysuria.   Skin: Negative for color change and pallor.   Neurological: Positive for weakness. Negative for dizziness and numbness.   Psychiatric/Behavioral: Negative for behavioral problems and confusion.     Objective:     Vital Signs (Most Recent):  Temp: 97.4 °F (36.3 °C) (07/12/17 1940)  Pulse: 63 (07/12/17 1940)  Resp: 18 (07/12/17 1940)  BP: (!) 117/53 (07/12/17 1940)  SpO2: 95 % (07/12/17 1940) Vital Signs (24h Range):  Temp:  [94.8 °F (34.9 °C)-97.8 °F (36.6 °C)] 97.4 °F (36.3 °C)  Pulse:  [46-79] 63  Resp:  [15-18] 18  SpO2:  [93 %-95 %] 95 %  BP: ()/(53-74) 117/53     Weight: 84 kg (185 lb 3 oz)  Body mass index is 27.35 kg/m².    Intake/Output Summary (Last 24 hours) at 07/12/17 2042  Last data filed at 07/12/17 1700   Gross per 24 hour   Intake              370 ml   Output              130 ml   Net              240 ml      Physical Exam   Constitutional: He appears well-developed and well-nourished. He appears lethargic.   HENT:   Head: Normocephalic and atraumatic.   Eyes: Conjunctivae are normal. Pupils are equal, round, and reactive to light.   Neck: Normal range of motion. No thyromegaly present.   Cardiovascular: Normal rate, regular rhythm and normal heart sounds.    Pulmonary/Chest: Effort normal and breath sounds normal.   Abdominal: Soft. He exhibits no distension. There is no tenderness.   Neurological: He appears lethargic. He is disoriented. Coordination normal.   Skin:  No rash noted. No erythema.       Significant Labs:   CBC:   Recent Labs  Lab 07/11/17  0428 07/12/17  0352   WBC 7.21 5.47   HGB 7.6* 7.0*   HCT 21.7* 19.6*   PLT 75* 55*     CMP:   Recent Labs  Lab 07/11/17  0427 07/12/17  0352 07/12/17  1148 07/12/17  1811   * 124* 127* 125*   K 4.4 4.7 4.8 4.8   CL 97 95 98 97   CO2 18* 20* 16* 18*   GLU 98 100 116* 95   BUN 23* 28* 29* 29*   CREATININE 1.4 1.7* 1.6* 1.7*   CALCIUM 8.1* 8.4* 8.9 9.1   PROT 5.3* 5.4*  --   --    ALBUMIN 2.8* 3.1*  --   --    BILITOT 6.4* 6.3*  --   --    ALKPHOS 140* 146*  --   --    AST 84* 82*  --   --    ALT 27 28  --   --    ANIONGAP 10 9 13 10   EGFRNONAA 55.0* 43.5* 46.8* 43.5*       Significant Imaging: I have reviewed all pertinent imaging results/findings within the past 24 hours.

## 2017-07-13 NOTE — PHYSICIAN QUERY
PT Name: Tommy Hopper  MR #: [id number]     Physician Query Form - Documentation Clarification      CDS/: Greg Yuan Jr, RN               Contact information:[phone]    This form is a permanent document in the medical record.     Query Date: July 13, 2017    By submitting this query, we are merely seeking further clarification of documentation. Please utilize your independent clinical judgment when addressing the question(s) below.    The Medical record reflects the following:    Supporting Clinical Findings Location in Medical Record   Pt has been progressively altered throughout the day. Primary team has initiated a work up including lactulose enema, cultures, CTH, and paracentesis.     Hepatic encephalopathy     Interval History: Patient very lethargic at bedside today, only responsive to painful stimuli.     Ammonia= 76    lactulose 30 g 3 Times Daily 7/12 Critical Care Medicine Event Note          7/12 Hospital Medicine Progress Note    7/12 Hospital Medicine Progress Note      7/5 Labs    7/6-7/13 MAR                                                                                      Doctor, Please specify diagnosis or diagnoses associated with above clinical findings.  Please specify the acuity of Hepatic encephalopathy  Provider Use Only    (  x )Acute Hepatic Encephalopathy    (   )Sub-acute Hepatic Encephalopathy     (   )Other________________________________________________                                                                                                             [  ] Clinically undetermined
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
## 2017-07-13 NOTE — PLAN OF CARE
CAN received a voicemail from the  regarding the pt.  Pt is not stable to transfer to SNF today.  CAN left a message for Piero at [facility].  Pt will likely be stable on Monday.  CAN then received a call from Jnaay at [facility] regarding dc date.  CAN informed her that it could possibly be tomorrow but likely it will be Monday.  She will f/u tomorrow.    Rica Lacy, WANDER x [phone]

## 2017-07-13 NOTE — PLAN OF CARE
CAN left a message for Analy Beckford with [facility] ([phone]) to determine if she received [employer] auth.  CAN will f/u this afternoon.  CAN will send SNF orders once they are written.    Rica Lacy, WANDER  [phone]
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
## 2017-07-13 NOTE — ASSESSMENT & PLAN NOTE
Probably worsened HRS  Hyperkalemia, treated with kayexalate  Metabolic acidosis, started on bicarbonate supplements  UA, Urine sodium, urine creatinine, urine jacobs stain   Nephrology consulted  Daily weights  Intake and output

## 2017-07-13 NOTE — ASSESSMENT & PLAN NOTE
MELD-Na score: 30 at 7/13/2017  9:57 AM  MELD score: 27 at 7/13/2017  9:57 AM  Calculated from:  Serum Creatinine: 2.2 mg/dL at 7/13/2017  9:57 AM  Serum Sodium: 129 mmol/L at 7/13/2017  9:57 AM  Total Bilirubin: 7.8 mg/dL at 7/13/2017  9:57 AM  INR(ratio): 1.6 at 7/13/2017  4:30 AM  Age: 57 years  Cont lactulose and rifaximin  CT head negative for any acute bleed

## 2017-07-13 NOTE — SUBJECTIVE & OBJECTIVE
Interval History: Patient seen and examined at bedside. Much more alert and awake than yesterday. Ca worsening with hyperkalemia and metabolic acidosis.     Review of Systems   Constitutional: Negative for activity change and appetite change.   HENT: Negative for drooling and facial swelling.    Eyes: Negative for discharge and itching.   Respiratory: Negative for cough, shortness of breath and wheezing.    Cardiovascular: Negative for chest pain and palpitations.   Gastrointestinal: Negative for abdominal pain and nausea.   Genitourinary: Negative for difficulty urinating and dysuria.   Skin: Negative for color change and pallor.   Neurological: Positive for weakness. Negative for dizziness and numbness.   Psychiatric/Behavioral: Negative for behavioral problems and confusion.     Objective:     Vital Signs (Most Recent):  Temp: 98 °F (36.7 °C) (07/13/17 1217)  Pulse: 60 (07/13/17 1217)  Resp: 20 (07/13/17 1217)  BP: 102/60 (07/13/17 1217)  SpO2: 96 % (07/13/17 1217) Vital Signs (24h Range):  Temp:  [95.8 °F (35.4 °C)-98.3 °F (36.8 °C)] 98 °F (36.7 °C)  Pulse:  [46-65] 60  Resp:  [15-20] 20  SpO2:  [94 %-96 %] 96 %  BP: ()/(53-74) 102/60     Weight: 83.9 kg (184 lb 15.5 oz)  Body mass index is 27.31 kg/m².    Intake/Output Summary (Last 24 hours) at 07/13/17 1330  Last data filed at 07/13/17 1158   Gross per 24 hour   Intake              200 ml   Output              200 ml   Net                0 ml      Physical Exam   Constitutional: He appears well-developed and well-nourished. He appears lethargic.   HENT:   Head: Normocephalic and atraumatic.   Eyes: Conjunctivae are normal. Pupils are equal, round, and reactive to light.   Neck: Normal range of motion. No thyromegaly present.   Cardiovascular: Normal rate, regular rhythm and normal heart sounds.    Pulmonary/Chest: Effort normal and breath sounds normal.   Abdominal: Soft. He exhibits no distension. There is no tenderness.   Neurological: He appears  lethargic. He is disoriented. Coordination normal.   Skin: No rash noted. No erythema.       Significant Labs:   CBC:   Recent Labs  Lab 07/12/17  0352 07/13/17  0957   WBC 5.47 5.51   HGB 7.0* 7.1*   HCT 19.6* 20.1*   PLT 55* 71*     CMP:   Recent Labs  Lab 07/12/17  0352 07/12/17  1148 07/12/17  1811 07/13/17  0957   * 127* 125* 129*   K 4.7 4.8 4.8 5.8*   CL 95 98 97 99   CO2 20* 16* 18* 15*    116* 95 153*   BUN 28* 29* 29* 35*   CREATININE 1.7* 1.6* 1.7* 2.2*   CALCIUM 8.4* 8.9 9.1 9.1   PROT 5.4*  --   --  5.9*   ALBUMIN 3.1*  --   --  3.8   BILITOT 6.3*  --   --  7.8*   ALKPHOS 146*  --   --  134   AST 82*  --   --  81*   ALT 28  --   --  32   ANIONGAP 9 13 10 15   EGFRNONAA 43.5* 46.8* 43.5* 31.8*       Significant Imaging: I have reviewed all pertinent imaging results/findings within the past 24 hours.

## 2017-07-13 NOTE — NURSING
Pt transported via stretcher to IR for paracentesis. Wife with pt. Tele monitor on. Will reassess and administer meds upon return to room.

## 2017-07-13 NOTE — ASSESSMENT & PLAN NOTE
Determined to not be good candidate for treatment.  Continue conservative management.  Wife not interested in comfort care or hospice at the moment

## 2017-07-13 NOTE — PROGRESS NOTES
[facility] Hospital Medicine  Progress Note    Patient Name: Tommy Hopper  MRN: [id number]  Patient Class: IP- Inpatient   Admission Date: 7/5/2017  Length of Stay: 8 days  Attending Physician: Zack Crawford MD  Primary Care Provider: Primary Doctor [facility] Medicine Team: [facility] A Zack Crawford MD    Subjective:     Principal Problem:CA (acute kidney injury)    HPI:  57M presents as transfer from [facility] ED where he presented for pain and inability to ambulate after a fall one day previously.  History very limited due to patient's garbled speech and lack of documentation from outside ED as they were taken off somewhere by another physician.  He reports losing his balance at home and falling on his right side.  He endorses severe persistent pain which has not been helped by any pain medication he was given so far.  While in the ambulance on his way here from [facility] his speech was noted to be more slurred and he was hypotensive so the ambulance diverted to the ED here.    Hospital Course:  No notes on file    Interval History: Patient seen and examined at bedside. Much more alert and awake than yesterday. Ca worsening with hyperkalemia and metabolic acidosis.     Review of Systems   Constitutional: Negative for activity change and appetite change.   HENT: Negative for drooling and facial swelling.    Eyes: Negative for discharge and itching.   Respiratory: Negative for cough, shortness of breath and wheezing.    Cardiovascular: Negative for chest pain and palpitations.   Gastrointestinal: Negative for abdominal pain and nausea.   Genitourinary: Negative for difficulty urinating and dysuria.   Skin: Negative for color change and pallor.   Neurological: Positive for weakness. Negative for dizziness and numbness.   Psychiatric/Behavioral: Negative for behavioral problems and confusion.     Objective:     Vital Signs (Most Recent):  Temp: 98 °F (36.7 °C) (07/13/17  1217)  Pulse: 60 (07/13/17 1217)  Resp: 20 (07/13/17 1217)  BP: 102/60 (07/13/17 1217)  SpO2: 96 % (07/13/17 1217) Vital Signs (24h Range):  Temp:  [95.8 °F (35.4 °C)-98.3 °F (36.8 °C)] 98 °F (36.7 °C)  Pulse:  [46-65] 60  Resp:  [15-20] 20  SpO2:  [94 %-96 %] 96 %  BP: ()/(53-74) 102/60     Weight: 83.9 kg (184 lb 15.5 oz)  Body mass index is 27.31 kg/m².    Intake/Output Summary (Last 24 hours) at 07/13/17 1330  Last data filed at 07/13/17 1158   Gross per 24 hour   Intake              200 ml   Output              200 ml   Net                0 ml      Physical Exam   Constitutional: He appears well-developed and well-nourished. He appears lethargic.   HENT:   Head: Normocephalic and atraumatic.   Eyes: Conjunctivae are normal. Pupils are equal, round, and reactive to light.   Neck: Normal range of motion. No thyromegaly present.   Cardiovascular: Normal rate, regular rhythm and normal heart sounds.    Pulmonary/Chest: Effort normal and breath sounds normal.   Abdominal: Soft. He exhibits no distension. There is no tenderness.   Neurological: He appears lethargic. He is disoriented. Coordination normal.   Skin: No rash noted. No erythema.       Significant Labs:   CBC:   Recent Labs  Lab 07/12/17  0352 07/13/17  0957   WBC 5.47 5.51   HGB 7.0* 7.1*   HCT 19.6* 20.1*   PLT 55* 71*     CMP:   Recent Labs  Lab 07/12/17  0352 07/12/17  1148 07/12/17  1811 07/13/17  0957   * 127* 125* 129*   K 4.7 4.8 4.8 5.8*   CL 95 98 97 99   CO2 20* 16* 18* 15*    116* 95 153*   BUN 28* 29* 29* 35*   CREATININE 1.7* 1.6* 1.7* 2.2*   CALCIUM 8.4* 8.9 9.1 9.1   PROT 5.4*  --   --  5.9*   ALBUMIN 3.1*  --   --  3.8   BILITOT 6.3*  --   --  7.8*   ALKPHOS 146*  --   --  134   AST 82*  --   --  81*   ALT 28  --   --  32   ANIONGAP 9 13 10 15   EGFRNONAA 43.5* 46.8* 43.5* 31.8*       Significant Imaging: I have reviewed all pertinent imaging results/findings within the past 24 hours.    Assessment/Plan:      * GYPSY  (acute kidney injury)    Probably worsened HRS  Hyperkalemia, treated with kayexalate  Metabolic acidosis, started on bicarbonate supplements  UA, Urine sodium, urine creatinine, urine jacobs stain   Nephrology consulted  Daily weights  Intake and output   IR paracentesis to be performed today           Acute blood loss anemia    Monitor daily HH  Transfuse if<7.0          Closed pelvic ring fracture    As per orthopedics no surgery would beneficial given high operative risk due to liver failure  Cont PT/OT with WBAT           Hypotension    Appears to be chronic.  Continue midorine; hold IVF unless absolutely necessary.          Hepatic encephalopathy    Improved, CT head negative  Cont lactulose and rifaximin,titrate dose to 3-4 bowel movements a day         Alcoholic cirrhosis of liver with ascites    Decompensated.  On decreased dose of lasix/aldactone given recent GYPSY.            Decompensated hepatic cirrhosis    MELD-Na score: 30 at 7/13/2017  9:57 AM  MELD score: 27 at 7/13/2017  9:57 AM  Calculated from:  Serum Creatinine: 2.2 mg/dL at 7/13/2017  9:57 AM  Serum Sodium: 129 mmol/L at 7/13/2017  9:57 AM  Total Bilirubin: 7.8 mg/dL at 7/13/2017  9:57 AM  INR(ratio): 1.6 at 7/13/2017  4:30 AM  Age: 57 years  Cont lactulose and rifaximin  CT head negative for any acute bleed         HCC (hepatocellular carcinoma)    Determined to not be good candidate for treatment.  Continue conservative management.  Wife not interested in comfort care or hospice at the moment           Thrombocytopenia    Stable, no bleed, no transfusion as of now           VTE Risk Mitigation         Ordered     Place sequential compression device  Until discontinued      07/06/17 0409     Place OLYA hose  Until discontinued      07/06/17 0409     High Risk of VTE  Once      07/06/17 0409     Place OLYA hose  Until discontinued      07/05/17 2330     Place sequential compression device  Until discontinued      07/05/17 2330          Zack  MD Yvette  Department of Hospital Medicine   [facility]

## 2017-07-13 NOTE — PT/OT/SLP PROGRESS
Physical Therapy  Treatment    Tommy Hopper   MRN: [id number]   Admitting Diagnosis: Closed pelvic ring fracture    PT Received On: 07/13/17  PT Start Time: 0900     PT Stop Time: 0923    PT Total Time (min): 23 min       Billable Minutes:  Therapeutic Activity 15 and Therapeutic Exercise 8    Treatment Type: Treatment  PT/PTA: PT     PTA Visit Number: 0       General Precautions: Standard, fall  Orthopedic Precautions: RLE weight bearing as tolerated   Braces: N/A         Subjective:  Communicated with RN prior to session.  Patient agreeable to PT session. Family present.  Patient with significant confusion throughout session limiting ability to follow simple commands consistently  Therapist oriented patient to time, place, and situation.    Pain/Comfort  Pain Rating 1: 0/10 (none reported)  Pain Rating Post-Intervention 1: 0/10 (none reported)    Objective:   Patient found with: telemetry    Functional Mobility:  Bed Mobility:   Rolling/Turning Right: Minimum assistance  Scooting/Bridging: Minimum Assistance  Supine to Sit: Minimum Assistance, With side rail  Sit to Supine:  (patient left up in chair with wife present)    Transfers:  Sit <> Stand Assistance: Contact Guard Assistance  Sit <> Stand Assistive Device: Rolling Walker  Bed <> Chair Technique: Stand Pivot  Bed <> Chair Assistance: Contact Guard Assistance  Bed <> Chair Assistive Device: Rolling Walker    Gait:   Gait Distance: 6ft to bedside chair; refused further ambulation  Assistance 1: Contact Guard Assistance  Gait Assistive Device: Rolling walker  Gait Pattern: swing-to gait  Gait Deviation(s): decreased tomi, increased time in double stance, decreased velocity of limb motion, decreased step length, decreased stride length, decreased swing-to-stance ratio, decreased toe-to-floor clearance, decreased weight-shifting ability, foot flat    Balance:   Static Sit: FAIR: Maintains without assist, but unable to take any challenges   Dynamic Sit: FAIR+:  Maintains balance through MINIMAL excursions of active trunk motion  Static Stand: POOR+: Needs MINIMAL assist to maintain  Dynamic stand: FAIR: Needs CONTACT GUARD during gait     Therapeutic Activities and Exercises:  Patient educated on role of PT/POC.  Significant confusion limiting ability to follow simple commands.  Bed mobility: Min Assist for R roll, seated EOB scoot, and supine<>sit with R hand rail  Sit<>stand transfer: CGA with rolling walker, verbal cues provided for hand placement and forward lean  Patient needs reinforcement with verbal cues and transfer techniques for safety during functional mobility.  Stand pivot transfer to bedside chair CGA with rolling walker; significantly forward flexed posture despite verbal cues to maintain upright posture  Gait 6ft to bedside chair CGA with rolling walker. Patient declined further ambulation.  Seated A-AROM LE therex performed in bedside chair x 15 reps: AP, TKE, seated march    Additional education provided on importance of participation with therapy to improve independence with functional mobility.  White board updated: safe to transfer with RN staff     AM-PAC 6 CLICK MOBILITY  How much help from another person does this patient currently need?   1 = Unable, Total/Dependent Assistance  2 = A lot, Maximum/Moderate Assistance  3 = A little, Minimum/Contact Guard/Supervision  4 = None, Modified [place]/Independent    Turning over in bed (including adjusting bedclothes, sheets and blankets)?: 3  Sitting down on and standing up from a chair with arms (e.g., wheelchair, bedside commode, etc.): 3  Moving from lying on back to sitting on the side of the bed?: 3  Moving to and from a bed to a chair (including a wheelchair)?: 3  Need to walk in hospital room?: 3  Climbing 3-5 steps with a railing?: 2  Total Score: 17    AM-PAC Raw Score CMS G-Code Modifier Level of Impairment Assistance   6 % Total / Unable   7 - 9 CM 80 - 100% Maximal Assist   10 - 14  CL 60 - 80% Moderate Assist   15 - 19 CK 40 - 60% Moderate Assist   20 - 22 CJ 20 - 40% Minimal Assist   23 CI 1-20% SBA / CGA   24 CH 0% Independent/ Mod I     Patient left up in chair with all lines intact, call button in reach, RN notified and wife present.    Assessment:  Tommy Hopper is a 57 y.o. male with a medical diagnosis of Closed pelvic ring fracture and presents with impaired cognition limiting command following, impaired dynamic balance, and gait instability limiting functional mobility. Patient declined ambulation and only agreeable to sit in bedside chair. Education provided on the importance of participation with therapy to maintain functional independence. To benefit from continued skilled intervention to address deficits prior to transition to SNF to improve safety and overall functional mobility.    Rehab identified problem list/impairments: Rehab identified problem list/impairments: weakness, impaired self care skills, impaired endurance, impaired functional mobilty, gait instability, impaired cognition, decreased lower extremity function, decreased safety awareness, decreased coordination, impaired balance    Rehab potential is fair.    Activity tolerance: Fair    Discharge recommendations: Discharge Facility/Level Of Care Needs: nursing facility, skilled     Barriers to discharge: Barriers to Discharge: Decreased caregiver support, Inaccessible home environment    Equipment recommendations: Equipment Needed After Discharge: walker, rolling     GOALS:    Physical Therapy Goals        Problem: Physical Therapy Goal    Goal Priority Disciplines Outcome Goal Variances Interventions   Physical Therapy Goal     PT/OT, PT Ongoing (interventions implemented as appropriate)     Description:  PT goals until 7/12/17    1. Pt supine to sit with minimal assist- met   Updated: with SBA  2. Pt sit to supine with minimal assist-not met  3. Pt sit to stand with RW with WBAT R LE with CGA-met   Updated: with  SBA  4. Pt to perform gait 30ft with RW with WBAT R LE with minimal assist.-not met  5. Pt to go up/down curb step with RW with WBAT R LE with minimal assist.-not met  6. Pt to transfer bed to/from bedside chair with WBAT R LE with minimal assist.-not met  7. Pt to perform B LE exs in sitting x 15 reps with handout.-not met                       PLAN:    Patient to be seen 5 x/week  to address the above listed problems via gait training, therapeutic activities, therapeutic exercises, neuromuscular re-education  Plan of Care expires: 08/06/17  Plan of Care reviewed with: patient, spouse         David Bridges III, PT  07/13/2017

## 2017-07-13 NOTE — NURSING
Lost IV access. Charge nurse to attempt. Pt very difficult stick. Dr. Farris notified and aware. Cannot administer fluids ordered by nephrology until access gained. Will encourage PO intake.

## 2017-07-13 NOTE — PLAN OF CARE
Problem: Fall Risk (Adult)  Intervention: Safety Precautions  Pt was lethargic in beginning of shift and oriented to self. Vital signs have been stable. Medications were crushed and given in applesauce without any issues.Pt had a large bowel movement in the beginning of shift.  Pt woke up around midnight and was oriented x3, asked for something eat and pudding was provided. Pt has been maintaining temperature without bear hugger. No complains of pain this shift. Plan of care reviewed with patient and spouse, all questions and concerns were addressed. Will continue to monitor.

## 2017-07-13 NOTE — ASSESSMENT & PLAN NOTE
As per orthopedics no surgery would beneficial given high operative risk due to liver failure  Cont PT/OT with WBAT

## 2017-07-13 NOTE — PROGRESS NOTES
[facility] Hospital Medicine  Progress Note    Patient Name: Tommy Hopper  MRN: [id number]  Patient Class: IP- Inpatient   Admission Date: 7/5/2017  Length of Stay: 7 days  Attending Physician: Zack Crawford MD  Primary Care Provider: Primary Doctor [facility] Medicine Team: [facility] [place] [facility] A Zack Crawford MD    Subjective:     Principal Problem:Closed pelvic ring fracture    HPI:  57M presents as transfer from [facility] ED where he presented for pain and inability to ambulate after a fall one day previously.  History very limited due to patient's garbled speech and lack of documentation from outside ED as they were taken off somewhere by another physician.  He reports losing his balance at home and falling on his right side.  He endorses severe persistent pain which has not been helped by any pain medication he was given so far.  While in the ambulance on his way here from [facility] his speech was noted to be more slurred and he was hypotensive so the ambulance diverted to the ED here.    Hospital Course:  No notes on file    Interval History: Patient very lethargic at bedside today, only responsive to painful stimuli.     Review of Systems   Constitutional: Negative for activity change and appetite change.   HENT: Negative for drooling and facial swelling.    Eyes: Negative for discharge and itching.   Respiratory: Negative for cough, shortness of breath and wheezing.    Cardiovascular: Negative for chest pain and palpitations.   Gastrointestinal: Negative for abdominal pain and nausea.   Genitourinary: Negative for difficulty urinating and dysuria.   Skin: Negative for color change and pallor.   Neurological: Positive for weakness. Negative for dizziness and numbness.   Psychiatric/Behavioral: Negative for behavioral problems and confusion.     Objective:     Vital Signs (Most Recent):  Temp: 97.4 °F (36.3 °C) (07/12/17 1940)  Pulse: 63 (07/12/17 1940)  Resp: 18 (07/12/17  1940)  BP: (!) 117/53 (07/12/17 1940)  SpO2: 95 % (07/12/17 1940) Vital Signs (24h Range):  Temp:  [94.8 °F (34.9 °C)-97.8 °F (36.6 °C)] 97.4 °F (36.3 °C)  Pulse:  [46-79] 63  Resp:  [15-18] 18  SpO2:  [93 %-95 %] 95 %  BP: ()/(53-74) 117/53     Weight: 84 kg (185 lb 3 oz)  Body mass index is 27.35 kg/m².    Intake/Output Summary (Last 24 hours) at 07/12/17 2042  Last data filed at 07/12/17 1700   Gross per 24 hour   Intake              370 ml   Output              130 ml   Net              240 ml      Physical Exam   Constitutional: He appears well-developed and well-nourished. He appears lethargic.   HENT:   Head: Normocephalic and atraumatic.   Eyes: Conjunctivae are normal. Pupils are equal, round, and reactive to light.   Neck: Normal range of motion. No thyromegaly present.   Cardiovascular: Normal rate, regular rhythm and normal heart sounds.    Pulmonary/Chest: Effort normal and breath sounds normal.   Abdominal: Soft. He exhibits no distension. There is no tenderness.   Neurological: He appears lethargic. He is disoriented. Coordination normal.   Skin: No rash noted. No erythema.       Significant Labs:   CBC:   Recent Labs  Lab 07/11/17  0428 07/12/17  0352   WBC 7.21 5.47   HGB 7.6* 7.0*   HCT 21.7* 19.6*   PLT 75* 55*     CMP:   Recent Labs  Lab 07/11/17  0427 07/12/17  0352 07/12/17  1148 07/12/17  1811   * 124* 127* 125*   K 4.4 4.7 4.8 4.8   CL 97 95 98 97   CO2 18* 20* 16* 18*   GLU 98 100 116* 95   BUN 23* 28* 29* 29*   CREATININE 1.4 1.7* 1.6* 1.7*   CALCIUM 8.1* 8.4* 8.9 9.1   PROT 5.3* 5.4*  --   --    ALBUMIN 2.8* 3.1*  --   --    BILITOT 6.4* 6.3*  --   --    ALKPHOS 140* 146*  --   --    AST 84* 82*  --   --    ALT 27 28  --   --    ANIONGAP 10 9 13 10   EGFRNONAA 55.0* 43.5* 46.8* 43.5*       Significant Imaging: I have reviewed all pertinent imaging results/findings within the past 24 hours.    Assessment/Plan:     GYPSY (acute kidney injury)     proabably related to recent  overdiuresis; monitor volume status closely.  Started on midodrine, albumin and octreotide       Hypotension     Appears to be chronic.  Continue midorine; hold IVF unless absolutely necessary.          Hepatic encephalopathy     Continue rifaximin bid; add lactulose 25mg q8.  If patient unable to afford rifaximin can substitute zinc sulfate tablet for urate-lowering therapy  CT Scan of the head negative.         Alcoholic cirrhosis of liver with ascites     MELD-Na score: 30 at 7/12/2017  6:11 PM  MELD score: 23 at 7/12/2017  6:11 PM  Calculated from:  Serum Creatinine: 1.7 mg/dL at 7/12/2017  6:11 PM  Serum Sodium: 125 mmol/L at 7/12/2017  6:11 PM  Total Bilirubin: 6.3 mg/dL at 7/12/2017  3:52 AM  INR(ratio): 1.5 at 7/12/2017  3:52 AM  Age: 57 years            HCC (hepatocellular carcinoma)     Determined to not be good candidate for treatment.  Continue conservative management.  Family is not interested in comfort care at the moment despite being aware of poor prognosis         * Closed pelvic ring fracture     Likely nonoperative, as his decompensated cirrhosis portends perioperative mortality far exceeding potential benefitl.Orthopedic surgery to re-evaluate today and determine WB status for PT.             VTE Risk Mitigation         Ordered     Place sequential compression device  Until discontinued      07/06/17 0409     Place OLYA hose  Until discontinued      07/06/17 0409     High Risk of VTE  Once      07/06/17 0409     Place OLYA hose  Until discontinued      07/05/17 2330     Place sequential compression device  Until discontinued      07/05/17 2330          Zack Crawford MD  Department of Hospital Medicine   [facility]

## 2017-07-13 NOTE — ASSESSMENT & PLAN NOTE
"GYPSY, probably hepatorenal syndrome in patient with ESLD and now exacerbated by large volume paracentesis, noted to have been on volume expanders for two days without improvement, no diuretics, urine sodium < 20, again acute worsening likely from volume removal. Overall a very poor long term prognosis.    Plan/Recommendations:  1) give back normal saline x 1000ccs over 4 hrs  2) continue albumin, midodrine and octreotide  3) strict Is & Os  4) check renal panel in the morning    5) POA clearly expressed the patient's desire to myself, my attending and the nurse of her 's wish to be DNR and possibly be discharged with hospice if there is "no significant possibility to recover and have a meaningful life," discussed with primary team and they will address again, sounds like POA may not have expressed this wish so forcefully in the past, but there was little doubt tonight and this choice from out perspective would appear to be very appropriate  "

## 2017-07-14 LAB
ALBUMIN SERPL BCP-MCNC: 3.6 G/DL
ALP SERPL-CCNC: 133 U/L
ALT SERPL W/O P-5'-P-CCNC: 27 U/L
ANION GAP SERPL CALC-SCNC: 11 MMOL/L
AST SERPL-CCNC: 63 U/L
BASOPHILS # BLD AUTO: 0.02 K/UL
BASOPHILS NFR BLD: 0.5 %
BILIRUB SERPL-MCNC: 6.7 MG/DL
BUN SERPL-MCNC: 31 MG/DL
CALCIUM SERPL-MCNC: 8.7 MG/DL
CHLORIDE SERPL-SCNC: 101 MMOL/L
CO2 SERPL-SCNC: 19 MMOL/L
CREAT SERPL-MCNC: 2 MG/DL
DIFFERENTIAL METHOD: ABNORMAL
EOSINOPHIL # BLD AUTO: 0.1 K/UL
EOSINOPHIL NFR BLD: 3.2 %
ERYTHROCYTE [DISTWIDTH] IN BLOOD BY AUTOMATED COUNT: 22.8 %
EST. GFR  (AFRICAN AMERICAN): 41.3 ML/MIN/1.73 M^2
EST. GFR  (NON AFRICAN AMERICAN): 35.7 ML/MIN/1.73 M^2
GLUCOSE SERPL-MCNC: 124 MG/DL
HCT VFR BLD AUTO: 21.4 %
HGB BLD-MCNC: 7.4 G/DL
INR PPP: 1.5
LYMPHOCYTES # BLD AUTO: 1 K/UL
LYMPHOCYTES NFR BLD: 23.9 %
MAGNESIUM SERPL-MCNC: 1.5 MG/DL
MCH RBC QN AUTO: 33 PG
MCHC RBC AUTO-ENTMCNC: 34.6 %
MCV RBC AUTO: 96 FL
MONOCYTES # BLD AUTO: 0.4 K/UL
MONOCYTES NFR BLD: 9.5 %
NEUTROPHILS # BLD AUTO: 2.6 K/UL
NEUTROPHILS NFR BLD: 62.7 %
PHOSPHATE SERPL-MCNC: 3.5 MG/DL
PLATELET # BLD AUTO: 53 K/UL
PMV BLD AUTO: 9.8 FL
POTASSIUM SERPL-SCNC: 4.8 MMOL/L
PROT SERPL-MCNC: 5.6 G/DL
PROTHROMBIN TIME: 15.5 SEC
RBC # BLD AUTO: 2.24 M/UL
SODIUM SERPL-SCNC: 131 MMOL/L
WBC # BLD AUTO: 4.1 K/UL

## 2017-07-14 PROCEDURE — 63600175 PHARM REV CODE 636 W HCPCS: Performed by: HOSPITALIST

## 2017-07-14 PROCEDURE — 85610 PROTHROMBIN TIME: CPT

## 2017-07-14 PROCEDURE — 99233 SBSQ HOSP IP/OBS HIGH 50: CPT | Mod: ,,, | Performed by: HOSPITALIST

## 2017-07-14 PROCEDURE — 84100 ASSAY OF PHOSPHORUS: CPT

## 2017-07-14 PROCEDURE — 20600001 HC STEP DOWN PRIVATE ROOM

## 2017-07-14 PROCEDURE — 85025 COMPLETE CBC W/AUTO DIFF WBC: CPT

## 2017-07-14 PROCEDURE — 25000003 PHARM REV CODE 250: Performed by: HOSPITALIST

## 2017-07-14 PROCEDURE — 83735 ASSAY OF MAGNESIUM: CPT

## 2017-07-14 PROCEDURE — 99232 SBSQ HOSP IP/OBS MODERATE 35: CPT | Mod: ,,, | Performed by: INTERNAL MEDICINE

## 2017-07-14 PROCEDURE — 36415 COLL VENOUS BLD VENIPUNCTURE: CPT

## 2017-07-14 PROCEDURE — 25000242 PHARM REV CODE 250 ALT 637 W/ HCPCS: Performed by: HOSPITALIST

## 2017-07-14 PROCEDURE — 80053 COMPREHEN METABOLIC PANEL: CPT

## 2017-07-14 PROCEDURE — P9047 ALBUMIN (HUMAN), 25%, 50ML: HCPCS | Performed by: HOSPITALIST

## 2017-07-14 RX ORDER — IPRATROPIUM BROMIDE AND ALBUTEROL SULFATE 2.5; .5 MG/3ML; MG/3ML
3 SOLUTION RESPIRATORY (INHALATION) EVERY 4 HOURS PRN
Status: DISCONTINUED | OUTPATIENT
Start: 2017-07-14 | End: 2017-07-15 | Stop reason: HOSPADM

## 2017-07-14 RX ORDER — ACETAMINOPHEN 10 MG/ML
500 INJECTION, SOLUTION INTRAVENOUS ONCE
Status: COMPLETED | OUTPATIENT
Start: 2017-07-14 | End: 2017-07-14

## 2017-07-14 RX ADMIN — IPRATROPIUM BROMIDE AND ALBUTEROL SULFATE 3 ML: .5; 3 SOLUTION RESPIRATORY (INHALATION) at 05:07

## 2017-07-14 RX ADMIN — OCTREOTIDE ACETATE 50 MCG: 100 INJECTION, SOLUTION INTRAVENOUS; SUBCUTANEOUS at 06:07

## 2017-07-14 RX ADMIN — RIFAXIMIN 550 MG: 550 TABLET ORAL at 08:07

## 2017-07-14 RX ADMIN — PREGABALIN 75 MG: 25 CAPSULE ORAL at 12:07

## 2017-07-14 RX ADMIN — MIDODRINE HYDROCHLORIDE 15 MG: 5 TABLET ORAL at 01:07

## 2017-07-14 RX ADMIN — OCTREOTIDE ACETATE 50 MCG: 100 INJECTION, SOLUTION INTRAVENOUS; SUBCUTANEOUS at 12:07

## 2017-07-14 RX ADMIN — SODIUM BICARBONATE 1300 MG: 650 TABLET ORAL at 12:07

## 2017-07-14 RX ADMIN — ALBUMIN (HUMAN) 25 G: 12.5 SOLUTION INTRAVENOUS at 12:07

## 2017-07-14 RX ADMIN — LACTULOSE 30 G: 20 SOLUTION ORAL at 12:07

## 2017-07-14 RX ADMIN — ACETAMINOPHEN 500 MG: 10 INJECTION, SOLUTION INTRAVENOUS at 10:07

## 2017-07-14 RX ADMIN — LACTULOSE 30 G: 20 SOLUTION ORAL at 06:07

## 2017-07-14 RX ADMIN — OCTREOTIDE ACETATE 50 MCG: 100 INJECTION, SOLUTION INTRAVENOUS; SUBCUTANEOUS at 01:07

## 2017-07-14 RX ADMIN — SODIUM BICARBONATE 1300 MG: 650 TABLET ORAL at 01:07

## 2017-07-14 RX ADMIN — OCTREOTIDE ACETATE 50 MCG: 100 INJECTION, SOLUTION INTRAVENOUS; SUBCUTANEOUS at 10:07

## 2017-07-14 RX ADMIN — HYDROMORPHONE HYDROCHLORIDE 2 MG: 2 INJECTION INTRAMUSCULAR; INTRAVENOUS; SUBCUTANEOUS at 05:07

## 2017-07-14 RX ADMIN — RIFAXIMIN 550 MG: 550 TABLET ORAL at 12:07

## 2017-07-14 RX ADMIN — MIDODRINE HYDROCHLORIDE 15 MG: 5 TABLET ORAL at 12:07

## 2017-07-14 RX ADMIN — SODIUM BICARBONATE 1300 MG: 650 TABLET ORAL at 06:07

## 2017-07-14 RX ADMIN — ALBUMIN (HUMAN) 25 G: 12.5 SOLUTION INTRAVENOUS at 01:07

## 2017-07-14 RX ADMIN — ALBUMIN (HUMAN) 25 G: 12.5 SOLUTION INTRAVENOUS at 11:07

## 2017-07-14 RX ADMIN — LACTULOSE 30 G: 20 SOLUTION ORAL at 01:07

## 2017-07-14 RX ADMIN — MIDODRINE HYDROCHLORIDE 15 MG: 5 TABLET ORAL at 06:07

## 2017-07-14 RX ADMIN — ALBUMIN (HUMAN) 25 G: 12.5 SOLUTION INTRAVENOUS at 06:07

## 2017-07-14 RX ADMIN — CEFTRIAXONE 2 G: 2 INJECTION, SOLUTION INTRAVENOUS at 10:07

## 2017-07-14 RX ADMIN — PANTOPRAZOLE SODIUM 40 MG: 40 TABLET, DELAYED RELEASE ORAL at 08:07

## 2017-07-14 RX ADMIN — PREGABALIN 75 MG: 25 CAPSULE ORAL at 08:07

## 2017-07-14 NOTE — NURSING
Called placed to Dr. Farris re: Pt lungs are wet and tight. Pt is unable to give an effective cough and there is no relief with suction. O2 sat is 91% on room air, Resp is 28-32. Orders given for respiratory tx.

## 2017-07-14 NOTE — PLAN OF CARE
Problem: Patient Care Overview  Goal: Plan of Care Review  Outcome: Ongoing (interventions implemented as appropriate)  Plan of care reviewed with patient and spouse. AVSS. Patient is more lethargic, arousing to voice/ light touch. At periods he in nonverbal, but will have some delayed responses to questions, slurred speech sometimes unintelligible. 1 BM during shift. No significant changes. WCTM.

## 2017-07-14 NOTE — PLAN OF CARE
D/C plan updated: pt's wife has decided on Inpatient Hospice at discharge. Attending VIKAS TURNER, Rn provided pt with inpatient hospice list and pt's wife, Evelia selected [facility].   SW added [facility] to treatment team and sent a referral via [facility].

## 2017-07-14 NOTE — ASSESSMENT & PLAN NOTE
likely combination of worsened renal function, decreased UOP and hyponatremia (which limits the kidney's ability to excrete potassium), at this point we're hopefull normal saline administered as outlined in the plan for GYPSY will be helpful, would recommend checking potassium after fluids completed to reassess    This morning potassium improved and noted to be 4.8 this morning, would not recommend further specific intervention, if patient/family elects hospice would not recommend checking further labs

## 2017-07-14 NOTE — ASSESSMENT & PLAN NOTE
GYPSY, probably hepatorenal syndrome in patient with ESLD and now exacerbated by large volume paracentesis, noted to have been on volume expanders for two days without improvement, no diuretics, urine sodium < 20, again acute worsening likely from volume removal. Overall a very poor long term prognosis.    This morning renal function remains stable with Cr of 2.0, electrolytes have improved s/p giving back 1000 ccs of NS volume with K at 4.8 and sodium increasing by 2 to 131    Plan/Recommendations:  At this time POA is expressing she wants for her  to be discharged to hospice close to her home, if this ends up happening, in short, if it is decided after primary team rounds for this patient to be comfort measures, then would not recommend further lab draws or work up, we will sign off for now, but be available and if anything changes please call

## 2017-07-14 NOTE — SUBJECTIVE & OBJECTIVE
Interval History: patient sleeping this morning and barely arousable, POA (wife) at bedside, she is emotional, but rational, she continues to express that she wants him comfortable, would like to leave the hospital and take him home with hospice, I briefly discussed that maybe going to inpatient hospice first and see how he does would be a better option considering his current situation and for her to make sure to express her desires to the primary team for discharge planning    Review of patient's allergies indicates:   Allergen Reactions    Adhesive Blisters     Current Facility-Administered Medications   Medication Frequency    0.9%  NaCl infusion (for blood administration) Q24H PRN    albumin human 25% bottle 25 g Q8H    dextrose 50% injection 12.5 g PRN    dextrose 50% injection 25 g PRN    diazePAM tablet 10 mg BID PRN    glucagon (human recombinant) injection 1 mg PRN    glucose chewable tablet 16 g PRN    glucose chewable tablet 24 g PRN    hydromorphone (PF) injection 2 mg Q4H PRN    lactulose 20 gram/30 mL solution Soln 30 g TID    midodrine tablet 15 mg TID    octreotide injection 50 mcg Q8H    ondansetron injection 4 mg Q12H PRN    oxycodone immediate release tablet 30 mg Q4H PRN    pantoprazole EC tablet 40 mg Daily    pregabalin capsule 75 mg BID    rifAXIMin tablet 550 mg BID    sodium bicarbonate tablet 1,300 mg TID    tuberculin injection 5 Units Once     Facility-Administered Medications Ordered in Other Encounters   Medication Frequency    Chemoembolization/LC beads (doxorubicin in sterile water) Once    And    Chemoembolization/LC beads (doxorubicin in sterile water) Once       Objective:     Vital Signs (Most Recent):  Temp: 97.5 °F (36.4 °C) (07/14/17 0400)  Pulse: 74 (07/14/17 0400)  Resp: 18 (07/14/17 0400)  BP: (!) 106/55 (07/14/17 0400)  SpO2: 96 % (07/14/17 0400)  O2 Device (Oxygen Therapy): room air (07/14/17 0400) Vital Signs (24h Range):  Temp:  [96.5 °F (35.8  °C)-98 °F (36.7 °C)] 97.5 °F (36.4 °C)  Pulse:  [60-82] 74  Resp:  [16-20] 18  SpO2:  [94 %-99 %] 96 %  BP: (102-112)/(55-68) 106/55     Weight: 83.9 kg (184 lb 15.5 oz) (07/13/17 0553)  Body mass index is 27.31 kg/m².  Body surface area is 2.02 meters squared.    I/O last 3 completed shifts:  In: 450 [P.O.:450]  Out: 7900 [Urine:500; Other:7400]    Physical Exam   HENT:   Head: Normocephalic and atraumatic.   Eyes: Scleral icterus is present.   Neck: No JVD present.   Cardiovascular: Normal rate.  Exam reveals no friction rub.    Pulmonary/Chest: Effort normal.   Abdominal: He exhibits distension.   Musculoskeletal: He exhibits edema.   Neurological:   Barely arousable       Significant Labs:  CBC:   Recent Labs  Lab 07/13/17  0957   WBC 5.51   RBC 2.14*   HGB 7.1*   HCT 20.1*   PLT 71*   MCV 94   MCH 33.2*   MCHC 35.3     CMP:   Recent Labs  Lab 07/14/17  0622   *   CALCIUM 8.7   ALBUMIN 3.6   PROT 5.6*   *   K 4.8   CO2 19*      BUN 31*   CREATININE 2.0*   ALKPHOS 133   ALT 27   AST 63*   BILITOT 6.7*       Recent Labs  Lab 07/13/17  1923   COLORU Yellow   SPECGRAV 1.005   PHUR 6.0   PROTEINUA Negative   NITRITE Negative   LEUKOCYTESUR Negative   UROBILINOGEN Negative   HYALINECASTS 1     All labs within the past 24 hours have been reviewed.

## 2017-07-14 NOTE — PLAN OF CARE
CAN confirmed with Julissa with [facility] that they have arranged to meet with pt's wife Evelia tomorrow at 1:00pm to sign hospice admission paperwork. Vic is their weekend person and his number is [phone].

## 2017-07-14 NOTE — PLAN OF CARE
Problem: Patient Care Overview  Goal: Plan of Care Review  Pt orientation waxing and waning through shift. AVSS on RA. Paracentesis done, 7.4L removed. U/S kidney and retroperitoneal done today. Pt worked with PT and sat up in chair most of morning. Voiding per urinal. Neuro checks q4 maintained. Pt made DNR today. MD at bedside to discuss code status at length with pt and wife, Evelia. No c/o pain. Bed in lowest position, wheels locked, call light within reach, WCTM.

## 2017-07-14 NOTE — PLAN OF CARE
07/14/17 1138   Discharge Reassessment   Assessment Type Discharge Planning Reassessment   Can the patient answer the patient profile reliably? (mentation waxes and wanes)   How does the patient rate their overall health at the present time? Poor   Describe the patient's ability to walk at the present time. Major restrictions/daily assistance from another person   How often would a person be available to care for the patient? Occasionally   Number of comorbid conditions (as recorded on the chart) Two   During the past month, has the patient often been bothered by feeling down, depressed or hopeless? (unable to assess)   During the past month, has the patient often been bothered by little interest or pleasure in doing things? (unable to assess)   Discharge plan remains the same: No   Provided patient/caregiver education on the expected discharge date and the discharge plan Yes   Discharge Plan A Inpatient Hospice   Change in patient condition or support system No

## 2017-07-14 NOTE — PT/OT/SLP PROGRESS
Physical Therapy      Tommy Hopper  MRN: [id number]    Patient not seen today secondary to  (Patient difficult to arouse in AM. Spoke with RN in PM and states patient not appropriate for therapy. Hospice per rounds.). Attempted in AM/PM. Will follow-up next scheduled visit.    David Bridges III, PT   7/14/2017

## 2017-07-14 NOTE — PLAN OF CARE
Problem: Patient Care Overview  Goal: Plan of Care Review  Outcome: Ongoing (interventions implemented as appropriate)  Plan of care reviewed with patient and spouse. AVSS. Patient remains lethargic, arousing to voice/ light touch, oriented to self. Delayed responses to questions, slurred speech sometimes unintelligible. Retroperitoneal US showed moderate ascites in abd. No BM during shift. No significant changes. WCTM.

## 2017-07-15 VITALS
HEART RATE: 84 BPM | DIASTOLIC BLOOD PRESSURE: 62 MMHG | TEMPERATURE: 98 F | OXYGEN SATURATION: 96 % | SYSTOLIC BLOOD PRESSURE: 108 MMHG | HEIGHT: 69 IN | RESPIRATION RATE: 16 BRPM | WEIGHT: 157.88 LBS | BODY MASS INDEX: 23.38 KG/M2

## 2017-07-15 PROBLEM — E46 HYPOALBUMINEMIA DUE TO PROTEIN-CALORIE MALNUTRITION: Status: RESOLVED | Noted: 2017-01-18 | Resolved: 2017-07-15

## 2017-07-15 PROBLEM — E88.09 HYPOALBUMINEMIA DUE TO PROTEIN-CALORIE MALNUTRITION: Status: RESOLVED | Noted: 2017-01-18 | Resolved: 2017-07-15

## 2017-07-15 PROBLEM — R50.9 FEVER: Status: ACTIVE | Noted: 2017-07-15

## 2017-07-15 LAB
ALBUMIN SERPL BCP-MCNC: 3.5 G/DL
ALP SERPL-CCNC: 136 U/L
ALT SERPL W/O P-5'-P-CCNC: 26 U/L
ANION GAP SERPL CALC-SCNC: 15 MMOL/L
AST SERPL-CCNC: 58 U/L
BASOPHILS # BLD AUTO: 0.01 K/UL
BASOPHILS NFR BLD: 0.1 %
BILIRUB SERPL-MCNC: 8.9 MG/DL
BUN SERPL-MCNC: 42 MG/DL
CALCIUM SERPL-MCNC: 8.5 MG/DL
CHLORIDE SERPL-SCNC: 100 MMOL/L
CO2 SERPL-SCNC: 15 MMOL/L
CREAT SERPL-MCNC: 2.3 MG/DL
DIFFERENTIAL METHOD: ABNORMAL
EOSINOPHIL # BLD AUTO: 0 K/UL
EOSINOPHIL NFR BLD: 0 %
ERYTHROCYTE [DISTWIDTH] IN BLOOD BY AUTOMATED COUNT: 24.3 %
EST. GFR  (AFRICAN AMERICAN): 34.9 ML/MIN/1.73 M^2
EST. GFR  (NON AFRICAN AMERICAN): 30.2 ML/MIN/1.73 M^2
GLUCOSE SERPL-MCNC: 129 MG/DL
HCT VFR BLD AUTO: 23.8 %
HGB BLD-MCNC: 8.2 G/DL
INR PPP: 1.3
LYMPHOCYTES # BLD AUTO: 0.7 K/UL
LYMPHOCYTES NFR BLD: 4.7 %
MAGNESIUM SERPL-MCNC: 1.6 MG/DL
MCH RBC QN AUTO: 33.7 PG
MCHC RBC AUTO-ENTMCNC: 34.5 %
MCV RBC AUTO: 98 FL
MONOCYTES # BLD AUTO: 0.8 K/UL
MONOCYTES NFR BLD: 5.4 %
NEUTROPHILS # BLD AUTO: 13.4 K/UL
NEUTROPHILS NFR BLD: 89.5 %
PHOSPHATE SERPL-MCNC: 5.2 MG/DL
PLATELET # BLD AUTO: 61 K/UL
PMV BLD AUTO: 9.3 FL
POTASSIUM SERPL-SCNC: 4.3 MMOL/L
PROT SERPL-MCNC: 5.7 G/DL
PROTHROMBIN TIME: 13 SEC
RBC # BLD AUTO: 2.43 M/UL
SODIUM SERPL-SCNC: 130 MMOL/L
WBC # BLD AUTO: 14.91 K/UL

## 2017-07-15 PROCEDURE — 85610 PROTHROMBIN TIME: CPT

## 2017-07-15 PROCEDURE — 99238 HOSP IP/OBS DSCHRG MGMT 30/<: CPT | Mod: ,,, | Performed by: HOSPITALIST

## 2017-07-15 PROCEDURE — 36415 COLL VENOUS BLD VENIPUNCTURE: CPT

## 2017-07-15 PROCEDURE — 84100 ASSAY OF PHOSPHORUS: CPT

## 2017-07-15 PROCEDURE — 80053 COMPREHEN METABOLIC PANEL: CPT

## 2017-07-15 PROCEDURE — 25000003 PHARM REV CODE 250: Performed by: HOSPITALIST

## 2017-07-15 PROCEDURE — 85025 COMPLETE CBC W/AUTO DIFF WBC: CPT

## 2017-07-15 PROCEDURE — 83735 ASSAY OF MAGNESIUM: CPT

## 2017-07-15 RX ORDER — IPRATROPIUM BROMIDE AND ALBUTEROL SULFATE 2.5; .5 MG/3ML; MG/3ML
3 SOLUTION RESPIRATORY (INHALATION) EVERY 4 HOURS PRN
Refills: 0
Start: 2017-07-15 | End: 2018-07-15

## 2017-07-15 RX ADMIN — RIFAXIMIN 550 MG: 550 TABLET ORAL at 08:07

## 2017-07-15 RX ADMIN — PREGABALIN 75 MG: 25 CAPSULE ORAL at 08:07

## 2017-07-15 RX ADMIN — PANTOPRAZOLE SODIUM 40 MG: 40 TABLET, DELAYED RELEASE ORAL at 08:07

## 2017-07-15 NOTE — DISCHARGE SUMMARY
DISCHARGE SUMMARY  Hospital Medicine    Team: [facility] MED A    Patient Name: Tommy Hopper  YOB: 1960    Admit Date: 7/5/2017    Discharge Date: 07/15/2017    Discharge Attending Physician: No att. providers found     Diagnoses:  Active Hospital Problems    Diagnosis  POA    *GYPSY (acute kidney injury) [N17.9]  Yes     Priority: 1 - High    Fever [R50.9]  No    Severe malnutrition [E43]  Yes    Acute blood loss anemia [D62]  Yes    Closed pelvic ring fracture [S32.810A]  Yes    Hypotension [I95.9]  Yes    Hepatic encephalopathy [K72.90]  Yes    Alcoholic cirrhosis of liver with ascites [K70.31]  Yes    Decompensated hepatic cirrhosis [K72.90]  Yes    Acute hyperkalemia [E87.5]  Yes    HCC (hepatocellular carcinoma) [C22.0]  Yes    Thrombocytopenia [D69.6]  Yes    Hyponatremia [E87.1]  Yes      Resolved Hospital Problems    Diagnosis Date Resolved POA    Hypoalbuminemia due to protein-calorie malnutrition [E46] 07/15/2017 Yes       Discharged Condition: admit problems have stabilized       HOSPITAL COURSE:    Initial Presentation:    57M presents as transfer from [facility] ED where he presented for pain and inability to ambulate after a fall one day previously.  History very limited due to patient's garbled speech and lack of documentation from outside ED as they were taken off somewhere by another physician.  He reports losing his balance at home and falling on his right side.  He endorses severe persistent pain which has not been helped by any pain medication he was given so far.  While in the ambulance on his way here from [facility] his speech was noted to be more slurred and he was hypotensive so the ambulance diverted to the ED here.       Course of Principle Problem for Admission:    Patient admitted for a right inferior ramus fracture seen on CT scan Likely nonoperative, as his decompensated cirrhosis portends perioperative mortality far exceeding potential  benefitl.Orthopedic surgery consulted and recommended WBAT status for PT. Subsequently patient's condition deteriorated with changes in mental status, kidney failure and worsening hepatic encephalopathy. Patient's wife and POA decided than Hospice placement was the best option given his overall poor prognosis due to inoperable HCC. Patient discharged to [facility].       Other Medical Problems Addressed in the Hospital:    GYPSY (acute kidney injury)     Probably worsened HRS  Hyperkalemia, treated with kayexalate  Metabolic acidosis, started on bicarbonate supplements  UA, Urine sodium, urine creatinine, urine jacobs stain   Nephrology consulted  Daily weights  Intake and output           Fever     Most likely due to SBP  Given one time dose of Ceftriaxone  Fever resolved  Cont Ciprofloxacin after discharge           Acute blood loss anemia     Monitor daily HH  Transfuse if<7.0          Closed pelvic ring fracture     As per orthopedics no surgery would beneficial given high operative risk due to liver failure  Pain management  Not a good candidate for continuing PT/OT           Hypotension     Appears to be chronic.  Continue midorine; hold IVF unless absolutely necessary.          Hepatic encephalopathy     Same, CT head negative  Cont lactulose and rifaximin,titrate dose to 3-4 bowel movements a day        Acute hyperkalemia     Resolved           Alcoholic cirrhosis of liver with ascites     See above              Decompensated hepatic cirrhosis     MELD-Na score: 29 at 7/15/2017  6:10 AM  MELD score: 26 at 7/15/2017  6:10 AM  Calculated from:  Serum Creatinine: 2.3 mg/dL at 7/15/2017  6:10 AM  Serum Sodium: 130 mmol/L at 7/15/2017  6:10 AM  Total Bilirubin: 8.9 mg/dL at 7/15/2017  6:10 AM  INR(ratio): 1.3 at 7/15/2017  6:10 AM  Age: 57 years  Cont lactulose and rifaximin  CT head negative for any acute bleed        HCC (hepatocellular carcinoma)     Determined to not be good candidate for treatment.   Continue conservative management.  Going to [facility]  DNR/DNI             CONSULTS:   Hepatology, Nephrology     Other Pertinent Lab Findings:   Results for STERLING FERRER (MRN [id number]) as of 7/15/2017 17:19   Ref. Range 7/5/2017 21:48 7/6/2017 08:28 7/7/2017 05:00 7/8/2017 05:05 7/9/2017 06:50 7/10/2017 05:37 7/11/2017 04:28 7/12/2017 03:52 7/13/2017 04:30 7/13/2017 09:57 7/14/2017 06:22 7/15/2017 06:10   WBC Latest Ref Range: 3.90 - 12.70 K/uL 4.01 5.72 5.10 5.87 6.85 5.92 7.21 5.47  5.51 4.10 14.91 (H)   RBC Latest Ref Range: 4.60 - 6.20 M/uL 1.69 (L) 2.57 (L) 2.46 (L) 2.61 (L) 2.54 (L) 2.23 (L) 2.32 (L) 2.08 (L)  2.14 (L) 2.24 (L) 2.43 (L)   Hemoglobin Latest Ref Range: 14.0 - 18.0 g/dL 5.7 (LL) 8.3 (L) 8.0 (L) 8.4 (L) 8.3 (L) 7.3 (L) 7.6 (L) 7.0 (L)  7.1 (L) 7.4 (L) 8.2 (L)   Hematocrit Latest Ref Range: 40.0 - 54.0 % 16.3 (LL) 23.5 (L) 22.7 (L) 24.1 (L) 23.6 (L) 20.9 (L) 21.7 (L) 19.6 (LL)  20.1 (L) 21.4 (L) 23.8 (L)   MCV Latest Ref Range: 82 - 98 fL 96 91 92 92 93 94 94 94  94 96 98   MCH Latest Ref Range: 27.0 - 31.0 pg 33.7 (H) 32.3 (H) 32.5 (H) 32.2 (H) 32.7 (H) 32.7 (H) 32.8 (H) 33.7 (H)  33.2 (H) 33.0 (H) 33.7 (H)   MCHC Latest Ref Range: 32.0 - 36.0 % 35.0 35.3 35.2 34.9 35.2 34.9 35.0 35.7  35.3 34.6 34.5   RDW Latest Ref Range: 11.5 - 14.5 % 20.1 (H) 22.8 (H) 22.5 (H) 21.9 (H) 21.7 (H) 21.5 (H) 21.6 (H) 21.5 (H)  22.4 (H) 22.8 (H) 24.3 (H)   Platelets Latest Ref Range: 150 - 350 K/uL 58 (L) 64 (L) 58 (L) 69 (L) 76 (L) 61 (L) 75 (L) 55 (L)  71 (L) 53 (L) 61 (L)   MPV Latest Ref Range: 9.2 - 12.9 fL 9.6 9.0 (L) 9.9 9.1 (L) 8.9 (L) 9.6 9.6 10.0  10.5 9.8 9.3   Gran% Latest Ref Range: 38.0 - 73.0 % 61.2 63.7 58.2 54.8 64.3 59.9 60.2 63.8  59.8 62.7 89.5 (H)   Gran # Latest Ref Range: 1.8 - 7.7 K/uL 2.5 3.6 3.0 3.2 4.4 3.6 4.3 3.5  3.2 2.6 13.4 (H)   Lymph% Latest Ref Range: 18.0 - 48.0 % 22.7 21.2 26.7 31.3 21.5 23.3 21.5 21.0  24.3 23.9 4.7 (L)   Lymph # Latest Ref Range: 1.0 - 4.8 K/uL 0.9 (L)  1.2 1.4 1.8 1.5 1.4 1.6 1.2  1.3 1.0 0.7 (L)   Mono% Latest Ref Range: 4.0 - 15.0 % 14.7 13.1 10.8 9.0 9.9 11.0 11.9 8.6  8.2 9.5 5.4   Mono # Latest Ref Range: 0.3 - 1.0 K/uL 0.6 0.8 0.6 0.5 0.7 0.7 0.9 0.5  0.5 0.4 0.8   Eosinophil% Latest Ref Range: 0.0 - 8.0 % 0.5 1.2 3.3 3.7 3.6 4.6 5.4 5.5  6.4 3.2 0.0   Eos # Latest Ref Range: 0.0 - 0.5 K/uL 0.0 0.1 0.2 0.2 0.3 0.3 0.4 0.3  0.4 0.1 0.0   Basophil% Latest Ref Range: 0.0 - 1.9 % 0.2 0.5 0.6 1.0 0.4 0.7 0.6 0.7  1.3 0.5 0.1   Baso # Latest Ref Range: 0.00 - 0.20 K/uL 0.01 0.03 0.03 0.06 0.03 0.04 0.04 0.04  0.07 0.02 0.01   Ovalocytes Unknown Occasional         Occasional     Aniso Unknown Slight         Slight     Poik Unknown Slight         Slight     Poly Unknown          Occasional     Hypo Unknown Occasional         Occasional     Platelet Estimate Unknown Decreased (A)         Decreased (A)     Tacos Cells Unknown Occasional         Occasional     Protime Latest Ref Range: 9.0 - 12.5 sec 17.9 (H)   17.2 (H) 15.2 (H) 16.9 (H) 15.6 (H) 15.4 (H) 16.2 (H)  15.5 (H) 13.0 (H)   Coumadin Monitoring INR Latest Ref Range: 0.8 - 1.2  1.8 (H)   1.7 (H) 1.5 (H) 1.7 (H) 1.5 (H) 1.5 (H) 1.6 (H)  1.5 (H) 1.3 (H)     Results for STERLING FERRER (MRN [id number]) as of 7/15/2017 17:19   Ref. Range 7/5/2017 21:48 7/5/2017 22:04 7/6/2017 08:28 7/7/2017 05:00 7/8/2017 05:05 7/9/2017 06:50 7/10/2017 05:37 7/11/2017 04:27 7/12/2017 03:52 7/12/2017 11:48 7/12/2017 18:11 7/12/2017 20:48 7/13/2017 09:57 7/13/2017 17:55 7/13/2017 21:47 7/14/2017 06:22 7/15/2017 06:10   Sodium Latest Ref Range: 136 - 145 mmol/L 132 (L)  136 129 (L) 130 (L) 129 (L) 127 (L) 125 (L) 124 (L) 127 (L) 125 (L)  129 (L) 129 (L) 129 (L) 131 (L) 130 (L)   Potassium Latest Ref Range: 3.5 - 5.1 mmol/L 4.5  4.3 3.7 3.9 4.0 4.1 4.4 4.7 4.8 4.8  5.8 (H) 4.6 4.7 4.8 4.3   Chloride Latest Ref Range: 95 - 110 mmol/L 100  104 99 101 100 99 97 95 98 97  99 99 100 101 100   CO2 Latest Ref Range: 23 - 29 mmol/L 24  21 (L) 22  (L) 22 (L) 19 (L) 20 (L) 18 (L) 20 (L) 16 (L) 18 (L)  15 (L) 19 (L) 18 (L) 19 (L) 15 (L)   Anion Gap Latest Ref Range: 8 - 16 mmol/L 8  11 8 7 (L) 10 8 10 9 13 10  15 11 11 11 15   BUN, Bld Latest Ref Range: 6 - 20 mg/dL 34 (H)  33 (H) 27 (H) 23 (H) 22 (H) 21 (H) 23 (H) 28 (H) 29 (H) 29 (H)  35 (H) 32 (H) 32 (H) 31 (H) 42 (H)   Creatinine Latest Ref Range: 0.5 - 1.4 mg/dL 1.9 (H)  1.5 (H) 1.2 1.1 1.1 1.1 1.4 1.7 (H) 1.6 (H) 1.7 (H)  2.2 (H) 2.0 (H) 2.0 (H) 2.0 (H) 2.3 (H)   eGFR if non African American Latest Ref Range: >60 mL/min/1.73 m^2 38.0 (A)  50.6 (A) >60.0 >60.0 >60.0 >60.0 55.0 (A) 43.5 (A) 46.8 (A) 43.5 (A)  31.8 (A) 35.7 (A) 35.7 (A) 35.7 (A) 30.2 (A)   eGFR if African American Latest Ref Range: >60 mL/min/1.73 m^2 43.9 (A)  58.5 (A) >60.0 >60.0 >60.0 >60.0 >60.0 50.3 (A) 54.1 (A) 50.3 (A)  36.8 (A) 41.3 (A) 41.3 (A) 41.3 (A) 34.9 (A)   Glucose Latest Ref Range: 70 - 110 mg/dL 112 (H)  111 (H) 103 99 136 (H) 98 98 100 116 (H) 95  153 (H) 106 120 (H) 124 (H) 129 (H)   Calcium Latest Ref Range: 8.7 - 10.5 mg/dL 7.9 (L)  8.1 (L) 7.7 (L) 7.7 (L) 7.9 (L) 7.9 (L) 8.1 (L) 8.4 (L) 8.9 9.1  9.1 9.0 8.8 8.7 8.5 (L)   Phosphorus Latest Ref Range: 2.7 - 4.5 mg/dL     3.4 3.1 2.9 3.0 3.0       3.5 5.2 (H)   Magnesium Latest Ref Range: 1.6 - 2.6 mg/dL     0.9 (LL) 1.5 (L) 1.2 (L) 1.4 (L) 1.8       1.5 (L) 1.6   Alkaline Phosphatase Latest Ref Range: 55 - 135 U/L 131  130 118 133 139 (H) 132 140 (H) 146 (H)    134   133 136 (H)   Total Protein Latest Ref Range: 6.0 - 8.4 g/dL 5.0 (L)  5.1 (L) 4.9 (L) 5.2 (L) 5.5 (L) 5.1 (L) 5.3 (L) 5.4 (L)    5.9 (L)   5.6 (L) 5.7 (L)   Albumin Latest Ref Range: 3.5 - 5.2 g/dL 2.9 (L)  2.9 (L) 2.7 (L) 2.8 (L) 2.9 (L) 2.7 (L) 2.8 (L) 3.1 (L)    3.8   3.6 3.5   Total Bilirubin Latest Ref Range: 0.1 - 1.0 mg/dL 6.1 (H)  12.1 (H) 8.0 (H) 5.7 (H) 6.2 (H) 6.2 (H) 6.4 (H) 6.3 (H)    7.8 (H)   6.7 (H) 8.9 (H)   AST Latest Ref Range: 10 - 40 U/L 57 (H)  59 (H) 56 (H) 67 (H) 76 (H) 75 (H) 84 (H)  82 (H)    81 (H)   63 (H) 58 (H)   ALT Latest Ref Range: 10 - 44 U/L 24  22 22 26 26 25 27 28    32   27 26   Ammonia Latest Ref Range: 10 - 50 umol/L  76 (H)                  Lactate, Shemar Latest Ref Range: 0.5 - 2.2 mmol/L 1.5           1.7        Procalcitonin Latest Ref Range: <0.25 ng/mL            0.58 (H)            Pertinent/Significant Diagnostic Studies:    CT head:   Age-appropriate generalized cerebral volume loss with mild degree of patchy decreased attenuation supratentorial white matter suggestive for chronic microvascular ischemic change similarly seen on the prior.     There is superimposed stable left temporal and left interfrontal encephalomalacia suggestive for prior traumatic injury.    No evidence for acute intracranial hemorrhage or definite new abnormal parenchymal attenuation. Clinical correlation and further evaluation as warranted.    CT renal stone:   Acute fracture of the inferior right sacral wing and superior and inferior right pubic rami.    Cirrhosis and sequela of portal hypertension including splenomegaly, left upper quadrant collateral vessels and large ascites.  In the absence of IV contrast the lesions previously noted to be compatible with HCC cannot be assessed on today's exam.    Nonobstructing bilateral nephrolithiasis.    Increased large right pleural effusion    Additional stable findings as detailed above    Special Treatments/Procedures:  IR paracentesis: 07/13/17: removed 7 liters, negative for SBP      Disposition:    Hospice     Future Scheduled Appointments:  Future Appointments  Date Time Provider Department Center   7/26/2017 8:00 AM LAB, TRANSPLANT [facility] LABTX [facility]   7/26/2017 8:30 AM Indira Oconnor MD [facility] LIVERTX [facility]   7/26/2017 9:40 AM [facility] CT6 MOBILE LIMIT 450 LBS [facility] CT SCAN [facility]   8/4/2017 11:00 AM Keely Luna MD [facility] NEPHRO [facility]       Follow-up Plans from This Hospitalization:  Fu with [facility] physician    Last  CBC/BMP/HgbA1c (if applicable):  Recent Results (from the past 336 hour(s))   CBC with Automated Differential    Collection Time: 07/15/17  6:10 AM   Result Value Ref Range    WBC 14.91 (H) 3.90 - 12.70 K/uL    Hemoglobin 8.2 (L) 14.0 - 18.0 g/dL    Hematocrit 23.8 (L) 40.0 - 54.0 %    Platelets 61 (L) 150 - 350 K/uL   CBC with Automated Differential    Collection Time: 07/14/17  6:22 AM   Result Value Ref Range    WBC 4.10 3.90 - 12.70 K/uL    Hemoglobin 7.4 (L) 14.0 - 18.0 g/dL    Hematocrit 21.4 (L) 40.0 - 54.0 %    Platelets 53 (L) 150 - 350 K/uL   CBC auto differential    Collection Time: 07/13/17  9:57 AM   Result Value Ref Range    WBC 5.51 3.90 - 12.70 K/uL    Hemoglobin 7.1 (L) 14.0 - 18.0 g/dL    Hematocrit 20.1 (L) 40.0 - 54.0 %    Platelets 71 (L) 150 - 350 K/uL     Recent Results (from the past 336 hour(s))   Basic metabolic panel    Collection Time: 07/13/17  9:47 PM   Result Value Ref Range    Sodium 129 (L) 136 - 145 mmol/L    Potassium 4.7 3.5 - 5.1 mmol/L    Chloride 100 95 - 110 mmol/L    CO2 18 (L) 23 - 29 mmol/L    BUN, Bld 32 (H) 6 - 20 mg/dL    Creatinine 2.0 (H) 0.5 - 1.4 mg/dL    Calcium 8.8 8.7 - 10.5 mg/dL    Anion Gap 11 8 - 16 mmol/L   Basic metabolic panel    Collection Time: 07/13/17  5:55 PM   Result Value Ref Range    Sodium 129 (L) 136 - 145 mmol/L    Potassium 4.6 3.5 - 5.1 mmol/L    Chloride 99 95 - 110 mmol/L    CO2 19 (L) 23 - 29 mmol/L    BUN, Bld 32 (H) 6 - 20 mg/dL    Creatinine 2.0 (H) 0.5 - 1.4 mg/dL    Calcium 9.0 8.7 - 10.5 mg/dL    Anion Gap 11 8 - 16 mmol/L   Basic metabolic panel    Collection Time: 07/12/17  6:11 PM   Result Value Ref Range    Sodium 125 (L) 136 - 145 mmol/L    Potassium 4.8 3.5 - 5.1 mmol/L    Chloride 97 95 - 110 mmol/L    CO2 18 (L) 23 - 29 mmol/L    BUN, Bld 29 (H) 6 - 20 mg/dL    Creatinine 1.7 (H) 0.5 - 1.4 mg/dL    Calcium 9.1 8.7 - 10.5 mg/dL    Anion Gap 10 8 - 16 mmol/L     No results found for: HGBA1C    Discharge Medication List:      Medication List      START taking these medications    albuterol-ipratropium 2.5mg-0.5mg/3mL 0.5 mg-3 mg(2.5 mg base)/3 mL nebulizer solution  Commonly known as:  DUO-NEB  Take 3 mLs by nebulization every 4 (four) hours as needed for Wheezing. Rescue        CHANGE how you take these medications    sodium bicarbonate 650 MG tablet  Take 1 tablet (650 mg total) by mouth 3 (three) times daily.  What changed:  Another medication with the same name was removed. Continue taking this medication, and follow the directions you see here.        CONTINUE taking these medications    calcium-vitamin D 250-100 mg-unit per tablet     CHANTIX STARTING MONTH BOX 0.5 mg (11)- 1 mg (42) tablet  Generic drug:  varenicline     ciprofloxacin HCl 500 MG tablet  Commonly known as:  CIPRO  Take 1 tablet (500 mg total) by mouth once daily.     diazePAM 10 MG Tab  Commonly known as:  VALIUM     lactulose 20 gram/30 mL Soln  Commonly known as:  CHRONULAC  Take 30 mLs (20 g total) by mouth every 6 (six) hours as needed (titrate to have 2-3 bowle movements per day).     midodrine 10 MG tablet  Commonly known as:  PROAMATINE  Take 1 tablet (10 mg total) by mouth 3 (three) times daily.     neomycin-polymyxin-dexamethasone 3.5 mg/g-10,000 unit/g-0.1 % Oint  Commonly known as:  DEXACINE     ondansetron 4 MG tablet  Commonly known as:  ZOFRAN  Take 1 tablet (4 mg total) by mouth every 6 (six) hours as needed for Nausea.     oxycodone 30 MG Tab  Commonly known as:  ROXICODONE     pantoprazole 40 MG tablet  Commonly known as:  PROTONIX  Take 1 tablet (40 mg total) by mouth once daily.     rifAXIMin 550 mg Tab  Commonly known as:  XIFAXAN  Take 1 tablet (550 mg total) by mouth 2 (two) times daily.        STOP taking these medications    furosemide 40 MG tablet  Commonly known as:  LASIX     morphine 30 MG 12 hr tablet  Commonly known as:  MS CONTIN     spironolactone 100 MG tablet  Commonly known as:  ALDACTONE           Where to Get Your Medications       Information about where to get these medications is not yet available    Ask your nurse or doctor about these medications  · albuterol-ipratropium 2.5mg-0.5mg/3mL 0.5 mg-3 mg(2.5 mg base)/3 mL nebulizer solution         Patient Instructions:  No discharge procedures on file.    Signing Physician:  Zack Crawford MD

## 2017-07-15 NOTE — ASSESSMENT & PLAN NOTE
MELD-Na score: 29 at 7/15/2017  6:10 AM  MELD score: 26 at 7/15/2017  6:10 AM  Calculated from:  Serum Creatinine: 2.3 mg/dL at 7/15/2017  6:10 AM  Serum Sodium: 130 mmol/L at 7/15/2017  6:10 AM  Total Bilirubin: 8.9 mg/dL at 7/15/2017  6:10 AM  INR(ratio): 1.3 at 7/15/2017  6:10 AM  Age: 57 years  Cont lactulose and rifaximin  CT head negative for any acute bleed

## 2017-07-15 NOTE — SUBJECTIVE & OBJECTIVE
Interval History: Patient seen and examined at baseline. Wife has agreed to admit patient to Hospice.     Review of Systems   Constitutional: Positive for activity change, appetite change and fatigue.   Respiratory: Positive for shortness of breath. Negative for chest tightness.    Cardiovascular: Positive for leg swelling.   Gastrointestinal: Positive for abdominal distention. Negative for abdominal pain.   Psychiatric/Behavioral: Positive for confusion and decreased concentration.     Objective:     Vital Signs (Most Recent):  Temp: (!) 100.4 °F (38 °C) (07/14/17 1934)  Pulse: 105 (07/14/17 2000)  Resp: 15 (07/14/17 1934)  BP: 116/60 (07/14/17 1934)  SpO2: (!) 90 % (07/14/17 1934) Vital Signs (24h Range):  Temp:  [97.2 °F (36.2 °C)-100.4 °F (38 °C)] 100.4 °F (38 °C)  Pulse:  [] 105  Resp:  [15-26] 15  SpO2:  [90 %-98 %] 90 %  BP: (105-127)/(55-68) 116/60     Weight: 83.9 kg (184 lb 15.5 oz)  Body mass index is 27.31 kg/m².    Intake/Output Summary (Last 24 hours) at 07/14/17 2046  Last data filed at 07/14/17 1605   Gross per 24 hour   Intake              160 ml   Output                0 ml   Net              160 ml      Physical Exam   Constitutional:   Malnourished and cachectice   HENT:   Head: Normocephalic and atraumatic.   Eyes: EOM are normal. Scleral icterus is present.   Neck: No JVD present.   Cardiovascular: Normal rate and regular rhythm.  Exam reveals no friction rub.    Pulmonary/Chest: Effort normal and breath sounds normal.   Crackles at bases   Abdominal: He exhibits distension.   Positive fluid wave   Musculoskeletal: He exhibits edema.   Neurological: He is alert.   Oriented to person, off and on place, but not time nor situation   Skin: Skin is warm.       Significant Labs:   CBC:   Recent Labs  Lab 07/13/17 0957 07/14/17  0622   WBC 5.51 4.10   HGB 7.1* 7.4*   HCT 20.1* 21.4*   PLT 71* 53*     CMP:   Recent Labs  Lab 07/13/17 0957 07/13/17  1755 07/13/17  2147 07/14/17  0622   *  129* 129* 131*   K 5.8* 4.6 4.7 4.8   CL 99 99 100 101   CO2 15* 19* 18* 19*   * 106 120* 124*   BUN 35* 32* 32* 31*   CREATININE 2.2* 2.0* 2.0* 2.0*   CALCIUM 9.1 9.0 8.8 8.7   PROT 5.9*  --   --  5.6*   ALBUMIN 3.8  --   --  3.6   BILITOT 7.8*  --   --  6.7*   ALKPHOS 134  --   --  133   AST 81*  --   --  63*   ALT 32  --   --  27   ANIONGAP 15 11 11 11   EGFRNONAA 31.8* 35.7* 35.7* 35.7*       Significant Imaging: I have reviewed and interpreted all pertinent imaging results/findings within the past 24 hours.

## 2017-07-15 NOTE — ASSESSMENT & PLAN NOTE
As per orthopedics no surgery would beneficial given high operative risk due to liver failure  Pain management  Not a good candidate for continuing PT/OT

## 2017-07-15 NOTE — PLAN OF CARE
Problem: Patient Care Overview  Goal: Plan of Care Review  Outcome: Ongoing (interventions implemented as appropriate)  Plan of care reviewed with pt's spouse, as patient remains oriented to self only. AVSS, sats in the mid 90s throughout the night. Breath sounds still coarse, but did not require suctioning. Temperature increased slightly during the beginning of the shift, IV apap administered. Afebrile. Stat chest xray did not show any significant findings. IV abx infused as ordered. No PO medications administered as pt's lethargy posed an aspiration risk. Lost IV access this AM, team notified. Moderate edema noted to the left arm. Possible d/c to [facility] today. No other changes. Will continue to monitor.

## 2017-07-15 NOTE — ASSESSMENT & PLAN NOTE
Most likely due to SBP  Given one time dose of Ceftriaxone  Fever resolved  Cont Ciprofloxacin after discharge

## 2017-07-15 NOTE — PROGRESS NOTES
[facility] Hospital Medicine  Progress Note    Patient Name: Tommy Hopper  MRN: [id number]  Patient Class: IP- Inpatient   Admission Date: 7/5/2017  Length of Stay: 9 days  Expected Discharge Date: 7/15/2017  Attending Physician: Zack Crawford MD  Primary Care Provider: Primary Doctor [facility] Medicine Team: [facility]OSP MED A Zack Crawford MD  Principal Problem:GYPSY (acute kidney injury)    Subjective:     HPI:   57M presents as transfer from [facility] ED where he presented for pain and inability to ambulate after a fall one day previously.  History very limited due to patient's garbled speech and lack of documentation from outside ED as they were taken off somewhere by another physician.  He reports losing his balance at home and falling on his right side.  He endorses severe persistent pain which has not been helped by any pain medication he was given so far.  While in the ambulance on his way here from [facility] his speech was noted to be more slurred and he was hypotensive so the ambulance diverted to the ED here.    Hospital Course:  No notes on file    Interval History: Patient seen and examined at baseline. Wife has agreed to admit patient to Hospice.     Review of Systems   Constitutional: Positive for activity change, appetite change and fatigue.   Respiratory: Positive for shortness of breath. Negative for chest tightness.    Cardiovascular: Positive for leg swelling.   Gastrointestinal: Positive for abdominal distention. Negative for abdominal pain.   Psychiatric/Behavioral: Positive for confusion and decreased concentration.     Objective:     Vital Signs (Most Recent):  Temp: (!) 100.4 °F (38 °C) (07/14/17 1934)  Pulse: 105 (07/14/17 2000)  Resp: 15 (07/14/17 1934)  BP: 116/60 (07/14/17 1934)  SpO2: (!) 90 % (07/14/17 1934) Vital Signs (24h Range):  Temp:  [97.2 °F (36.2 °C)-100.4 °F (38 °C)] 100.4 °F (38 °C)  Pulse:  [] 105  Resp:  [15-26] 15  SpO2:  [90 %-98 %]  90 %  BP: (105-127)/(55-68) 116/60     Weight: 83.9 kg (184 lb 15.5 oz)  Body mass index is 27.31 kg/m².    Intake/Output Summary (Last 24 hours) at 07/14/17 2046  Last data filed at 07/14/17 1605   Gross per 24 hour   Intake              160 ml   Output                0 ml   Net              160 ml      Physical Exam   Constitutional:   Malnourished and cachectice   HENT:   Head: Normocephalic and atraumatic.   Eyes: EOM are normal. Scleral icterus is present.   Neck: No JVD present.   Cardiovascular: Normal rate and regular rhythm.  Exam reveals no friction rub.    Pulmonary/Chest: Effort normal and breath sounds normal.   Crackles at bases   Abdominal: He exhibits distension.   Positive fluid wave   Musculoskeletal: He exhibits edema.   Neurological: He is alert.   Oriented to person, off and on place, but not time nor situation   Skin: Skin is warm.       Significant Labs:   CBC:   Recent Labs  Lab 07/13/17  0957 07/14/17  0622   WBC 5.51 4.10   HGB 7.1* 7.4*   HCT 20.1* 21.4*   PLT 71* 53*     CMP:   Recent Labs  Lab 07/13/17  0957 07/13/17  1755 07/13/17  2147 07/14/17  0622   * 129* 129* 131*   K 5.8* 4.6 4.7 4.8   CL 99 99 100 101   CO2 15* 19* 18* 19*   * 106 120* 124*   BUN 35* 32* 32* 31*   CREATININE 2.2* 2.0* 2.0* 2.0*   CALCIUM 9.1 9.0 8.8 8.7   PROT 5.9*  --   --  5.6*   ALBUMIN 3.8  --   --  3.6   BILITOT 7.8*  --   --  6.7*   ALKPHOS 134  --   --  133   AST 81*  --   --  63*   ALT 32  --   --  27   ANIONGAP 15 11 11 11   EGFRNONAA 31.8* 35.7* 35.7* 35.7*       Significant Imaging: I have reviewed and interpreted all pertinent imaging results/findings within the past 24 hours.    MELD-Na score: 28 at 7/14/2017  6:22 AM  MELD score: 25 at 7/14/2017  6:22 AM  Calculated from:  Serum Creatinine: 2.0 mg/dL at 7/14/2017  6:22 AM  Serum Sodium: 131 mmol/L at 7/14/2017  6:22 AM  Total Bilirubin: 6.7 mg/dL at 7/14/2017  6:22 AM  INR(ratio): 1.5 at 7/14/2017  6:22 AM  Age: 57 years    Significant  Labs:    CBC:    Recent Labs  Lab 07/13/17  0957 07/14/17  0622   WBC 5.51 4.10   HGB 7.1* 7.4*   HCT 20.1* 21.4*   PLT 71* 53*     CMP:    Recent Labs  Lab 07/13/17  0957 07/13/17  1755 07/13/17  2147 07/14/17  0622   * 129* 129* 131*   K 5.8* 4.6 4.7 4.8   CL 99 99 100 101   CO2 15* 19* 18* 19*   * 106 120* 124*   BUN 35* 32* 32* 31*   CREATININE 2.2* 2.0* 2.0* 2.0*   CALCIUM 9.1 9.0 8.8 8.7   PROT 5.9*  --   --  5.6*   ALBUMIN 3.8  --   --  3.6   BILITOT 7.8*  --   --  6.7*   ALKPHOS 134  --   --  133   AST 81*  --   --  63*   ALT 32  --   --  27   ANIONGAP 15 11 11 11   EGFRNONAA 31.8* 35.7* 35.7* 35.7*     PTINR:    Recent Labs  Lab 07/13/17  0430 07/14/17  0622   INR 1.6* 1.5*         Procedures: None    Assessment / Plan:     * GYPSY (acute kidney injury)    Probably worsened HRS  Hyperkalemia, treated with kayexalate  Metabolic acidosis, started on bicarbonate supplements  UA, Urine sodium, urine creatinine, urine jacobs stain   Nephrology consulted  Daily weights  Intake and output           Acute blood loss anemia    Monitor daily HH  Transfuse if<7.0          Closed pelvic ring fracture    As per orthopedics no surgery would beneficial given high operative risk due to liver failure  Cont PT/OT with WBAT           Hypotension    Appears to be chronic.  Continue midorine; hold IVF unless absolutely necessary.          Hepatic encephalopathy    Improved, CT head negative  Cont lactulose and rifaximin,titrate dose to 3-4 bowel movements a day         Alcoholic cirrhosis of liver with ascites    Decompensated.  On decreased dose of lasix/aldactone given recent GYPSY.            Decompensated hepatic cirrhosis    MELD-Na score: 30 at 7/13/2017  9:57 AM  MELD score: 27 at 7/13/2017  9:57 AM  Calculated from:  Serum Creatinine: 2.2 mg/dL at 7/13/2017  9:57 AM  Serum Sodium: 129 mmol/L at 7/13/2017  9:57 AM  Total Bilirubin: 7.8 mg/dL at 7/13/2017  9:57 AM  INR(ratio): 1.6 at 7/13/2017  4:30 AM  Age: 57  years  Cont lactulose and rifaximin  CT head negative for any acute bleed         HCC (hepatocellular carcinoma)    Determined to not be good candidate for treatment.  Continue conservative management.  Wife not interested in comfort care or hospice at the moment           Thrombocytopenia    Stable, no bleed, no transfusion as of now               VTE Risk Mitigation         Ordered     Place sequential compression device  Until discontinued      07/06/17 0409     Place OLYA hose  Until discontinued      07/06/17 0409     High Risk of VTE  Once      07/06/17 0409     Place OLYA hose  Until discontinued      07/05/17 2330     Place sequential compression device  Until discontinued      07/05/17 2330           Anticipated Disposition: Hospice/Medical Facility    Zack Crawford MD  Department of Hospital Medicine  [facility]

## 2017-07-15 NOTE — PROGRESS NOTES
[facility] Hospital Medicine  Progress Note    Patient Name: Tommy Hopper  MRN: [id number]  Patient Class: IP- Inpatient   Admission Date: 7/5/2017  Length of Stay: 10 days  Attending Physician: Zack Crawford MD  Primary Care Provider: Primary Doctor [facility] Medicine Team: [facility] A Zack Crawford MD    Subjective:     Principal Problem:GYPSY (acute kidney injury)    HPI:  57M presents as transfer from [facility] ED where he presented for pain and inability to ambulate after a fall one day previously.  History very limited due to patient's garbled speech and lack of documentation from outside ED as they were taken off somewhere by another physician.  He reports losing his balance at home and falling on his right side.  He endorses severe persistent pain which has not been helped by any pain medication he was given so far.  While in the ambulance on his way here from [facility] his speech was noted to be more slurred and he was hypotensive so the ambulance diverted to the ED here.    Hospital Course:  No notes on file    Interval History: Patient seen and examined at bedside. Spiked a fever overnight of 100.7, Ceftriaxone IV given since most likely source is SBP. Possible discharge to Hospice today.     Review of Systems   Constitutional: Positive for activity change, appetite change and fatigue.   Respiratory: Positive for shortness of breath. Negative for chest tightness.    Cardiovascular: Positive for leg swelling.   Gastrointestinal: Positive for abdominal distention. Negative for abdominal pain.   Psychiatric/Behavioral: Positive for confusion and decreased concentration.     Objective:     Vital Signs (Most Recent):  Temp: 98.4 °F (36.9 °C) (07/15/17 1205)  Pulse: 84 (07/15/17 1205)  Resp: 16 (07/15/17 0840)  BP: 108/62 (07/15/17 1205)  SpO2: 96 % (07/15/17 1205) Vital Signs (24h Range):  Temp:  [98 °F (36.7 °C)-100.4 °F (38 °C)] 98.4 °F (36.9 °C)  Pulse:  []  84  Resp:  [15-26] 16  SpO2:  [90 %-96 %] 96 %  BP: (108-140)/(60-70) 108/62     Weight: 71.6 kg (157 lb 14.4 oz)  Body mass index is 23.32 kg/m².    Intake/Output Summary (Last 24 hours) at 07/15/17 1229  Last data filed at 07/14/17 2227   Gross per 24 hour   Intake              150 ml   Output                0 ml   Net              150 ml      Physical Exam   Constitutional:   Malnourished and cachectice   HENT:   Head: Normocephalic and atraumatic.   Eyes: EOM are normal. Scleral icterus is present.   Neck: No JVD present.   Cardiovascular: Normal rate and regular rhythm.  Exam reveals no friction rub.    Pulmonary/Chest: Effort normal and breath sounds normal.   Crackles at bases   Abdominal: He exhibits distension.   Positive fluid wave   Musculoskeletal: He exhibits edema.   Neurological: He is alert.   Oriented to person, off and on place, but not time nor situation   Skin: Skin is warm.       Significant Labs:   CBC:   Recent Labs  Lab 07/14/17  0622 07/15/17  0610   WBC 4.10 14.91*   HGB 7.4* 8.2*   HCT 21.4* 23.8*   PLT 53* 61*     CMP:   Recent Labs  Lab 07/13/17  2147 07/14/17  0622 07/15/17  0610   * 131* 130*   K 4.7 4.8 4.3    101 100   CO2 18* 19* 15*   * 124* 129*   BUN 32* 31* 42*   CREATININE 2.0* 2.0* 2.3*   CALCIUM 8.8 8.7 8.5*   PROT  --  5.6* 5.7*   ALBUMIN  --  3.6 3.5   BILITOT  --  6.7* 8.9*   ALKPHOS  --  133 136*   AST  --  63* 58*   ALT  --  27 26   ANIONGAP 11 11 15   EGFRNONAA 35.7* 35.7* 30.2*       Significant Imaging: I have reviewed and interpreted all pertinent imaging results/findings within the past 24 hours.    Assessment/Plan:      * GYPSY (acute kidney injury)    Probably worsened HRS  Hyperkalemia, treated with kayexalate  Metabolic acidosis, started on bicarbonate supplements  UA, Urine sodium, urine creatinine, urine jacobs stain   Nephrology consulted  Daily weights  Intake and output           Fever    Most likely due to SBP  Given one time dose of  Ceftriaxone  Fever resolved  Cont Ciprofloxacin after discharge           Acute blood loss anemia    Monitor daily HH  Transfuse if<7.0          Closed pelvic ring fracture    As per orthopedics no surgery would beneficial given high operative risk due to liver failure  Pain management  Not a good candidate for continuing PT/OT           Hypotension    Appears to be chronic.  Continue midorine; hold IVF unless absolutely necessary.          Hepatic encephalopathy    Same, CT head negative  Cont lactulose and rifaximin,titrate dose to 3-4 bowel movements a day         Acute hyperkalemia    Resolved           Alcoholic cirrhosis of liver with ascites    See above              Decompensated hepatic cirrhosis    MELD-Na score: 29 at 7/15/2017  6:10 AM  MELD score: 26 at 7/15/2017  6:10 AM  Calculated from:  Serum Creatinine: 2.3 mg/dL at 7/15/2017  6:10 AM  Serum Sodium: 130 mmol/L at 7/15/2017  6:10 AM  Total Bilirubin: 8.9 mg/dL at 7/15/2017  6:10 AM  INR(ratio): 1.3 at 7/15/2017  6:10 AM  Age: 57 years  Cont lactulose and rifaximin  CT head negative for any acute bleed         HCC (hepatocellular carcinoma)    Determined to not be good candidate for treatment.  Continue conservative management.  Going [facility]  DNR/DNI        Thrombocytopenia    Stable, no bleed, no transfusion as of now         Hyponatremia    improving            VTE Risk Mitigation         Ordered     Place sequential compression device  Until discontinued      07/06/17 0409     Place OLYA hose  Until discontinued      07/06/17 0409     High Risk of VTE  Once      07/06/17 0409     Place OLYA hose  Until discontinued      07/05/17 2330     Place sequential compression device  Until discontinued      07/05/17 2330          Zack Crawford MD  Department of Hospital Medicine   [facility]

## 2017-07-15 NOTE — ASSESSMENT & PLAN NOTE
Determined to not be good candidate for treatment.  Continue conservative management.  Going to [facility]  DNR/DNI

## 2017-07-15 NOTE — SUBJECTIVE & OBJECTIVE
Interval History: Patient seen and examined at bedside. Spiked a fever overnight of 100.7, Ceftriaxone IV given since most likely source is SBP. Possible discharge to Hospice today.     Review of Systems   Constitutional: Positive for activity change, appetite change and fatigue.   Respiratory: Positive for shortness of breath. Negative for chest tightness.    Cardiovascular: Positive for leg swelling.   Gastrointestinal: Positive for abdominal distention. Negative for abdominal pain.   Psychiatric/Behavioral: Positive for confusion and decreased concentration.     Objective:     Vital Signs (Most Recent):  Temp: 98.4 °F (36.9 °C) (07/15/17 1205)  Pulse: 84 (07/15/17 1205)  Resp: 16 (07/15/17 0840)  BP: 108/62 (07/15/17 1205)  SpO2: 96 % (07/15/17 1205) Vital Signs (24h Range):  Temp:  [98 °F (36.7 °C)-100.4 °F (38 °C)] 98.4 °F (36.9 °C)  Pulse:  [] 84  Resp:  [15-26] 16  SpO2:  [90 %-96 %] 96 %  BP: (108-140)/(60-70) 108/62     Weight: 71.6 kg (157 lb 14.4 oz)  Body mass index is 23.32 kg/m².    Intake/Output Summary (Last 24 hours) at 07/15/17 1229  Last data filed at 07/14/17 2227   Gross per 24 hour   Intake              150 ml   Output                0 ml   Net              150 ml      Physical Exam   Constitutional:   Malnourished and cachectice   HENT:   Head: Normocephalic and atraumatic.   Eyes: EOM are normal. Scleral icterus is present.   Neck: No JVD present.   Cardiovascular: Normal rate and regular rhythm.  Exam reveals no friction rub.    Pulmonary/Chest: Effort normal and breath sounds normal.   Crackles at bases   Abdominal: He exhibits distension.   Positive fluid wave   Musculoskeletal: He exhibits edema.   Neurological: He is alert.   Oriented to person, off and on place, but not time nor situation   Skin: Skin is warm.       Significant Labs:   CBC:   Recent Labs  Lab 07/14/17  0622 07/15/17  0610   WBC 4.10 14.91*   HGB 7.4* 8.2*   HCT 21.4* 23.8*   PLT 53* 61*     CMP:   Recent Labs  Lab  07/13/17  2147 07/14/17  0622 07/15/17  0610   * 131* 130*   K 4.7 4.8 4.3    101 100   CO2 18* 19* 15*   * 124* 129*   BUN 32* 31* 42*   CREATININE 2.0* 2.0* 2.3*   CALCIUM 8.8 8.7 8.5*   PROT  --  5.6* 5.7*   ALBUMIN  --  3.6 3.5   BILITOT  --  6.7* 8.9*   ALKPHOS  --  133 136*   AST  --  63* 58*   ALT  --  27 26   ANIONGAP 11 11 15   EGFRNONAA 35.7* 35.7* 30.2*       Significant Imaging: I have reviewed and interpreted all pertinent imaging results/findings within the past 24 hours.

## 2017-07-15 NOTE — PLAN OF CARE
Spoke with pt's nurse Carmen, confirmed pt needs stretcher for tx to [facility]. Advised her I will arrange for p/u with in the hour.       1330- Spoke to Gracia with [facility] dispatch, advised pt is ready to transfer to [facility], she stated she would have someone here within the hour.     1338- Spoke to pt's nurse Carmen, advised [facility] should be here within the hour. Advised I will tube down the d/c packet to give to [facility].     1343- D/C packet tubed to 10th floor and pt's nurse is aware it is being sent.

## 2017-07-15 NOTE — NURSING
Paged team about pt's lethargy and fever, MD ordered IV apap, as pt too lethargic for PO medication and an aspiration risk. Will continue to monitor.

## 2017-07-15 NOTE — PLAN OF CARE
[facility]     Department of Hospital Medicine     [address] [place] [address]     [phone] after hours  [phone] fax                                   HOSPICE  ORDERS     07/15/2017    Admit to Hospice:  Inpatient Service    Diagnoses:  Active Hospital Problems    Diagnosis  POA    *GYPSY (acute kidney injury) [N17.9]  Yes     Priority: 1 - High    Severe malnutrition [E43]  Yes    Acute blood loss anemia [D62]  Yes    Closed pelvic ring fracture [S32.810A]  Yes    Hypotension [I95.9]  Yes    Hepatic encephalopathy [K72.90]  Yes    Alcoholic cirrhosis of liver with ascites [K70.31]  Yes    Hypoalbuminemia due to protein-calorie malnutrition [E46]  Yes    Decompensated hepatic cirrhosis [K72.90]  Yes    Acute hyperkalemia [E87.5]  Yes    HCC (hepatocellular carcinoma) [C22.0]  Yes    Thrombocytopenia [D69.6]  Yes    Hyponatremia [E87.1]  Yes      Resolved Hospital Problems    Diagnosis Date Resolved POA   No resolved problems to display.       Hospice Qualifying Diagnoses: Liver failure due to untreatable Hepatocellular Carcinoma, Renal failure          Patient has a life expectancy < 6 months due to these conditions.    Vital Signs: Routine per Hospice Protocol.    Allergies:  Review of patient's allergies indicates:   Allergen Reactions    Adhesive Blisters       Diet: Regular diet- 800 cc, low sodium 2gm    Activities: As tolerated    Nursing: Per Hospice Routine    Future Orders:  Hospice Medical Director may dictate new orders for comfortable care measures & sign death certificate.    Medications:       Tommy Hopper   Home Medication Instructions BRIAN:[id number]    Printed on:07/15/17 1012   Medication Information                      albuterol-ipratropium 2.5mg-0.5mg/3mL (DUO-NEB) 0.5 mg-3 mg(2.5 mg base)/3 mL nebulizer solution  Take 3 mLs by nebulization every 4 (four) hours as needed for Wheezing. Rescue              calcium-vitamin D 250-100 mg-unit per tablet  Take 2 tablets by mouth 2 (two) times daily.             CHANTIX STARTING MONTH BOX 0.5 mg (11)- 1 mg (42) tablet  as directed Orally 30 days- taking PRN (medication maked BP low)             ciprofloxacin HCl (CIPRO) 500 MG tablet  Take 1 tablet (500 mg total) by mouth once daily.             diazepam (VALIUM) 10 MG Tab  Take 10 mg by mouth 2 (two) times daily.             lactulose (CHRONULAC) 20 gram/30 mL Soln  Take 30 mLs (20 g total) by mouth every 6 (six) hours as needed (titrate to have 2-3 bowle movements per day).             midodrine (PROAMATINE) 10 MG tablet  Take 1 tablet (10 mg total) by mouth 3 (three) times daily.             neomycin-polymyxin-dexamethasone (DEXACINE) 3.5 mg/g-10,000 unit/g-0.1 % Oint  every 6 (six) hours.             ondansetron (ZOFRAN) 4 MG tablet  Take 1 tablet (4 mg total) by mouth every 6 (six) hours as needed for Nausea.             oxycodone (ROXICODONE) 30 MG Tab  Take 30 mg by mouth every 8 (eight) hours.             pantoprazole (PROTONIX) 40 MG tablet  Take 1 tablet (40 mg total) by mouth once daily.             rifAXIMin (XIFAXAN) 550 mg Tab  Take 1 tablet (550 mg total) by mouth 2 (two) times daily.             sodium bicarbonate 650 MG tablet  Take 1 tablet (650 mg total) by mouth 3 (three) times daily.                                _________________________________  Zack Crawford MD  07/15/2017

## 2017-07-17 LAB
BACTERIA BLD CULT: NORMAL
BACTERIA SPEC AEROBE CULT: NO GROWTH

## 2017-07-19 NOTE — PLAN OF CARE
07/19/17 0658   Final Note   Assessment Type Final Discharge Note   Discharge Disposition HospiceMedic     Patient discharged on 07/15/17 to [facility] facility.

## 2017-07-19 NOTE — PT/OT/SLP DISCHARGE
Physical Therapy Discharge Summary    Tommy Hopper  MRN: [id number]   GYPSY (acute kidney injury)   Patient Discharged from acute Physical Therapy on 7/15/2017.  Please refer to prior PT noted date on 7/13/2017 for functional status.     Assessment:   Patient appropriate for care in another setting.  GOALS:    Physical Therapy Goals        Problem: Physical Therapy Goal    Goal Priority Disciplines Outcome Goal Variances Interventions   Physical Therapy Goal     PT/OT, PT Ongoing (interventions implemented as appropriate)     Description:  PT goals until 7/12/17    1. Pt supine to sit with minimal assist- met   Updated: with SBA  2. Pt sit to supine with minimal assist-not met  3. Pt sit to stand with RW with WBAT R LE with CGA-met   Updated: with SBA  4. Pt to perform gait 30ft with RW with WBAT R LE with minimal assist.-not met  5. Pt to go up/down curb step with RW with WBAT R LE with minimal assist.-not met  6. Pt to transfer bed to/from bedside chair with WBAT R LE with minimal assist.-not met  7. Pt to perform B LE exs in sitting x 15 reps with handout.-not met                     Reasons for Discontinuation of Therapy Services  Transfer to alternate level of care.      Plan:  Patient Discharged to: Hospice.    David Bridges III, DPT  7/19/2017

## 2017-07-20 LAB — BACTERIA SPEC ANAEROBE CULT: NORMAL

## 2017-07-31 ENCOUNTER — TELEPHONE (OUTPATIENT)
Dept: INTERVENTIONAL RADIOLOGY/VASCULAR | Facility: CLINIC | Age: 57
End: 2017-07-31

## 2017-08-15 ENCOUNTER — PATIENT MESSAGE (OUTPATIENT)
Dept: ORTHOPEDICS | Facility: CLINIC | Age: 57
End: 2017-08-15

## 2017-08-16 ENCOUNTER — TELEPHONE (OUTPATIENT)
Dept: INTERVENTIONAL RADIOLOGY/VASCULAR | Facility: HOSPITAL | Age: 57
End: 2017-08-16

## 2017-08-16 NOTE — TELEPHONE ENCOUNTER
Unable to reach patient, tried home phone which rings busy and I left a message on cell.  Need to reschedule CT, pt was a no show.  I am sending out a letter.  Thanks

## 2017-09-18 ENCOUNTER — TELEPHONE (OUTPATIENT)
Dept: SMOKING CESSATION | Facility: CLINIC | Age: 57
End: 2017-09-18

## 2017-09-21 NOTE — PT/OT/SLP DISCHARGE
Occupational Therapy Discharge Summary    Tommy Hopper  MRN: [id number]   GYPSY (acute kidney injury)   Patient Discharged from acute Occupational Therapy on 7/15/17.  Please refer to prior OT note dated on 7/7/17 for functional status.     Assessment:   Patient appropriate for care in another setting.  GOALS:    Occupational Therapy Goals        Problem: Occupational Therapy Goal    Goal Priority Disciplines Outcome Interventions   Occupational Therapy Goal     OT, PT/OT Ongoing (interventions implemented as appropriate)    Description:  Goals to be met by: 7/21/17     Patient will increase functional independence with ADLs by performing:    Feeding with Set-up Assistance.  UE Dressing with Set-up Assistance.  Grooming while EOB with Stand-by Assistance.  Sitting at edge of bed x5 minutes with Supervision while performing functional tasks   Stand pivot transfers with Contact Guard Assistance.  Upper extremity exercise program x10-12 reps per handout and visual demonstration, with assistance as needed.                    Reasons for Discontinuation of Therapy Services  Transfer to alternate level of care.      Plan:  Patient Discharged to: Palliative Care/Hospice.

## 2017-10-24 ENCOUNTER — TELEPHONE (OUTPATIENT)
Dept: SMOKING CESSATION | Facility: CLINIC | Age: 57
End: 2017-10-24

## 2017-10-25 ENCOUNTER — TELEPHONE (OUTPATIENT)
Dept: SMOKING CESSATION | Facility: CLINIC | Age: 57
End: 2017-10-25

## 2017-10-31 ENCOUNTER — TELEPHONE (OUTPATIENT)
Dept: SMOKING CESSATION | Facility: CLINIC | Age: 57
End: 2017-10-31

## 2018-02-27 NOTE — PT/OT/SLP DISCHARGE
Occupational Therapy Discharge Summary    Tommy Hopper  MRN: [id number]   Principal Problem: <principal problem not specified>      Patient Discharged from acute Occupational Therapy on 6/13/17.      Assessment:      Patient was discharged unexpectedly.  Information required to complete an accurate discharge summary is unknown.  Refer to therapy initial evaluation and last progress note for initial and most recent functional status and goal achievement.  Recommendations made may be found in medical record.    Objective:     GOALS:    Occupational Therapy Goals     Not on file          Multidisciplinary Problems (Resolved)        Problem: Occupational Therapy Goal    Goal Priority Disciplines Outcome Interventions   Occupational Therapy Goal   (Resolved)     OT, PT/OT Outcome(s) achieved                    Reasons for Discontinuation of Therapy Services  Transfer to alternate level of care.      Plan:     Patient Discharged to: Home with Home Health Service    DESTINEE Parks  2/27/2018

## 2018-03-09 NOTE — TELEPHONE ENCOUNTER
[address] [facility], 
[address], [address] [facility] 
[phone] Patient: Jayla Salas MRN: [id number] Elza Cline Visit Information Date & Time Provider Department Dept. Phone Encounter #  
 3/9/2018 11:00 AM Paulette Crowe MD Neurology Clinic at [facility] [phone] [id number] Your Appointments 4/10/2018 12:45 PM  
Any with Liliana Metzger MD  
[facility] ([facility]) Appt Note: follow up 6wks [address] [place] [address]. [place] [id number] [phone]  
  
   
 [address] [place] [address]. [address] [place] [id number] Upcoming Health Maintenance Date Due DTaP/Tdap/Td series (1 - Tdap) 8/19/1950 ZOSTER VACCINE AGE 60> 6/19/1989 GLAUCOMA SCREENING Q2Y 8/19/1994 Bone Densitometry (Dexa) Screening 8/19/1994 Influenza Age 5 to Adult 8/1/2017 Pneumococcal 65+ Low/Medium Risk (2 of 2 - PPSV23) 12/5/2018 MEDICARE YEARLY EXAM 12/6/2018 Allergies as of 3/9/2018  Review Complete On: 3/9/2018 By: Paulette Crowe MD  
 No Known Allergies Current Immunizations  Reviewed on 12/6/2017 Name Date Pneumococcal Conjugate (PCV-13) 12/5/2017 Not reviewed this visit You Were Diagnosed With   
  
 Codes Comments Late onset Alzheimer's disease without behavioral disturbance    -  Primary ICD-10-CM: G30.1, F02.80 ICD-9-CM: 331.0, 294.10 Vitals BP Pulse Height(growth percentile) Weight(growth percentile) SpO2 BMI  
 110/80 79 5' 1\" (1.549 m) 120 lb (54.4 kg) 97% 22.67 kg/m2 OB Status Smoking Status Postmenopausal Never Smoker Vitals History BMI and BSA Data Body Mass Index Body Surface Area  
 22.67 kg/m 2 1.53 m 2 Preferred Pharmacy Pharmacy Name Phone [address] [facility], [address] [facility] [address] I have called the patient, spoke to Mrs. Hopper, explained to her that due to high risk for transplant surgery and due to compliance issues, liver transplant is denied. She was disappointed, but accepted the decision.  She will bring him to hepatology clinic and IR for paracentesis.     Your Updated Medication List  
  
   
This list is accurate as of 3/9/18 11:31 AM.  Always use your most recent med list.  
  
  
  
  
 acetaminophen 500 mg tablet Commonly known as:  TYLENOL Take 1 Tab by mouth two (2) times a day. baclofen 10 mg tablet Commonly known as:  LIORESAL Take 1 Tab by mouth two (2) times daily as needed. dilTIAZem  mg Tb24 tablet Commonly known as:  CARDIZEM LA Take 240 mg by mouth daily. losartan 50 mg tablet Commonly known as:  COZAAR Take  by mouth daily. lutein 6 mg Tab Take  by mouth.  
  
 memantine 10 mg tablet Commonly known as:  Diannia Octavia Take 1 Tab by mouth daily. metoprolol succinate 50 mg XL tablet Commonly known as:  TOPROL-XL Take  by mouth daily. mirtazapine 7.5 mg tablet Commonly known as:  Aleene Short Take 1 Tab by mouth nightly. oxybutynin 5 mg tablet Commonly known as:  WZGQLIDT Take 1 Tab by mouth two (2) times a day. SENOKOT 8.6 mg tablet Generic drug:  senna Take 1 Tab by mouth daily. Take 2 tablets daily. VITAMIN D3 2,000 unit Tab Generic drug:  cholecalciferol (vitamin D3) Take  by mouth. XARELTO 15 mg Tab tablet Generic drug:  rivaroxaban Take  by mouth daily. To-Do List   
 03/16/2018 Imaging:  CT HEAD WO CONT Patient Instructions PRESCRIPTION REFILL POLICY [facility] Statement to Patients April 1, 2014 In an effort to ensure the large volume of patient prescription refills is processed in the most efficient and expeditious manner, we are asking our patients to assist us by calling your Pharmacy for all prescription refills, this will include also your  Mail Order Pharmacy. The pharmacy will contact our office electronically to continue the refill process. Please do not wait until the last minute to call your pharmacy.  We need at least 48 hours (2days) to fill prescriptions. We also encourage you to call your pharmacy before going to  your prescription to make sure it is ready. With regard to controlled substance prescription refill requests (narcotic refills) that need to be picked up at our office, we ask your cooperation by providing us with at least 72 hours (3days) notice that you will need a refill. We will not refill narcotic prescription refill requests after 4:00pm on any weekday, Monday through Thursday, or after 2:00pm on Fridays, or on the weekends. We encourage everyone to explore another way of getting your prescription refill request processed using [employer], our patient web portal through our electronic medical record system. [employer] is an efficient and effective way to communicate your medication request directly to the office and  downloadable as an darien on your smart phone . [employer] also features a review functionality that allows you to view your medication list as well as leave messages for your physician. Are you ready to get connected? If so please review the attatched instructions or speak to any of our staff to get you set up right away! Thank you so much for your cooperation. Should you have any questions please contact our Practice Administrator. The Physicians and Staff,  [facility] Please provide this summary of care documentation to your next provider. Your primary care clinician is listed as Janeth Frederick. If you have any questions after today's visit, please call [phone].

## 2018-03-22 ENCOUNTER — TELEPHONE (OUTPATIENT)
Dept: SMOKING CESSATION | Facility: CLINIC | Age: 58
End: 2018-03-22

## 2018-03-28 ENCOUNTER — TELEPHONE (OUTPATIENT)
Dept: SMOKING CESSATION | Facility: CLINIC | Age: 58
End: 2018-03-28

## 2020-05-22 NOTE — ANESTHESIA POSTPROCEDURE EVALUATION
"Anesthesia Post Evaluation    Patient: Tommy Hopper    Procedure(s) Performed: Procedure(s) (LRB):  ESOPHAGOGASTRODUODENOSCOPY (EGD) (N/A)  COLONOSCOPY (N/A)    Final Anesthesia Type: general  Patient location during evaluation: GI PACU  Patient participation: Yes- Able to Participate  Level of consciousness: awake and alert  Post-procedure vital signs: reviewed and stable  Pain management: adequate  Airway patency: patent  PONV status at discharge: No PONV  Anesthetic complications: no      Cardiovascular status: blood pressure returned to baseline  Respiratory status: unassisted  Hydration status: euvolemic  Follow-up not needed.        Visit Vitals    BP 93/60 (BP Location: Left arm, Patient Position: Sitting, BP Method: Automatic)    Pulse 72    Temp 36.6 °C (97.8 °F) (Oral)    Resp 16    Ht 5' 9" (1.753 m)    Wt 72.6 kg (160 lb)    SpO2 99%    BMI 23.63 kg/m2       Pain/Sandra Score: Pain Assessment Performed: Yes (3/9/2017 11:20 AM)  Presence of Pain: denies (3/9/2017 11:20 AM)  Pain Rating Prior to Med Admin: 0 (3/9/2017 11:20 AM)  Sandra Score: 10 (3/9/2017 11:20 AM)      " Called client to confirm video appointment on 5/27/2020 with Tonja graves and schedule more appointments. No answer, left vm with appointment reminder and phone number to schedule
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
## 2021-07-01 ENCOUNTER — PATIENT MESSAGE (OUTPATIENT)
Dept: ADMINISTRATIVE | Facility: OTHER | Age: 61
End: 2021-07-01

## 2021-10-27 NOTE — PT/OT/SLP PROGRESS
Physical Therapy      Tommy Hopper  MRN: [id number]    Patient not seen today secondary to patient very lethargic, low heart rate, and rapid response called in PM.  Will follow-up according to PT POC.    Tali Lee, PT   7/12/2017       Spoke with patient briefly prior to treatment, he states about 3:00 AM this morning he had an episode of having vertigo and vomiting. I asked him if he ate something different that his usual, he states no. He states he vomited water prior to coming here, educated pt not drink his water too fast.  I notified Dr Denise Cross and suggested for pt to make an appointment with his PCP, I relayed the information to the pt he verbalizes understanding, and states I will call today.

## 2022-09-26 NOTE — PROGRESS NOTES
Refill request from patient for the medication listed below.    Patient was last seen 09/22/22 for a New Patient.  Next appt 10/28/22.    Writer does not have access to PDMP, please review PDMP and update prescription.    Routed to on call provider, Dr Madrid, in Dr Mejia's absence.     Last written as    amphetamine-dextroamphetamine (Adderall) 20 MG tablet 90 tablet 0 8/30/2022 9/29/2022    Sig - Route: Take 1 tablet by mouth 3 times daily. - Oral    Sent to pharmacy as: Amphetamine-Dextroamphetamine 20 MG Oral Tablet (Adderall)    Class: Eprescribe    Earliest Fill Date: 8/30/2022      [facility]   Call when complete?  no     Report given to Stephanie, 10th floor nurse receiving pt. Wife at bedside. Pt resting comfortably, transferred in bed with belonging. VSS on RA.

## 2022-10-15 NOTE — PROGRESS NOTES
C3 nurse attempted to contact patient. The following occurred:   C3 nurse attempted to contact Tommy Hopper  for a TCC post hospital discharge follow up call. The patient is unable to conduct the call @ this time. The patient requested a callback.    The patient does not have a scheduled HOSFU appointment within 7-14 days post hospital discharge date 6/13/17.Unable to Message Physician staff to assist with HOSFU appointment scheduling. Patient do not have a PCP at this time        
11
